# Patient Record
Sex: FEMALE | Race: OTHER | HISPANIC OR LATINO | Employment: FULL TIME | URBAN - METROPOLITAN AREA
[De-identification: names, ages, dates, MRNs, and addresses within clinical notes are randomized per-mention and may not be internally consistent; named-entity substitution may affect disease eponyms.]

---

## 2017-07-11 ENCOUNTER — OFFICE VISIT (OUTPATIENT)
Dept: URGENT CARE | Facility: MEDICAL CENTER | Age: 52
End: 2017-07-11
Payer: COMMERCIAL

## 2017-07-11 ENCOUNTER — APPOINTMENT (OUTPATIENT)
Dept: RADIOLOGY | Facility: MEDICAL CENTER | Age: 52
End: 2017-07-11
Payer: COMMERCIAL

## 2017-07-11 DIAGNOSIS — M25.519 PAIN IN SHOULDER: ICD-10-CM

## 2017-07-11 PROCEDURE — 99204 OFFICE O/P NEW MOD 45 MIN: CPT

## 2017-07-11 PROCEDURE — 73030 X-RAY EXAM OF SHOULDER: CPT

## 2018-01-15 NOTE — MISCELLANEOUS
Message  Message Free Text Note Form: I left a voice mail message for Karen regarding the referral she received from Dr Loco Jackson for the [facility] Program       Active Problems    1  Benign essential hypertension (401 1) (I10)   2  Change in bowel habits (787 99) (R19 4)   3  Diabetes mellitus type 2, uncontrolled (250 02) (E11 65)   4  Diabetes mellitus, type 2 (250 00) (E11 9)   5  Diabetic Peripheral Neuropathy (357 2)   6  Esophageal reflux (530 81) (K21 9)   7  Hyperlipidemia (272 4) (E78 5)   8  Obesity (278 00) (E66 9)   9  Upper respiratory infection (465 9) (J06 9)   10  Vitamin D deficiency (268 9) (E55 9)    Current Meds   1  Atorvastatin Calcium 20 MG Oral Tablet; TAKE 1 TABLET DAILY; Therapy: 51VCA8634 to (Evaluate:19Mar2014)  Requested for: 49SSX6243; Last   Rx:89Ihs5098 Ordered   2  BD Pen Needle Zhane U/F 32G X 4 MM Miscellaneous; Use 4/day; Therapy: 28Apr2016 to (Meng Perry)  Requested for: 28Apr2016; Last   Rx:28Apr2016 Ordered   3  Fluticasone Propionate 50 MCG/ACT Nasal Suspension; USE 2 SPRAYS IN EACH   NOSTRIL DAILY; Therapy: 02Apr2014 to (Evaluate:01Jun2014)  Requested for: 02Apr2014; Last   Rx:02Apr2014 Ordered   4  Lantus SoloStar 100 UNIT/ML Subcutaneous Solution Pen-injector; 30 units sq qhs; Therapy: 28Apr2016 to (Evaluate:58Zrd1945)  Requested for: 28Apr2016; Last   Rx:28Apr2016 Ordered   5  Lisinopril 40 MG Oral Tablet; TAKE 1 TABLET DAILY AS DIRECTED; Therapy: 04PTT8540 to (Porter Ventura)  Requested for: 40FUZ9450; Last   Rx:06Mar2013 Ordered   6  Metoprolol Tartrate 25 MG Oral Tablet; Take 1 tablet twice daily; Therapy: 97YFT2877 to (Evaluate:03Obs2761)  Requested for: 45RIG5351; Last   Rx:16Jun2014 Ordered   7  NovoLOG FlexPen 100 UNIT/ML Subcutaneous Solution Pen-injector; 8 units before   meals; Therapy: 28Apr2016 to (Evaluate:64Igz9096)  Requested for: 28Apr2016; Last   Rx:28Apr2016 Ordered   8   Omeprazole 20 MG Oral Capsule Delayed Release; TAKE 1 CAPSULE BY MOUTH ONE   TIME DAILY AS NEEDED FOR GERD; Therapy: 02Apr2014 to (Evaluate:07Jan2015)  Requested for: 09Sep2014; Last   Rx:32Pil9948 Ordered    Allergies    1  Penicillins    2   Shellfish    Signatures   Electronically signed by : Shamir Rasheed, ; May  3 2016  3:18PM EST                       (Author)

## 2018-02-22 ENCOUNTER — OFFICE VISIT (OUTPATIENT)
Dept: OBGYN CLINIC | Facility: CLINIC | Age: 53
End: 2018-02-22
Payer: COMMERCIAL

## 2018-02-22 VITALS — DIASTOLIC BLOOD PRESSURE: 70 MMHG | WEIGHT: 200 LBS | SYSTOLIC BLOOD PRESSURE: 117 MMHG | BODY MASS INDEX: 40.4 KG/M2

## 2018-02-22 DIAGNOSIS — N64.4 BREAST PAIN, LEFT: Primary | ICD-10-CM

## 2018-02-22 DIAGNOSIS — N63.20 MASS OF BREAST, LEFT: ICD-10-CM

## 2018-02-22 PROCEDURE — 99204 OFFICE O/P NEW MOD 45 MIN: CPT | Performed by: OBSTETRICS & GYNECOLOGY

## 2018-02-22 RX ORDER — ACETAMINOPHEN AND CODEINE PHOSPHATE 300; 30 MG/1; MG/1
2 TABLET ORAL EVERY 6 HOURS PRN
Qty: 30 TABLET | Refills: 0 | Status: SHIPPED | OUTPATIENT
Start: 2018-02-22 | End: 2019-09-11 | Stop reason: ALTCHOICE

## 2018-02-22 NOTE — PROGRESS NOTES
Assessment/Plan:   Left breast mass with pain and tenderness     Plan  1  Diagnostic ultrasound left breast  2  Screening mammography bilateral with CAD  3  Warm compresses 3-4 times daily   4  Tylenol with codeine, alternate with Motrin p r n   5   Trial of antibiotics cephalexin 500 mg twice daily  Follow-up in 2 weeks and p r n  Schedule annual exam      Diagnoses and all orders for this visit:    Breast pain, left  -     US breast left limited (diagnostic); Future  -     Mammo diagnostic bilateral w cad; Future  -     acetaminophen-codeine (TYLENOL #3) 300-30 mg per tablet; Take 2 tablets by mouth every 6 (six) hours as needed for moderate pain    Mass of breast, left       Past Medical history   x2  Insulin-dependent diabetes  Hypertension    Subjective:     Patient ID: Cora Verma is a 50 y o  female  HPI   82-year-old female  5 para  here with recent onset of left breast pain and swelling beneath her left Areola  Palpable lesion feel cystic 4 x 5 cm  No nipple discharge, no supraclavicular or axillary lymphadenopathy detected  Patient has not had a mammogram in over 2 years  Review of Systems   All other systems reviewed and are negative  Objective:     Physical Exam   Constitutional: She appears well-developed  HENT:   Head: Normocephalic  Eyes: Pupils are equal, round, and reactive to light  Pulmonary/Chest: Right breast exhibits no inverted nipple, no mass, no nipple discharge, no skin change and no tenderness  Left breast exhibits mass and tenderness  Left breast exhibits no inverted nipple, no nipple discharge and no skin change

## 2018-02-23 ENCOUNTER — TELEPHONE (OUTPATIENT)
Dept: OBGYN CLINIC | Facility: CLINIC | Age: 53
End: 2018-02-23

## 2018-02-23 DIAGNOSIS — N61.1 BREAST ABSCESS OF FEMALE: Primary | ICD-10-CM

## 2018-02-23 RX ORDER — CEPHALEXIN 500 MG/1
500 CAPSULE ORAL EVERY 12 HOURS SCHEDULED
Qty: 10 CAPSULE | Refills: 1 | Status: SHIPPED | OUTPATIENT
Start: 2018-02-23 | End: 2018-02-28

## 2018-03-13 ENCOUNTER — CONVERSION ENCOUNTER (OUTPATIENT)
Dept: MAMMOGRAPHY | Facility: CLINIC | Age: 53
End: 2018-03-13

## 2018-03-20 LAB
ABSOL LYMPHOCYTES (HISTORICAL): 3.1 K/UL (ref 0.5–4)
ALBUMIN SERPL BCP-MCNC: 4.1 G/DL (ref 3–5.2)
ALP SERPL-CCNC: 78 U/L (ref 43–122)
ALT SERPL W P-5'-P-CCNC: 40 U/L (ref 9–52)
AMORPHOUS MATERIAL (HISTORICAL): ABNORMAL
ANION GAP SERPL CALCULATED.3IONS-SCNC: 13 MMOL/L (ref 5–14)
AST SERPL W P-5'-P-CCNC: 28 U/L (ref 14–36)
BACTERIA UR QL AUTO: ABNORMAL
BASOPHILS # BLD AUTO: 0.1 K/UL (ref 0–0.1)
BASOPHILS # BLD AUTO: 1 % (ref 0–1)
BILIRUB SERPL-MCNC: 0.2 MG/DL
BILIRUB UR QL STRIP: NEGATIVE MG/DL
BUN SERPL-MCNC: 17 MG/DL (ref 5–25)
CALCIUM SERPL-MCNC: 9.9 MG/DL (ref 8.4–10.2)
CASTS/CASTS TYPE (HISTORICAL): ABNORMAL /LPF
CHLORIDE SERPL-SCNC: 105 MEQ/L (ref 97–108)
CHOLEST SERPL-MCNC: 177 MG/DL
CHOLEST/HDLC SERPL: 3.8 {RATIO}
CLARITY UR: ABNORMAL
CO2 SERPL-SCNC: 22 MMOL/L (ref 22–30)
COLOR UR: ABNORMAL
CREATINE, SERUM (HISTORICAL): 0.7 MG/DL (ref 0.6–1.2)
CRYSTAL TYPE (HISTORICAL): ABNORMAL /HPF
DEPRECATED RDW RBC AUTO: 15.1 %
EGFR (HISTORICAL): >60 ML/MIN/1.73 M2
EOSINOPHIL # BLD AUTO: 0.5 K/UL (ref 0–0.4)
EOSINOPHIL NFR BLD AUTO: 6 % (ref 0–6)
EST. AVERAGE GLUCOSE BLD GHB EST-MCNC: 151 MG/DL
GLUCOSE FASTING (HISTORICAL): 116 MG/DL (ref 70–99)
GLUCOSE UR STRIP-MCNC: 250 MG/DL
HBA1C MFR BLD HPLC: 6.9 %
HCT VFR BLD AUTO: 40.3 % (ref 36–46)
HDLC SERPL-MCNC: 46 MG/DL
HGB BLD-MCNC: 13.4 G/DL (ref 12–16)
HGB UR QL STRIP.AUTO: NEGATIVE
KETONES UR STRIP-MCNC: NEGATIVE MG/DL
LDL/HDL RATIO (HISTORICAL): 1.8
LDLC SERPL CALC-MCNC: 81 MG/DL
LEUKOCYTE ESTERASE UR QL STRIP: ABNORMAL
LYMPHOCYTES NFR BLD AUTO: 33 % (ref 25–45)
MCH RBC QN AUTO: 26.6 PG (ref 26–34)
MCHC RBC AUTO-ENTMCNC: 33.3 % (ref 31–36)
MCV RBC AUTO: 80 FL (ref 80–100)
MONOCYTES # BLD AUTO: 0.6 K/UL (ref 0.2–0.9)
MONOCYTES NFR BLD AUTO: 6 % (ref 1–10)
MUCOUS THREADS URNS QL MICRO: ABNORMAL
NEUTROPHILS ABS COUNT (HISTORICAL): 5.1 K/UL (ref 1.8–7.8)
NEUTS SEG NFR BLD AUTO: 54 % (ref 45–65)
NITRITE UR QL STRIP: NEGATIVE
NON-SQ EPI CELLS URNS QL MICRO: ABNORMAL
OTHER STN SPEC: ABNORMAL
PH UR STRIP.AUTO: 5 [PH] (ref 4.5–8)
PLATELET # BLD AUTO: 409 K/MCL (ref 150–450)
POTASSIUM SERPL-SCNC: 4.4 MEQ/L (ref 3.6–5)
PROT UR STRIP-MCNC: 30 MG/DL
RBC # BLD AUTO: 5.05 M/MCL (ref 4–5.2)
RBC #/AREA URNS AUTO: ABNORMAL /HPF
SODIUM SERPL-SCNC: 140 MEQ/L (ref 137–147)
SP GR UR STRIP.AUTO: 1.02 (ref 1–1.04)
TOTAL PROTEIN (HISTORICAL): 7.5 G/DL (ref 5.9–8.4)
TRIGL SERPL-MCNC: 251 MG/DL
TSH SERPL DL<=0.05 MIU/L-ACNC: 3.52 UIU/ML (ref 0.47–4.68)
URIC ACID (HISTORICAL): 7.7 MG/DL (ref 2.5–7.5)
UROBILINOGEN UR QL STRIP.AUTO: NEGATIVE MG/DL (ref 0–1)
VIT B12 SERPL-MCNC: 505 PG/ML (ref 239–931)
VITAMIN D25-HYDROXY (HISTORICAL): 21.7 NG/ML (ref 30–100)
VLDLC SERPL CALC-MCNC: 50 MG/DL (ref 0–40)
WBC # BLD AUTO: 9.4 K/MCL (ref 4.5–11)
WBC #/AREA URNS AUTO: 6 /HPF

## 2018-05-04 DIAGNOSIS — N61.0 MASTITIS: Primary | ICD-10-CM

## 2018-05-04 NOTE — PROGRESS NOTES
Please notify patient ultrasound of left breast is stable, no suspicious findings, however recommended repeat diagnostic ultrasound of left breast in 6 months  Prescription ordered

## 2018-05-30 LAB
ABSOL LYMPHOCYTES (HISTORICAL): 3.3 K/UL (ref 0.5–4)
ALBUMIN SERPL BCP-MCNC: 4 G/DL (ref 3–5.2)
ALP SERPL-CCNC: 75 U/L (ref 43–122)
ALT SERPL W P-5'-P-CCNC: 23 U/L (ref 9–52)
ANION GAP SERPL CALCULATED.3IONS-SCNC: 12 MMOL/L (ref 5–14)
AST SERPL W P-5'-P-CCNC: 18 U/L (ref 14–36)
BASOPHILS # BLD AUTO: 0.1 K/UL (ref 0–0.1)
BASOPHILS # BLD AUTO: 1 % (ref 0–1)
BILIRUB SERPL-MCNC: 0.3 MG/DL
BUN SERPL-MCNC: 18 MG/DL (ref 5–25)
CALCIUM SERPL-MCNC: 9.4 MG/DL (ref 8.4–10.2)
CHLORIDE SERPL-SCNC: 109 MEQ/L (ref 97–108)
CHOLEST SERPL-MCNC: 148 MG/DL
CHOLEST/HDLC SERPL: 3.4 {RATIO}
CO2 SERPL-SCNC: 20 MMOL/L (ref 22–30)
CREATINE, SERUM (HISTORICAL): 0.89 MG/DL (ref 0.6–1.2)
CREATININE, RANDOM URINE (HISTORICAL): 273.4 MG/DL (ref 50–200)
DEPRECATED RDW RBC AUTO: 15.2 %
EGFR (HISTORICAL): >60 ML/MIN/1.73 M2
EOSINOPHIL # BLD AUTO: 0.5 K/UL (ref 0–0.4)
EOSINOPHIL NFR BLD AUTO: 6 % (ref 0–6)
EST. AVERAGE GLUCOSE BLD GHB EST-MCNC: 146 MG/DL
GLUCOSE FASTING (HISTORICAL): 120 MG/DL (ref 70–99)
HBA1C MFR BLD HPLC: 6.7 %
HCT VFR BLD AUTO: 41.6 % (ref 36–46)
HDLC SERPL-MCNC: 43 MG/DL
HGB BLD-MCNC: 13.4 G/DL (ref 12–16)
LDL/HDL RATIO (HISTORICAL): 1.5
LDLC SERPL CALC-MCNC: 64 MG/DL
LYMPHOCYTES NFR BLD AUTO: 33 % (ref 25–45)
MCH RBC QN AUTO: 26.9 PG (ref 26–34)
MCHC RBC AUTO-ENTMCNC: 32.3 % (ref 31–36)
MCV RBC AUTO: 83 FL (ref 80–100)
MICROALBUM.,U,RANDOM (HISTORICAL): 10.5 MG/DL
MICROALBUMIN/CREATININE RATIO (HISTORICAL): 38.4
MONOCYTES # BLD AUTO: 0.7 K/UL (ref 0.2–0.9)
MONOCYTES NFR BLD AUTO: 7 % (ref 1–10)
NEUTROPHILS ABS COUNT (HISTORICAL): 5.2 K/UL (ref 1.8–7.8)
NEUTS SEG NFR BLD AUTO: 53 % (ref 45–65)
PLATELET # BLD AUTO: 413 K/MCL (ref 150–450)
POTASSIUM SERPL-SCNC: 4.6 MEQ/L (ref 3.6–5)
RBC # BLD AUTO: 5.01 M/MCL (ref 4–5.2)
SODIUM SERPL-SCNC: 142 MEQ/L (ref 137–147)
TOTAL PROTEIN (HISTORICAL): 7.3 G/DL (ref 5.9–8.4)
TRIGL SERPL-MCNC: 204 MG/DL
TSH SERPL DL<=0.05 MIU/L-ACNC: 4.02 UIU/ML (ref 0.47–4.68)
URIC ACID (HISTORICAL): 6.1 MG/DL (ref 2.5–7.5)
VIT B12 SERPL-MCNC: >1000 PG/ML (ref 239–931)
VITAMIN D25-HYDROXY (HISTORICAL): 30.4 NG/ML (ref 30–100)
VLDLC SERPL CALC-MCNC: 41 MG/DL (ref 0–40)
WBC # BLD AUTO: 9.8 K/MCL (ref 4.5–11)

## 2018-07-24 ENCOUNTER — APPOINTMENT (OUTPATIENT)
Dept: LAB | Facility: HOSPITAL | Age: 53
End: 2018-07-24
Attending: INTERNAL MEDICINE
Payer: COMMERCIAL

## 2018-07-24 ENCOUNTER — TRANSCRIBE ORDERS (OUTPATIENT)
Dept: ADMINISTRATIVE | Facility: HOSPITAL | Age: 53
End: 2018-07-24

## 2018-07-24 DIAGNOSIS — E78.5 DYSLIPIDEMIA: ICD-10-CM

## 2018-07-24 DIAGNOSIS — G62.9 NEUROPATHY: ICD-10-CM

## 2018-07-24 DIAGNOSIS — E11.29 TYPE 2 DIABETES MELLITUS WITH OTHER DIABETIC KIDNEY COMPLICATION (HCC): ICD-10-CM

## 2018-07-24 DIAGNOSIS — E11.29 TYPE 2 DIABETES MELLITUS WITH OTHER DIABETIC KIDNEY COMPLICATION (HCC): Primary | ICD-10-CM

## 2018-07-24 DIAGNOSIS — I15.2 HYPERTENSION DUE TO ENDOCRINE DISORDER: ICD-10-CM

## 2018-07-24 LAB
ALBUMIN SERPL BCP-MCNC: 4.2 G/DL (ref 3–5.2)
ALP SERPL-CCNC: 68 U/L (ref 43–122)
ALT SERPL W P-5'-P-CCNC: 23 U/L (ref 9–52)
ANION GAP SERPL CALCULATED.3IONS-SCNC: 11 MMOL/L (ref 5–14)
AST SERPL W P-5'-P-CCNC: 20 U/L (ref 14–36)
BILIRUB SERPL-MCNC: 0.3 MG/DL
BUN SERPL-MCNC: 22 MG/DL (ref 5–25)
CALCIUM SERPL-MCNC: 9.3 MG/DL (ref 8.4–10.2)
CHLORIDE SERPL-SCNC: 108 MMOL/L (ref 97–108)
CO2 SERPL-SCNC: 22 MMOL/L (ref 22–30)
CREAT SERPL-MCNC: 0.88 MG/DL (ref 0.6–1.2)
EST. AVERAGE GLUCOSE BLD GHB EST-MCNC: 134 MG/DL
GFR SERPL CREATININE-BSD FRML MDRD: 77 ML/MIN/1.73SQ M
GLUCOSE P FAST SERPL-MCNC: 96 MG/DL (ref 70–99)
HBA1C MFR BLD: 6.3 % (ref 4.2–6.3)
POTASSIUM SERPL-SCNC: 4.5 MMOL/L (ref 3.6–5)
PROT SERPL-MCNC: 8.1 G/DL (ref 5.9–8.4)
SODIUM SERPL-SCNC: 141 MMOL/L (ref 137–147)

## 2018-07-24 PROCEDURE — 36415 COLL VENOUS BLD VENIPUNCTURE: CPT

## 2018-07-24 PROCEDURE — 83036 HEMOGLOBIN GLYCOSYLATED A1C: CPT

## 2018-07-24 PROCEDURE — 80053 COMPREHEN METABOLIC PANEL: CPT

## 2018-09-19 ENCOUNTER — TELEPHONE (OUTPATIENT)
Dept: OBGYN CLINIC | Facility: CLINIC | Age: 53
End: 2018-09-19

## 2018-09-19 NOTE — TELEPHONE ENCOUNTER
----- Message from Shanna Mitchell DO sent at 9/19/2018  9:56 AM EDT -----  Regarding: Mastitis  This patient was seen  6 months ago for this initial problem if it has recurred again she does need to be seen in the office at her earliest convenience prior to being treated

## 2018-09-19 NOTE — TELEPHONE ENCOUNTER
Patient feels as if she has mastitis again and would like to know if you would like to see her or if you can just send in a script to [facility] in [facility]

## 2018-09-20 ENCOUNTER — OFFICE VISIT (OUTPATIENT)
Dept: OBGYN CLINIC | Facility: CLINIC | Age: 53
End: 2018-09-20
Payer: COMMERCIAL

## 2018-09-20 VITALS
BODY MASS INDEX: 37.29 KG/M2 | WEIGHT: 185 LBS | SYSTOLIC BLOOD PRESSURE: 132 MMHG | HEIGHT: 59 IN | DIASTOLIC BLOOD PRESSURE: 80 MMHG

## 2018-09-20 DIAGNOSIS — N61.1 ABSCESS OF RIGHT BREAST: Primary | ICD-10-CM

## 2018-09-20 PROCEDURE — 99214 OFFICE O/P EST MOD 30 MIN: CPT | Performed by: OBSTETRICS & GYNECOLOGY

## 2018-09-20 RX ORDER — AMITRIPTYLINE HYDROCHLORIDE 10 MG/1
TABLET, FILM COATED ORAL EVERY 12 HOURS
COMMUNITY
Start: 2016-02-26 | End: 2019-09-11 | Stop reason: SDDI

## 2018-09-20 RX ORDER — METOPROLOL TARTRATE 50 MG/1
TABLET, FILM COATED ORAL EVERY 12 HOURS
COMMUNITY
Start: 2016-04-20 | End: 2018-09-20

## 2018-09-20 RX ORDER — LISINOPRIL 20 MG/1
TABLET ORAL EVERY 24 HOURS
COMMUNITY
Start: 2015-08-05 | End: 2018-09-20

## 2018-09-20 RX ORDER — MOMETASONE FUROATE 50 UG/1
SPRAY, METERED NASAL
COMMUNITY
Start: 2015-11-09 | End: 2018-09-20

## 2018-09-20 RX ORDER — INSULIN GLARGINE 100 [IU]/ML
INJECTION, SOLUTION SUBCUTANEOUS
COMMUNITY
Start: 2015-08-05 | End: 2019-09-11

## 2018-09-20 RX ORDER — GABAPENTIN 100 MG/1
CAPSULE ORAL EVERY 12 HOURS
COMMUNITY
Start: 2016-05-24 | End: 2019-09-11 | Stop reason: SDDI

## 2018-09-20 RX ORDER — GABAPENTIN 300 MG/1
CAPSULE ORAL 3 TIMES DAILY
COMMUNITY
End: 2018-09-20

## 2018-09-20 RX ORDER — LORATADINE AND PSEUDOEPHEDRINE 10; 240 MG/1; MG/1
TABLET, EXTENDED RELEASE ORAL EVERY 24 HOURS
COMMUNITY
Start: 2016-01-14 | End: 2018-09-20

## 2018-09-20 RX ORDER — CEPHALEXIN 250 MG/1
250 CAPSULE ORAL 4 TIMES DAILY
Qty: 40 CAPSULE | Refills: 0 | Status: SHIPPED | OUTPATIENT
Start: 2018-09-20 | End: 2018-09-30

## 2018-09-20 RX ORDER — PREGABALIN 75 MG/1
CAPSULE ORAL
COMMUNITY
Start: 2016-02-26 | End: 2018-09-20

## 2018-09-20 RX ORDER — DULOXETIN HYDROCHLORIDE 30 MG/1
CAPSULE, DELAYED RELEASE ORAL
COMMUNITY
Start: 2016-03-31 | End: 2018-09-20

## 2018-09-20 NOTE — PROGRESS NOTES
Assessment/Plan:      Diagnoses and all orders for this visit:    Abscess of right breast  Comments: To start Keflex immediately along with warm compresses  Patient to follow up this coming Monday or Tuesday  To call the  imediately for worsening  Orders:  -     US breast right limited (diagnostic); Future  -     cephalexin (KEFLEX) 250 mg capsule; Take 1 capsule (250 mg total) by mouth 4 (four) times a day for 10 days    Other orders  -     Insulin Pen Needle 32G X 5 MM MISC; Inject at bedtime   -     Discontinue: mometasone (NASONEX) 50 mcg/act nasal spray; spray 2 spray by intranasal route  every day in each nostril  -     Discontinue: pregabalin (LYRICA) 75 mg capsule; take 1 capsule by oral route in am 2 capsules at bedtime  -     Discontinue: gabapentin (NEURONTIN) 300 mg capsule; 3 (three) times a day  -     gabapentin (NEURONTIN) 100 mg capsule; Every 12 hours  -     Ergocalciferol 2000 units CAPS; take 1 capsule by oral route  every week  -     Discontinue: DULoxetine (CYMBALTA) 30 mg delayed release capsule; take 2 capsule by oral route at bedtime or as directed  -     Discontinue: loratadine-pseudoephedrine (CLARITIN-D 24 HOUR)  mg per 24 hr tablet; every 24 hours  -     Exenatide ER (BYDUREON) 2 MG PEN; inject 0 65 milliliter by subcutaneous route  every 7 days in the abdomen, thighs, or outer area of upper arm rotating injectionsites  -     amitriptyline (ELAVIL) 10 mg tablet; Every 12 hours  -     insulin glargine (LANTUS) 100 units/mL subcutaneous injection; inject by subcutaneous route 25 units at bedtime  -     Discontinue: lisinopril (ZESTRIL) 20 mg tablet; every 24 hours  -     Discontinue: metoprolol tartrate (LOPRESSOR) 50 mg tablet; Every 12 hours          Subjective:     Patient ID: Paulina Caurso is a 48 y o  female  This patient is seen with a several day history of pain and swelling of the right breast in addition to a low-grade fever (99)     The patient last year did have a breast abscess of the left breast   She feels she has the same on her right side  She does have diabetes which is under fairly tight control  She denies any history of trauma or skin changes  Currently there is no breast discharge  Review of Systems   Constitutional: Positive for fever  Negative for chills  Respiratory: Negative  Cardiovascular: Negative  Gastrointestinal: Negative  Negative for abdominal pain, blood in stool, constipation, diarrhea and nausea  Genitourinary: Negative  Negative for difficulty urinating, dysuria, flank pain, hematuria and urgency  Skin: Negative  Negative for rash and wound  Neurological: Negative  Objective:     Physical Exam  the breasts are symmetric but the right definitely shows erythema from the 9 to 1 o'clock area  Palpation of the right breast discloses a large cyst-like area underlying the area of erythema encompassing an area roughly the size of a plum  The area is painful to palpation  There are no other overlying skin changes or masses  The right axillary area and right supraclavicular area are free of any lymphadenopathy  Palpation an exam of the left breast fails to show any abnormalities

## 2018-09-21 ENCOUNTER — TELEPHONE (OUTPATIENT)
Dept: OBGYN CLINIC | Facility: CLINIC | Age: 53
End: 2018-09-21

## 2018-09-21 NOTE — TELEPHONE ENCOUNTER
----- Message from Vivek Roa MD sent at 9/20/2018  4:50 PM EDT -----  Regarding: Patient's right breast abscess  Please have someone touch base with this person late Friday or Saturday to see how they are doing

## 2018-09-26 ENCOUNTER — TELEPHONE (OUTPATIENT)
Dept: OBGYN CLINIC | Facility: CLINIC | Age: 53
End: 2018-09-26

## 2018-09-26 NOTE — TELEPHONE ENCOUNTER
----- Message from Lilia Durand MD sent at 9/20/2018  4:50 PM EDT -----  Regarding: Patient's right breast abscess  Please have someone touch base with this person late Friday or Saturday to see how they are doing

## 2018-10-20 ENCOUNTER — TRANSCRIBE ORDERS (OUTPATIENT)
Dept: ADMINISTRATIVE | Facility: HOSPITAL | Age: 53
End: 2018-10-20

## 2018-10-23 ENCOUNTER — TRANSCRIBE ORDERS (OUTPATIENT)
Dept: ADMINISTRATIVE | Facility: HOSPITAL | Age: 53
End: 2018-10-23

## 2018-10-23 DIAGNOSIS — E53.8 BIOTIN-(PROPIONYL-COA-CARBOXYLASE) LIGASE DEFICIENCY: ICD-10-CM

## 2018-10-23 DIAGNOSIS — E11.22 TYPE 2 DIABETES MELLITUS WITH DIABETIC CHRONIC KIDNEY DISEASE, UNSPECIFIED CKD STAGE, UNSPECIFIED WHETHER LONG TERM INSULIN USE (HCC): ICD-10-CM

## 2018-10-23 DIAGNOSIS — E55.9 AVITAMINOSIS D: Primary | ICD-10-CM

## 2018-10-25 ENCOUNTER — APPOINTMENT (OUTPATIENT)
Dept: LAB | Facility: HOSPITAL | Age: 53
End: 2018-10-25
Attending: INTERNAL MEDICINE
Payer: COMMERCIAL

## 2018-10-25 DIAGNOSIS — E53.8 BIOTIN-(PROPIONYL-COA-CARBOXYLASE) LIGASE DEFICIENCY: ICD-10-CM

## 2018-10-25 DIAGNOSIS — E11.22 TYPE 2 DIABETES MELLITUS WITH DIABETIC CHRONIC KIDNEY DISEASE, UNSPECIFIED CKD STAGE, UNSPECIFIED WHETHER LONG TERM INSULIN USE (HCC): ICD-10-CM

## 2018-10-25 DIAGNOSIS — E55.9 AVITAMINOSIS D: ICD-10-CM

## 2018-10-25 LAB
25(OH)D3 SERPL-MCNC: 52.8 NG/ML (ref 30–100)
ALBUMIN SERPL BCP-MCNC: 4.3 G/DL (ref 3–5.2)
ALP SERPL-CCNC: 90 U/L (ref 43–122)
ALT SERPL W P-5'-P-CCNC: 20 U/L (ref 9–52)
ANION GAP SERPL CALCULATED.3IONS-SCNC: 12 MMOL/L (ref 5–14)
AST SERPL W P-5'-P-CCNC: 23 U/L (ref 14–36)
BACTERIA UR QL AUTO: ABNORMAL /HPF
BASOPHILS # BLD AUTO: 0 THOUSANDS/ΜL (ref 0–0.1)
BASOPHILS NFR BLD AUTO: 1 % (ref 0–1)
BILIRUB SERPL-MCNC: 0.4 MG/DL
BILIRUB UR QL STRIP: NEGATIVE
BUN SERPL-MCNC: 16 MG/DL (ref 5–25)
CALCIUM SERPL-MCNC: 10 MG/DL (ref 8.4–10.2)
CHLORIDE SERPL-SCNC: 107 MMOL/L (ref 97–108)
CHOLEST SERPL-MCNC: 136 MG/DL
CLARITY UR: ABNORMAL
CO2 SERPL-SCNC: 22 MMOL/L (ref 22–30)
COLOR UR: ABNORMAL
CREAT SERPL-MCNC: 1.01 MG/DL (ref 0.6–1.2)
EOSINOPHIL # BLD AUTO: 0.5 THOUSAND/ΜL (ref 0–0.4)
EOSINOPHIL NFR BLD AUTO: 7 % (ref 0–6)
ERYTHROCYTE [DISTWIDTH] IN BLOOD BY AUTOMATED COUNT: 14.4 %
EST. AVERAGE GLUCOSE BLD GHB EST-MCNC: 140 MG/DL
GFR SERPL CREATININE-BSD FRML MDRD: 65 ML/MIN/1.73SQ M
GLUCOSE P FAST SERPL-MCNC: 128 MG/DL (ref 70–99)
GLUCOSE UR STRIP-MCNC: ABNORMAL MG/DL
HBA1C MFR BLD: 6.5 % (ref 4.2–6.3)
HCT VFR BLD AUTO: 40 % (ref 36–46)
HDLC SERPL-MCNC: 38 MG/DL (ref 40–59)
HGB BLD-MCNC: 13.4 G/DL (ref 12–16)
HGB UR QL STRIP.AUTO: NEGATIVE
KETONES UR STRIP-MCNC: NEGATIVE MG/DL
LDLC SERPL CALC-MCNC: 63 MG/DL
LEUKOCYTE ESTERASE UR QL STRIP: 25
LYMPHOCYTES # BLD AUTO: 2.5 THOUSANDS/ΜL (ref 0.5–4)
LYMPHOCYTES NFR BLD AUTO: 35 % (ref 20–50)
MCH RBC QN AUTO: 27.8 PG (ref 26–34)
MCHC RBC AUTO-ENTMCNC: 33.4 G/DL (ref 31–36)
MCV RBC AUTO: 83 FL (ref 80–100)
MONOCYTES # BLD AUTO: 0.5 THOUSAND/ΜL (ref 0.2–0.9)
MONOCYTES NFR BLD AUTO: 6 % (ref 1–10)
NEUTROPHILS # BLD AUTO: 3.7 THOUSANDS/ΜL (ref 1.8–7.8)
NEUTS SEG NFR BLD AUTO: 51 % (ref 45–65)
NITRITE UR QL STRIP: NEGATIVE
NON-SQ EPI CELLS URNS QL MICRO: ABNORMAL /HPF
NONHDLC SERPL-MCNC: 98 MG/DL
PH UR STRIP.AUTO: 5 [PH] (ref 4.5–8)
PLATELET # BLD AUTO: 443 THOUSANDS/UL (ref 150–450)
PMV BLD AUTO: 6.7 FL (ref 8.9–12.7)
POTASSIUM SERPL-SCNC: 4.1 MMOL/L (ref 3.6–5)
PROLACTIN SERPL-MCNC: 62.8 NG/ML
PROT SERPL-MCNC: 7.9 G/DL (ref 5.9–8.4)
PROT UR STRIP-MCNC: ABNORMAL MG/DL
RBC # BLD AUTO: 4.81 MILLION/UL (ref 4–5.2)
RBC #/AREA URNS AUTO: ABNORMAL /HPF
SODIUM SERPL-SCNC: 141 MMOL/L (ref 137–147)
SP GR UR STRIP.AUTO: 1.02 (ref 1–1.04)
TRIGL SERPL-MCNC: 174 MG/DL
TSH SERPL DL<=0.05 MIU/L-ACNC: 2.31 UIU/ML (ref 0.47–4.68)
URATE SERPL-MCNC: 5.4 MG/DL (ref 2.7–7.5)
UROBILINOGEN UA: NEGATIVE MG/DL
VIT B12 SERPL-MCNC: 1019 PG/ML (ref 100–900)
WBC # BLD AUTO: 7.2 THOUSAND/UL (ref 4.5–11)
WBC #/AREA URNS AUTO: ABNORMAL /HPF

## 2018-10-25 PROCEDURE — 84550 ASSAY OF BLOOD/URIC ACID: CPT

## 2018-10-25 PROCEDURE — 84443 ASSAY THYROID STIM HORMONE: CPT

## 2018-10-25 PROCEDURE — 81003 URINALYSIS AUTO W/O SCOPE: CPT | Performed by: INTERNAL MEDICINE

## 2018-10-25 PROCEDURE — 83036 HEMOGLOBIN GLYCOSYLATED A1C: CPT

## 2018-10-25 PROCEDURE — 80053 COMPREHEN METABOLIC PANEL: CPT

## 2018-10-25 PROCEDURE — 84146 ASSAY OF PROLACTIN: CPT

## 2018-10-25 PROCEDURE — 36415 COLL VENOUS BLD VENIPUNCTURE: CPT

## 2018-10-25 PROCEDURE — 80061 LIPID PANEL: CPT

## 2018-10-25 PROCEDURE — 85025 COMPLETE CBC W/AUTO DIFF WBC: CPT

## 2018-10-25 PROCEDURE — 81001 URINALYSIS AUTO W/SCOPE: CPT | Performed by: INTERNAL MEDICINE

## 2018-10-25 PROCEDURE — 82306 VITAMIN D 25 HYDROXY: CPT

## 2018-10-25 PROCEDURE — 82607 VITAMIN B-12: CPT

## 2018-11-26 ENCOUNTER — HOSPITAL ENCOUNTER (OUTPATIENT)
Dept: BONE DENSITY | Facility: CLINIC | Age: 53
Discharge: HOME | End: 2018-11-26
Payer: COMMERCIAL

## 2018-11-26 DIAGNOSIS — E55.9 AVITAMINOSIS D: ICD-10-CM

## 2018-11-26 PROCEDURE — 77080 DXA BONE DENSITY AXIAL: CPT

## 2019-03-26 ENCOUNTER — APPOINTMENT (OUTPATIENT)
Dept: LAB | Facility: HOSPITAL | Age: 54
End: 2019-03-26
Attending: INTERNAL MEDICINE
Payer: COMMERCIAL

## 2019-03-26 ENCOUNTER — TRANSCRIBE ORDERS (OUTPATIENT)
Dept: ADMINISTRATIVE | Facility: HOSPITAL | Age: 54
End: 2019-03-26

## 2019-03-26 DIAGNOSIS — IMO0002 TYPE II DIABETES MELLITUS WITH RENAL MANIFESTATIONS, UNCONTROLLED: ICD-10-CM

## 2019-03-26 DIAGNOSIS — I10 ESSENTIAL HYPERTENSION, BENIGN: ICD-10-CM

## 2019-03-26 DIAGNOSIS — E53.8 VITAMIN B12 DEFICIENCY: ICD-10-CM

## 2019-03-26 DIAGNOSIS — IMO0002 TYPE II DIABETES MELLITUS WITH RENAL MANIFESTATIONS, UNCONTROLLED: Primary | ICD-10-CM

## 2019-03-26 DIAGNOSIS — E66.9 OBESITY, UNSPECIFIED CLASSIFICATION, UNSPECIFIED OBESITY TYPE, UNSPECIFIED WHETHER SERIOUS COMORBIDITY PRESENT: ICD-10-CM

## 2019-03-26 LAB
25(OH)D3 SERPL-MCNC: 41.4 NG/ML (ref 30–100)
ALBUMIN SERPL BCP-MCNC: 4.5 G/DL (ref 3–5.2)
ALP SERPL-CCNC: 110 U/L (ref 43–122)
ALT SERPL W P-5'-P-CCNC: 21 U/L (ref 9–52)
ANION GAP SERPL CALCULATED.3IONS-SCNC: 11 MMOL/L (ref 5–14)
AST SERPL W P-5'-P-CCNC: 24 U/L (ref 14–36)
BACTERIA UR QL AUTO: ABNORMAL /HPF
BASOPHILS # BLD AUTO: 0.1 THOUSANDS/ΜL (ref 0–0.1)
BASOPHILS NFR BLD AUTO: 1 % (ref 0–1)
BILIRUB SERPL-MCNC: 0.3 MG/DL
BILIRUB UR QL STRIP: ABNORMAL
BUN SERPL-MCNC: 17 MG/DL (ref 5–25)
CALCIUM ALBUM COR SERPL-MCNC: 9.9 MG/DL (ref 8.3–10.1)
CALCIUM SERPL-MCNC: 10.3 MG/DL (ref 8.4–10.2)
CHLORIDE SERPL-SCNC: 101 MMOL/L (ref 97–108)
CHOLEST SERPL-MCNC: 247 MG/DL
CLARITY UR: ABNORMAL
CO2 SERPL-SCNC: 26 MMOL/L (ref 22–30)
COLOR UR: ABNORMAL
CREAT SERPL-MCNC: 0.75 MG/DL (ref 0.6–1.2)
CREAT UR-MCNC: 413 MG/DL
EOSINOPHIL # BLD AUTO: 0.4 THOUSAND/ΜL (ref 0–0.4)
EOSINOPHIL NFR BLD AUTO: 4 % (ref 0–6)
ERYTHROCYTE [DISTWIDTH] IN BLOOD BY AUTOMATED COUNT: 14.3 %
EST. AVERAGE GLUCOSE BLD GHB EST-MCNC: 183 MG/DL
GFR SERPL CREATININE-BSD FRML MDRD: 93 ML/MIN/1.73SQ M
GLUCOSE P FAST SERPL-MCNC: 145 MG/DL (ref 70–99)
GLUCOSE UR STRIP-MCNC: NEGATIVE MG/DL
HBA1C MFR BLD: 8 % (ref 4.2–6.3)
HCT VFR BLD AUTO: 42.6 % (ref 36–46)
HDLC SERPL-MCNC: 44 MG/DL (ref 40–59)
HGB BLD-MCNC: 14.1 G/DL (ref 12–16)
HGB UR QL STRIP.AUTO: 250
KETONES UR STRIP-MCNC: ABNORMAL MG/DL
LDLC SERPL CALC-MCNC: 147 MG/DL
LEUKOCYTE ESTERASE UR QL STRIP: 25
LYMPHOCYTES # BLD AUTO: 3 THOUSANDS/ΜL (ref 0.5–4)
LYMPHOCYTES NFR BLD AUTO: 29 % (ref 25–45)
MCH RBC QN AUTO: 27 PG (ref 26–34)
MCHC RBC AUTO-ENTMCNC: 33 G/DL (ref 31–36)
MCV RBC AUTO: 82 FL (ref 80–100)
MICROALBUMIN UR-MCNC: 1490 MG/L (ref 0–20)
MICROALBUMIN/CREAT 24H UR: 361 MG/G CREATININE (ref 0–30)
MONOCYTES # BLD AUTO: 0.8 THOUSAND/ΜL (ref 0.2–0.9)
MONOCYTES NFR BLD AUTO: 8 % (ref 1–10)
MUCOUS THREADS UR QL AUTO: ABNORMAL
NEUTROPHILS # BLD AUTO: 6.1 THOUSANDS/ΜL (ref 1.8–7.8)
NEUTS SEG NFR BLD AUTO: 59 % (ref 45–65)
NITRITE UR QL STRIP: NEGATIVE
NON-SQ EPI CELLS URNS QL MICRO: ABNORMAL /HPF
NONHDLC SERPL-MCNC: 203 MG/DL
OTHER STN SPEC: ABNORMAL
PH UR STRIP.AUTO: 5 [PH]
PLATELET # BLD AUTO: 474 THOUSANDS/UL (ref 150–450)
PMV BLD AUTO: 6.4 FL (ref 8.9–12.7)
POTASSIUM SERPL-SCNC: 3.5 MMOL/L (ref 3.6–5)
PROT SERPL-MCNC: 8.6 G/DL (ref 5.9–8.4)
PROT UR STRIP-MCNC: ABNORMAL MG/DL
RBC # BLD AUTO: 5.2 MILLION/UL (ref 4–5.2)
RBC #/AREA URNS AUTO: ABNORMAL /HPF
SODIUM SERPL-SCNC: 138 MMOL/L (ref 137–147)
SP GR UR STRIP.AUTO: 1.03 (ref 1–1.04)
TRIGL SERPL-MCNC: 278 MG/DL
TSH SERPL DL<=0.05 MIU/L-ACNC: 5.34 UIU/ML (ref 0.47–4.68)
URATE SERPL-MCNC: 5.9 MG/DL (ref 2.7–7.5)
UROBILINOGEN UA: 1 MG/DL
VIT B12 SERPL-MCNC: 647 PG/ML (ref 100–900)
WBC # BLD AUTO: 10.5 THOUSAND/UL (ref 4.5–11)
WBC #/AREA URNS AUTO: ABNORMAL /HPF

## 2019-03-26 PROCEDURE — 87086 URINE CULTURE/COLONY COUNT: CPT | Performed by: INTERNAL MEDICINE

## 2019-03-26 PROCEDURE — 84550 ASSAY OF BLOOD/URIC ACID: CPT

## 2019-03-26 PROCEDURE — 85025 COMPLETE CBC W/AUTO DIFF WBC: CPT

## 2019-03-26 PROCEDURE — 81001 URINALYSIS AUTO W/SCOPE: CPT | Performed by: INTERNAL MEDICINE

## 2019-03-26 PROCEDURE — 80053 COMPREHEN METABOLIC PANEL: CPT

## 2019-03-26 PROCEDURE — 82306 VITAMIN D 25 HYDROXY: CPT

## 2019-03-26 PROCEDURE — 82043 UR ALBUMIN QUANTITATIVE: CPT | Performed by: INTERNAL MEDICINE

## 2019-03-26 PROCEDURE — 83036 HEMOGLOBIN GLYCOSYLATED A1C: CPT | Performed by: INTERNAL MEDICINE

## 2019-03-26 PROCEDURE — 81003 URINALYSIS AUTO W/O SCOPE: CPT | Performed by: INTERNAL MEDICINE

## 2019-03-26 PROCEDURE — 82607 VITAMIN B-12: CPT

## 2019-03-26 PROCEDURE — 84443 ASSAY THYROID STIM HORMONE: CPT

## 2019-03-26 PROCEDURE — 80061 LIPID PANEL: CPT

## 2019-03-26 PROCEDURE — 36415 COLL VENOUS BLD VENIPUNCTURE: CPT | Performed by: INTERNAL MEDICINE

## 2019-03-26 PROCEDURE — 82570 ASSAY OF URINE CREATININE: CPT | Performed by: INTERNAL MEDICINE

## 2019-03-28 LAB — BACTERIA UR CULT: NORMAL

## 2019-04-01 ENCOUNTER — PROCEDURE VISIT (OUTPATIENT)
Dept: FAMILY MEDICINE CLINIC | Facility: CLINIC | Age: 54
End: 2019-04-01

## 2019-04-01 VITALS
TEMPERATURE: 97.9 F | HEIGHT: 59 IN | SYSTOLIC BLOOD PRESSURE: 110 MMHG | RESPIRATION RATE: 18 BRPM | HEART RATE: 100 BPM | OXYGEN SATURATION: 98 % | BODY MASS INDEX: 37.09 KG/M2 | DIASTOLIC BLOOD PRESSURE: 60 MMHG | WEIGHT: 184 LBS

## 2019-04-01 DIAGNOSIS — M65.321 TRIGGER INDEX FINGER OF RIGHT HAND: Primary | ICD-10-CM

## 2019-04-01 PROCEDURE — 20550 NJX 1 TENDON SHEATH/LIGAMENT: CPT | Performed by: FAMILY MEDICINE

## 2019-04-01 PROCEDURE — 99213 OFFICE O/P EST LOW 20 MIN: CPT | Performed by: FAMILY MEDICINE

## 2019-04-01 RX ORDER — TRIAMCINOLONE ACETONIDE 40 MG/ML
20 INJECTION, SUSPENSION INTRA-ARTICULAR; INTRAMUSCULAR
Status: COMPLETED | OUTPATIENT
Start: 2019-04-01 | End: 2019-04-01

## 2019-04-01 RX ADMIN — TRIAMCINOLONE ACETONIDE 20 MG: 40 INJECTION, SUSPENSION INTRA-ARTICULAR; INTRAMUSCULAR at 14:05

## 2019-07-11 ENCOUNTER — APPOINTMENT (OUTPATIENT)
Dept: LAB | Facility: HOSPITAL | Age: 54
End: 2019-07-11
Attending: INTERNAL MEDICINE
Payer: COMMERCIAL

## 2019-07-11 ENCOUNTER — TRANSCRIBE ORDERS (OUTPATIENT)
Dept: ADMINISTRATIVE | Facility: HOSPITAL | Age: 54
End: 2019-07-11

## 2019-07-11 DIAGNOSIS — E78.5 DYSLIPIDEMIA: ICD-10-CM

## 2019-07-11 DIAGNOSIS — E11.29 TYPE 2 DIABETES MELLITUS WITH OTHER DIABETIC KIDNEY COMPLICATION (HCC): ICD-10-CM

## 2019-07-11 DIAGNOSIS — I10 HYPERTENSION, UNSPECIFIED TYPE: Primary | ICD-10-CM

## 2019-07-11 DIAGNOSIS — G62.9 NEUROPATHY: ICD-10-CM

## 2019-07-11 DIAGNOSIS — E55.9 VITAMIN D DEFICIENCY: ICD-10-CM

## 2019-07-11 DIAGNOSIS — I10 HYPERTENSION, UNSPECIFIED TYPE: ICD-10-CM

## 2019-07-11 DIAGNOSIS — E66.9 OBESITY, UNSPECIFIED CLASSIFICATION, UNSPECIFIED OBESITY TYPE, UNSPECIFIED WHETHER SERIOUS COMORBIDITY PRESENT: ICD-10-CM

## 2019-07-11 LAB
25(OH)D3 SERPL-MCNC: 21.2 NG/ML (ref 30–100)
ALBUMIN SERPL BCP-MCNC: 4.2 G/DL (ref 3–5.2)
ALP SERPL-CCNC: 90 U/L (ref 43–122)
ALT SERPL W P-5'-P-CCNC: 21 U/L (ref 9–52)
ANION GAP SERPL CALCULATED.3IONS-SCNC: 13 MMOL/L (ref 5–14)
AST SERPL W P-5'-P-CCNC: 23 U/L (ref 14–36)
BACTERIA UR QL AUTO: ABNORMAL /HPF
BASOPHILS # BLD AUTO: 0.2 THOUSANDS/ΜL (ref 0–0.1)
BASOPHILS NFR BLD AUTO: 2 % (ref 0–1)
BILIRUB SERPL-MCNC: 0.4 MG/DL
BILIRUB UR QL STRIP: NEGATIVE
BUN SERPL-MCNC: 12 MG/DL (ref 5–25)
CALCIUM SERPL-MCNC: 10.1 MG/DL (ref 8.4–10.2)
CHLORIDE SERPL-SCNC: 106 MMOL/L (ref 97–108)
CHOLEST SERPL-MCNC: 205 MG/DL
CLARITY UR: ABNORMAL
CO2 SERPL-SCNC: 22 MMOL/L (ref 22–30)
COLOR UR: ABNORMAL
CREAT SERPL-MCNC: 0.76 MG/DL (ref 0.6–1.2)
CREAT UR-MCNC: 175 MG/DL
EOSINOPHIL # BLD AUTO: 0.4 THOUSAND/ΜL (ref 0–0.4)
EOSINOPHIL NFR BLD AUTO: 4 % (ref 0–6)
ERYTHROCYTE [DISTWIDTH] IN BLOOD BY AUTOMATED COUNT: 15.4 %
EST. AVERAGE GLUCOSE BLD GHB EST-MCNC: 163 MG/DL
GFR SERPL CREATININE-BSD FRML MDRD: 91 ML/MIN/1.73SQ M
GLUCOSE P FAST SERPL-MCNC: 140 MG/DL (ref 70–99)
GLUCOSE UR STRIP-MCNC: ABNORMAL MG/DL
HBA1C MFR BLD: 7.3 % (ref 4.2–6.3)
HCT VFR BLD AUTO: 40.7 % (ref 36–46)
HDLC SERPL-MCNC: 46 MG/DL (ref 40–59)
HGB BLD-MCNC: 13.4 G/DL (ref 12–16)
HGB UR QL STRIP.AUTO: NEGATIVE
KETONES UR STRIP-MCNC: NEGATIVE MG/DL
LDLC SERPL CALC-MCNC: 122 MG/DL
LEUKOCYTE ESTERASE UR QL STRIP: NEGATIVE
LYMPHOCYTES # BLD AUTO: 2.9 THOUSANDS/ΜL (ref 0.5–4)
LYMPHOCYTES NFR BLD AUTO: 29 % (ref 25–45)
MCH RBC QN AUTO: 26.6 PG (ref 26–34)
MCHC RBC AUTO-ENTMCNC: 32.8 G/DL (ref 31–36)
MCV RBC AUTO: 81 FL (ref 80–100)
MICROALBUMIN UR-MCNC: 335 MG/L (ref 0–20)
MICROALBUMIN/CREAT 24H UR: 191 MG/G CREATININE (ref 0–30)
MONOCYTES # BLD AUTO: 0.7 THOUSAND/ΜL (ref 0.2–0.9)
MONOCYTES NFR BLD AUTO: 7 % (ref 1–10)
NEUTROPHILS # BLD AUTO: 5.8 THOUSANDS/ΜL (ref 1.8–7.8)
NEUTS SEG NFR BLD AUTO: 58 % (ref 45–65)
NITRITE UR QL STRIP: NEGATIVE
NON-SQ EPI CELLS URNS QL MICRO: ABNORMAL /HPF
NONHDLC SERPL-MCNC: 159 MG/DL
PH UR STRIP.AUTO: 5 [PH]
PLATELET # BLD AUTO: 443 THOUSANDS/UL (ref 150–450)
PMV BLD AUTO: 6.8 FL (ref 8.9–12.7)
POTASSIUM SERPL-SCNC: 3.7 MMOL/L (ref 3.6–5)
PROT SERPL-MCNC: 8.1 G/DL (ref 5.9–8.4)
PROT UR STRIP-MCNC: ABNORMAL MG/DL
RBC # BLD AUTO: 5.02 MILLION/UL (ref 4–5.2)
RBC #/AREA URNS AUTO: ABNORMAL /HPF
SODIUM SERPL-SCNC: 141 MMOL/L (ref 137–147)
SP GR UR STRIP.AUTO: 1.02 (ref 1–1.04)
TRIGL SERPL-MCNC: 183 MG/DL
TSH SERPL DL<=0.05 MIU/L-ACNC: 2.48 UIU/ML (ref 0.47–4.68)
URATE SERPL-MCNC: 5.9 MG/DL (ref 2.7–7.5)
UROBILINOGEN UA: NEGATIVE MG/DL
VIT B12 SERPL-MCNC: 519 PG/ML (ref 100–900)
WBC # BLD AUTO: 9.9 THOUSAND/UL (ref 4.5–11)
WBC #/AREA URNS AUTO: ABNORMAL /HPF

## 2019-07-11 PROCEDURE — 84443 ASSAY THYROID STIM HORMONE: CPT

## 2019-07-11 PROCEDURE — 80061 LIPID PANEL: CPT

## 2019-07-11 PROCEDURE — 82306 VITAMIN D 25 HYDROXY: CPT

## 2019-07-11 PROCEDURE — 83036 HEMOGLOBIN GLYCOSYLATED A1C: CPT

## 2019-07-11 PROCEDURE — 82607 VITAMIN B-12: CPT

## 2019-07-11 PROCEDURE — 85025 COMPLETE CBC W/AUTO DIFF WBC: CPT

## 2019-07-11 PROCEDURE — 82043 UR ALBUMIN QUANTITATIVE: CPT | Performed by: INTERNAL MEDICINE

## 2019-07-11 PROCEDURE — 81003 URINALYSIS AUTO W/O SCOPE: CPT | Performed by: INTERNAL MEDICINE

## 2019-07-11 PROCEDURE — 84550 ASSAY OF BLOOD/URIC ACID: CPT

## 2019-07-11 PROCEDURE — 80053 COMPREHEN METABOLIC PANEL: CPT

## 2019-07-11 PROCEDURE — 82570 ASSAY OF URINE CREATININE: CPT | Performed by: INTERNAL MEDICINE

## 2019-07-11 PROCEDURE — 81001 URINALYSIS AUTO W/SCOPE: CPT | Performed by: INTERNAL MEDICINE

## 2019-07-11 PROCEDURE — 36415 COLL VENOUS BLD VENIPUNCTURE: CPT

## 2019-09-11 ENCOUNTER — OFFICE VISIT (OUTPATIENT)
Dept: FAMILY MEDICINE CLINIC | Facility: CLINIC | Age: 54
End: 2019-09-11
Payer: COMMERCIAL

## 2019-09-11 VITALS
HEART RATE: 70 BPM | WEIGHT: 198.4 LBS | DIASTOLIC BLOOD PRESSURE: 74 MMHG | RESPIRATION RATE: 16 BRPM | BODY MASS INDEX: 40 KG/M2 | HEIGHT: 59 IN | SYSTOLIC BLOOD PRESSURE: 126 MMHG

## 2019-09-11 DIAGNOSIS — E11.65 UNCONTROLLED TYPE 2 DIABETES MELLITUS WITH HYPERGLYCEMIA (HCC): ICD-10-CM

## 2019-09-11 DIAGNOSIS — E66.01 CLASS 3 SEVERE OBESITY DUE TO EXCESS CALORIES WITH SERIOUS COMORBIDITY AND BODY MASS INDEX (BMI) OF 40.0 TO 44.9 IN ADULT (HCC): ICD-10-CM

## 2019-09-11 DIAGNOSIS — E78.2 MIXED HYPERLIPIDEMIA: ICD-10-CM

## 2019-09-11 DIAGNOSIS — M65.321 TRIGGER INDEX FINGER OF RIGHT HAND: ICD-10-CM

## 2019-09-11 DIAGNOSIS — R60.9 DEPENDENT EDEMA: ICD-10-CM

## 2019-09-11 DIAGNOSIS — H73.92 ABNORMAL TYMPANIC MEMBRANE OF LEFT EAR: ICD-10-CM

## 2019-09-11 DIAGNOSIS — D35.2 PITUITARY MICROADENOMA (HCC): ICD-10-CM

## 2019-09-11 DIAGNOSIS — Z00.00 ANNUAL PHYSICAL EXAM: Primary | ICD-10-CM

## 2019-09-11 DIAGNOSIS — L70.0 ACNE VULGARIS: ICD-10-CM

## 2019-09-11 DIAGNOSIS — Z12.39 BREAST CANCER SCREENING: ICD-10-CM

## 2019-09-11 DIAGNOSIS — I10 ESSENTIAL HYPERTENSION: ICD-10-CM

## 2019-09-11 DIAGNOSIS — R80.9 MICROALBUMINURIA DUE TO TYPE 2 DIABETES MELLITUS (HCC): ICD-10-CM

## 2019-09-11 DIAGNOSIS — L29.9 ITCHING: ICD-10-CM

## 2019-09-11 DIAGNOSIS — E11.29 MICROALBUMINURIA DUE TO TYPE 2 DIABETES MELLITUS (HCC): ICD-10-CM

## 2019-09-11 PROCEDURE — 99204 OFFICE O/P NEW MOD 45 MIN: CPT | Performed by: FAMILY MEDICINE

## 2019-09-11 PROCEDURE — 99386 PREV VISIT NEW AGE 40-64: CPT | Performed by: FAMILY MEDICINE

## 2019-09-11 RX ORDER — LEVOTHYROXINE SODIUM 0.03 MG/1
25 TABLET ORAL DAILY
COMMUNITY
End: 2019-09-11 | Stop reason: ALTCHOICE

## 2019-09-11 RX ORDER — GEMFIBROZIL 600 MG/1
600 TABLET, FILM COATED ORAL DAILY
COMMUNITY
End: 2019-09-11 | Stop reason: SDDI

## 2019-09-11 RX ORDER — CLINDAMYCIN AND BENZOYL PEROXIDE 10; 50 MG/G; MG/G
GEL TOPICAL 2 TIMES DAILY
Qty: 25 G | Refills: 0 | Status: SHIPPED | OUTPATIENT
Start: 2019-09-11 | End: 2021-09-17

## 2019-09-11 RX ORDER — INSULIN ASPART 100 [IU]/ML
15-30 INJECTION, SOLUTION INTRAVENOUS; SUBCUTANEOUS 2 TIMES DAILY WITH MEALS
COMMUNITY
Start: 2019-07-09 | End: 2019-12-05 | Stop reason: SDUPTHER

## 2019-09-11 RX ORDER — ALLOPURINOL 100 MG/1
100 TABLET ORAL DAILY
COMMUNITY
End: 2019-09-11 | Stop reason: SDDI

## 2019-09-11 RX ORDER — LIRAGLUTIDE 6 MG/ML
30 INJECTION, SOLUTION SUBCUTANEOUS
COMMUNITY
Start: 2019-06-13 | End: 2019-11-11 | Stop reason: SDUPTHER

## 2019-09-11 RX ORDER — ASPIRIN 81 MG/1
81 TABLET ORAL DAILY
COMMUNITY
End: 2022-04-13

## 2019-09-11 RX ORDER — PANTOPRAZOLE SODIUM 40 MG/1
40 TABLET, DELAYED RELEASE ORAL DAILY
COMMUNITY
End: 2019-09-11 | Stop reason: SDDI

## 2019-09-11 RX ORDER — MOMETASONE FUROATE 50 UG/1
SPRAY, METERED NASAL AS NEEDED
COMMUNITY
Start: 2015-11-09 | End: 2020-03-10 | Stop reason: SDUPTHER

## 2019-09-11 RX ORDER — PHENTERMINE HYDROCHLORIDE 37.5 MG/1
37.5 CAPSULE ORAL EVERY MORNING
COMMUNITY
End: 2019-09-11 | Stop reason: SDDI

## 2019-09-11 RX ORDER — ESCITALOPRAM OXALATE 5 MG/1
5 TABLET ORAL DAILY
COMMUNITY
End: 2019-09-11 | Stop reason: SDDI

## 2019-09-11 RX ORDER — LISINOPRIL 20 MG/1
20 TABLET ORAL DAILY
COMMUNITY
End: 2019-10-23 | Stop reason: SDUPTHER

## 2019-09-11 RX ORDER — PIOGLITAZONEHYDROCHLORIDE 15 MG/1
15 TABLET ORAL DAILY
COMMUNITY
End: 2019-09-11 | Stop reason: SDDI

## 2019-09-11 RX ORDER — METOPROLOL TARTRATE 50 MG/1
1 TABLET, FILM COATED ORAL EVERY 12 HOURS
COMMUNITY
Start: 2016-04-20 | End: 2019-09-11 | Stop reason: ALTCHOICE

## 2019-09-11 NOTE — PROGRESS NOTES
ADULT ANNUAL [address]    NAME: Karen Ngo  AGE: 48 y o  SEX: female  : 1965     DATE: 2019     Assessment and Plan:   Immunizations and preventive care screenings were discussed with patient today  Appropriate education was printed on patient's after visit summary  Counseling:  Dental Health: discussed importance of regular tooth brushing, flossing, and dental visits  Injury prevention: discussed safety/seat belts, safety helmets, smoke detectors, carbon dioxide detectors, and smoking near bedding or upholstery  Sexual health: discussed sexually transmitted diseases, partner selection, use of condoms, avoidance of unintended pregnancy, and contraceptive alternatives  · Exercise: the importance of regular exercise/physical activity was discussed  Recommend exercise 3-5 times per week for at least 30 minutes  BMI Counseling: Body mass index is 40 76 kg/m²  The BMI is above normal  Nutrition recommendations include decreasing portion sizes, encouraging healthy choices of fruits and vegetables, consuming healthier snacks and limiting drinks that contain sugar  Exercise recommendations include moderate physical activity 150 minutes/week, exercising 3-5 times per week and strength training exercises  No pharmacotherapy was ordered  Patient referred to weight management and bariatric surgery due to patient being overweight  Return in about 6 weeks (around 10/23/2019) for Recheck  Chief Complaint:     Chief Complaint   Patient presents with   [address]     current PCP is too far away  Diabetes management   Diabetes      History of Present Illness:     Adult Annual Physical   Patient here for a comprehensive physical exam  The patient reports problems - see other note  Diet and Physical Activity  · Diet/Nutrition: diabetic diet  · Exercise: no formal exercise        Depression Screening  PHQ-9 Depression Screening    PHQ-9:    Frequency of the following problems over the past two weeks:            General Health  · Sleep: sleeps well  · Hearing: normal - bilateral   · Vision: goes for regular eye exams, most recent eye exam <1 year ago and wears glasses  · Dental: regular dental visits  /GYN Health  · Patient is: perimenopausal  · Last menstrual period: No LMP recorded  · Contraceptive method: tubal ligation  Review of Systems:     Review of Systems   Constitutional: Negative for activity change, chills and fever  HENT: Negative for congestion, rhinorrhea and sore throat  Eyes: Negative for visual disturbance  Respiratory: Negative for cough, shortness of breath and wheezing  Cardiovascular: Negative for chest pain and palpitations  Gastrointestinal: Negative for abdominal pain, blood in stool, constipation, diarrhea, nausea and vomiting  Genitourinary: Positive for menstrual problem (irregular)  Negative for dysuria  Musculoskeletal: Negative for arthralgias and myalgias  Skin: Positive for rash  Neurological: Negative for dizziness, tremors, weakness, numbness and headaches  Psychiatric/Behavioral: Positive for dysphoric mood  Negative for suicidal ideas  All other systems reviewed and are negative  Past Medical History:     Past Medical History:   Diagnosis Date    Anemia     Last Assessed:  3/27/13a    Asthma     Diabetes mellitus ([facility] [address] )     Hyperlipidemia     Hypertension     Irritable bowel syndrome     Last Assessed:  10/2/12      Past Surgical History:     Past Surgical History:   Procedure Laterality Date    ABDOMINAL SURGERY      CARPAL TUNNEL RELEASE Right      SECTION      TUBAL LIGATION      WISDOM TOOTH EXTRACTION        Social History:     Social History     Socioeconomic History    Marital status:       Spouse name: Danny Gaetano    Number of children: 2    Years of education: None    Highest education level: Some college, no degree   Occupational History     Comment: financial counseler   Social Needs    Financial resource strain: Not hard at all   [email or web address] insecurity:     Worry: Never true     Inability: Never true   [employer] needs:     Medical: No     Non-medical: No   Tobacco Use    Smoking status: Never Smoker    Smokeless tobacco: Never Used    Tobacco comment: As per [employer]:  Unknown if ever smoked   Substance and Sexual Activity    Alcohol use: Yes     Frequency: 2-4 times a month     Drinks per session: 1 or 2     Binge frequency: Never     Comment: As per [employer]:  Social drinker    Drug use: No    Sexual activity: Yes     Partners: Male     Birth control/protection: Female Sterilization   Lifestyle    Physical activity:     Days per week: 0 days     Minutes per session: 0 min    Stress: To some extent   Relationships    Social connections:     Talks on phone: More than three times a week     Gets together: More than three times a week     Attends [employer]ical service: None     Active member of club or organization: Yes     Attends meetings of clubs or organizations: More than 4 times per year     Relationship status:      Intimate partner violence:     Fear of current or ex partner: No     Emotionally abused: No     Physically abused: No     Forced sexual activity: No   Other Topics Concern    None   Social History Narrative    None      Family History:     Family History   Problem Relation Age of Onset    Diabetes Mother     Diabetes Father     Heart disease Father     Stroke Father     Hypertension Father     Diverticulitis Father     Hyperlipidemia Father     Diabetes Maternal Grandmother     Pancreatic cancer Maternal Grandmother     Liver cancer Maternal Grandfather     Alcohol abuse Maternal Grandfather     Heart disease Paternal Grandmother     Crohn's disease Sister     Diabetes type I Daughter     Eczema Daughter     Heart attack Paternal Grandfather     Asthma Maternal Aunt     Alcohol abuse Maternal Uncle     Mental illness Paternal Aunt     Schizophrenia Paternal Aunt     Alcohol abuse Paternal Uncle     Diabetes Paternal Uncle     Eczema Son     Colon cancer Neg Hx     Breast cancer Neg Hx       Current Medications:     Current Outpatient Medications   Medication Sig Dispense Refill    lisinopril (ZESTRIL) 20 mg tablet Take 20 mg by mouth daily      NOVOLOG FLEXPEN 100 units/mL injection pen       SAXENDA injection       atorvastatin (LIPITOR) 40 mg tablet Take 40 mg by mouth daily   clindamycin-benzoyl peroxide (BENZACLIN) gel Apply topically 2 (two) times a day 25 g 0    Ergocalciferol 2000 units CAPS take 1 capsule by oral route  every week      Insulin Pen Needle (BD PEN NEEDLE EDUARDO U/F) 32G X 4 MM MISC by Does not apply route      Insulin Pen Needle 32G X 5 MM MISC Inject at bedtime   mometasone (NASONEX) 50 mcg/act nasal spray spray 2 spray by intranasal route  every day in each nostril       No current facility-administered medications for this visit  Allergies: Allergies   Allergen Reactions    Amoxicillin Hives    Metformin      Other reaction(s): diarhea    Iodinated Diagnostic Agents Facial Swelling    Iodine     Penicillins     Shellfish-Derived Products Throat Swelling      Physical Exam:     /74 (BP Location: Left arm, Patient Position: Sitting, Cuff Size: Large)   Pulse 70   Resp 16   Ht 4' 10 5" (1 486 m)   Wt 90 kg (198 lb 6 4 oz)   BMI 40 76 kg/m²     Physical Exam   Constitutional: She is oriented to person, place, and time  She appears well-developed and well-nourished  obese   HENT:   Head: Normocephalic and atraumatic  Eyes: Conjunctivae and EOM are normal    Neck: No thyromegaly present  Cardiovascular: Normal rate, regular rhythm, normal heart sounds and intact distal pulses  No murmur heard  Pulmonary/Chest: Effort normal and breath sounds normal  No respiratory distress   She has no wheezes  Abdominal: Soft  Bowel sounds are normal  She exhibits no distension  There is no tenderness  Musculoskeletal: Normal range of motion  She exhibits no edema  Neurological: She is alert and oriented to person, place, and time  Skin: Skin is warm and dry  Capillary refill takes less than 2 seconds  Psychiatric: She has a normal mood and affect  Nursing note and vitals reviewed        MD Katie Reardon

## 2019-09-11 NOTE — ASSESSMENT & PLAN NOTE
Chronic, worsening  Referred for bariatric surgery consultation  Patient has previously been on phentermine which did help but then she went off it gain the weight back and more per patient

## 2019-09-11 NOTE — ASSESSMENT & PLAN NOTE
Chronic  Lipids currently above goal based on labs from July   Encouraged patient to restart atorvastatin 40 mg daily  When she runs out of this will transition her to Crestor for maximal therapy

## 2019-09-11 NOTE — ASSESSMENT & PLAN NOTE
Lab Results   Component Value Date    HGBA1C 7 3 (H) 07/11/2019     Chronic, above goal  Continue current regimen: Toujeo q h s  26-28 units, Saxenda 30 units q h s , NovoLog t i d  A c   Sliding scale (usually takes 16-26 units)  Recommend diet and exercise improvement  Referred for weight management and consideration of bariatric surgery given BMI of 40 and comorbidities  Diabetic foot exam completed today  Obtain ophthalmology records after her upcoming visit  Repeat A1c in 5 weeks  Obtain records of hepatitis B vaccination  Patient will receive pneumococcal vaccination with her flu shot next month

## 2019-09-11 NOTE — ASSESSMENT & PLAN NOTE
She has not had imaging in several years  Recommend rechecking this now (note allergy to IV contrast with facial swelling, thus no contrast)  Recheck prolactin with next lab draw

## 2019-09-11 NOTE — PATIENT INSTRUCTIONS
Nutrition: How to Make Alana Castano6  A healthy diet has a lot of benefits  It can prevent certain health conditions like heart disease and cancer, and it can lower your cholesterol  It can give you more energy, help you focus, and improve your mood  It can also help you lose weight or stay at a healthy weight  Path to Improved Health  The choices you make about what you eat and drink matter  They should add up to a balanced, nutritious diet  We all have different calorie needs based on our age, sex, and activity level  Health conditions can have a role, too  Fruits and Vegetables  Fruits and vegetables are rich in fiber, vitamins, and minerals  They should be the basis of your diet  Try to get many different colors of fruits and vegetables each day to add flavor and variety  Fruits and vegetables should cover half of your plate at each meal  Try not to add saturated fats and sugar to vegetables and fruits  This means avoiding margarine, butter, mayonnaise, and sour cream  You can use yogurt, healthy oils (such as canola or olive oil), or herbs instead  Potatoes and corn are not considered vegetables  Your body processes them more like grains  FRUITS & VEGETABLES   INSTEAD OF THIS: TRY THIS:   Regular or fried vegetables served with cream, cheese, or butter Raw, steamed, boiled, sautéed, or baked vegetables tossed with olive oil, salt, and pepper, or with onions or spices added (like garlic and cumin)   Fruits served with cream cheese or sugary sauces Fresh fruit with peanut, almond, or cashew butter or plain yogurt   Fried potatoes, including french fries, hash browns, and potato chips Baked sweet potatoes or other vegetables     Grains  Choose products that list whole grains as the first ingredient  Whole grains are high in fiber, protein, and vitamins  They are digested slowly, which helps you feel full longer and keeps you from overeating  Avoid products that say enriched    Hot cereals like oatmeal are usually low in saturated fat  However, instant cereals with cream may contain processed oils and can be high in sugar  Granola cereals usually contain a lot of sugar  Cold cereals are generally made with refined grains and are high in sugars  Look for whole-grain, low-sugar options instead  Try not to eat rich sweets, such as doughnuts, rolls, and muffins  Consider fruit or a piece of dark chocolate instead to satisfy your sweet tooth  GRAINS   INSTEAD OF THIS: TRY THIS:   Croissants, rolls, biscuits, and white breads Whole-grain breads, including wheat, rye, and pumpernickel   Doughnuts, pastries, and scones Whole-grain English muffins and small whole-grain bagels   Fried tortillas Soft tortillas (corn or whole wheat) without trans fats   Sugary cereals and regular granola Whole-grain cereal, oatmeal, and reduced-sugar granola   Snack crackers Whole-grain crackers   Potato or corn chips and buttered popcorn Unbuttered popcorn   White pasta Whole-wheat pasta   White rice Brown or wild rice   Fried rice or pasta mixes Brown rice or whole-grain pasta with low-sodium vegetable sauce   All-purpose white flour Whole-wheat flour     Protein  Protein can come from animal and vegetable sources  People who get more of their protein from animal sources tend to have more health problems that can lead to illness and early death  It is healthier to eat meat less often and get most of your protein from plant sources  When you eat meat, choose leaner cuts  Vegetable Protein Sources  There are many ways to get protein in your diet even if you do not eat meat  Most vegetables have some protein  When you eat these vegetables with whole grains, seeds, nuts, and especially beans, you can get a good amount of protein  You can swap beans for meat in recipes like lasagna or chili  Soy foods such as tofu, tempeh, and edamame are also good sources of protein      Beef, Pork, Veal, and [employer] and veal cuts have the words loin or round in their names  Lean pork cuts have the words loin or leg in their names  Trim off the outside fat before cooking the meat  Trim any inside fat before eating it  Use herbs, spices, and low-sodium marinades to season meat  Baking, broiling, grilling, and roasting are the healthiest ways to cook meats  Lean cuts can be panbroiled or stir-fried  Use a nonstick pan, canola oil, or olive oil instead of butter or margarine  Don't serve meat with high-fat sauces and gravies  Poultry  Chicken breasts are a good choice because they are low in fat and high in protein  Only eat duck and goose once in a while, because they are higher in saturated fat  Remove skin and visible fat before cooking  Baking, broiling, grilling, and roasting are the healthiest ways to [employer]  Skinless poultry can be pan broiled or stir fried  Use a nonstick pan, canola oil, or olive oil instead of butter or margarine  Seafood  Most seafood is high in healthy polyunsaturated fats  Healthy omega-3 fatty acids also are found in some fish, such as salmon and cold-water trout  If good-quality fresh fish isn't available, buy frozen fish  To prepare fish, you should poach, steam, bake, broil, or grill it      PROTEIN   INSTEAD OF THIS: TRY THIS:   Prime and marbled cuts of meat Select-grade lean beef, such as round, sirloin, and loin cuts   Pork spare ribs and ybarra Lean pork, such as tenderloin and loin chop, turkey ybarra, tofu ybarra   Regular ground beef Lean or extra-lean ground beef, ground chicken or turkey, tempeh, or beans   Lunch meats, such as pepperoni, salami, bologna, and liverwurst Lean lunch meats, such as turkey, chicken, and ham   Regular hot dogs and sausage Fat-free hot dogs, turkey dogs, tofu hot dogs   Breaded fish sticks and cakes, fish canned in oil, or seafood prepared with butter or served with high-fat sauce Fish (fresh, frozen, or canned in water), grilled fish sticks and cakes, or shellfish     Dairy  Choose low-fat, skim, or nondairy milk, such as soy, rice, or almond milk  Try low-fat or part-skim cheeses and other dairy products, or choose smaller portions of foods high in saturated fat  Yogurt can replace sour cream in many recipes  It is important to pick yogurt without added sugar  Try mixing yogurt with fruit for dessert  Sorbet and frozen yogurt are lower in fat than ice cream     DAIRY   INSTEAD OF THIS: TRY THIS:   Whole milk Skim (nonfat), 1% or 2% (low fat), or nondairy milk, such as soy, rice, almond, or cashew milk   Cream or evaporated milk Evaporated skim milk   Regular buttermilk Low-fat buttermilk   Yogurt made with whole milk Low-fat or nonfat yogurt   Regular cheese, including American, blue, Brie, cheddar, Stas, and Parmesan Low-fat cheese with less than 3 g fat per serving, or nondairy soy cheese   Regular cottage cheese Low-fat cottage cheese (less than 2% fat)   Regular cream cheese Low-fat cream cheese with less than 3 g fat per 1 oz, or skim ricotta   Ice cream Sorbet, sherbet, or frozen yogurt with less than 3 g fat per ½-cup serving     Fats and Oils  Although high-fat foods are higher in calories, they can help you feel satisfied with eating less  Don't be afraid to have fats in your diet, but try to limit saturated and trans fats  You need saturated and unsaturated fats in your diet, but most Americans get too much saturated fat  Heart disease, diabetes, some cancers, and arthritis have been linked to diets high in saturated fat, particularly saturated fats from animal products  FATS & OILS   INSTEAD OF THIS: TRY THIS:   Cookies Fruit or whole-grain cookies   Shortening, butter, and margarine Olive, canola, and soybean oils   Regular mayonnaise Yogurt   Regular salad dressing Vinaigrette with olive oil and vinegar   Butter or fat to grease pans Nonstick cooking spray, olive oil, or canola oil           Beverages  It is important that you stay hydrated  However, drinks that contain sugar are not healthy  This includes fruit juices, soda, sports and energy drinks, sweetened or flavored milk, and sweet tea  Artificial sweeteners may also be bad for your health  Drink mostly water or other unsweetened drinks  Don't drink too much alcohol  Women should have no more than one drink per day  Men should have no more than two drinks per day  Exercises    Set an Exercise Goal & Make a Plan  If youre ready to start getting active, its time to set a goal and make a plan  Staying Motivated  Its easy to start an exercise routine once youve decided its time for a change, but keeping it up is a challenge for many people  Positive Self-Talk Makes a Difference  The conversations you have with yourself about physical activity and your fitness abilities can have an impact on your performance  Overcoming Barriers to Activity Think about what is keeping you from being active and then check out some of our solutions to the most common barriers to physical activity  · Most adults with with type 1 and type 2 diabetes should engage in 150 min or more of moderate-to-vigorous intensity physical activity per week, spread over at least 3 days/week, with no more than 2 consecutive days without activity  [place] durations (minimum 75 min/week) of vigorous-intensity or interval training may be sufficient for younger and more physically fit individuals  · Adults with type 1  and type 2  diabetes should engage in 23 sessions/week of resistance exercise on nonconsecutive days  · All adults, and particularly those with type 2 diabetes, should decrease the amount of time spent in daily sedentary behavior  Prolonged sitting should be interrupted every 30 min for blood glucose benefits, particularly in adults with type 2 diabetes  · Flexibility training and balance training are recommended 23 times/week for older adults with diabetes   Yoga and huy chi may be included based on individual preferences to increase flexibility, muscular strength, and balance  Patient Education     [employer] Healthy Eating Plate [facility]  Department of [employer], Choose My Plate [employer] com br  Http://care  diabetesjournals  org/content/40/Supplement_1/S33    [employer]  org: Exercise & Fitness   http://familyLevel Four Softwarector  org/familydoctor/en/prevention-wellness/exercise-fitness html     American Diabetes Association (ADA): Weight Loss   https://www [employer]/  org/food-and-fitness/fitness/weight-loss/? utm_source=WWW&utm_medium=DropDownFF&utm_content=WeightLoss&utm_camp aign=CON     My Fitness Pal   http://www  [employer]   Online nutrition and calorie tracker  National Diabetes Education Program (NDEP): Tasty Recipes for People with Diabetes and Their Tru Mayans (English and [place])   https://www scar net/     Sonya Betts Recipes is a bilingual booklet filled with recipes specifically designed for Latin Americans  Recipes are accompanied by their nutritional facts table  The booklet also includes diabetes health information and resources  [employer]  org: Body Mass Index Calculator   http://familyLevel Four Softwarector  org/familydoctor/en/health-tools/bmi-calculator html     [employer]  org: Food and Nutrition Topics   http://familyLevel Four Softwarector  org/familydoctor/en/prevention-wellness/food-nutrition  html     ADA: Preventing Diabetes with Good Nutrition   https://www [employer]/  org/advocate/our-priorities/prevention/preventing-diabetes-nutrition  html     Health Power for Minorities: 10 Tips about Diabetes Prevention and Control   Competitor/HealthChannelDetails  aspx?bn=406        Wellness Visit for Adults   AMBULATORY CARE:   A wellness visit  is when you see your healthcare provider to get screened for health problems  You can also get advice on how to stay healthy  Write down your questions so you remember to ask them  Ask your healthcare provider how often you should have a wellness visit  What happens at a wellness visit:  Your healthcare provider will ask about your health, and your family history of health problems  This includes high blood pressure, heart disease, and cancer  He or she will ask if you have symptoms that concern you, if you smoke, and about your mood  You may also be asked about your intake of medicines, supplements, food, and alcohol  Any of the following may be done:  · Your weight  will be checked  Your height may also be checked so your body mass index (BMI) can be calculated  Your BMI shows if you are at a healthy weight  · Your blood pressure  and heart rate will be checked  Your temperature may also be checked  · Blood and urine tests  may be done  Blood tests may be done to check your cholesterol levels  Abnormal cholesterol levels increase your risk for heart disease and stroke  You may also need a blood or urine test to check for diabetes if you are at increased risk  Urine tests may be done to look for signs of an infection or kidney disease  · A physical exam  includes checking your heartbeat and lungs with a stethoscope  Your healthcare provider may also check your skin to look for sun damage  · Screening tests  may be recommended  A screening test is done to check for diseases that may not cause symptoms  The screening tests you may need depend on your age, gender, family history, and lifestyle habits  For example, colorectal screening may be recommended if you are 48years old or older  Screening tests you need if you are a woman:   · A Pap smear  is used to screen for cervical cancer  Pap smears are usually done every 3 to 5 years depending on your age  You may need them more often if you have had abnormal Pap smear test results in the past  Ask your healthcare provider how often you should have a Pap smear       · A mammogram  is an x-ray of your breasts to screen for breast cancer  Experts recommend mammograms every 2 years starting at age 48 years  You may need a mammogram at age 52 years or younger if you have an increased risk for breast cancer  Talk to your healthcare provider about when you should start having mammograms and how often you need them  Vaccines you may need:   · Get an influenza vaccine  every year  The influenza vaccine protects you from the flu  Several types of viruses cause the flu  The viruses change over time, so new vaccines are made each year  · Get a tetanus-diphtheria (Td) booster vaccine  every 10 years  This vaccine protects you against tetanus and diphtheria  Tetanus is a severe infection that may cause painful muscle spasms and lockjaw  Diphtheria is a severe bacterial infection that causes a thick covering in the back of your mouth and throat  · Get a human papillomavirus (HPV) vaccine  if you are female and aged 23 to 32 or male 23 to 24 and never received it  This vaccine protects you from HPV infection  HPV is the most common infection spread by sexual contact  HPV may also cause vaginal, penile, and anal cancers  · Get a pneumococcal vaccine  if you are aged 72 years or older  The pneumococcal vaccine is an injection given to protect you from pneumococcal disease  Pneumococcal disease is an infection caused by pneumococcal bacteria  The infection may cause pneumonia, meningitis, or an ear infection  · Get a shingles vaccine  if you are aged 61 or older, even if you have had shingles before  The shingles vaccine is an injection to protect you from the varicella-zoster virus  This is the same virus that causes chickenpox  Shingles is a painful rash that develops in people who had chickenpox or have been exposed to the virus  How to eat healthy:  My Plate is a model for planning healthy meals  It shows the types and amounts of foods that should go on your plate   Fruits and vegetables make up about half of your plate, and grains and protein make up the other half  A serving of dairy is included on the side of your plate  The amount of calories and serving sizes you need depends on your age, gender, weight, and height  Examples of healthy foods are listed below:  · Eat a variety of vegetables  such as dark green, red, and orange vegetables  You can also include canned vegetables low in sodium (salt) and frozen vegetables without added butter or sauces  · Eat a variety of fresh fruits , canned fruit in 100% juice, frozen fruit, and dried fruit  · Include whole grains  At least half of the grains you eat should be whole grains  Examples include whole-wheat bread, wheat pasta, brown rice, and whole-grain cereals such as oatmeal     · Eat a variety of protein foods such as seafood (fish and shellfish), lean meat, and poultry without skin (turkey and chicken)  Examples of lean meats include pork leg, shoulder, or tenderloin, and beef round, sirloin, tenderloin, and extra lean ground beef  Other protein foods include eggs and egg substitutes, beans, peas, soy products, nuts, and seeds  · Choose low-fat dairy products such as skim or 1% milk or low-fat yogurt, cheese, and cottage cheese  · Limit unhealthy fats  such as butter, hard margarine, and shortening  Exercise:  Exercise at least 30 minutes per day on most days of the week  Some examples of exercise include walking, biking, dancing, and swimming  You can also fit in more physical activity by taking the stairs instead of the elevator or parking farther away from stores  Include muscle strengthening activities 2 days each week  Regular exercise provides many health benefits  It helps you manage your weight, and decreases your risk for type 2 diabetes, heart disease, stroke, and high blood pressure  Exercise can also help improve your mood  Ask your healthcare provider about the best exercise plan for you     General health and safety guidelines:   · Do not smoke  Nicotine and other chemicals in cigarettes and cigars can cause lung damage  Ask your healthcare provider for information if you currently smoke and need help to quit  E-cigarettes or smokeless tobacco still contain nicotine  Talk to your healthcare provider before you use these products  · Limit alcohol  A drink of alcohol is 12 ounces of beer, 5 ounces of wine, or 1½ ounces of liquor  · Lose weight, if needed  Being overweight increases your risk of certain health conditions  These include heart disease, high blood pressure, type 2 diabetes, and certain types of cancer  · Protect your skin  Do not sunbathe or use tanning beds  Use sunscreen with a SPF 15 or higher  Apply sunscreen at least 15 minutes before you go outside  Reapply sunscreen every 2 hours  Wear protective clothing, hats, and sunglasses when you are outside  · Drive safely  Always wear your seatbelt  Make sure everyone in your car wears a seatbelt  A seatbelt can save your life if you are in an accident  Do not use your cell phone when you are driving  This could distract you and cause an accident  Pull over if you need to make a call or send a text message  · Practice safe sex  Use latex condoms if are sexually active and have more than one partner  Your healthcare provider may recommend screening tests for sexually transmitted infections (STIs)  · Wear helmets, lifejackets, and protective gear  Always wear a helmet when you ride a bike or motorcycle, go skiing, or play sports that could cause a head injury  Wear protective equipment when you play sports  Wear a lifejacket when you are on a boat or doing water sports  © 2017 [facility] Information is for End User's use only and may not be sold, redistributed or otherwise used for commercial purposes   All illustrations and images included in CareNotes® are the copyrighted property of [employer]  or Kirk Hatch  The above information is an  only  It is not intended as medical advice for individual conditions or treatments  Talk to your doctor, nurse or pharmacist before following any medical regimen to see if it is safe and effective for you  Wellness Visit for Adults   AMBULATORY CARE:   A wellness visit  is when you see your healthcare provider to get screened for health problems  You can also get advice on how to stay healthy  Write down your questions so you remember to ask them  Ask your healthcare provider how often you should have a wellness visit  What happens at a wellness visit:  Your healthcare provider will ask about your health, and your family history of health problems  This includes high blood pressure, heart disease, and cancer  He or she will ask if you have symptoms that concern you, if you smoke, and about your mood  You may also be asked about your intake of medicines, supplements, food, and alcohol  Any of the following may be done:  · Your weight  will be checked  Your height may also be checked so your body mass index (BMI) can be calculated  Your BMI shows if you are at a healthy weight  · Your blood pressure  and heart rate will be checked  Your temperature may also be checked  · Blood and urine tests  may be done  Blood tests may be done to check your cholesterol levels  Abnormal cholesterol levels increase your risk for heart disease and stroke  You may also need a blood or urine test to check for diabetes if you are at increased risk  Urine tests may be done to look for signs of an infection or kidney disease  · A physical exam  includes checking your heartbeat and lungs with a stethoscope  Your healthcare provider may also check your skin to look for sun damage  · Screening tests  may be recommended  A screening test is done to check for diseases that may not cause symptoms   The screening tests you may need depend on your age, gender, family history, and lifestyle habits  For example, colorectal screening may be recommended if you are 48years old or older  Screening tests you need if you are a woman:   · A Pap smear  is used to screen for cervical cancer  Pap smears are usually done every 3 to 5 years depending on your age  You may need them more often if you have had abnormal Pap smear test results in the past  Ask your healthcare provider how often you should have a Pap smear  · A mammogram  is an x-ray of your breasts to screen for breast cancer  Experts recommend mammograms every 2 years starting at age 48 years  You may need a mammogram at age 52 years or younger if you have an increased risk for breast cancer  Talk to your healthcare provider about when you should start having mammograms and how often you need them  Vaccines you may need:   · Get an influenza vaccine  every year  The influenza vaccine protects you from the flu  Several types of viruses cause the flu  The viruses change over time, so new vaccines are made each year  · Get a tetanus-diphtheria (Td) booster vaccine  every 10 years  This vaccine protects you against tetanus and diphtheria  Tetanus is a severe infection that may cause painful muscle spasms and lockjaw  Diphtheria is a severe bacterial infection that causes a thick covering in the back of your mouth and throat  · Get a human papillomavirus (HPV) vaccine  if you are female and aged 23 to 32 or male 23 to 24 and never received it  This vaccine protects you from HPV infection  HPV is the most common infection spread by sexual contact  HPV may also cause vaginal, penile, and anal cancers  · Get a pneumococcal vaccine  if you are aged 72 years or older  The pneumococcal vaccine is an injection given to protect you from pneumococcal disease  Pneumococcal disease is an infection caused by pneumococcal bacteria  The infection may cause pneumonia, meningitis, or an ear infection      · Get a shingles vaccine  if you are aged 61 or older, even if you have had shingles before  The shingles vaccine is an injection to protect you from the varicella-zoster virus  This is the same virus that causes chickenpox  Shingles is a painful rash that develops in people who had chickenpox or have been exposed to the virus  How to eat healthy:  My Plate is a model for planning healthy meals  It shows the types and amounts of foods that should go on your plate  Fruits and vegetables make up about half of your plate, and grains and protein make up the other half  A serving of dairy is included on the side of your plate  The amount of calories and serving sizes you need depends on your age, gender, weight, and height  Examples of healthy foods are listed below:  · Eat a variety of vegetables  such as dark green, red, and orange vegetables  You can also include canned vegetables low in sodium (salt) and frozen vegetables without added butter or sauces  · Eat a variety of fresh fruits , canned fruit in 100% juice, frozen fruit, and dried fruit  · Include whole grains  At least half of the grains you eat should be whole grains  Examples include whole-wheat bread, wheat pasta, brown rice, and whole-grain cereals such as oatmeal     · Eat a variety of protein foods such as seafood (fish and shellfish), lean meat, and poultry without skin (turkey and chicken)  Examples of lean meats include pork leg, shoulder, or tenderloin, and beef round, sirloin, tenderloin, and extra lean ground beef  Other protein foods include eggs and egg substitutes, beans, peas, soy products, nuts, and seeds  · Choose low-fat dairy products such as skim or 1% milk or low-fat yogurt, cheese, and cottage cheese  · Limit unhealthy fats  such as butter, hard margarine, and shortening  Exercise:  Exercise at least 30 minutes per day on most days of the week  Some examples of exercise include walking, biking, dancing, and swimming   You can also fit in more physical activity by taking the stairs instead of the elevator or parking farther away from stores  Include muscle strengthening activities 2 days each week  Regular exercise provides many health benefits  It helps you manage your weight, and decreases your risk for type 2 diabetes, heart disease, stroke, and high blood pressure  Exercise can also help improve your mood  Ask your healthcare provider about the best exercise plan for you  General health and safety guidelines:   · Do not smoke  Nicotine and other chemicals in cigarettes and cigars can cause lung damage  Ask your healthcare provider for information if you currently smoke and need help to quit  E-cigarettes or smokeless tobacco still contain nicotine  Talk to your healthcare provider before you use these products  · Limit alcohol  A drink of alcohol is 12 ounces of beer, 5 ounces of wine, or 1½ ounces of liquor  · Lose weight, if needed  Being overweight increases your risk of certain health conditions  These include heart disease, high blood pressure, type 2 diabetes, and certain types of cancer  · Protect your skin  Do not sunbathe or use tanning beds  Use sunscreen with a SPF 15 or higher  Apply sunscreen at least 15 minutes before you go outside  Reapply sunscreen every 2 hours  Wear protective clothing, hats, and sunglasses when you are outside  · Drive safely  Always wear your seatbelt  Make sure everyone in your car wears a seatbelt  A seatbelt can save your life if you are in an accident  Do not use your cell phone when you are driving  This could distract you and cause an accident  Pull over if you need to make a call or send a text message  · Practice safe sex  Use latex condoms if are sexually active and have more than one partner  Your healthcare provider may recommend screening tests for sexually transmitted infections (STIs)  · Wear helmets, lifejackets, and protective gear    Always wear a helmet when you ride a bike or motorcycle, go skiing, or play sports that could cause a head injury  Wear protective equipment when you play sports  Wear a lifejacket when you are on a boat or doing water sports  © 2017 [address] Information is for End User's use only and may not be sold, redistributed or otherwise used for commercial purposes  All illustrations and images included in CareNotes® are the copyrighted property of A D A M , Inc  or Kirk Hatch  The above information is an  only  It is not intended as medical advice for individual conditions or treatments  Talk to your doctor, nurse or pharmacist before following any medical regimen to see if it is safe and effective for you

## 2019-09-11 NOTE — ASSESSMENT & PLAN NOTE
Chronic  Per patient, her previous PCP was double-boarded IM/Nephro  Will encourage to establish with nephrology

## 2019-09-11 NOTE — PROGRESS NOTES
Lorenzo Pierre 1965 female MRN: [id number]    Family Medicine Follow-up Visit    Assessment/Plan  Itching  Start daily Zyrtec and f/u in 1 month    Pituitary microadenoma ([facility] [address] )  She has not had imaging in several years  Recommend rechecking this now (note allergy to IV contrast with facial swelling, thus no contrast)  Recheck prolactin with next lab draw    Diabetes mellitus type 2, uncontrolled ([facility] [address] )  Lab Results   Component Value Date    HGBA1C 7 3 (H) 07/11/2019     Chronic, above goal  Continue current regimen: Toujeo q h s  26-28 units, Saxenda 30 units q h s , NovoLog t i d  A c   Sliding scale (usually takes 16-26 units)  Recommend diet and exercise improvement  Referred for weight management and consideration of bariatric surgery given BMI of 40 and comorbidities  Diabetic foot exam completed today  Obtain ophthalmology records after her upcoming visit  Repeat A1c in 5 weeks  Obtain records of hepatitis B vaccination  Patient will receive pneumococcal vaccination with her flu shot next month    Essential hypertension  Chronic, at goal  Continue lisinopril 20 mg daily monotherapy    Class 3 severe obesity due to excess calories with serious comorbidity and body mass index (BMI) of 40 0 to 44 9 in [facility])  Chronic, worsening  Referred for bariatric surgery consultation  Patient has previously been on phentermine which did help but then she went off it gain the weight back and more per patient    Dependent edema  Counseled patient regarding lower extremity elevation, salt intake, compression stockings    Hyperlipidemia  Chronic  Lipids currently above goal based on labs from July   Encouraged patient to restart atorvastatin 40 mg daily  When she runs out of this will transition her to Crestor for maximal therapy    Trigger index finger of right hand  Follow-up with Dr Lars salinas as already scheduled    Acne vulgaris  New  Start Benzaclin BID and maintain good skin hygiene and moisturizers     Abnormal tympanic membrane of left ear  Referred to ENT for further evaluation    Microalbuminuria due to type 2 diabetes mellitus ([address] )  Chronic  Per patient, her previous PCP was double-boarded IM/Nephro  Will encourage to establish with nephrology    Sayra Ward MD  [facility] [place]   9/11/2019        SUBJECTIVE    CC: Establish Care (current PCP is too far away  Diabetes management ) and Diabetes    She has multiple records of  activities we need to obtain :  last PAP 2 years ago normal Dr Sandra Orellana  Dec 2018 Lyric Foley normal  Upcoming eye doctor appoint  Tdap shot 6 years ago get records ([facility] [employer])  Hep B vaccines with [address] [facility]  Hand doctor - Dr Valentina Saleh    She states she stopped all of her oral medications about 6 months ago:  Vit D  Gabapentin 100 TID  Gemfibrozil 600 QD  Phetermine 34 5 QD  Allopurinol 100 QD  Vit B12 1000mg QD  Atorvastatin 40mg QD  Pantoprazole 40mg QD  Levothyroxine 25mcg QD  lexapro 5mg QD  pioglitaozone 15mg qd  Asa 81 QD    She still takes: lisinopril 20mg QD, Novolog, Saxenda, and Toujeo  Carpal tunnel on left hand  She's had surgery on the right  Trigger fingers on pointer fingers but is seeing Dr Valentina Saleh for ring fingers soon  She's been very itchy and new rash on face for 4 months  She was on allopurinol but she's not sure she actually has gout  Feet swelling that's new through the day, gone when she wakes up  No pain or numbness  Also cramping in calves at night - shes using an OTC cream that does help  She has recurrent rashes on her hands, gets itchy and flakey and sometimes blisters/peels then gets better  She uses OTC anti-itch and moisturizer that does seem to help  Children have eczema, no other history of psoriasis or dermatologic disease in family       History of Present Illness:   Karen KAY Asyajihancy Mosquera is a 48 y o  female with a history of type 2 diabetes with long term use of insulin  Reports complications of none  Current regimen: Toujeo QHS 26-28u   Saxenda QD 30u HS  Novolog TID AC PRN, usually taking 16-26u     compliant most of the timedenies any side effects from medications  Hypoglycemic episodes: No  Activity: Daily activity is predictable Yes The patient engages in little, if any physical activity  Adjustments for activity include: none                further diabetic ROS: no polyuria or polydipsia, no chest pain, dyspnea or TIAs, no numbness, tingling or pain in extremities        Opthamology: sees doctor in next weeks  Podiatry: not in >1 year  Will obtain specialist records if not already obtained  Has hypertension: on ACE inhibitor/ARB, compliant all of the time  Has hyperlipidemia: on statin - tolerating well, no myalgias  noncompliant much of the time  Thyroid disorders: ?questionable, she hasn't taken medication in 6 months and TSH currently normal  Continue to monitor  History of pancreatitis: no    Review of Systems   Constitutional: Positive for unexpected weight change (increasing)  Negative for activity change, chills and fever  HENT: Negative for congestion, rhinorrhea, sore throat, trouble swallowing and voice change  Eyes: Negative for visual disturbance  Respiratory: Negative for cough, shortness of breath and wheezing  Cardiovascular: Negative for chest pain and palpitations  Gastrointestinal: Negative for abdominal pain, blood in stool, constipation, diarrhea, nausea and vomiting  Genitourinary: Positive for menstrual problem (irregular menses)  Negative for decreased urine volume and dysuria  Musculoskeletal: Negative for arthralgias and myalgias  Skin: Positive for rash  Neurological: Negative for dizziness, weakness and headaches  Psychiatric/Behavioral: Negative for dysphoric mood  All other systems reviewed and are negative        Historical Information     The patient history was reviewed as follows:    Past Medical History:   Diagnosis Date    Anemia     Last Assessed:  3/27/13a    Asthma     Diabetes mellitus ([facility] [address] )     Hyperlipidemia     Hypertension     Irritable bowel syndrome     Last Assessed:  10/2/12     Past Surgical History:   Procedure Laterality Date    ABDOMINAL SURGERY      CARPAL TUNNEL RELEASE Right      SECTION      TUBAL LIGATION      WISDOM TOOTH EXTRACTION       Family History   Problem Relation Age of Onset    Diabetes Mother     Diabetes Father     Heart disease Father     Stroke Father     Hypertension Father     Diverticulitis Father     Hyperlipidemia Father     Diabetes Maternal Grandmother     Pancreatic cancer Maternal Grandmother     Liver cancer Maternal Grandfather     Alcohol abuse Maternal Grandfather     Heart disease Paternal Grandmother     Crohn's disease Sister     Diabetes type I Daughter     Eczema Daughter     Heart attack Paternal Grandfather     Asthma Maternal Aunt     Alcohol abuse Maternal Uncle     Mental illness Paternal Aunt     Schizophrenia Paternal Aunt     Alcohol abuse Paternal Uncle     Diabetes Paternal Uncle     Eczema Son     Colon cancer Neg Hx     Breast cancer Neg Hx       Social History   Social History     Substance and Sexual Activity   Alcohol Use Yes    Frequency: 2-4 times a month    Drinks per session: 1 or 2    Binge frequency: Never    Comment: As per [employer]:  Social drinker     Social History     Substance and Sexual Activity   Drug Use No     Social History     Tobacco Use   Smoking Status Never Smoker   Smokeless Tobacco Never Used   Tobacco Comment    As per [employer]:  Unknown if ever smoked       Medications:   Meds/Allergies     Current Outpatient Medications:     lisinopril (ZESTRIL) 20 mg tablet, Take 20 mg by mouth daily, Disp: , Rfl:     NOVOLOG FLEXPEN 100 units/mL injection pen, , Disp: , Rfl:     SAXENDA injection, , Disp: , Rfl:     atorvastatin (LIPITOR) 40 mg tablet, Take 40 mg by mouth daily  , Disp: , Rfl:     clindamycin-benzoyl peroxide (BENZACLIN) gel, Apply topically 2 (two) times a day, Disp: 25 g, Rfl: 0    Ergocalciferol 2000 units CAPS, take 1 capsule by oral route  every week, Disp: , Rfl:     Insulin Pen Needle (BD PEN NEEDLE EDUARDO U/F) 32G X 4 MM MISC, by Does not apply route, Disp: , Rfl:     Insulin Pen Needle 32G X 5 MM MISC, Inject at bedtime  , Disp: , Rfl:     mometasone (NASONEX) 50 mcg/act nasal spray, spray 2 spray by intranasal route  every day in each nostril, Disp: , Rfl:   Allergies   Allergen Reactions    Amoxicillin Hives    Metformin      Other reaction(s): diarhea    Iodinated Diagnostic Agents Facial Swelling    Iodine     Penicillins     Shellfish-Derived Products Throat Swelling     OBJECTIVE    Vitals:   Vitals:    09/11/19 0901   BP: 126/74   BP Location: Left arm   Patient Position: Sitting   Cuff Size: Large   Pulse: 70   Resp: 16   Weight: 90 kg (198 lb 6 4 oz)   Height: 4' 10 5" (1 486 m)     Wt Readings from Last 3 Encounters:   09/11/19 90 kg (198 lb 6 4 oz)   04/01/19 83 5 kg (184 lb)   09/20/18 83 9 kg (185 lb)     Body mass index is 40 76 kg/m²  No LMP recorded  Physical Exam:    Physical Exam   Constitutional: She is oriented to person, place, and time  Vital signs are normal  She appears well-developed and well-nourished  She does not appear ill  No distress  HENT:   Head: Normocephalic and atraumatic  Right Ear: Tympanic membrane, external ear and ear canal normal    Left Ear: External ear and ear canal normal  Tympanic membrane is scarred (appears as though normal skin is invading over TM, still visualized bony landmarks)  Mouth/Throat: Uvula is midline, oropharynx is clear and moist and mucous membranes are normal  No posterior oropharyngeal erythema  No tonsillar exudate  Eyes: Conjunctivae and EOM are normal    Cardiovascular: Normal rate, regular rhythm, normal heart sounds and intact distal pulses  Pulses are weak pulses  No murmur heard  Pulses:       Dorsalis pedis pulses are 1+ on the right side, and 1+ on the left side  Posterior tibial pulses are 1+ on the right side, and 1+ on the left side  Pulmonary/Chest: Effort normal and breath sounds normal  No respiratory distress  She has no wheezes  Abdominal: Soft  Bowel sounds are normal  She exhibits no distension  There is no tenderness  Musculoskeletal: She exhibits no edema  Feet:   Right Foot:   Skin Integrity: Positive for callus  Left Foot:   Skin Integrity: Positive for callus  Neurological: She is alert and oriented to person, place, and time  Reflex Scores:       Patellar reflexes are 1+ on the right side and 1+ on the left side  Skin: Skin is warm and dry  Capillary refill takes less than 2 seconds  Acne over bilateral cheeks   Nursing note and vitals reviewed  Patient's shoes and socks removed  Right Foot/Ankle   Right Foot Inspection  Skin Exam: callus and callus                          Toe Exam: ROM and strength within normal limits  Sensory   Vibration: intact    Monofilament testing: intact  Vascular  Capillary refills: < 3 seconds  The right DP pulse is 1+  The right PT pulse is 1+  Left Foot/Ankle  Left Foot Inspection  Skin Exam: callus                         Toe Exam: ROM and strength within normal limits                   Sensory   Vibration: intact    Monofilament: intact  Vascular  Capillary refills: < 3 seconds  The left DP pulse is 1+  The left PT pulse is 1+  Assign Risk Category:  No deformity present; No loss of protective sensation; Weak pulses       Risk: 1    Labs: I have personally reviewed all pertinent results       Component      Latest Ref Rng & Units 3/26/2019 7/11/2019   Vit D, 25-Hydroxy      30 0 - 100 0 ng/mL 41 4 21 2 (L)     Component      Latest Ref Rng & Units 10/25/2018 3/26/2019 7/11/2019   Vitamin B-12      100 - 900 pg/mL 1,019 (H) 836 686     Component      Latest Ref Rng & Units 10/25/2018 3/26/2019 7/11/2019   TSH 3RD GENERATON      0 465 - 4 680 uIU/mL 2 310 5 340 (H) 2 480     Component      Latest Ref Rng & Units 10/25/2018 3/26/2019 7/11/2019   Cholesterol      <200 mg/dL 136 247 (H) 205 (H)   Triglycerides      <150 mg/dL 174 (H) 278 (H) 183 (H)   HDL      40 - 59 mg/dL 38 (L) 44 46   LDL Direct      <130 mg/dL 63 147 (H) 122   Non-HDL Cholesterol      mg/dl 98 203 159       Component      Latest Ref Rng & Units 7/24/2018 10/25/2018 3/26/2019 7/11/2019   Hemoglobin A1C      4 2 - 6 3 % 6 3 6 5 (H) 8 0 (H) 7 3 (H)   EAG      mg/dl 134 140 183 163       Component      Latest Ref Rng & Units 7/11/2019   Sodium      137 - 147 mmol/L 141   Potassium      3 6 - 5 0 mmol/L 3 7   Chloride      97 - 108 mmol/L 106   CO2      22 - 30 mmol/L 22   Anion Gap      5 - 14 mmol/L 13   BUN      5 - 25 mg/dL 12   Creatinine      0 60 - 1 20 mg/dL 0 76   GLUCOSE FASTING      70 - 99 mg/dL 140 (H)   Calcium      8 4 - 10 2 mg/dL 10 1   AST      14 - 36 U/L 23   ALT      9 - 52 U/L 21   Alkaline Phosphatase      43 - 122 U/L 90   Total Protein      5 9 - 8 4 g/dL 8 1   Albumin      3 0 - 5 2 g/dL 4 2   TOTAL BILIRUBIN      <1 30 mg/dL 0 40   eGFR      >60 ml/min/1 73sq m 91       Component      Latest Ref Rng & Units 5/30/2018 3/26/2019 7/11/2019   EXT Creatinine Urine      mg/dL  413 0 175 0   MICROALBUM ,U,RANDOM      0 0 - 20 0 mg/L 10 5 (H) 1,490 0 (H) 335 0 (H)   MICROALBUMIN/CREATININE RATIO      0 - 30 mg/g creatinine 38 4 (H) 361 (H) 191 (H)     Imaging:  I have personally reviewed all pertinent results

## 2019-09-23 ENCOUNTER — OFFICE VISIT (OUTPATIENT)
Dept: URGENT CARE | Facility: CLINIC | Age: 54
End: 2019-09-23
Payer: COMMERCIAL

## 2019-09-23 DIAGNOSIS — D35.2 PITUITARY MICROADENOMA (HCC): Primary | ICD-10-CM

## 2019-09-23 DIAGNOSIS — M54.2 NECK PAIN: Primary | ICD-10-CM

## 2019-09-23 PROCEDURE — 99213 OFFICE O/P EST LOW 20 MIN: CPT | Performed by: PHYSICIAN ASSISTANT

## 2019-09-23 PROCEDURE — S9088 SERVICES PROVIDED IN URGENT: HCPCS | Performed by: PHYSICIAN ASSISTANT

## 2019-09-23 RX ORDER — CYCLOBENZAPRINE HCL 10 MG
10 TABLET ORAL 3 TIMES DAILY PRN
Qty: 10 TABLET | Refills: 0 | Status: SHIPPED | OUTPATIENT
Start: 2019-09-23 | End: 2019-10-23

## 2019-09-23 RX ORDER — IBUPROFEN 800 MG/1
800 TABLET ORAL EVERY 8 HOURS PRN
Qty: 30 TABLET | Refills: 0 | Status: SHIPPED | OUTPATIENT
Start: 2019-09-23 | End: 2019-10-23 | Stop reason: SDUPTHER

## 2019-09-23 NOTE — PROGRESS NOTES
Contact by radiology that patient's MRI should be with and without contrast, will order as requested

## 2019-09-24 VITALS
WEIGHT: 197 LBS | HEART RATE: 96 BPM | BODY MASS INDEX: 41.35 KG/M2 | TEMPERATURE: 98.3 F | HEIGHT: 58 IN | SYSTOLIC BLOOD PRESSURE: 158 MMHG | DIASTOLIC BLOOD PRESSURE: 90 MMHG | RESPIRATION RATE: 16 BRPM

## 2019-09-24 NOTE — PROGRESS NOTES
[facility]        NAME: Brie Sanz is a 48 y o  female  : 1965    MRN: [id number]  DATE: 2019  TIME: 9:22 AM    Assessment and Plan   Neck pain [M54 2]  1  Neck pain  ibuprofen (MOTRIN) 800 mg tablet    cyclobenzaprine (FLEXERIL) 10 mg tablet         Patient Instructions     Ibuprofen and muscle relaxer as directed  Ice area for 20 minutes every 3-4 hours  Follow up with PCP in 3-5 days  Proceed to  ER if symptoms worsen  Chief Complaint     Chief Complaint   Patient presents with    Neck Pain     mild pain last week, taking Tylenol  Last 24 hrs much worse  History of Present Illness       Pt is a 48 yr old female presenting for right sided neck pain when she woke up yesterday  Pain worsens when turning her head  She has a hx of neuropathy but denies it being worse with the neck pain  She has been taking Tylenol without any relief  She was unable to sleep comfortably last night  Review of Systems   Review of Systems   Constitutional: Negative for chills and fever  Musculoskeletal: Positive for neck pain  Negative for neck stiffness  Current Medications       Current Outpatient Medications:     aspirin (ECOTRIN LOW STRENGTH) 81 mg EC tablet, Take 81 mg by mouth daily, Disp: , Rfl:     atorvastatin (LIPITOR) 40 mg tablet, Take 40 mg by mouth daily  , Disp: , Rfl:     clindamycin-benzoyl peroxide (BENZACLIN) gel, Apply topically 2 (two) times a day, Disp: 25 g, Rfl: 0    Insulin Glargine (TOUJEO) 300 units/mL CONCETRATED U-300 injection pen, Inject 28 Units under the skin daily at bedtime, Disp: , Rfl:     Insulin Pen Needle (BD PEN NEEDLE EDUARDO U/F) 32G X 4 MM MISC, by Does not apply route, Disp: , Rfl:     Insulin Pen Needle 32G X 5 MM MISC, Inject at bedtime  , Disp: , Rfl:     lisinopril (ZESTRIL) 20 mg tablet, Take 20 mg by mouth daily, Disp: , Rfl:     mometasone (NASONEX) 50 mcg/act nasal spray, spray 2 spray by intranasal route  every day in each nostril, Disp: , Rfl:     NOVOLOG FLEXPEN 100 units/mL injection pen, Inject 15-30 Units under the skin 3 (three) times a day before meals, Disp: , Rfl:     SAXENDA injection, Inject 30 Units under the skin daily at bedtime, Disp: , Rfl:     cyclobenzaprine (FLEXERIL) 10 mg tablet, Take 1 tablet (10 mg total) by mouth 3 (three) times a day as needed for muscle spasms, Disp: 10 tablet, Rfl: 0    Ergocalciferol 2000 units CAPS, take 1 capsule by oral route  every week, Disp: , Rfl:     ibuprofen (MOTRIN) 800 mg tablet, Take 1 tablet (800 mg total) by mouth every 8 (eight) hours as needed for mild pain or moderate pain, Disp: 30 tablet, Rfl: 0    Current Allergies     Allergies as of 2019 - Reviewed 2019   Allergen Reaction Noted    Amoxicillin Hives 2016    Metformin  2016    Iodinated diagnostic agents Facial Swelling 2016    Iodine  2016    Penicillins  10/02/2012    Shellfish-derived products Throat Swelling 2016            The following portions of the patient's history were reviewed and updated as appropriate: allergies, current medications, past family history, past medical history, past social history, past surgical history and problem list      Past Medical History:   Diagnosis Date    Anemia     Last Assessed:  3/27/13a    Asthma     Diabetes mellitus ([facility] [address] )     Hyperlipidemia     Hypertension     Irritable bowel syndrome     Last Assessed:  10/2/12       Past Surgical History:   Procedure Laterality Date    ABDOMINAL SURGERY      CARPAL TUNNEL RELEASE Right      SECTION      TUBAL LIGATION      WISDOM TOOTH EXTRACTION         Family History   Problem Relation Age of Onset    Diabetes Mother     Diabetes Father     Heart disease Father     Stroke Father     Hypertension Father     Diverticulitis Father     Hyperlipidemia Father     Diabetes Maternal Grandmother     Pancreatic cancer Maternal Grandmother     Liver cancer Maternal Grandfather     Alcohol abuse Maternal Grandfather     Heart disease Paternal Grandmother     Crohn's disease Sister     Diabetes type I Daughter     Eczema Daughter     Heart attack Paternal Grandfather     Asthma Maternal Aunt     Alcohol abuse Maternal Uncle     Mental illness Paternal Aunt     Schizophrenia Paternal Aunt     Alcohol abuse Paternal Uncle     Diabetes Paternal Uncle     Eczema Son     Colon cancer Neg Hx     Breast cancer Neg Hx          Medications have been verified  Objective   /90 (Patient Position: Sitting)   Pulse 96   Temp 98 3 °F (36 8 °C) (Temporal)   Resp 16   Ht 4' 10" (1 473 m)   Wt 89 4 kg (197 lb)   BMI 41 17 kg/m²        Physical Exam     Physical Exam   Constitutional: She is oriented to person, place, and time  She appears well-developed and well-nourished  No distress  HENT:   Head: Normocephalic and atraumatic  Eyes: Conjunctivae are normal    Neck: Normal range of motion  No spinous process tenderness and no muscular tenderness present  No neck rigidity  Normal range of motion present  Pain located to right upper neck  No rash  No TTP  Pulmonary/Chest: Effort normal    Neurological: She is alert and oriented to person, place, and time  Skin: Skin is warm and dry  She is not diaphoretic  Psychiatric: She has a normal mood and affect   Her behavior is normal

## 2019-09-26 ENCOUNTER — HOSPITAL ENCOUNTER (OUTPATIENT)
Dept: MRI IMAGING | Facility: HOSPITAL | Age: 54
Discharge: HOME | End: 2019-09-26
Payer: COMMERCIAL

## 2019-09-26 DIAGNOSIS — D35.2 PITUITARY MICROADENOMA (HCC): ICD-10-CM

## 2019-09-26 PROCEDURE — A9585 GADOBUTROL INJECTION: HCPCS | Performed by: FAMILY MEDICINE

## 2019-09-26 PROCEDURE — 70553 MRI BRAIN STEM W/O & W/DYE: CPT

## 2019-09-26 RX ADMIN — GADOBUTROL 8 ML: 604.72 INJECTION INTRAVENOUS at 20:25

## 2019-10-04 ENCOUNTER — OFFICE VISIT (OUTPATIENT)
Dept: OBGYN CLINIC | Facility: HOSPITAL | Age: 54
End: 2019-10-04
Payer: COMMERCIAL

## 2019-10-04 VITALS
HEIGHT: 58 IN | HEART RATE: 111 BPM | DIASTOLIC BLOOD PRESSURE: 84 MMHG | SYSTOLIC BLOOD PRESSURE: 142 MMHG | BODY MASS INDEX: 41.98 KG/M2 | WEIGHT: 200 LBS

## 2019-10-04 DIAGNOSIS — G56.02 CARPAL TUNNEL SYNDROME ON LEFT: ICD-10-CM

## 2019-10-04 DIAGNOSIS — M65.342 TRIGGER RING FINGER OF LEFT HAND: Primary | ICD-10-CM

## 2019-10-04 DIAGNOSIS — M65.341 TRIGGER RING FINGER OF RIGHT HAND: ICD-10-CM

## 2019-10-04 PROCEDURE — 20550 NJX 1 TENDON SHEATH/LIGAMENT: CPT | Performed by: ORTHOPAEDIC SURGERY

## 2019-10-04 PROCEDURE — 99214 OFFICE O/P EST MOD 30 MIN: CPT | Performed by: ORTHOPAEDIC SURGERY

## 2019-10-04 RX ORDER — LIDOCAINE HYDROCHLORIDE 5 MG/ML
1 INJECTION, SOLUTION INFILTRATION; PERINEURAL
Status: COMPLETED | OUTPATIENT
Start: 2019-10-04 | End: 2019-10-04

## 2019-10-04 RX ORDER — LIDOCAINE HYDROCHLORIDE AND EPINEPHRINE 10; 10 MG/ML; UG/ML
20 INJECTION, SOLUTION INFILTRATION; PERINEURAL ONCE
Status: CANCELLED | OUTPATIENT
Start: 2019-10-04 | End: 2019-10-04

## 2019-10-04 RX ORDER — BETAMETHASONE SODIUM PHOSPHATE AND BETAMETHASONE ACETATE 3; 3 MG/ML; MG/ML
3 INJECTION, SUSPENSION INTRA-ARTICULAR; INTRALESIONAL; INTRAMUSCULAR; SOFT TISSUE
Status: COMPLETED | OUTPATIENT
Start: 2019-10-04 | End: 2019-10-04

## 2019-10-04 RX ADMIN — LIDOCAINE HYDROCHLORIDE 1 ML: 5 INJECTION, SOLUTION INFILTRATION; PERINEURAL at 09:24

## 2019-10-04 RX ADMIN — BETAMETHASONE SODIUM PHOSPHATE AND BETAMETHASONE ACETATE 3 MG: 3; 3 INJECTION, SUSPENSION INTRA-ARTICULAR; INTRALESIONAL; INTRAMUSCULAR; SOFT TISSUE at 09:24

## 2019-10-04 NOTE — H&P
ASSESSMENT/PLAN:    Assessment:   Left carpal tunnel syndrome  Bilateral ring trigger fingers    Plan:   Bilateral ring trigger finger injection  Left endoscopic carpal tunnel release under local medication    Follow Up: After Surgery    To Do Next Visit:    Sutures out      Operative Discussions:     Endoscopic Carpal Tunnel Release: The anatomy and physiology of carpal tunnel syndrome was discussed with the patient today  Increase pressure localized under the transverse carpal ligament can cause pain, numbness, tingling, or dysesthesias within the median nerve distribution as well as feelings of fatigue, clumsiness, or awkwardness  These symptoms typically occur at night and worse in the morning upon waking  Eventually, untreated carpal tunnel syndrome can result in weakness and permanent loss of muscle within the thenar compartment of the hand  Treatment options were discussed with the patient  Conservative treatment includes nocturnal resting splints to keep the nerve in a neutral position, ergonomic changes within the work or home environment, activity modification, and tendon gliding exercises  Steroid injections within the carpal canal can help a majority of patients, however this is often self-limited in a majority of patients  Surgical intervention to divide the transverse carpal ligament typically results in a long-lasting relief of the patient's complaints, with the recurrence rate of less than 1%  The patient has elected to undergo an endoscopic carpal tunnel release  The 2 incision technique was discussed with the patient, which results in approximately a two-week less recovery time, and less wound complications  In the postoperative period, light activities are allowed immediately, driving is allowed when narcotic medication has stopped, and the bandages may be removed and incision may get wet after 2 days    Heavy activities (lifting more than approximately 10 pounds) will be allowed after follow up appointment in 1-2 weeks  While the pain and discomfort in the hands generally improves rapidly, the numbness and tingling as well as the strength will slowly improve over weeks to months depending on the severity of the carpal tunnel syndrome  Pillar pain was discussed with the patient, which is typically a common but self-limiting condition  The risks of bleeding and infection from the surgery are less than 1%  Risk of recurrence is approximately 0 5%  The risks of nerve injury or nerve damage or damage to the blood vessels is approximately 1 in 1200  The patient has an understanding of the above mentioned discussion  The risks and benefits of the procedure were explained to the patient, which include, but are not limited to: Bleeding, infection, recurrence, pain, scar, damage to tendons, damage to nerves, and damage to blood vessels, failure to give desired results and complications related to anesthesia   These risks, along with alternative conservative treatment options, and postoperative protocols were voiced back and understood by the patient   All questions were answered to the patient's satisfaction   The patient agrees to comply with a standard postoperative protocol, and is willing to proceed   Education was provided via written and auditory forms   There were no barriers to learning  Written handouts regarding wound care, incision and scar care, and general preoperative information was provided to the patient   Prior to surgery, the patient may be requested to stop all anti-inflammatory medications   Prophylactic aspirin, Plavix, and Coumadin may be allowed to be continued   Medications including vitamin E , ginkgo, and fish oil are requested to be stopped approximately one week prior to surgery   Hypertensive medications and beta blockers, if taken, should be continued  Diabetes: The risks of diabetes were discussed with the patient   These risks include increased risk of infection, delayed wound healing, increased swelling, increased stiffness, and increased scar formation  While these risks are generally increased in all diabetics, keeping one's blood sugar under strict control can help decrease problems after surgery  If blood sugar levels are too high, or hemoglobin A1c levels are too high, surgery may be delayed or canceled  _____________________________________________________  CHIEF COMPLAINT:  Chief Complaint   Patient presents with    Left Hand - Numbness         SUBJECTIVE:  Karen Carrillo is a 48y o  year old female who presents with Numbness in the left thumb, index and long fingers and, Catching and Locking to the bilateral ring finger  This started  Several  year(s) ago  Patient states she had an EMG about 12 yrs ago which showed moderate to severe CTS at that time  Symptoms wake her from sleep at night  Night-time bracing is no longer helping  She notes that is dropping items with the left hand         PAST MEDICAL HISTORY:  Past Medical History:   Diagnosis Date    Anemia     Last Assessed:  3/27/13a    Asthma     Diabetes mellitus ([facility] [address]     Hyperlipidemia     Hypertension     Irritable bowel syndrome     Last Assessed:  10/2/12       PAST SURGICAL HISTORY:  Past Surgical History:   Procedure Laterality Date    ABDOMINAL SURGERY      CARPAL TUNNEL RELEASE Right      SECTION      TUBAL LIGATION      WISDOM TOOTH EXTRACTION         FAMILY HISTORY:  Family History   Problem Relation Age of Onset    Diabetes Mother     Diabetes Father     Heart disease Father    [place] Stroke Father     Hypertension Father     Diverticulitis Father     Hyperlipidemia Father     Diabetes Maternal Grandmother     Pancreatic cancer Maternal Grandmother     Liver cancer Maternal Grandfather     Alcohol abuse Maternal Grandfather     Heart disease Paternal Grandmother     Crohn's disease Sister     Diabetes type I Daughter     Eczema Daughter     Heart attack Paternal Grandfather     Asthma Maternal Aunt     Alcohol abuse Maternal Uncle     Mental illness Paternal Aunt     Schizophrenia Paternal Aunt     Alcohol abuse Paternal Uncle     Diabetes Paternal Uncle     Eczema Son     Colon cancer Neg Hx     Breast cancer Neg Hx        SOCIAL HISTORY:  Social History     Tobacco Use    Smoking status: Never Smoker    Smokeless tobacco: Never Used   Substance Use Topics    Alcohol use: Yes     Frequency: 2-4 times a month     Drinks per session: 1 or 2     Binge frequency: Never    Drug use: No       MEDICATIONS:    Current Outpatient Medications:     aspirin (ECOTRIN LOW STRENGTH) 81 mg EC tablet, Take 81 mg by mouth daily, Disp: , Rfl:     atorvastatin (LIPITOR) 40 mg tablet, Take 40 mg by mouth daily  , Disp: , Rfl:     clindamycin-benzoyl peroxide (BENZACLIN) gel, Apply topically 2 (two) times a day, Disp: 25 g, Rfl: 0    cyclobenzaprine (FLEXERIL) 10 mg tablet, Take 1 tablet (10 mg total) by mouth 3 (three) times a day as needed for muscle spasms, Disp: 10 tablet, Rfl: 0    Ergocalciferol 2000 units CAPS, take 1 capsule by oral route  every week, Disp: , Rfl:     ibuprofen (MOTRIN) 800 mg tablet, Take 1 tablet (800 mg total) by mouth every 8 (eight) hours as needed for mild pain or moderate pain, Disp: 30 tablet, Rfl: 0    Insulin Glargine (TOUJEO) 300 units/mL CONCETRATED U-300 injection pen, Inject 28 Units under the skin daily at bedtime, Disp: , Rfl:     Insulin Pen Needle (BD PEN NEEDLE EDUARDO U/F) 32G X 4 MM MISC, by Does not apply route, Disp: , Rfl:     Insulin Pen Needle 32G X 5 MM MISC, Inject at bedtime  , Disp: , Rfl:     lisinopril (ZESTRIL) 20 mg tablet, Take 20 mg by mouth daily, Disp: , Rfl:     mometasone (NASONEX) 50 mcg/act nasal spray, spray 2 spray by intranasal route  every day in each nostril, Disp: , Rfl:     NOVOLOG FLEXPEN 100 units/mL injection pen, Inject 15-30 Units under the skin 3 (three) times a day before meals, Disp: , Rfl:     SAXENDA injection, Inject 30 Units under the skin daily at bedtime, Disp: , Rfl:     ALLERGIES:  Allergies   Allergen Reactions    Amoxicillin Hives    Metformin      Other reaction(s): diarhea    Iodinated Diagnostic Agents Facial Swelling    Iodine     Penicillins     Shellfish-Derived Products Throat Swelling       REVIEW OF SYSTEMS:  Pertinent items are noted in HPI  A comprehensive review of systems was negative  LABS:  HgA1c:   Lab Results   Component Value Date    HGBA1C 7 3 (H) 07/11/2019     BMP:   Lab Results   Component Value Date    GLUCOSE 138 02/28/2014    CALCIUM 10 1 07/11/2019     05/30/2018    K 3 7 07/11/2019    CO2 22 07/11/2019     07/11/2019    BUN 12 07/11/2019    CREATININE 0 76 07/11/2019         _____________________________________________________  PHYSICAL EXAMINATION:  /84   Pulse (!) 111   Ht 4' 10" (1 473 m)   Wt 90 7 kg (200 lb)   BMI 41 80 kg/m²    General: well developed and well nourished, alert, oriented times 3 and appears comfortable  Psychiatric: Normal  HEENT: Trachea Midline, No torticollis  Cardiovascular: No discernable arrhythmia  Pulmonary: No wheezing or stridor  Skin: No masses, erythema, lacerations, fluctation, ulcerations  Neurovascular: Pulses Intact    MUSCULOSKELETAL EXAMINATION:    Left Carpal Tunnel Exam:  Negative thenar atrophy  Positive phalen's test  Positive carpal tunnel compression  Positive tinels over median nerve at the wrist   AIN 5/5 APB 4/5  Full wrist flexion and extension motion with 5/5 strength  Bilateral ring fingers:  Positive palpable nodule over the A1 pulley  Positive tenderness to palpation over A1 pulley  Positive catching  Positive clicking    Patient has 60% flexion before trigger finger engages on the right and full motion on the left    _____________________________________________________  STUDIES REVIEWED:  No Studies to review      PROCEDURES PERFORMED:  Hand/upper extremity injection: bilateral ring A1  Date/Time: 10/4/2019 9:24 AM  Consent given by: patient  Site marked: site marked  Timeout: Immediately prior to procedure a time out was called to verify the correct patient, procedure, equipment, support staff and site/side marked as required   Supporting Documentation  Indications: pain   Procedure Details  Condition:trigger finger Location: ring finger - bilateral ring A1   Needle size: 25 G  Ultrasound guidance: no  Approach: volar    Medications (Right): 1 mL lidocaine 0 5 %; 3 mg betamethasone acetate-betamethasone sodium phosphate 6 (3-3) mg/mLMedications (Left): 1 mL lidocaine 0 5 %; 3 mg betamethasone acetate-betamethasone sodium phosphate 6 (3-3) mg/mL   Patient tolerance: patient tolerated the procedure well with no immediate complications  Dressing:  Sterile dressing applied

## 2019-10-04 NOTE — PROGRESS NOTES
ASSESSMENT/PLAN:    Assessment:   Left carpal tunnel syndrome  Bilateral ring trigger fingers    Plan:   Bilateral ring trigger finger injection  Left endoscopic carpal tunnel release under local medication    Follow Up: After Surgery    To Do Next Visit:    Sutures out      Operative Discussions:     Endoscopic Carpal Tunnel Release: The anatomy and physiology of carpal tunnel syndrome was discussed with the patient today  Increase pressure localized under the transverse carpal ligament can cause pain, numbness, tingling, or dysesthesias within the median nerve distribution as well as feelings of fatigue, clumsiness, or awkwardness  These symptoms typically occur at night and worse in the morning upon waking  Eventually, untreated carpal tunnel syndrome can result in weakness and permanent loss of muscle within the thenar compartment of the hand  Treatment options were discussed with the patient  Conservative treatment includes nocturnal resting splints to keep the nerve in a neutral position, ergonomic changes within the work or home environment, activity modification, and tendon gliding exercises  Steroid injections within the carpal canal can help a majority of patients, however this is often self-limited in a majority of patients  Surgical intervention to divide the transverse carpal ligament typically results in a long-lasting relief of the patient's complaints, with the recurrence rate of less than 1%  The patient has elected to undergo an endoscopic carpal tunnel release  The 2 incision technique was discussed with the patient, which results in approximately a two-week less recovery time, and less wound complications  In the postoperative period, light activities are allowed immediately, driving is allowed when narcotic medication has stopped, and the bandages may be removed and incision may get wet after 2 days    Heavy activities (lifting more than approximately 10 pounds) will be allowed after follow up appointment in 1-2 weeks  While the pain and discomfort in the hands generally improves rapidly, the numbness and tingling as well as the strength will slowly improve over weeks to months depending on the severity of the carpal tunnel syndrome  Pillar pain was discussed with the patient, which is typically a common but self-limiting condition  The risks of bleeding and infection from the surgery are less than 1%  Risk of recurrence is approximately 0 5%  The risks of nerve injury or nerve damage or damage to the blood vessels is approximately 1 in 1200  The patient has an understanding of the above mentioned discussion  The risks and benefits of the procedure were explained to the patient, which include, but are not limited to: Bleeding, infection, recurrence, pain, scar, damage to tendons, damage to nerves, and damage to blood vessels, failure to give desired results and complications related to anesthesia   These risks, along with alternative conservative treatment options, and postoperative protocols were voiced back and understood by the patient   All questions were answered to the patient's satisfaction   The patient agrees to comply with a standard postoperative protocol, and is willing to proceed   Education was provided via written and auditory forms   There were no barriers to learning  Written handouts regarding wound care, incision and scar care, and general preoperative information was provided to the patient   Prior to surgery, the patient may be requested to stop all anti-inflammatory medications   Prophylactic aspirin, Plavix, and Coumadin may be allowed to be continued   Medications including vitamin E , ginkgo, and fish oil are requested to be stopped approximately one week prior to surgery   Hypertensive medications and beta blockers, if taken, should be continued  Diabetes: The risks of diabetes were discussed with the patient   These risks include increased risk of infection, delayed wound healing, increased swelling, increased stiffness, and increased scar formation  While these risks are generally increased in all diabetics, keeping one's blood sugar under strict control can help decrease problems after surgery  If blood sugar levels are too high, or hemoglobin A1c levels are too high, surgery may be delayed or canceled  _____________________________________________________  CHIEF COMPLAINT:  Chief Complaint   Patient presents with    Left Hand - Numbness         SUBJECTIVE:  Karen Bar is a 48y o  year old female who presents with Numbness in the left thumb, index and long fingers and, Catching and Locking to the bilateral ring finger  This started  Several  year(s) ago  Patient states she had an EMG about 12 yrs ago which showed moderate to severe CTS at that time  Symptoms wake her from sleep at night  Night-time bracing is no longer helping  She notes that is dropping items with the left hand         PAST MEDICAL HISTORY:  Past Medical History:   Diagnosis Date    Anemia     Last Assessed:  3/27/13a    Asthma     [facility] mellitus ([facility] [address]     Hyperlipidemia     Hypertension     Irritable bowel syndrome     Last Assessed:  10/2/12       PAST SURGICAL HISTORY:  Past Surgical History:   Procedure Laterality Date    ABDOMINAL SURGERY      CARPAL TUNNEL RELEASE Right      SECTION      TUBAL LIGATION      WISDOM TOOTH EXTRACTION         FAMILY HISTORY:  Family History   Problem Relation Age of Onset    Diabetes Mother     Diabetes Father     Heart disease Father    Willena [place] Stroke Father     Hypertension Father     Diverticulitis Father     Hyperlipidemia Father     Diabetes Maternal Grandmother     Pancreatic cancer Maternal Grandmother     Liver cancer Maternal Grandfather     Alcohol abuse Maternal Grandfather     Heart disease Paternal Grandmother     Crohn's disease Sister     Diabetes type I Daughter     Eczema Daughter     Heart attack Paternal Grandfather     Asthma Maternal Aunt     Alcohol abuse Maternal Uncle     Mental illness Paternal Aunt     Schizophrenia Paternal Aunt     Alcohol abuse Paternal Uncle     Diabetes Paternal Uncle     Eczema Son     Colon cancer Neg Hx     Breast cancer Neg Hx        SOCIAL HISTORY:  Social History     Tobacco Use    Smoking status: Never Smoker    Smokeless tobacco: Never Used   Substance Use Topics    Alcohol use: Yes     Frequency: 2-4 times a month     Drinks per session: 1 or 2     Binge frequency: Never    Drug use: No       MEDICATIONS:    Current Outpatient Medications:     aspirin (ECOTRIN LOW STRENGTH) 81 mg EC tablet, Take 81 mg by mouth daily, Disp: , Rfl:     atorvastatin (LIPITOR) 40 mg tablet, Take 40 mg by mouth daily  , Disp: , Rfl:     clindamycin-benzoyl peroxide (BENZACLIN) gel, Apply topically 2 (two) times a day, Disp: 25 g, Rfl: 0    cyclobenzaprine (FLEXERIL) 10 mg tablet, Take 1 tablet (10 mg total) by mouth 3 (three) times a day as needed for muscle spasms, Disp: 10 tablet, Rfl: 0    Ergocalciferol 2000 units CAPS, take 1 capsule by oral route  every week, Disp: , Rfl:     ibuprofen (MOTRIN) 800 mg tablet, Take 1 tablet (800 mg total) by mouth every 8 (eight) hours as needed for mild pain or moderate pain, Disp: 30 tablet, Rfl: 0    Insulin Glargine (TOUJEO) 300 units/mL CONCETRATED U-300 injection pen, Inject 28 Units under the skin daily at bedtime, Disp: , Rfl:     Insulin Pen Needle (BD PEN NEEDLE EDUARDO U/F) 32G X 4 MM MISC, by Does not apply route, Disp: , Rfl:     Insulin Pen Needle 32G X 5 MM MISC, Inject at bedtime  , Disp: , Rfl:     lisinopril (ZESTRIL) 20 mg tablet, Take 20 mg by mouth daily, Disp: , Rfl:     mometasone (NASONEX) 50 mcg/act nasal spray, spray 2 spray by intranasal route  every day in each nostril, Disp: , Rfl:     NOVOLOG FLEXPEN 100 units/mL injection pen, Inject 15-30 Units under the skin 3 (three) times a day before meals, Disp: , Rfl:     SAXENDA injection, Inject 30 Units under the skin daily at bedtime, Disp: , Rfl:     ALLERGIES:  Allergies   Allergen Reactions    Amoxicillin Hives    Metformin      Other reaction(s): diarhea    Iodinated Diagnostic Agents Facial Swelling    Iodine     Penicillins     Shellfish-Derived Products Throat Swelling       REVIEW OF SYSTEMS:  Pertinent items are noted in HPI  A comprehensive review of systems was negative  LABS:  HgA1c:   Lab Results   Component Value Date    HGBA1C 7 3 (H) 07/11/2019     BMP:   Lab Results   Component Value Date    GLUCOSE 138 02/28/2014    CALCIUM 10 1 07/11/2019     05/30/2018    K 3 7 07/11/2019    CO2 22 07/11/2019     07/11/2019    BUN 12 07/11/2019    CREATININE 0 76 07/11/2019         _____________________________________________________  PHYSICAL EXAMINATION:  /84   Pulse (!) 111   Ht 4' 10" (1 473 m)   Wt 90 7 kg (200 lb)   BMI 41 80 kg/m²   General: well developed and well nourished, alert, oriented times 3 and appears comfortable  Psychiatric: Normal  HEENT: Trachea Midline, No torticollis  Cardiovascular: No discernable arrhythmia  Pulmonary: No wheezing or stridor  Skin: No masses, erythema, lacerations, fluctation, ulcerations  Neurovascular: Pulses Intact    MUSCULOSKELETAL EXAMINATION:    Left Carpal Tunnel Exam:  Negative thenar atrophy  Positive phalen's test  Positive carpal tunnel compression  Positive tinels over median nerve at the wrist   AIN 5/5 APB 4/5  Full wrist flexion and extension motion with 5/5 strength  Bilateral ring fingers:  Positive palpable nodule over the A1 pulley  Positive tenderness to palpation over A1 pulley  Positive catching  Positive clicking    Patient has 60% flexion before trigger finger engages on the right and full motion on the left    _____________________________________________________  STUDIES REVIEWED:  No Studies to review      PROCEDURES PERFORMED:  Hand/upper extremity injection: bilateral ring A1  Date/Time: 10/4/2019 9:24 AM  Consent given by: patient  Site marked: site marked  Timeout: Immediately prior to procedure a time out was called to verify the correct patient, procedure, equipment, support staff and site/side marked as required   Supporting Documentation  Indications: pain   Procedure Details  Condition:trigger finger Location: ring finger - bilateral ring A1   Needle size: 25 G  Ultrasound guidance: no  Approach: volar    Medications (Right): 1 mL lidocaine 0 5 %; 3 mg betamethasone acetate-betamethasone sodium phosphate 6 (3-3) mg/mLMedications (Left): 1 mL lidocaine 0 5 %; 3 mg betamethasone acetate-betamethasone sodium phosphate 6 (3-3) mg/mL   Patient tolerance: patient tolerated the procedure well with no immediate complications  Dressing:  Sterile dressing applied              Scribe Attestation    I,:    am acting as a scribe while in the presence of the attending physician :        I,:    personally performed the services described in this documentation    as scribed in my presence :

## 2019-10-09 ENCOUNTER — CLINICAL SUPPORT (OUTPATIENT)
Dept: BARIATRICS | Facility: CLINIC | Age: 54
End: 2019-10-09

## 2019-10-09 VITALS
RESPIRATION RATE: 14 BRPM | HEART RATE: 104 BPM | WEIGHT: 199.4 LBS | SYSTOLIC BLOOD PRESSURE: 130 MMHG | BODY MASS INDEX: 41.86 KG/M2 | HEIGHT: 58 IN | TEMPERATURE: 98.7 F | DIASTOLIC BLOOD PRESSURE: 80 MMHG

## 2019-10-09 DIAGNOSIS — E66.01 MORBID (SEVERE) OBESITY DUE TO EXCESS CALORIES (HCC): Primary | ICD-10-CM

## 2019-10-09 DIAGNOSIS — E66.01 MORBID OBESITY (HCC): Primary | ICD-10-CM

## 2019-10-09 PROCEDURE — RECHECK
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
## 2019-10-09 NOTE — PROGRESS NOTES
Bariatric Nutrition Assessment Note  NO WEIGHT CHECKS  Type of surgery    Vertical sleeve gastrectomy  Surgery Date: TBD  Surgeon: Dr Ronaldo Davis  48 y o   female     Wt with BMI of 25: 119lbs  Pre-Op Excess Wt: 80lbs  Blood pressure 130/80, pulse 104, temperature 98 7 °F (37 1 °C), temperature source Tympanic, resp  rate 14, height 4' 10" (1 473 m), weight 90 4 kg (199 lb 6 4 oz), not currently breastfeeding  Body mass index is 41 67 kg/m²  Weight History   Onset of Obesity: Childhood was "chubby", pre-pregnancy weight  31 yrs ago was about 150#, 175# 23yrs ago, gained a lot in the past 2 years since daughter passed away  Family history of obesity: Yes  Wt Loss Attempts: Counseling with  MD with weight loss meds  Exercise  Self Created Diets (Portion Control, Healthy Food Choices, etc )  Maximum Wt Lost: -25lbs    Review of History and Medications   Past Medical History:   Diagnosis Date    Anemia     Last Assessed:  3/27/13a    Arthritis     Asthma     Blurred vision     Diabetes mellitus ([facility] [address] )     Hypercholesterolemia     Hyperlipidemia     Hypertension     Increased urinary frequency     Irritable bowel syndrome     Last Assessed:  10/2/12    Joint pain     Nausea     Night sweat     Numbness     Palpitations     Weakness      Past Surgical History:   Procedure Laterality Date    ABDOMINAL SURGERY      CARPAL TUNNEL RELEASE Right      SECTION      x 2    TUBAL LIGATION      WISDOM TOOTH EXTRACTION       Social History     Socioeconomic History    Marital status:       Spouse name: Jamie Sorto    Number of children: 2    Years of education: 15    Highest education level: Some college, no degree   Occupational History     Comment: financial counseler   Social Needs    Financial resource strain: Not hard at all   [place] insecurity:     Worry: Never true     Inability: Never true   [employer] needs:     Medical: No     Non-medical: No   Tobacco Use    Smoking status: Former Smoker    Smokeless tobacco: Never Used   Substance and Sexual Activity    Alcohol use: Yes     Frequency: 2-4 times a month     Drinks per session: 1 or 2     Binge frequency: Never    Drug use: No    Sexual activity: Yes     Partners: Male     Birth control/protection: Female Sterilization   Lifestyle    Physical activity:     Days per week: 0 days     Minutes per session: 0 min    Stress: To some extent   Relationships    Social connections:     Talks on phone: More than three times a week     Gets together: More than three times a week     Attends Church service: None     Active member of club or organization: Yes     Attends meetings of clubs or organizations: More than 4 times per year     Relationship status:     Intimate partner violence:     Fear of current or ex partner: No     Emotionally abused: No     Physically abused: No     Forced sexual activity: No   Other Topics Concern    None   Social History Narrative    None       Current Outpatient Medications:     aspirin (ECOTRIN LOW STRENGTH) 81 mg EC tablet, Take 81 mg by mouth daily, Disp: , Rfl:     atorvastatin (LIPITOR) 40 mg tablet, Take 40 mg by mouth daily  , Disp: , Rfl:     clindamycin-benzoyl peroxide (BENZACLIN) gel, Apply topically 2 (two) times a day (Patient taking differently: Apply topically as needed ), Disp: 25 g, Rfl: 0    ibuprofen (MOTRIN) 800 mg tablet, Take 1 tablet (800 mg total) by mouth every 8 (eight) hours as needed for mild pain or moderate pain, Disp: 30 tablet, Rfl: 0    Insulin Glargine (TOUJEO) 300 units/mL CONCETRATED U-300 injection pen, Inject 28 Units under the skin daily at bedtime, Disp: , Rfl:     Insulin Pen Needle (BD PEN NEEDLE EDUARDO U/F) 32G X 4 MM MISC, by Does not apply route, Disp: , Rfl:     Insulin Pen Needle 32G X 5 MM MISC, Inject at bedtime  , Disp: , Rfl:     lisinopril (ZESTRIL) 20 mg tablet, Take 20 mg by mouth daily, Disp: , Rfl:     mometasone (NASONEX) 50 mcg/act nasal spray, as needed , Disp: , Rfl:     NOVOLOG FLEXPEN 100 units/mL injection pen, Inject 15-30 Units under the skin 2 (two) times a day with meals , Disp: , Rfl:     SAXENDA injection, Inject 30 Units under the skin daily at bedtime, Disp: , Rfl:     cyclobenzaprine (FLEXERIL) 10 mg tablet, Take 1 tablet (10 mg total) by mouth 3 (three) times a day as needed for muscle spasms (Patient not taking: Reported on 10/9/2019), Disp: 10 tablet, Rfl: 0    Ergocalciferol 2000 units CAPS, take 1 capsule by oral route  every week, Disp: , Rfl:   Food Intake and Lifestyle Assessment   Food Intake Assessment completed via food log brought by patient:  Lives at home alone, cooks for self  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[email or web address], sedentary job  Wakes:  6am  Breakfast: first food 9am-buttered roll and coffee or cream cheese on toast or [place]/muffin or 2 fried or scrammbled eggs, 2 slices of toast with butter  Always 16oz coffee with <1/4 c cream with 3 tsp sugar  Snack: -   Lunch: between 12-1pm- frozen [place] [facility] dinner (? Calories) or skips or tandori chicken wrap or burger or leftovers (rice, meat)  Snack: sometimes:   1/2 cookie and handful of cheetos-whatever may be in the office  Dinner: can be as late as 8 or 9pm:  a lot of pork products, but also will have beef or chicken or 1/2-1 cups rice, 2 fried eggs and 2 hot dogs or the frozen  meal   Snack: "Danger zone":  Cookie, cake, pastries last night had 6 oatmeal cookies and blood sugars was 300 before bed     Beverage intake: water, sweetened beverages, juice, regular soda and coffee/tea  Protein supplement: none, but has done a whey protein powder in the past  Estimated protein intake per day: 60-70gm  Estimated fluid intake per day: 16oz cup-1/2 water 1/2 juice 5 per week, 2 sodas per week or only 16oz water  Meals eaten away from home: 1-2 meals on the weekends, during the week will  something from [place] from hot bar once a week, lunch is out 2x/week  Typical meal pattern: 2-3 meals per day and grazes on snacks in the evening  Eating Behaviors: Consumption of high calorie/ high fat foods, Consumption of high calorie beverages, Large portion sizes, Frequent snacking/ grazing, Mindless eating and Craves sweet foods  Food allergies or intolerances: Allergies   Allergen Reactions    Amoxicillin Hives    Iodinated Diagnostic Agents Facial Swelling    Iodine Facial Swelling    Metformin      Other reaction(s): diarhea    Shellfish-Derived Products Throat Swelling    Penicillins      Yeast Infection     Cultural or Zoroastrianism considerations: none  Allergy:  shellfish    Physical Assessment  Physical Activity  Types of exercise: None now, used to belong to the gym and enjoyed it  Current physical limitations: back and joint pain    Psychosocial Assessment   Support systems: parent(s) sibling(s) significant other  Socioeconomic factors: none    Nutrition Diagnosis  Diagnosis: Overweight / Obesity (NC-3 3)  Related to: Physical inactivity and Excessive energy intake  As Evidenced by: BMI >25     Nutrition Prescription: Recommend the following diet  Regular    Interventions and Teaching   Discussed pre-op and post-op nutrition guidelines  Patient educated and handouts provided    Surgical changes to stomach / GI  Capacity of post-surgery stomach  Diet progression  Adequate hydration  Sugar and fat restriction to decrease "dumping syndrome"  Fat restriction to decrease steatorrhea  Expected weight loss  Weight loss plateaus/ possibility of weight regain  Exercise  Suggestions for pre-op diet  Nutrition considerations after surgery  Protein supplements  Meal planning and preparation  Appropriate carbohydrate, protein, and fat intake, and food/fluid choices to maximize safe weight loss, nutrient intake, and tolerance   Dietary and lifestyle changes  Possible problems with poor eating habits  Intuitive eating  Techniques for self monitoring and keeping daily food journal  Potential for food intolerance after surgery, and ways to deal with them including: lactose intolerance, nausea, reflux, vomiting, diarrhea, food intolerance, appetite changes, gas  Vitamin / Mineral supplementation of Multivitamin with minerals and Vitamin D 2000I pre-op    Education provided to: patient    Barriers to learning: No barriers identified  Readiness to change: preparation    Prior research on procedure: books, internet, discussed with provider and friends or family    Comprehension: verbalizes understanding     Expected Compliance: good  Recommendations  Pt is an appropriate candidate for surgery   Yes  Evaluation / Monitoring  Dietitian to Monitor: Eating pattern as discussed Body weight Lab values Physical activity    Pre-op weight loss goals  5% by day of surgery:  -10lbs (189lbs)  1/2 in order to submit: -5lbs (194lbs)  Goals  Eliminate sugar sweetened beverages and increase water to 64oz  Food journal via baritastic (reviewed with pt)  Exercise 60 minutes 3 times per week  Complete lesson plans 1-6  Eat 3 meals per day - use a protein shake as a meal replacement for breakfast  Eliminate mindless snacking-provided with snack list to assist in better choice for evening snack  Time Spent:   1 Hour 15 mins

## 2019-10-23 ENCOUNTER — OFFICE VISIT (OUTPATIENT)
Dept: FAMILY MEDICINE CLINIC | Facility: CLINIC | Age: 54
End: 2019-10-23
Payer: COMMERCIAL

## 2019-10-23 VITALS
DIASTOLIC BLOOD PRESSURE: 84 MMHG | SYSTOLIC BLOOD PRESSURE: 124 MMHG | HEART RATE: 66 BPM | BODY MASS INDEX: 41.94 KG/M2 | HEIGHT: 58 IN | TEMPERATURE: 98.2 F | WEIGHT: 199.8 LBS

## 2019-10-23 DIAGNOSIS — R80.9 MICROALBUMINURIA DUE TO TYPE 2 DIABETES MELLITUS (HCC): ICD-10-CM

## 2019-10-23 DIAGNOSIS — I10 ESSENTIAL HYPERTENSION: ICD-10-CM

## 2019-10-23 DIAGNOSIS — M54.2 NECK PAIN: ICD-10-CM

## 2019-10-23 DIAGNOSIS — L70.0 ACNE VULGARIS: ICD-10-CM

## 2019-10-23 DIAGNOSIS — E11.65 UNCONTROLLED TYPE 2 DIABETES MELLITUS WITH HYPERGLYCEMIA (HCC): ICD-10-CM

## 2019-10-23 DIAGNOSIS — E78.2 MIXED HYPERLIPIDEMIA: ICD-10-CM

## 2019-10-23 DIAGNOSIS — E11.29 MICROALBUMINURIA DUE TO TYPE 2 DIABETES MELLITUS (HCC): ICD-10-CM

## 2019-10-23 DIAGNOSIS — E66.01 CLASS 3 SEVERE OBESITY DUE TO EXCESS CALORIES WITH SERIOUS COMORBIDITY AND BODY MASS INDEX (BMI) OF 40.0 TO 44.9 IN ADULT (HCC): ICD-10-CM

## 2019-10-23 DIAGNOSIS — Z23 IMMUNIZATION DUE: Primary | ICD-10-CM

## 2019-10-23 DIAGNOSIS — D35.2 PITUITARY MICROADENOMA (HCC): ICD-10-CM

## 2019-10-23 DIAGNOSIS — E11.319 DIABETIC RETINOPATHY OF BOTH EYES ASSOCIATED WITH TYPE 2 DIABETES MELLITUS, MACULAR EDEMA PRESENCE UNSPECIFIED, UNSPECIFIED RETINOPATHY SEVERITY (HCC): ICD-10-CM

## 2019-10-23 DIAGNOSIS — K21.9 GASTROESOPHAGEAL REFLUX DISEASE WITHOUT ESOPHAGITIS: ICD-10-CM

## 2019-10-23 PROBLEM — H60.542 ECZEMA OF LEFT EXTERNAL EAR: Status: ACTIVE | Noted: 2019-09-11

## 2019-10-23 LAB — SL AMB POCT HEMOGLOBIN AIC: 9.8 (ref ?–6.5)

## 2019-10-23 PROCEDURE — 90471 IMMUNIZATION ADMIN: CPT | Performed by: FAMILY MEDICINE

## 2019-10-23 PROCEDURE — 90715 TDAP VACCINE 7 YRS/> IM: CPT | Performed by: FAMILY MEDICINE

## 2019-10-23 PROCEDURE — 83036 HEMOGLOBIN GLYCOSYLATED A1C: CPT | Performed by: FAMILY MEDICINE

## 2019-10-23 PROCEDURE — 99214 OFFICE O/P EST MOD 30 MIN: CPT | Performed by: FAMILY MEDICINE

## 2019-10-23 PROCEDURE — 90682 RIV4 VACC RECOMBINANT DNA IM: CPT | Performed by: FAMILY MEDICINE

## 2019-10-23 PROCEDURE — 3046F HEMOGLOBIN A1C LEVEL >9.0%: CPT | Performed by: FAMILY MEDICINE

## 2019-10-23 PROCEDURE — 90472 IMMUNIZATION ADMIN EACH ADD: CPT | Performed by: FAMILY MEDICINE

## 2019-10-23 PROCEDURE — 90732 PPSV23 VACC 2 YRS+ SUBQ/IM: CPT | Performed by: FAMILY MEDICINE

## 2019-10-23 RX ORDER — ATORVASTATIN CALCIUM 40 MG/1
40 TABLET, FILM COATED ORAL DAILY
Qty: 90 TABLET | Refills: 3 | Status: SHIPPED | OUTPATIENT
Start: 2019-10-23 | End: 2019-11-12

## 2019-10-23 RX ORDER — LISINOPRIL 20 MG/1
20 TABLET ORAL DAILY
Qty: 90 TABLET | Refills: 3 | Status: SHIPPED | OUTPATIENT
Start: 2019-10-23 | End: 2020-03-25

## 2019-10-23 RX ORDER — PANTOPRAZOLE SODIUM 40 MG/1
40 TABLET, DELAYED RELEASE ORAL DAILY
Qty: 90 TABLET | Refills: 1 | Status: SHIPPED | OUTPATIENT
Start: 2019-10-23 | End: 2020-09-01 | Stop reason: HOSPADM

## 2019-10-23 RX ORDER — IBUPROFEN 800 MG/1
800 TABLET ORAL EVERY 8 HOURS PRN
Qty: 30 TABLET | Refills: 0 | Status: SHIPPED | OUTPATIENT
Start: 2019-10-23 | End: 2019-11-21 | Stop reason: HOSPADM

## 2019-10-23 NOTE — ASSESSMENT & PLAN NOTE
Lab Results   Component Value Date    HGBA1C 9 8 (A) 10/23/2019   A1c 7 3--> 9 8  Chronic, uncontrolled, worsening  Patient's previous regimen was to prison 28 units subcu q h s , Saxenda 30 units subcu q h s , NovoLog 15-30 units b i d   With meals, and dapagliflozin-metformin (she's unsure of dose) Coty Lindsay  The patient has stopped taking this medication, accounting for the increase in her A1c  Will restart medication and recheck in 3 months  She is to send me a glucose log of her fasting glucoses and glucose 2 hours after her biggest meal of the day in 2 weeks so we can adjust her insulin as necessary  I advised the patient strongly to not stop, or change the dosing of her medications without consulting with me  Also discussed the importance of diabetic control with her upcoming surgeries

## 2019-10-23 NOTE — ASSESSMENT & PLAN NOTE
Patient reported recent eye exam in [facility]  The ophthalmologist had reported to the patient that she had noted diabetic retinopathy as well as retinal hemorrhages and recommended she see a retinal specialist  Referral placed for [facility] ophthalmology

## 2019-10-23 NOTE — ASSESSMENT & PLAN NOTE
Repeat urine microalbumin creatinine ratio with next blood draw  Patient has upcoming appointment with Nephrology

## 2019-10-23 NOTE — ASSESSMENT & PLAN NOTE
Patient id the with the bariatric team  She would like to pursue options for bariatric surgery  She is in the process of the workup for that

## 2019-10-23 NOTE — PROGRESS NOTES
Mehdi Miguel Angel 1965 female MRN: [id number]    Family Medicine Follow-up Visit    Assessment/Plan   Pituitary microadenoma [facility])  Reviewed MRI with patient  Microadenoma has shrunk compared to previous imaging, currently 4 mm  Would not recommend continuing to follow radiographically on last new symptoms warrant investigation    Microalbuminuria due to type 2 diabetes mellitus ([facility] )  Repeat urine microalbumin creatinine ratio with next blood draw  Patient has upcoming appointment with Nephrology    Essential hypertension  Chronic, at goal  Continue lisinopril 20 mg daily monotherapy    Carpal tunnel syndrome on left  Patient is planning upcoming surgical intervention    Eczema of left external ear  Patient's eye ENT determined the abnormality I have visualized to be due to eczema    Acne vulgaris  Significant improvement on the BenzaClin  Continue medication as ordered      Hyperlipidemia  Patient did restart Lipitor  Will recheck lipid panel in 6 weeks    Class 3 severe obesity due to excess calories with serious comorbidity and body mass index (BMI) of 40 0 to 44 9 in adult [facility])  Patient id the with the bariatric team  She would like to pursue options for bariatric surgery  She is in the process of the workup for that    Diabetes mellitus type 2, uncontrolled ([place] [id number] )    Lab Results   Component Value Date    HGBA1C 9 8 (A) 10/23/2019   A1c 7 3--> 9 8  Chronic, uncontrolled, worsening  Patient's previous regimen was to snf 28 units subcu q h s , Saxenda 30 units subcu q h s , NovoLog 15-30 units b i d   With meals, and dapagliflozin-metformin (she's unsure of dose) Lc Zhao  The patient has stopped taking this medication, accounting for the increase in her A1c  Will restart medication and recheck in 3 months  She is to send me a glucose log of her fasting glucoses and glucose 2 hours after her biggest meal of the day in 2 weeks so we can adjust her insulin as necessary  I advised the patient strongly to not stop, or change the dosing of her medications without consulting with me  Also discussed the importance of diabetic control with her upcoming surgeries    Diabetic retinopathy of both eyes associated with type 2 diabetes mellitus ([address] )  Patient reported recent eye exam in vision works  The ophthalmologist had reported to the patient that she had noted diabetic retinopathy as well as retinal hemorrhages and recommended she see a retinal specialist  Referral placed for Torsten Manzanares, [facility] ophthalmology    Karen was seen today for follow-up  Diagnoses and all orders for this visit:    Immunization due  -     influenza vaccine, [id number], quadrivalent, recombinant, PF, 0 5 mL, for patients 18 yr+ ([id number])  -     PNEUMOCOCCAL POLYSACCHARIDE VACCINE 23-VALENT =>3YO SQ IM  -     TDAP VACCINE GREATER THAN OR EQUAL TO 6YO IM    Uncontrolled type 2 diabetes mellitus with hyperglycemia ([facility] )  -     POCT hemoglobin A1c  -     Microalbumin / creatinine urine ratio  -     Comprehensive metabolic panel; Future  -     CBC; Future  -     Dapagliflozin-metFORMIN HCl ER 2 5-1000 MG TB24; Take 1 tablet by mouth daily with breakfast  -     pantoprazole (PROTONIX) 40 mg tablet; Take 1 tablet (40 mg total) by mouth daily    Mixed hyperlipidemia  -     Lipid Panel with Direct LDL reflex; Future  -     atorvastatin (LIPITOR) 40 mg tablet; Take 1 tablet (40 mg total) by mouth daily    Diabetic retinopathy of both eyes associated with type 2 diabetes mellitus, macular edema presence unspecified, unspecified retinopathy severity ([facility] )  -     Cancel: Ambulatory Referral to Ophthalmology; Future  -     Ambulatory Referral to Ophthalmology; Future    Essential hypertension  -     lisinopril (ZESTRIL) 20 mg tablet;  Take 1 tablet (20 mg total) by mouth daily    Pituitary microadenoma (HCC)    Gastroesophageal reflux disease without esophagitis  -     Dapagliflozin-metFORMIN HCl ER 2 5-1000 MG TB24; Take 1 tablet by mouth daily with breakfast  -     pantoprazole (PROTONIX) 40 mg tablet; Take 1 tablet (40 mg total) by mouth daily    Neck pain  -     ibuprofen (MOTRIN) 800 mg tablet; Take 1 tablet (800 mg total) by mouth every 8 (eight) hours as needed for mild pain or moderate pain    Acne vulgaris    Class 3 severe obesity due to excess calories with serious comorbidity and body mass index (BMI) of 40 0 to 44 9 in adult [facility])    Microalbuminuria due to type 2 diabetes mellitus ([facility] )      Karina Dawn MD  [address]  10/23/2019      Please be aware that this note contains text that was dictated and there may be errors pertaining to "sound-alike" words during the dictation process  SUBJECTIVE    CC: Follow-up    HPI:  Mary Crowe is a 48 y o  female who presented for a follow-up of chronic problems:    Obesity - saw bariatric team; is going to schedule after holidays  She needs to lose 10 lbs, shes meeting with cardio    Diabetes -   -- Nephropathy, did recommend she see nephrologist  Patient responded that she had stopped taking her oral antidiabetic medication, and reported that it bothered her stomach  However given her upcoming surgeries she is willing to restart it and take Protonix with it    Cholesterol - I had recommended last visit she start taking Lipitor again  The patient did comply with my recommendations and is currently taking Lipitor daily    Acne - last visit I had Rx Benzaclin  She felt significant improvement in her acne with this topical gel    Pituitary adenoma - was reimaged  Did initially size is now 4 mm diameter  Review of Systems   Constitutional: Negative for activity change, chills and fever  HENT: Negative for congestion, rhinorrhea and sore throat  Eyes: Negative for visual disturbance  Respiratory: Negative for cough, shortness of breath and wheezing  Cardiovascular: Negative for chest pain and palpitations     Gastrointestinal: Negative for abdominal pain, blood in stool, constipation, diarrhea, nausea and vomiting  Genitourinary: Negative for dysuria  Musculoskeletal: Negative for arthralgias and myalgias  Skin: Negative for rash  Neurological: Negative for dizziness, weakness and headaches  All other systems reviewed and are negative        Historical Information     The patient history was reviewed as follows:    Past Medical History:   Diagnosis Date    Anemia     Last Assessed:  3/27/13a    Arthritis     Asthma     Blurred vision     Diabetes mellitus ([facility] )     Hypercholesterolemia     Hyperlipidemia     Hypertension     Increased urinary frequency     Irritable bowel syndrome     Last Assessed:  10/2/12    Joint pain     Nausea     Night sweat     Numbness     Weakness      Past Surgical History:   Procedure Laterality Date    ABDOMINAL SURGERY      CARPAL TUNNEL RELEASE Right      SECTION      x 2    TUBAL LIGATION      WISDOM TOOTH EXTRACTION       Family History   Problem Relation Age of Onset    Diabetes Mother     Hypertension Mother     Diabetes Father     Heart disease Father     Stroke Father     Hypertension Father     Diverticulitis Father     Hyperlipidemia Father     Heart attack Father     Diabetes Maternal Grandmother     Pancreatic cancer Maternal Grandmother     Liver cancer Maternal Grandfather     Alcohol abuse Maternal Grandfather     Heart disease Paternal Grandmother     Crohn's disease Sister     Diabetes type I Daughter     Eczema Daughter     Heart attack Paternal Grandfather     Asthma Maternal Aunt     Alcohol abuse Maternal Uncle     Mental illness Paternal Aunt     Schizophrenia Paternal Aunt     Alcohol abuse Paternal Uncle     Diabetes Paternal Uncle     Eczema Son     Colon cancer Neg Hx     Breast cancer Neg Hx       Social History   Social History     Substance and Sexual Activity   Alcohol Use Yes    Comment: rarely     Social History     Substance and Sexual Activity Drug Use No     Social History     Tobacco Use   Smoking Status Former Smoker    Last attempt to quit: Rivas Armani Years since quittin 8   Smokeless Tobacco Never Used       Medications:   Meds/Allergies     Current Outpatient Medications:     clindamycin-benzoyl peroxide (BENZACLIN) gel, Apply topically 2 (two) times a day, Disp: 25 g, Rfl: 0    aspirin (ECOTRIN LOW STRENGTH) 81 mg EC tablet, Take 81 mg by mouth daily, Disp: , Rfl:     atorvastatin (LIPITOR) 40 mg tablet, Take 1 tablet (40 mg total) by mouth daily, Disp: 90 tablet, Rfl: 3    azelastine (ASTELIN) 0 1 % nasal spray, 2 sprays into each nostril 2 (two) times a day Use in each nostril as directed, Disp: 1 Bottle, Rfl: 6    Dapagliflozin-metFORMIN HCl ER 2 5-1000 MG TB24, Take 1 tablet by mouth daily with breakfast, Disp: 30 tablet, Rfl: 3    Ergocalciferol 2000 units CAPS, take 1 capsule by oral route  every week, Disp: , Rfl:     ibuprofen (MOTRIN) 800 mg tablet, Take 1 tablet (800 mg total) by mouth every 8 (eight) hours as needed for mild pain or moderate pain, Disp: 30 tablet, Rfl: 0    Insulin Glargine (TOUJEO) 300 units/mL CONCETRATED U-300 injection pen, Inject 28 Units under the skin daily at bedtime, Disp: , Rfl:     Insulin Pen Needle (BD PEN NEEDLE EDUARDO U/F) 32G X 4 MM MISC, by Does not apply route, Disp: , Rfl:     Insulin Pen Needle 32G X 5 MM MISC, Inject at bedtime  , Disp: , Rfl:     lisinopril (ZESTRIL) 20 mg tablet, Take 1 tablet (20 mg total) by mouth daily, Disp: 90 tablet, Rfl: 3    mometasone (ELOCON) 0 1 % cream, Apply topically daily Apply topically BID x 2 weeks then qhs prn, Disp: 45 g, Rfl: 3    mometasone (NASONEX) 50 mcg/act nasal spray, as needed , Disp: , Rfl:     NOVOLOG FLEXPEN 100 units/mL injection pen, Inject 15-30 Units under the skin 2 (two) times a day with meals , Disp: , Rfl:     pantoprazole (PROTONIX) 40 mg tablet, Take 1 tablet (40 mg total) by mouth daily, Disp: 90 tablet, Rfl: 1   SAXENDA injection, Inject 30 Units under the skin daily at bedtime, Disp: , Rfl:   Allergies   Allergen Reactions    Amoxicillin Hives    Iodinated Diagnostic Agents Facial Swelling    Iodine Facial Swelling    Shellfish-Derived Products Throat Swelling    Metformin      Other reaction(s): diarhea    Penicillins      Yeast Infection       OBJECTIVE    Vitals:   Vitals:    10/23/19 1529   BP: 124/84   BP Location: Left arm   Patient Position: Sitting   Cuff Size: Standard   Pulse: 66   Temp: 98 2 °F (36 8 °C)   Weight: 90 6 kg (199 lb 12 8 oz)   Height: 4' 10" (1 473 m)     Wt Readings from Last 3 Encounters:   10/23/19 90 6 kg (199 lb 12 8 oz)   10/14/19 92 5 kg (204 lb)   10/09/19 90 4 kg (199 lb 6 4 oz)     Body mass index is 41 76 kg/m²  Temp Readings from Last 3 Encounters:   10/23/19 98 2 °F (36 8 °C)   10/09/19 98 7 °F (37 1 °C) (Tympanic)   09/23/19 98 3 °F (36 8 °C) (Temporal)     BP Readings from Last 3 Encounters:   10/23/19 124/84   10/14/19 120/78   10/09/19 130/80     Pulse Readings from Last 3 Encounters:   10/23/19 66   10/14/19 64   10/09/19 104     No LMP recorded  Physical Exam:    Physical Exam   Constitutional: Vital signs are normal  She appears well-developed and well-nourished  She does not appear ill  No distress  HENT:   Head: Normocephalic and atraumatic  Right Ear: External ear normal    Left Ear: External ear normal    Nose: Nose normal    Eyes: Conjunctivae, EOM and lids are normal    Neck: No JVD present  No tracheal deviation present  Cardiovascular: Intact distal pulses  Pulmonary/Chest: No accessory muscle usage  No respiratory distress  Abdominal: Normal appearance  Neurological: She is alert  Skin: No rash noted  She is not diaphoretic  Psychiatric: She has a normal mood and affect  Her speech is normal and behavior is normal  She expresses no suicidal ideation  Nursing note and vitals reviewed  Labs:   I have personally reviewed all pertinent results  Imaging:  I have personally reviewed all pertinent results

## 2019-10-23 NOTE — ASSESSMENT & PLAN NOTE
Reviewed MRI with patient  Microadenoma has shrunk compared to previous imaging, currently 4 mm  Would not recommend continuing to follow radiographically on last new symptoms warrant investigation

## 2019-10-29 ENCOUNTER — CONSULT (OUTPATIENT)
Dept: NEPHROLOGY | Facility: CLINIC | Age: 54
End: 2019-10-29
Payer: COMMERCIAL

## 2019-10-29 VITALS
HEIGHT: 58 IN | HEART RATE: 104 BPM | RESPIRATION RATE: 18 BRPM | SYSTOLIC BLOOD PRESSURE: 130 MMHG | WEIGHT: 199 LBS | DIASTOLIC BLOOD PRESSURE: 80 MMHG | BODY MASS INDEX: 41.77 KG/M2

## 2019-10-29 DIAGNOSIS — E55.9 VITAMIN D DEFICIENCY: ICD-10-CM

## 2019-10-29 DIAGNOSIS — I10 PRIMARY HYPERTENSION: ICD-10-CM

## 2019-10-29 DIAGNOSIS — E11.21 TYPE 2 DIABETES MELLITUS WITH NEPHROPATHY (HCC): ICD-10-CM

## 2019-10-29 DIAGNOSIS — R80.1 PERSISTENT PROTEINURIA: Primary | ICD-10-CM

## 2019-10-29 DIAGNOSIS — E78.5 HYPERLIPIDEMIA, UNSPECIFIED HYPERLIPIDEMIA TYPE: ICD-10-CM

## 2019-10-29 PROCEDURE — 99244 OFF/OP CNSLTJ NEW/EST MOD 40: CPT | Performed by: INTERNAL MEDICINE

## 2019-10-29 NOTE — PROGRESS NOTES
NEPHROLOGY OUTPATIENT CONSULTATION   Krista Dunbar 48 y o  female MRN: [id number]  Date: 10/29/2019  Reason for consultation:   Chief Complaint   Patient presents with    Consult    Proteinuria       ASSESSMENT AND PLAN:  Persistent Proteinuria  -Proteinuria likely due to diabetic nephropathy as well as hypertensive nephrosclerosis  As per patient she stopped taking all other medications few months back which could have contributed to worsening proteinuria in March 2019  Proteinuria now improving to microalbumin creatinine ratio of 191 mg   -stressed on need for good control of diabetes mellitus with goal A1c less than 7 0 to prevent chronic kidney disease and slow proteinuria  Last A1c from October was 9 8  Also recommended avoiding NSAIDs which currently she is taking once a day if needed for pain  Recommend that she take Tylenol instead   -continue lisinopril 20 mg daily which would help further with proteinuria   -last blood work as well as urine protein check was from July 2019  Will repeat upc ratio as well as check BMP this week  UA done in office today was positive for 1+ protein but no blood   -follow-up in 5 months with repeat labs  Primary hypertension  -BP stable and at goal   Recommend goal blood pressure less than 130/80 to decrease proteinuria   -continue lisinopril    -discussed about 2 g sodium diet and weight loss with daily exercise  Recommended home monitoring of blood pressure  As per patient she does not check blood pressure at home but goes to pharmacy near her where she can check her blood pressure  She was told to call me if systolic blood pressure more than 950 or diastolic more than 90  Vitamin-D deficiency  - vitamin 21 2 from July 2019   - On oral vitamin D 2000 units daily  Check vitamin-D level for next office visit  Patient complaining of cramps, will also check magnesium level this week along with BMP      Diabetes mellitus type 2 with diabetic nephropathy  -last A1c from October 23rd was 9 8  Recommend goal A1c less than 7 0 to decrease the chance of chronic kidney disease and decrease proteinuria  Patient verbalized understanding  She is currently on insulin and oral medication for diabetes  Hyperlipidemia   ,   Stressed on good diabetic control, life style modification and weight loss with daily exercise  Patient is planning for gastric sleeve procedure and is following with bariatric surgeon  Continue statins  Diagnoses and all orders for this visit:    Persistent proteinuria  -     Ambulatory referral to Nephrology  -     Basic metabolic panel; Future  -     Protein / creatinine ratio, urine; Future  -     Magnesium; Future  -     Basic metabolic panel; Future  -     Phosphorus; Future  -     Protein / creatinine ratio, urine; Future  -     Urinalysis with microscopic; Future  -     Magnesium; Future    Primary hypertension  -     Ambulatory referral to Nephrology  -     Basic metabolic panel; Future  -     Phosphorus; Future  -     Protein / creatinine ratio, urine; Future  -     Urinalysis with microscopic; Future  -     Vitamin D 25 hydroxy; Future  -     Magnesium; Future    Vitamin D deficiency  -     Vitamin D 25 hydroxy; Future    Type 2 diabetes mellitus with nephropathy (HCC)    Hyperlipidemia, unspecified hyperlipidemia type        Patient Instructions   Proteinuria improving  BP stable  Continue lisinopril      -Recommend 2 g sodium diet  -Recommend daily exercise and weight loss  -Discussed home monitoring of BP and maintaining a log of blood pressure   -Recommend goal BP less than 130/80  Please inform me if SBP below 110 or above 140's persistently  Follow up in 4-5 months with repeat labs  Repeat labs this week         HISTORY OF PRESENT ILLNESS:  Maria Esther Davila is a 48y o  year old female with medical issues of pituitary microadenoma, diabetes mellitus type 2 x 15 yrs, hypertension x 15 yrs , hyperlipidemia who presents for initial consultation for proteinuria  Has proteinuria since 2016  Was 38 mg in 2018 and worsened to  361 mg in march 2019 and now improving to 191 mg  Has been on lisinopril  She was previously following with Dr Jocelyne Duarte but recently moved [facility] and changed her PCP  Patient's A1c from 10/23 was 9 8  Renal function is stable at creatinine 0 7 to 0 8  EGFR 91  Electrolytes stable  Vitamin-D level is low at 21 2 in July  BP has been well controlled recently during PCP visit  Diabetes on insulin and oral meds  Fasting in am 135  Will also be going for gastric sleeve procedure as not able to loose weight- planning for it next year  No urinary symptoms  Taking NSAIDs as needed once daily  Complaining of occasional cramps in both legs  REVIEW OF SYSTEMS:    Review of Systems   Constitutional: Negative for activity change, appetite change, chills, diaphoresis, fatigue and fever  HENT: Negative for congestion, facial swelling and nosebleeds  Eyes: Negative for pain and visual disturbance  Respiratory: Negative for cough, chest tightness and shortness of breath  Cardiovascular: Negative for chest pain and palpitations  Gastrointestinal: Negative for abdominal distention, abdominal pain, diarrhea, nausea and vomiting  Genitourinary: Negative for difficulty urinating, dysuria, flank pain, frequency and hematuria  Musculoskeletal: Negative for arthralgias, back pain and joint swelling  Skin: Negative for rash  Neurological: Negative for dizziness, seizures, syncope, weakness and headaches  Psychiatric/Behavioral: Negative for agitation and confusion  The patient is not nervous/anxious  More than 10 point review of systems were obtained and discussed in length with the patient       PAST MEDICAL HISTORY:  Past Medical History:   Diagnosis Date    Anemia     Last Assessed:  3/27/13a    Arthritis     Asthma     Blurred vision     Chronic kidney disease     Diabetes mellitus ([facility] [address]     Di[facility] retinopathy ([facility]     Hypercholesterolemia     Hyperlipidemia     Hypertension     Increased urinary frequency     Irritable bowel syndrome     Last Assessed:  10/2/12    Joint pain     Nausea     Night sweat     Numbness     Weakness        PAST SURGICAL HISTORY:  Past Surgical History:   Procedure Laterality Date    ABDOMINAL SURGERY      CARPAL TUNNEL RELEASE Right      SECTION      x 2    COLONOSCOPY  2016    TUBAL LIGATION      WISDOM TOOTH EXTRACTION         ALLERGIES:  Allergies   Allergen Reactions    Amoxicillin Hives    Iodinated Diagnostic Agents Facial Swelling    Iodine Facial Swelling    Shellfish-Derived Products Throat Swelling    Metformin      Other reaction(s): diarhea    Penicillins      Yeast Infection       SOCIAL HISTORY:  Social History     Substance and Sexual Activity   Alcohol Use Yes    Comment: rarely     Social History     Substance and Sexual Activity   Drug Use No     Social History     Tobacco Use   Smoking Status Former Smoker    Last attempt to quit: [place] Years since quittin 8   Smokeless Tobacco Never Used       FAMILY HISTORY:  Family History   Problem Relation Age of Onset    Diabetes Mother     Hypertension Mother     Diabetes Father     Heart disease Father     Stroke Father     Hypertension Father     Diverticulitis Father     Hyperlipidemia Father     Heart attack Father     Diabetes Maternal Grandmother     Pancreatic cancer Maternal Grandmother     Liver cancer Maternal Grandfather     Alcohol abuse Maternal Grandfather     Heart disease Paternal Grandmother     Crohn's disease Sister     Diabetes type I Daughter     Eczema Daughter     Heart attack Paternal Grandfather     Asthma Maternal Aunt     Alcohol abuse Maternal Uncle     Mental illness Paternal Aunt     Schizophrenia Paternal Aunt     Diabetes Paternal Aunt     Kidney disease Paternal Aunt     Alcohol abuse Paternal Uncle     Diabetes Paternal Uncle     Eczema Son     Colon cancer Neg Hx     Breast cancer Neg Hx        MEDICATIONS:    Current Outpatient Medications:     atorvastatin (LIPITOR) 40 mg tablet, Take 1 tablet (40 mg total) by mouth daily, Disp: 90 tablet, Rfl: 3    azelastine (ASTELIN) 0 1 % nasal spray, 2 sprays into each nostril 2 (two) times a day Use in each nostril as directed, Disp: 1 Bottle, Rfl: 6    clindamycin-benzoyl peroxide (BENZACLIN) gel, Apply topically 2 (two) times a day, Disp: 25 g, Rfl: 0    Dapagliflozin-metFORMIN HCl ER 2 5-1000 MG TB24, Take 1 tablet by mouth daily with breakfast, Disp: 30 tablet, Rfl: 3    Ergocalciferol 2000 units CAPS, take 1 capsule by oral route  every week, Disp: , Rfl:     ibuprofen (MOTRIN) 800 mg tablet, Take 1 tablet (800 mg total) by mouth every 8 (eight) hours as needed for mild pain or moderate pain, Disp: 30 tablet, Rfl: 0    Insulin Glargine (TOUJEO) 300 units/mL CONCETRATED U-300 injection pen, Inject 28 Units under the skin daily at bedtime, Disp: , Rfl:     Insulin Pen Needle (BD PEN NEEDLE EDUARDO U/F) 32G X 4 MM MISC, by Does not apply route, Disp: , Rfl:     Insulin Pen Needle 32G X 5 MM MISC, Inject at bedtime  , Disp: , Rfl:     lisinopril (ZESTRIL) 20 mg tablet, Take 1 tablet (20 mg total) by mouth daily, Disp: 90 tablet, Rfl: 3    mometasone (ELOCON) 0 1 % cream, Apply topically daily Apply topically BID x 2 weeks then qhs prn, Disp: 45 g, Rfl: 3    mometasone (NASONEX) 50 mcg/act nasal spray, as needed , Disp: , Rfl:     NOVOLOG FLEXPEN 100 units/mL injection pen, Inject 15-30 Units under the skin 2 (two) times a day with meals , Disp: , Rfl:     pantoprazole (PROTONIX) 40 mg tablet, Take 1 tablet (40 mg total) by mouth daily, Disp: 90 tablet, Rfl: 1    SAXENDA injection, Inject 30 Units under the skin daily at bedtime, Disp: , Rfl:     aspirin (ECOTRIN LOW STRENGTH) 81 mg EC tablet, Take 81 mg by mouth daily, Disp: , Rfl: PHYSICAL EXAM:  Vitals:    10/29/19 1523 10/29/19 1602   BP: 139/75 130/80   BP Location: Left arm    Patient Position: Sitting    Cuff Size: Large    Pulse: 104    Resp: 18    Weight: 90 3 kg (199 lb)    Height: 4' 10" (1 473 m)      Body mass index is 41 59 kg/m²  Physical Exam   Constitutional: She is oriented to person, place, and time  Vital signs are normal  She appears well-developed  No distress  HENT:   Head: Normocephalic and atraumatic  Mouth/Throat: Oropharynx is clear and moist    Eyes: Pupils are equal, round, and reactive to light  Conjunctivae are normal  No scleral icterus  Neck: Neck supple  No thyromegaly present  Cardiovascular: Normal rate, regular rhythm and normal heart sounds  Exam reveals no friction rub  No murmur heard  Pulmonary/Chest: Effort normal and breath sounds normal  No respiratory distress  She has no wheezes  She has no rales  Abdominal: Soft  Bowel sounds are normal  She exhibits no distension  There is no tenderness  Musculoskeletal: She exhibits no edema or deformity  Lymphadenopathy:     She has no cervical adenopathy  Neurological: She is alert and oriented to person, place, and time  She is not disoriented  Skin: She is not diaphoretic  No cyanosis  No pallor  Nails show no clubbing  Psychiatric: She has a normal mood and affect  Her mood appears not anxious  Her affect is not inappropriate           Lab Results:   Results for orders placed or performed in visit on 10/23/19   POCT hemoglobin A1c   Result Value Ref Range    Hemoglobin A1C 9 8 (A) 6 5             Invalid input(s): ALBUMIN     Lab Results:         Invalid input(s): ALBUMIN    Laboratory Results:  Lab Results   Component Value Date    WBC 9 90 07/11/2019    HGB 13 4 07/11/2019    HCT 40 7 07/11/2019    MCV 81 07/11/2019     07/11/2019     Lab Results   Component Value Date    SODIUM 141 07/11/2019    K 3 7 07/11/2019     07/11/2019    CO2 22 07/11/2019    BUN 12 07/11/2019    CREATININE 0 76 07/11/2019    GLUC 370 (H) 08/30/2016    CALCIUM 10 1 07/11/2019     Lab Results   Component Value Date    CALCIUM 10 1 07/11/2019     No results found for: LABPROT  [unfilled]  Lab Results   Component Value Date    CALCIUM 10 1 07/11/2019     [unfilled]      Portions of the record may have been created with voice recognition software  Occasional wrong word or "sound a like" substitutions may have occurred due to the inherent limitations of voice recognition software  Read the chart carefully and recognize, using context, where substitutions have occurred

## 2019-10-29 NOTE — PATIENT INSTRUCTIONS
Proteinuria improving  BP stable  Continue lisinopril      -Recommend 2 g sodium diet  -Recommend daily exercise and weight loss  -Discussed home monitoring of BP and maintaining a log of blood pressure   -Recommend goal BP less than 130/80  Please inform me if SBP below 110 or above 140's persistently  Follow up in 4-5 months with repeat labs  Repeat labs this week

## 2019-11-01 ENCOUNTER — OFFICE VISIT (OUTPATIENT)
Dept: BARIATRICS | Facility: CLINIC | Age: 54
End: 2019-11-01
Payer: COMMERCIAL

## 2019-11-01 VITALS
HEIGHT: 58 IN | WEIGHT: 198 LBS | BODY MASS INDEX: 41.56 KG/M2 | TEMPERATURE: 98.9 F | SYSTOLIC BLOOD PRESSURE: 118 MMHG | HEART RATE: 102 BPM | RESPIRATION RATE: 14 BRPM | DIASTOLIC BLOOD PRESSURE: 72 MMHG

## 2019-11-01 DIAGNOSIS — E66.01 OBESITY, CLASS III, BMI 40-49.9 (MORBID OBESITY) (HCC): Primary | ICD-10-CM

## 2019-11-01 PROCEDURE — 99203 OFFICE O/P NEW LOW 30 MIN: CPT | Performed by: SURGERY

## 2019-11-01 NOTE — PROGRESS NOTES
BARIATRIC INITIAL CONSULT - BARIATRIC SURGERY    Mehdi Michelle 48 y o  female MRN: [id number]  Unit/Bed#:  Encounter: [id number]      HPI:  Mehdi Michelle is a 48 y o  female who presents with a longstanding history of morbid obesity and inability to sustain a meaningful weight loss  Here today to discuss bariatric options  She is a  at [employer] mass index is 41 38 kg/m²  ++Suffers from DM2 (15yrs on insulin + oral + saxenda), HTN, HLD, carpal tunnel L hand, АНДРЕЙ (suspected), GERD after medication use (last episode July)  S/p C-sxn x2, lap tubal ligation, CTR R    Visit type: initial visit    Symptoms: excess weight, weight increase, inability to loss weight and joint pain    Associated Symptoms: none    Associated Conditions: glucose intolerance, hyperlipidemia, sleep apnea, abdominal obesity and hypergycemia  Disease Complications: diabetes, hypertension, sleep apnea and osteoarthritis  Weight Loss Interest: high  Previous Diet Trials: low calorie     Exercise Frequency:infrequency  Types of Exercise: walking      Review of Systems   Constitutional: Negative  Respiratory: Negative  Cardiovascular: Negative  Gastrointestinal: Negative  Musculoskeletal: Negative  Neurological: Negative  All other systems reviewed and are negative        Historical Information   Past Medical History:   Diagnosis Date    Anemia     Last Assessed:  3/27/13a    Arthritis     Asthma     Blurred vision     Chronic kidney disease     Diabetes mellitus [facility] [address]     Diabetic retinopathy ([facility] [address]     Hypercholesterolemia     Hyperlipidemia     Hypertension     Increased urinary frequency     Irritable bowel syndrome     Last Assessed:  10/2/12    Joint pain     Nausea     Night sweat     Numbness     Weakness      Past Surgical History:   Procedure Laterality Date    ABDOMINAL SURGERY      CARPAL TUNNEL RELEASE Right      SECTION      x 2    COLONOSCOPY 2016    TUBAL LIGATION      WISDOM TOOTH EXTRACTION       Social History   Social History     Substance and Sexual Activity   Alcohol Use Yes    Comment: rarely     Social History     Substance and Sexual Activity   Drug Use No     Social History     Tobacco Use   Smoking Status Former Smoker    Last attempt to quit:  [address] Years since quittin 8   Smokeless Tobacco Never Used     Family History:   Family History   Problem Relation Age of Onset    Diabetes Mother     Hypertension Mother     Diabetes Father     Heart disease Father    Sandoval Stroke Father     Hypertension Father     Diverticulitis Father     Hyperlipidemia Father     Heart attack Father     Diabetes Maternal Grandmother     Pancreatic cancer Maternal Grandmother     Liver cancer Maternal Grandfather     Alcohol abuse Maternal Grandfather     Heart disease Paternal Grandmother     Crohn's disease Sister     Diabetes type I Daughter     Eczema Daughter     Heart attack Paternal Grandfather     Asthma Maternal Aunt     Alcohol abuse Maternal Uncle     Mental illness Paternal Aunt     Schizophrenia Paternal Aunt     Diabetes Paternal Aunt     Kidney disease Paternal Aunt     Alcohol abuse Paternal Uncle     Diabetes Paternal Uncle     Eczema Son     Colon cancer Neg Hx     Breast cancer Neg Hx        Meds/Allergies   all medications and allergies reviewed  Allergies   Allergen Reactions    Amoxicillin Hives    Iodinated Diagnostic Agents Facial Swelling    Iodine Facial Swelling    Shellfish-Derived Products Throat Swelling    Metformin      Other reaction(s): diarhea    Penicillins      Yeast Infection       Objective       Current Vitals:   /72 (BP Location: Right arm, Patient Position: Sitting, Cuff Size: Large)   Pulse 102   Temp 98 9 °F (37 2 °C) (Tympanic)   Resp 14   Ht 4' 10" (1 473 m)   Wt 89 8 kg (198 lb)   BMI 41 38 kg/m²       Physical Exam   Constitutional: She is oriented to person, place, and time  She appears well-developed  HENT:   Head: Normocephalic  Eyes: EOM are normal    Neck: Normal range of motion  Cardiovascular: Normal rate  Pulmonary/Chest: Effort normal    Abdominal: She exhibits no distension  Musculoskeletal: Normal range of motion  Neurological: She is alert and oriented to person, place, and time  Skin: Skin is warm and dry  Psychiatric: She has a normal mood and affect  Her behavior is normal  Judgment and thought content normal        Lab Results: I have personally reviewed pertinent lab results  Imaging: I have personally reviewed pertinent reports  EKG, Pathology, and Other Studies: I have personally reviewed pertinent reports  Assessment/PLAN:    48 y o  yo female with a long standing h/o of obesity and inability to sustain any meaningful weight loss on her own despite several attempts  She is interested in the Laparoscopic sleeve gastrectomy  I have discussed with the patient that 20-30% of patient's may experience worsened GERD and 18% risk of Hwang's esophagitis requiring surveillance EGDs after a sleeve gastrectomy  The patient understands this fully and accepts the risks to undergo a sleeve gastrectomy  ++Suffers from DM2 (15yrs on insulin + oral + saxenda), HTN, HLD, carpal tunnel L hand, АНДРЕЙ (suspected), GERD after medication use (last episode July)  S/p C-sxn x2, lap tubal ligation, CTR R    I have explained our Enhanced Recovery After Bariatric Surgery (ERABS) protocol and benefits including preoperative, intraoperative and postoperative elements  As a part of her pre op evaluation, she will be referred to a cardiologist and for a sleep evaluation and consult  She needs an EGD to evaluate the anatomy of her GI tract prior to the operation  I have spent over 45 minutes with her face to face in the office today discussing her options and details of the surgery   We have seen an animation of the surgery on the computer that illustrates how the operation is done and how the anatomy will be altered with the procedure  Over 50% of this was coordinating care  She was given the opportunity to ask questions and I have answered all of them  I have discussed and educated the patient with regards to the components of our multidisciplinary program and the importance of compliance and follow up in the post operative period  The patient was also instructed with regards to the importance of behavior modification, nutritional counseling, support meeting attendance and lifestyle changes that are important to ensure success  Although there is a great statistical chance of improvement or even resolution of most of her associated comorbidities, the results vary from patient to patient and they largely depend on her commitment and compliance  She needs to be at   189 lbs prior to the operation      PAWAN Monk   11/1/2019  2:31 PM

## 2019-11-01 NOTE — LETTER
November 1, 2019     Janice Crawford MD  [address] [address]  [address]    Patient: Swapna Fraire   YOB: 1965   Date of Visit: 11/1/2019       Dear Dr Vernon Contrerasy: Thank you for referring Eva Redman to me for evaluation for bariatric surgery  Below are my notes for this consultation  If you have questions, please do not hesitate to call me  I look forward to following your patient along with you  Sincerely,      PAWAN Bach   11/1/2019  2:48 PM          CC: No Recipients  Lashae Bess MD  11/1/2019  2:47 PM  Sign at close encounter      BARIATRIC INITIAL CONSULT - Peter Ngo 48 y o  female MRN: [id number]  Unit/Bed#:  Encounter: [id number]      HPI:  Swapna Fraire is a 48 y o  female who presents with a longstanding history of morbid obesity and inability to sustain a meaningful weight loss  Here today to discuss bariatric options  She is a  at [employer] mass index is 41 38 kg/m²  ++Suffers from DM2 (15yrs on insulin + oral + saxenda), HTN, HLD, carpal tunnel L hand, АНДРЕЙ (suspected), GERD after medication use (last episode July)  S/p C-sxn x2, lap tubal ligation, CTR R    Visit type: initial visit    Symptoms: excess weight, weight increase, inability to loss weight and joint pain    Associated Symptoms: none    Associated Conditions: glucose intolerance, hyperlipidemia, sleep apnea, abdominal obesity and hypergycemia  Disease Complications: diabetes, hypertension, sleep apnea and osteoarthritis  Weight Loss Interest: high  Previous Diet Trials: low calorie     Exercise Frequency:infrequency  Types of Exercise: walking      Review of Systems   Constitutional: Negative  Respiratory: Negative  Cardiovascular: Negative  Gastrointestinal: Negative  Musculoskeletal: Negative  Neurological: Negative  All other systems reviewed and are negative        Historical Information Past Medical History:   Diagnosis Date    Anemia     Last Assessed:  3/27/13a    Arthritis     Asthma     Blurred vision     Chronic kidney disease     Diabetes mellitus [facility] [address] [facility]     Diabetic retinopathy ([facility] [address] [facility]     Hypercholesterolemia     Hyperlipidemia     Hypertension     Increased urinary frequency     Irritable bowel syndrome     Last Assessed:  10/2/12    Joint pain     Nausea     Night sweat     Numbness     Weakness      Past Surgical History:   Procedure Laterality Date    ABDOMINAL SURGERY      CARPAL TUNNEL RELEASE Right      SECTION      x 2    COLONOSCOPY  2016    TUBAL LIGATION      WISDOM TOOTH EXTRACTION       Social History   Social History     Substance and Sexual Activity   Alcohol Use Yes    Comment: rarely     Social History     Substance and Sexual Activity   Drug Use No     Social History     Tobacco Use   Smoking Status Former Smoker    Last attempt to quit: Claudetta Dumas Years since quittin 8   Smokeless Tobacco Never Used     Family History:   Family History   Problem Relation Age of Onset    Diabetes Mother     Hypertension Mother     Diabetes Father     Heart disease Father     Stroke Father     Hypertension Father     Diverticulitis Father     Hyperlipidemia Father     Heart attack Father     Diabetes Maternal Grandmother     Pancreatic cancer Maternal Grandmother     Liver cancer Maternal Grandfather     Alcohol abuse Maternal Grandfather     Heart disease Paternal Grandmother     Crohn's disease Sister     Diabetes type I Daughter     Eczema Daughter     Heart attack Paternal Grandfather     Asthma Maternal Aunt     Alcohol abuse Maternal Uncle     Mental illness Paternal Aunt     Schizophrenia Paternal Aunt     Diabetes Paternal Aunt     Kidney disease Paternal Aunt     Alcohol abuse Paternal Uncle     Diabetes Paternal Uncle     Eczema Son     Colon cancer Neg Hx     Breast cancer Neg Hx        Meds/Allergies   all medications and allergies reviewed  Allergies   Allergen Reactions    Amoxicillin Hives    Iodinated Diagnostic Agents Facial Swelling    Iodine Facial Swelling    Shellfish-Derived Products Throat Swelling    Metformin      Other reaction(s): diarhea    Penicillins      Yeast Infection       Objective       Current Vitals:   /72 (BP Location: Right arm, Patient Position: Sitting, Cuff Size: Large)   Pulse 102   Temp 98 9 °F (37 2 °C) (Tympanic)   Resp 14   Ht 4' 10" (1 473 m)   Wt 89 8 kg (198 lb)   BMI 41 38 kg/m²        Physical Exam   Constitutional: She is oriented to person, place, and time  She appears well-developed  HENT:   Head: Normocephalic  Eyes: EOM are normal    Neck: Normal range of motion  Cardiovascular: Normal rate  Pulmonary/Chest: Effort normal    Abdominal: She exhibits no distension  Musculoskeletal: Normal range of motion  Neurological: She is alert and oriented to person, place, and time  Skin: Skin is warm and dry  Psychiatric: She has a normal mood and affect  Her behavior is normal  Judgment and thought content normal        Lab Results: I have personally reviewed pertinent lab results  Imaging: I have personally reviewed pertinent reports  EKG, Pathology, and Other Studies: I have personally reviewed pertinent reports  Assessment/PLAN:    48 y o  yo female with a long standing h/o of obesity and inability to sustain any meaningful weight loss on her own despite several attempts  She is interested in the Laparoscopic sleeve gastrectomy  I have discussed with the patient that 20-30% of patient's may experience worsened GERD and 18% risk of Hwang's esophagitis requiring surveillance EGDs after a sleeve gastrectomy  The patient understands this fully and accepts the risks to undergo a sleeve gastrectomy       ++Suffers from DM2 (15yrs on insulin + oral + saxenda), HTN, HLD, carpal tunnel L hand, АНДРЕЙ (suspected), GERD after medication use (last episode July)  S/p C-sxn x2, lap tubal ligation, CTR R    I have explained our Enhanced Recovery After Bariatric Surgery (ERABS) protocol and benefits including preoperative, intraoperative and postoperative elements  As a part of her pre op evaluation, she will be referred to a cardiologist and for a sleep evaluation and consult  She needs an EGD to evaluate the anatomy of her GI tract prior to the operation  I have spent over 45 minutes with her face to face in the office today discussing her options and details of the surgery  We have seen an animation of the surgery on the computer that illustrates how the operation is done and how the anatomy will be altered with the procedure  Over 50% of this was coordinating care  She was given the opportunity to ask questions and I have answered all of them  I have discussed and educated the patient with regards to the components of our multidisciplinary program and the importance of compliance and follow up in the post operative period  The patient was also instructed with regards to the importance of behavior modification, nutritional counseling, support meeting attendance and lifestyle changes that are important to ensure success  Although there is a great statistical chance of improvement or even resolution of most of her associated comorbidities, the results vary from patient to patient and they largely depend on her commitment and compliance  She needs to be at   189 lbs prior to the operation      PAWAN Worrell   11/1/2019  2:31 PM

## 2019-11-11 ENCOUNTER — TELEPHONE (OUTPATIENT)
Dept: FAMILY MEDICINE CLINIC | Facility: CLINIC | Age: 54
End: 2019-11-11

## 2019-11-11 DIAGNOSIS — E11.65 UNCONTROLLED TYPE 2 DIABETES MELLITUS WITH HYPERGLYCEMIA (HCC): Primary | ICD-10-CM

## 2019-11-11 LAB
LEFT EYE DIABETIC RETINOPATHY: NORMAL
RIGHT EYE DIABETIC RETINOPATHY: NORMAL

## 2019-11-11 PROCEDURE — 2022F DILAT RTA XM EVC RTNOPTHY: CPT | Performed by: FAMILY MEDICINE

## 2019-11-11 RX ORDER — LIRAGLUTIDE 6 MG/ML
3 INJECTION, SOLUTION SUBCUTANEOUS
Qty: 30 PEN | Refills: 3 | Status: SHIPPED | OUTPATIENT
Start: 2019-11-11 | End: 2020-04-20 | Stop reason: SDUPTHER

## 2019-11-11 NOTE — TELEPHONE ENCOUNTER
Left message on script line to [facility]ill [facility], I did not see dosing in chart

## 2019-11-12 ENCOUNTER — CONSULT (OUTPATIENT)
Dept: CARDIOLOGY CLINIC | Facility: CLINIC | Age: 54
End: 2019-11-12
Payer: COMMERCIAL

## 2019-11-12 VITALS
SYSTOLIC BLOOD PRESSURE: 128 MMHG | HEIGHT: 58 IN | BODY MASS INDEX: 41.35 KG/M2 | WEIGHT: 197 LBS | HEART RATE: 86 BPM | OXYGEN SATURATION: 99 % | DIASTOLIC BLOOD PRESSURE: 80 MMHG

## 2019-11-12 DIAGNOSIS — E78.2 MIXED HYPERLIPIDEMIA: ICD-10-CM

## 2019-11-12 DIAGNOSIS — E11.65 UNCONTROLLED TYPE 2 DIABETES MELLITUS WITH HYPERGLYCEMIA (HCC): ICD-10-CM

## 2019-11-12 DIAGNOSIS — I10 ESSENTIAL HYPERTENSION: ICD-10-CM

## 2019-11-12 DIAGNOSIS — R06.00 EXERTIONAL DYSPNEA: Primary | ICD-10-CM

## 2019-11-12 DIAGNOSIS — E66.01 OBESITY, CLASS III, BMI 40-49.9 (MORBID OBESITY) (HCC): ICD-10-CM

## 2019-11-12 DIAGNOSIS — E66.01 MORBID OBESITY (HCC): ICD-10-CM

## 2019-11-12 PROCEDURE — 99244 OFF/OP CNSLTJ NEW/EST MOD 40: CPT | Performed by: INTERNAL MEDICINE

## 2019-11-12 PROCEDURE — 93000 ELECTROCARDIOGRAM COMPLETE: CPT | Performed by: INTERNAL MEDICINE

## 2019-11-12 RX ORDER — ROSUVASTATIN CALCIUM 20 MG/1
20 TABLET, COATED ORAL
Qty: 90 TABLET | Refills: 3 | Status: SHIPPED | OUTPATIENT
Start: 2019-12-17 | End: 2021-05-26

## 2019-11-12 NOTE — PROGRESS NOTES
[facility]  [address]   [place]  Cardiology Consultation    Kaylan Reyna  [id number]  1965      Consult for: Dyspnea  Appreciate consult by: Gino Melendez MD    1  Exertional dyspnea     2  Morbid obesity ([facility] [address] )  Ambulatory referral to Cardiology    POCT ECG    rosuvastatin (CRESTOR) 20 MG tablet   3  Essential hypertension  POCT ECG   4  Mixed hyperlipidemia  rosuvastatin (CRESTOR) 20 MG tablet   5  Obesity, Class III, BMI 40-49 9 (morbid obesity) ([facility] [address] )     6  Uncontrolled type 2 diabetes mellitus with hyperglycemia (HCC)        Discussion/Summary:   Exertional shortness of breath- high [place] risk factors including Dm2, htn, family hx- plan for exercise stress echo to evaluate  Diabetes mellitus with last A1c of 9 8 a- she will tried target her hemoglobin A1c to 7 0    Dyslipidemia- will switch her from atorvastatin to rosuvastatin 20 mg  There is less interference with  Blood sugars  We will target improved triglyceride control  Preoperative- she needs her blood sugar controlled  if exercise stress test is negative she will be intermediate cardiac risk for bariatric surgery    Hypertension targeting blood pressure less than 130/80 continue lisinopril therapy      HPI:   82-year-old woman with history longstanding diabetes mellitus type 2 for 15 years, hypertension for 15 years, dyslipidemia who presents prior to surgical intervention  She states having some dyspnea when walking up stairs  No prior history of myocardial infarction  Her blood pressures have been well controlled on lisinopril therapy  In no history of atrial fibrillation  No history of trouble with anesthesia  Denies having bleeding or bruising  Compliant with her medications  Her blood sugars have been controlled        Past Medical History:   Diagnosis Date    Anemia     Last Assessed:  3/27/13a    Arthritis     Asthma     Blurred vision     Chronic kidney disease     Diabetes mellitus ([facility] [address] [facility]     Diabetic retinopathy ([facility]     Hypercholesterolemia     Hyperlipidemia     Hypertension     Increased urinary frequency     Irritable bowel syndrome     Last Assessed:  10/2/12    Joint pain     Nausea     Night sweat     Numbness     Weakness      Social History     Socioeconomic History    Marital status:      Spouse name: Christel Hopson    Number of children: 2    Years of education: 15    Highest education level: Some college, no degree   Occupational History     Comment: financial counseler   Social Needs    Financial resource strain: Not hard at all   Moshe-R[place] insecurity:     Worry: Never true     Inability: Never true   [place] needs:     Medical: No     Non-medical: No   Tobacco Use    Smoking status: Former Smoker     Last attempt to quit:      Years since quittin 8    Smokeless tobacco: Never Used   Substance and Sexual Activity    Alcohol use: Yes     Comment: rarely    Drug use: No    Sexual activity: Yes     Partners: Male     Birth control/protection: Female Sterilization   Lifestyle    Physical activity:     Days per week: 0 days     Minutes per session: 0 min    Stress: To some extent   Relationships    Social connections:     Talks on phone: More than three times a week     Gets together: More than three times a week     Attends [employer] service: Not on file     Active member of club or organization: Yes     Attends meetings of clubs or organizations: More than 4 times per year     Relationship status:      Intimate partner violence:     Fear of current or ex partner: No     Emotionally abused: No     Physically abused: No     Forced sexual activity: No   Other Topics Concern    Not on file   Social History Narrative    Consumes 1 cup of coffee per day      Family History   Problem Relation Age of Onset    Diabetes Mother     Hypertension Mother     Diabetes Father     Heart disease Father  Stroke Father     Hypertension Father     Diverticulitis Father     Hyperlipidemia Father     Heart attack Father     Diabetes Maternal Grandmother     Pancreatic cancer Maternal Grandmother     Liver cancer Maternal Grandfather     Alcohol abuse Maternal Grandfather     Heart disease Paternal Grandmother     Crohn's disease Sister     Diabetes type I Daughter     Eczema Daughter     Heart attack Paternal Grandfather     Asthma Maternal Aunt     Alcohol abuse Maternal Uncle     Mental illness Paternal Aunt     Schizophrenia Paternal Aunt     Diabetes Paternal Aunt     Kidney disease Paternal Aunt     Alcohol abuse Paternal Uncle     Diabetes Paternal Uncle     Eczema Son     Colon cancer Neg Hx     Breast cancer Neg Hx      Past Surgical History:   Procedure Laterality Date    ABDOMINAL SURGERY      CARPAL TUNNEL RELEASE Right      SECTION      x 2    COLONOSCOPY  2016    TUBAL LIGATION      WISDOM TOOTH EXTRACTION         Current Outpatient Medications:     aspirin (ECOTRIN LOW STRENGTH) 81 mg EC tablet, Take 81 mg by mouth daily, Disp: , Rfl:     atorvastatin (LIPITOR) 40 mg tablet, Take 1 tablet (40 mg total) by mouth daily, Disp: 90 tablet, Rfl: 3    azelastine (ASTELIN) 0 1 % nasal spray, 2 sprays into each nostril 2 (two) times a day Use in each nostril as directed, Disp: 1 Bottle, Rfl: 6    clindamycin-benzoyl peroxide (BENZACLIN) gel, Apply topically 2 (two) times a day, Disp: 25 g, Rfl: 0    Dapagliflozin-metFORMIN HCl ER 2 5-1000 MG TB24, Take 1 tablet by mouth daily with breakfast, Disp: 30 tablet, Rfl: 3    Ergocalciferol 2000 units CAPS, take 1 capsule by oral route  every week, Disp: , Rfl:     ibuprofen (MOTRIN) 800 mg tablet, Take 1 tablet (800 mg total) by mouth every 8 (eight) hours as needed for mild pain or moderate pain, Disp: 30 tablet, Rfl: 0    Insulin Glargine (TOUJEO) 300 units/mL CONCETRATED U-300 injection pen, Inject 28 Units under the skin daily at bedtime, Disp: , Rfl:     Insulin Pen Needle (BD PEN NEEDLE EDUARDO U/F) 32G X 4 MM MISC, by Does not apply route, Disp: , Rfl:     Insulin Pen Needle 32G X 5 MM MISC, Inject at bedtime  , Disp: , Rfl:     lisinopril (ZESTRIL) 20 mg tablet, Take 1 tablet (20 mg total) by mouth daily, Disp: 90 tablet, Rfl: 3    mometasone (ELOCON) 0 1 % cream, Apply topically daily Apply topically BID x 2 weeks then qhs prn, Disp: 45 g, Rfl: 3    mometasone (NASONEX) 50 mcg/act nasal spray, as needed , Disp: , Rfl:     NOVOLOG FLEXPEN 100 units/mL injection pen, Inject 15-30 Units under the skin 2 (two) times a day with meals , Disp: , Rfl:     pantoprazole (PROTONIX) 40 mg tablet, Take 1 tablet (40 mg total) by mouth daily (Patient taking differently: Take 40 mg by mouth as needed ), Disp: 90 tablet, Rfl: 1    SAXENDA injection, Inject 0 5 mL (3 mg total) under the skin daily at bedtime, Disp: 30 pen, Rfl: 3  Allergies   Allergen Reactions    Amoxicillin Hives    Iodinated Diagnostic Agents Facial Swelling    Iodine Facial Swelling    Shellfish-Derived Products Throat Swelling    Metformin      Other reaction(s): diarhea    Penicillins      Yeast Infection     Vitals:    11/12/19 1601   BP: 128/80   BP Location: Left arm   Patient Position: Sitting   Cuff Size: Large   Pulse: 86   SpO2: 99%   Weight: 89 4 kg (197 lb)   Height: 4' 10" (1 473 m)       Review of Systems:   Review of Systems   Respiratory: Positive for shortness of breath  Vitals:    11/12/19 1601   BP: 128/80   BP Location: Left arm   Patient Position: Sitting   Cuff Size: Large   Pulse: 86   SpO2: 99%   Weight: 89 4 kg (197 lb)   Height: 4' 10" (1 473 m)     Physical Examination:   Physical Exam   Constitutional: She is oriented to person, place, and time  She appears well-developed and well-nourished  No distress  HENT:   Head: Normocephalic and atraumatic     Right Ear: External ear normal    Left Ear: External ear normal    Eyes: Pupils are equal, round, and reactive to light  Conjunctivae are normal  Right eye exhibits no discharge  Left eye exhibits no discharge  No scleral icterus  Neck: Normal range of motion  Neck supple  No JVD present  No tracheal deviation present  No thyromegaly present  Cardiovascular: Normal rate and regular rhythm  Exam reveals gallop  Exam reveals no friction rub  No murmur heard  Pulmonary/Chest: Effort normal and breath sounds normal  No stridor  No respiratory distress  She has no wheezes  She has no rales  She exhibits no tenderness  Abdominal: Soft  Bowel sounds are normal  She exhibits distension  She exhibits no mass  There is no tenderness  There is no rebound and no guarding  Musculoskeletal: Normal range of motion  She exhibits no edema, tenderness or deformity  Neurological: She is alert and oriented to person, place, and time  She has normal reflexes  She displays normal reflexes  No cranial nerve deficit  She exhibits normal muscle tone  Coordination normal    Skin: Skin is warm and dry  No rash noted  She is not diaphoretic  No erythema  No pallor  Psychiatric: She has a normal mood and affect   Her behavior is normal  Judgment and thought content normal        Labs:     Lab Results   Component Value Date    WBC 9 90 07/11/2019    HGB 13 4 07/11/2019    HCT 40 7 07/11/2019    MCV 81 07/11/2019    RDW 15 4 (H) 07/11/2019     07/11/2019     BMP:  Lab Results   Component Value Date    SODIUM 141 07/11/2019    K 3 7 07/11/2019     07/11/2019    CO2 22 07/11/2019    ANIONGAP 12 05/30/2018    BUN 12 07/11/2019    CREATININE 0 76 07/11/2019    GLUC 370 (H) 08/30/2016    GLUF 140 (H) 07/11/2019    CALCIUM 10 1 07/11/2019    CORRECTEDCA 9 9 03/26/2019    EGFR 91 07/11/2019     LFT:  Lab Results   Component Value Date    AST 23 07/11/2019    ALT 21 07/11/2019    ALKPHOS 90 07/11/2019    TP 8 1 07/11/2019    ALB 4 2 07/11/2019      Lab Results   Component Value Date    WGI6OVPRSHCZ 2 480 07/11/2019     Lab Results   Component Value Date    HGBA1C 9 8 (A) 10/23/2019     Lipid Profile:   Lab Results   Component Value Date    CHOLESTEROL 205 (H) 07/11/2019    HDL 46 07/11/2019    LDLCALC 122 07/11/2019    TRIG 183 (H) 07/11/2019     Lab Results   Component Value Date    CHOLESTEROL 205 (H) 07/11/2019    CHOLESTEROL 247 (H) 03/26/2019     No results found for: CKTOTAL, CKMB, CKMBINDEX, TROPONINI  No results found for: NTBNP   Recent Results (from the past 672 hour(s))   POCT hemoglobin A1c    Collection Time: 10/23/19  3:57 PM   Result Value Ref Range    Hemoglobin A1C 9 8 (A) 6 5   Hm Diabetes Eye Exam    Collection Time: 11/11/19  3:22 PM   Result Value Ref Range    Right Eye Diabetic Retinopathy Mild     Left Eye Diabetic Retinopathy Mild        Imaging & Testing   I have personally reviewed pertinent reports  Cardiac Testing   EKG: Personally reviewed  Normal sinus rhythm no acute ST T wave changes     Ginger Agarwal MD [facility] [place]  [phone]  Please call with any questions or suggestions    Counseling :  A description of the counseling:   Goals and Barriers:  Patient's ability to self care:  Medication side effect reviewed with patient in detail and all their questions answered  "Portions of the record may have been created with voice recognition software  Occasional wrong word or "sound a like" substitutions may have occurred due to the inherent limitations of voice recognition software  Read the chart carefully and recognize, using context, where substitutions have occurred   Please call if you have any questions  "

## 2019-11-18 NOTE — PRE-PROCEDURE INSTRUCTIONS
Pre-Surgery Instructions:   Medication Instructions    aspirin (ECOTRIN LOW STRENGTH) 81 mg EC tablet Patient was instructed by Physician and understands   azelastine (ASTELIN) 0 1 % nasal spray Patient was instructed by Physician and understands   clindamycin-benzoyl peroxide (BENZACLIN) gel Patient was instructed by Physician and understands   Dapagliflozin-metFORMIN HCl ER 2 5-1000 MG TB24 Patient was instructed by Physician and understands   Ergocalciferol 2000 units CAPS Patient was instructed by Physician and understands   ibuprofen (MOTRIN) 800 mg tablet Patient was instructed by Physician and understands   Insulin Glargine (TOUJEO) 300 units/mL CONCETRATED U-300 injection pen Patient was instructed by Physician and understands   Insulin Pen Needle (BD PEN NEEDLE EDUARDO U/F) 32G X 4 MM MISC Patient was instructed by Physician and understands   Insulin Pen Needle 32G X 5 MM MISC Patient was instructed by Physician and understands   lisinopril (ZESTRIL) 20 mg tablet Patient was instructed by Physician and understands   mometasone (ELOCON) 0 1 % cream Patient was instructed by Physician and understands   mometasone (NASONEX) 50 mcg/act nasal spray Patient was instructed by Physician and understands   NOVOLOG FLEXPEN 100 units/mL injection pen Patient was instructed by Physician and understands   pantoprazole (PROTONIX) 40 mg tablet Patient was instructed by Physician and understands   [START ON 12/17/2019] rosuvastatin (CRESTOR) 20 MG tablet Patient was instructed by Physician and understands   SAXENDA injection Patient was instructed by Physician and understands  Pre shower with hibiclens as instructed by surgeon  You may drive yourself to the hospital   You may take your medications day of surgery  You may eat on the day of your surgery  You may have a dressing, please wear something that will fit over it

## 2019-11-21 ENCOUNTER — TELEPHONE (OUTPATIENT)
Dept: OBGYN CLINIC | Facility: HOSPITAL | Age: 54
End: 2019-11-21

## 2019-11-21 ENCOUNTER — HOSPITAL ENCOUNTER (OUTPATIENT)
Facility: HOSPITAL | Age: 54
Setting detail: OUTPATIENT SURGERY
Discharge: HOME | End: 2019-11-21
Attending: ORTHOPAEDIC SURGERY | Admitting: ORTHOPAEDIC SURGERY
Payer: COMMERCIAL

## 2019-11-21 VITALS
RESPIRATION RATE: 18 BRPM | SYSTOLIC BLOOD PRESSURE: 126 MMHG | TEMPERATURE: 98.8 F | HEART RATE: 112 BPM | DIASTOLIC BLOOD PRESSURE: 63 MMHG | OXYGEN SATURATION: 100 %

## 2019-11-21 DIAGNOSIS — G56.02 CARPAL TUNNEL SYNDROME ON LEFT: Primary | ICD-10-CM

## 2019-11-21 LAB — GLUCOSE SERPL-MCNC: 214 MG/DL (ref 65–140)

## 2019-11-21 PROCEDURE — 29848 WRIST ENDOSCOPY/SURGERY: CPT | Performed by: ORTHOPAEDIC SURGERY

## 2019-11-21 PROCEDURE — 82948 REAGENT STRIP/BLOOD GLUCOSE: CPT

## 2019-11-21 PROCEDURE — 29848 WRIST ENDOSCOPY/SURGERY: CPT | Performed by: PHYSICIAN ASSISTANT

## 2019-11-21 PROCEDURE — 99024 POSTOP FOLLOW-UP VISIT: CPT | Performed by: PHYSICIAN ASSISTANT

## 2019-11-21 RX ORDER — HYDROCODONE BITARTRATE AND ACETAMINOPHEN 5; 325 MG/1; MG/1
1 TABLET ORAL EVERY 6 HOURS PRN
Qty: 5 TABLET | Refills: 0 | Status: SHIPPED | OUTPATIENT
Start: 2019-11-21 | End: 2019-12-05

## 2019-11-21 RX ORDER — SENNOSIDES 8.6 MG
650 CAPSULE ORAL EVERY 8 HOURS
Qty: 15 TABLET | Refills: 0 | Status: SHIPPED | OUTPATIENT
Start: 2019-11-21 | End: 2019-11-26

## 2019-11-21 RX ORDER — CLINDAMYCIN HYDROCHLORIDE 300 MG/1
300 CAPSULE ORAL 3 TIMES DAILY
Qty: 15 CAPSULE | Refills: 0 | Status: SHIPPED | OUTPATIENT
Start: 2019-11-21 | End: 2019-11-26

## 2019-11-21 RX ORDER — NAPROXEN SODIUM 220 MG
220 TABLET ORAL 2 TIMES DAILY WITH MEALS
Qty: 10 TABLET | Refills: 0 | Status: SHIPPED | OUTPATIENT
Start: 2019-11-21 | End: 2020-09-01 | Stop reason: HOSPADM

## 2019-11-21 RX ORDER — LIDOCAINE HYDROCHLORIDE AND EPINEPHRINE 10; 10 MG/ML; UG/ML
20 INJECTION, SOLUTION INFILTRATION; PERINEURAL ONCE
Status: COMPLETED | OUTPATIENT
Start: 2019-11-21 | End: 2019-11-21

## 2019-11-21 RX ADMIN — LIDOCAINE HYDROCHLORIDE,EPINEPHRINE BITARTRATE 20 ML: 10; .01 INJECTION, SOLUTION INFILTRATION; PERINEURAL at 11:19

## 2019-11-21 NOTE — TELEPHONE ENCOUNTER
Caller: patient  Call back number: [phone]  Patient's doctor: Dr Vito Swan    Patient called stating she needs a note to be out of work  She is asking to be out of work until 11/26  Patient will

## 2019-11-21 NOTE — LETTER
November 21, 2019     Patient: Valery Mills   YOB: 1965   Date of Visit: 11/21/2019       To Whom it May Concern:    Chandler Young is under my professional care  She was seen in my operating room on 11/21/2019  She may return to work on November 26, 2019  If you have any questions or concerns, please don't hesitate to call           Sincerely,          Roula Holman MD          CC: No Recipients

## 2019-11-21 NOTE — OP NOTE
OPERATIVE REPORT  PATIENT NAME: Rena Cotton  :  1965  MRN: [id number]  Pt Location: [facility] OR    SURGERY DATE: 19    Surgeon(s) and Role:     * Alexx Bojorquez MD - Primary     * Pancho Gallego PA-C - Assisting    Pre-Op Diagnosis:  Carpal tunnel syndrome on left [G56 02]    Post-Op Diagnosis Codes:     * Carpal tunnel syndrome on left [G56 02]    Procedure(s):  RELEASE CARPAL TUNNEL ENDOSCOPIC-left (Left)    Specimen(s):  * No orders in the log *    Estimated Blood Loss:   Minimal      Anesthesia Type:   Local    Operative Indications: The patient has a history of Carpal Tunnel Syndrome  left that was recalcitrant to conservative management  The decision was made to bring the patient to the operating room for Endoscopic Carpal Tunnel Release  left  Risks of the procedure were explained which include, but are not limited to bleeding; infection; damage to nerves, arteries,veins, tendons; scar; pain; need for reoperation; failure to give desired result; and risks of anaesthesia  All questions were answered to satisfaction and they were willing to proceed  Operative Findings:  Left carpal tunnel syndrome    Complications:   None    Procedure and Technique:  After the patient, site, and procedure were identified, the patient was brought into the operating room in a supine position  Local anaesthesia was adminstered in the preoperative holding area  A tourniquet was not used  The  left upper extremity was then prepped and drapped in a normal, sterile, orthopedic fashion  After reconfirmation of the patient, site, and surgical procedure, which was agreed upon by the entire surgical team, attention was turned to the left wrist   The sites of the proximal and distal incisions were marked    The rayne of the proximal incision was placed horizontally at the midline of the wrist   The distal incision rayne was longitudinal extending distally from the point of intersection of the line between the long finger and ring finger and the line along the distal border of the fully abducted thumb  The proximal incision was performed  Subcutaneous tissues were dissected  Then the transverse volar antebrachial fascia was perforated with a scalpel  The edges of the skin incision where retracted and the forearm fascia was incised for approximately 1 5 cm proximally with care taken to identify and protect the median nerve  Retractors were used to inspect the transverse carpal ligament distally  A curved Leong dissector was used to glide under the transverse carpal ligament and superficial to the median nerve with confirmation via the washboard feeling  Then the curved Leong was pushed into the palm toward the distal incision site  When the location of the distal skin rayne was adequate, the distal incision was made  Then with retraction of the skin, further dissection and perforation of the palmar fascia was performed with the use of tenotomy scissors  The curved Leong was guided from proximal to distal out the distal incisions without any twisting to allow for dilation of the tract  The curved Leong was removed, and the cannula for the camera was inserted along the same tract, making sure to keep the alignment post on the cannula perpendicular to the plane of the hand without twisting  Then while keeping the wrist in extension, and holding the cannula of the camera in place, the wrist was placed on the hyperextension board  The scope was inserted distally, and a cotton-tip applicator was used proximally to clean the tract as well as the scope  A curved cutting knife was introduced from proximal to distal while keeping visualization with the use of the camera  Without twisting of the canula, the knife was used to cut the transverse carpal ligament completely, making sure there were no remnant fibers  Then after this was accomplished, the hand was removed from the extension block    Three maneuvers were used to confirm the full release of the transverse carpal ligament  First, the ease of twisting the trocar of the camera confirmed the release of the ligament  Second, the curved Leong was introduced to make sure there were no remnant fibers that could be felt palmarly  Third, the scope was introduced again to visualize that the whole ligament was released proximally to distally  Additional confirmation of full release included retraction and inspection in the distal and proximal incisions to make sure there were no remnant fibers distally or proximally respectively  At the completion of the procedure, hemostasis was obtained with cautery and direct pressure  The wounds were copiously irrigated with sterile solution  The wounds were closed with Prolene  Sterile dressings were applied, including Xeroform, gauze, tweeners, webril, ACE  Please note, all sponge, needle, and instrument counts were correct prior to closure  Loupe magnification was utilized  The patient tolerated the procedure well       I was present for the entire procedure, A qualified resident physician was not available and A physician assistant was required during the procedure for retraction tissue handling,dissection and suturing    Patient Disposition:  PACU  and hemodynamically stable    SIGNATURE: Uma Garcia MD  DATE: 11/21/19  TIME: 11:59 AM

## 2019-11-21 NOTE — TELEPHONE ENCOUNTER
I called patient and informed her that her work note is done  She said she will get it off of her MyChart if she has any issues she will call us back

## 2019-11-21 NOTE — DISCHARGE INSTRUCTIONS
Post Operative Instructions    You have had surgery on your arm today, please read and follow the information below:  · Elevate your hand above your elbow during the next 24-48 hours to help with swelling  · Place your hand and arm over your head with motion at your shoulder three times a day  · Do not apply any cream/ointment/oil to your incisions including antibiotics  · Do not soak your hands in standing water (dishwater, tubs, Jacuzzi's, pools, etc ) until given permission (typically 2-3 weeks after injury)    Call the office if you notice any:  · Increased numbness or tingling of your hand or fingers that is not relieved with elevation  · Increasing pain that is not controlled with medication  · Difficulty chewing, breathing, swallowing  · Chest pains or shortness of breath  · Fever over 101 4 degrees  Bandage: Remove bandage after 5 days  Motion: Move fingers into a fist 5 times a day, DO NOT move any splinted fingers  Weight bearing status: Avoid heavy lifting (>5 pounds) with the extremity that was operated on until follow up appointment  Normal activities of daily living are OK  Ice: Ice for 10 minutes every hour as needed for swelling x 24 hours  Sling: No sling necessary  Medications:   Naproxen 220 mg two times a day   Tylenol Extended Release 650 mg every 8 hours  Norco/Hydrocodone one tab every 6 hours AS NEEDED for pain   Clindamycin 300mg three times a day x 5 days     Follow-up Appointment: 7-10 days  Please call the office if you have any questions or concerns regarding your post-operative care

## 2019-11-21 NOTE — H&P
H&P Exam - Orthopedics   Karen Ngo 48 y o  female MRN: [id number]  Unit/Bed#: OR POOL    Assessment/Plan   Assessment:  L CTS   Plan:  L ECTR to be performed today    History of Present Illness   HPI:  Rupal Young is a 48 y o  female who presents with n/t in the L thumb, index and long fingers  Describes nocturnal symptoms  Nocturnal splinting is no longer helping  Describes weakness      Historical Information  Review Of Systems:   · Skin: Normal  · Neuro: See HPI  · Musculoskeletal: See HPI  · 14 point review of systems negative except as stated above     Past Medical History:   Past Medical History:   Diagnosis Date    Anemia     Last Assessed:  3/27/13a    Arthritis     Asthma     Blurred vision     Chronic kidney disease     Diabetes mellitus ([facility] [address]     Diabetic retinopathy ([facility] [address] [facility]     Hypercholesterolemia     Hyperlipidemia     Hypertension     Increased urinary frequency     Irritable bowel syndrome     Last Assessed:  10/2/12    Joint pain     Nausea     Night sweat     Numbness     Weakness        Past Surgical History:   Past Surgical History:   Procedure Laterality Date    ABDOMINAL SURGERY      CARPAL TUNNEL RELEASE Right      SECTION      x 2    COLONOSCOPY  2016    TUBAL LIGATION      WISDOM TOOTH EXTRACTION         Family History:  Family history reviewed and non-contributory  Family History   Problem Relation Age of Onset    Diabetes Mother     Hypertension Mother     Diabetes Father     Heart disease Father     Stroke Father     Hypertension Father     Diverticulitis Father     Hyperlipidemia Father     Heart attack Father     Diabetes Maternal Grandmother     Pancreatic cancer Maternal Grandmother     Liver cancer Maternal Grandfather     Alcohol abuse Maternal Grandfather     Heart disease Paternal Grandmother     Crohn's disease Sister     Diabetes type I Daughter     Eczema Daughter     Heart attack Paternal Grandfather     Asthma Maternal Aunt     Alcohol abuse Maternal Uncle     Mental illness Paternal Aunt     Schizophrenia Paternal Aunt     Diabetes Paternal Aunt     Kidney disease Paternal Aunt     Alcohol abuse Paternal Uncle     Diabetes Paternal Uncle     Eczema Son     Colon cancer Neg Hx     Breast cancer Neg Hx        Social History:  Social History     Socioeconomic History    Marital status:      Spouse name: Skylar Lerma    Number of children: 2    Years of education: 15    Highest education level: Some college, no degree   Occupational History     Comment: financial counseler   Social Needs    Financial resource strain: Not hard at all   Moshe-Ras insecurity:     Worry: Never true     Inability: Never true   [employer] needs:     Medical: No     Non-medical: No   Tobacco Use    Smoking status: Former Smoker     Last attempt to quit:      Years since quittin 9    Smokeless tobacco: Never Used   Substance and Sexual Activity    Alcohol use: Yes     Comment: rarely    Drug use: No    Sexual activity: Yes     Partners: Male     Birth control/protection: Female Sterilization   Lifestyle    Physical activity:     Days per week: 0 days     Minutes per session: 0 min    Stress: To some extent   Relationships    Social connections:     Talks on phone: More than three times a week     Gets together: More than three times a week     Attends Christianity service: None     Active member of club or organization: Yes     Attends meetings of clubs or organizations: More than 4 times per year     Relationship status:     Intimate partner violence:     Fear of current or ex partner: No     Emotionally abused: No     Physically abused: No     Forced sexual activity: No   Other Topics Concern    None   Social History Narrative    Consumes 1 cup of coffee per day       Allergies:    Allergies   Allergen Reactions    Iodinated Diagnostic Agents Facial Swelling    Iodine Facial Swelling    Shellfish-Derived Products Throat Swelling    Amoxicillin Hives    Metformin Diarrhea    Penicillins      Yeast Infection           Labs:  0   Lab Value Date/Time    HCT 40 7 07/11/2019 1146    HCT 42 6 03/26/2019 0901    HCT 40 0 10/25/2018 0756    HCT 41 6 05/30/2018 0809    HCT 40 3 03/20/2018 0932    HCT 40 3 02/28/2014 1340    HGB 13 4 07/11/2019 1146    HGB 14 1 03/26/2019 0901    HGB 13 4 10/25/2018 0756    HGB 13 4 05/30/2018 0809    HGB 13 4 03/20/2018 0932    HGB 13 3 01/14/2015 1806    HGB 13 7 02/28/2014 1340    WBC 9 90 07/11/2019 1146    WBC 10 50 03/26/2019 0901    WBC 7 20 10/25/2018 0756    WBC 9 8 05/30/2018 0809    WBC 9 4 03/20/2018 0932    WBC 12 04 (H) 02/28/2014 1340       Meds:  No current facility-administered medications for this encounter  Blood Culture:   No results found for: BLOODCX    Wound Culture:   No results found for: WOUNDCULT    Ins and Outs:  No intake/output data recorded  Physical Exam  /83   Pulse (!) 107   Temp 98 3 °F (36 8 °C) (Oral)   Resp 18   SpO2 99%   /83   Pulse (!) 107   Temp 98 3 °F (36 8 °C) (Oral)   Resp 18   SpO2 99%   Gen: Alert and oriented to person, place, time  HEENT: EOMI, eyes clear, moist mucus membranes, hearing intact  Respiratory: Bilateral chest rise   No audible wheezing found  Cardiovascular: Regular Rate and Rhythm  Abdomen: soft nontender/nondistended  Ortho Exam: Pt with well-maintained wrist ROM  Neuro Exam: Pt with + tinel's at L carpal tunnel with + compression testing and + APB weakness    Lab Results: Reviewed  Imaging: Reviewed

## 2019-11-25 ENCOUNTER — OFFICE VISIT (OUTPATIENT)
Dept: SLEEP CENTER | Facility: CLINIC | Age: 54
End: 2019-11-25
Payer: COMMERCIAL

## 2019-11-25 VITALS
BODY MASS INDEX: 41.35 KG/M2 | WEIGHT: 197 LBS | DIASTOLIC BLOOD PRESSURE: 60 MMHG | HEIGHT: 58 IN | SYSTOLIC BLOOD PRESSURE: 110 MMHG

## 2019-11-25 DIAGNOSIS — R06.83 SNORING: Primary | ICD-10-CM

## 2019-11-25 DIAGNOSIS — G47.52 DREAM ENACTMENT BEHAVIOR: ICD-10-CM

## 2019-11-25 DIAGNOSIS — G47.19 EXCESSIVE DAYTIME SLEEPINESS: ICD-10-CM

## 2019-11-25 DIAGNOSIS — G47.00 FREQUENT NOCTURNAL AWAKENING: ICD-10-CM

## 2019-11-25 DIAGNOSIS — E66.01 MORBID OBESITY (HCC): ICD-10-CM

## 2019-11-25 PROCEDURE — 99244 OFF/OP CNSLTJ NEW/EST MOD 40: CPT | Performed by: PSYCHIATRY & NEUROLOGY

## 2019-11-25 NOTE — LETTER
November 25, 2019     Brandoncarlotta OmerEzra becerra  [address] [place] [address]    Patient: Magi Wilson   YOB: 1965   Date of Visit: 11/25/2019       Dear Dr Ely Hoffman: Thank you for referring India Herman to me for evaluation  Below are my notes for this consultation  If you have questions, please do not hesitate to call me  I look forward to following your patient along with you  Sincerely,        Caitlin Drew MD        CC: MD Caitlin Beckford MD  11/25/2019  9:57 AM  Signed  Sleep Medicine Consultation Note    HPI:  Ms Magi Wilson is a 48 y o  female seen at the request of Jada Alberts MD for advice regarding suspected sleep disordered breathing  The patient is looking to undergo bariatric surgery and needs this for clearance  She had an HST done 2 years ago at Chan Coe MD office when that was her PCP and she was told that she needs a CPAP, but the patient was not interested at that time  The patient stated that her boyfriend, mom, and son tell her that she snores  She will also catch herself snoring if she falls asleep on the couch  She has been snoring for many years and probably getting worse now because she is hearing herself snoring  She stated that his wakes her boyfriend at times  She does not snore every day  Sleeping supine makes snoring worse than sleeping on her side  She has gained 10-15lbs this year  She denied witnessed apneas or gasping  She denied difficult breathing through her nose unless it is allergy season  She was recently told that she has a chronic post nasal drip by her ENT a few years ago  She uses a nasal spray for this  She has "bouts of insomnia at times " She sometimes does not get sleepy at all and will not be able to fall asleep  Sometimes she will wake up frequently throughout the night  Sleep onset insomnia is variable, does not happened frequently, a few times a month       Please see below for continuation of the HPI:    Sleep Pattern:  -Location: bedroom  -Bed/Recliner/Wedge: bed  -Bed Partner: yes  -HOB: flat  -# of pillows under head: 1  -Position: side  -Bedtime: varies from 8p-1a  -Lights out: same time  -Environmental: TV for 20 mins while getting ready for sleep then it stays on while she is asleep    -Latency: variable, usually mins  -Awakenings: 2-5   -Reason: pain or unsure why she wakes up   -Duration: mins  -Wake time: 6am   -Alarm: yes, but wakes up before alarm  -Rise time: same time  -Days off: bedtime 1-2am and wake time 7-10am  -Shift Work: M-F days  -Patient's estimate of total sleep time: 4 at least  Sometimes 7 hours    Daytime Symptoms:  -Upon Awakening: not tired  -Daytime fatigue/sleepiness: tiredness increases as the day goes on  -Naps: on weekends  -Involuntary Dozing: watching TV, at work she will catch herself  -Cognitive Symptoms: denied  -Driving: Difficulty with sleepiness and driving:  denied   -- Close calls related to sleepiness: denied   -- Accidents related to sleepiness: denied      Questionnaires: Sitting and reading: Slight chance of dozing  Watching TV: High chance of dozing  Sitting, inactive in a public place (e g  a theatre or a meeting): Moderate chance of dozing  As a passenger in a car for an hour without a break: High chance of dozing  Lying down to rest in the afternoon when circumstances permit: High chance of dozing  Sitting and talking to someone: Would never doze  Sitting quietly after a lunch without alcohol: Slight chance of dozing  In a car, while stopped for a few minutes in traffic: Would never doze  Total score: 13      Sleep Review of Symptoms:  -Parasomnias:  --Sleep Walking: denied  --Dream Enactment: laughs, crying or screaming, sometimes runs   A lot of this is due to dreaming of her daughter who passed away 2 years ago  --Bruxism: no  -Motor:  --RLS: rarely  --PLMS: no  -Narcolepsy:  --Hallucinations: no  --Paralysis: yes  --Cataplexy: no    Childhood Sleep History: denied    Prior Sleep Studies/Evaluations:  HST 2 years ago    Family History:  Family history of sleep disorders: parents both snore    Patient Active Problem List   Diagnosis    Pituitary microadenoma ([facility] [address] )    Hyperlipidemia    Essential hypertension    Diabetes mellitus type 2, uncontrolled ([facility] [address] )    Class 3 severe obesity due to excess calories with serious comorbidity and body mass index (BMI) of 40 0 to 44 9 in adult [facility])    Trigger index finger of right hand    Dependent edema    Itching    Microalbuminuria due to type 2 diabetes mellitus (HCC)    Acne vulgaris    Eczema of left external ear    Carpal tunnel syndrome on left    Diabetic retinopathy of both eyes associated with type 2 diabetes mellitus ([facility] [address] )    Vitamin D deficiency    Type 2 diabetes mellitus with nephropathy ([facility] [address] )    Obesity, Class III, BMI 40-49 9 (morbid obesity) ([facility] [address] )     Past Medical History:   Diagnosis Date    Anemia     Last Assessed:  3/27/13a    Arthritis     Asthma     Blurred vision     Chronic kidney disease     Diabetes mellitus ([facility] [address] )     Diabetic retinopathy ([facility] [address] )     Hypercholesterolemia     Hyperlipidemia     Hypertension     Increased urinary frequency     Irritable bowel syndrome     Last Assessed:  10/2/12    Joint pain     Nausea     Night sweat     Numbness     Weakness        --> Denies any other cardiopulmonary disease  --> Seizure hx: denies  --> Head injury with LOC: denies  --> Supplemental Oxygen Use: denies    Labs   Results for Franck Person (MRN [id number]) as of 11/25/2019 09:27   Ref   Range 7/11/2019 11:46   Sodium Latest Ref Range: 137 - 147 mmol/L 141   Potassium Latest Ref Range: 3 6 - 5 0 mmol/L 3 7   Chloride Latest Ref Range: 97 - 108 mmol/L 106   CO2 Latest Ref Range: 22 - 30 mmol/L 22   Anion Gap Latest Ref Range: 5 - 14 mmol/L 13   BUN Latest Ref Range: 5 - 25 mg/dL 12   Creatinine Latest Ref Range: 0 60 - 1 20 mg/dL 0 76   GLUCOSE FASTING Latest Ref Range: 70 - 99 mg/dL 140 (H)   Calcium Latest Ref Range: 8 4 - 10 2 mg/dL 10 1   AST Latest Ref Range: 14 - 36 U/L 23   ALT Latest Ref Range: 9 - 52 U/L 21   Alkaline Phosphatase Latest Ref Range: 43 - 122 U/L 90   Total Protein Latest Ref Range: 5 9 - 8 4 g/dL 8 1   Albumin Latest Ref Range: 3 0 - 5 2 g/dL 4 2   TOTAL BILIRUBIN Latest Ref Range: <1 30 mg/dL 0 40   eGFR Latest Ref Range: >60 ml/min/1 73sq m 91   Cholesterol Latest Ref Range: <200 mg/dL 205 (H)   Triglycerides Latest Ref Range: <150 mg/dL 183 (H)   HDL Latest Ref Range: 40 - 59 mg/dL 46   Non-HDL Cholesterol Latest Units: mg/dl 159   LDL Direct Latest Ref Range: <130 mg/dL 122   Vit D, 25-Hydroxy Latest Ref Range: 30 0 - 100 0 ng/mL 21 2 (L)   Vitamin B-12 Latest Ref Range: 100 - 900 pg/mL 519   WBC Latest Ref Range: 4 50 - 11 00 Thousand/uL 9 90   Red Blood Cell Count Latest Ref Range: 4 00 - 5 20 Million/uL 5 02   Hemoglobin Latest Ref Range: 12 0 - 16 0 g/dL 13 4   HCT Latest Ref Range: 36 0 - 46 0 % 40 7   MCV Latest Ref Range: 80 - 100 fL 81   MCH Latest Ref Range: 26 0 - 34 0 pg 26 6   MCHC Latest Ref Range: 31 0 - 36 0 g/dL 32 8   RDW Latest Ref Range: <15 3 % 15 4 (H)   Platelet Count Latest Ref Range: 150 - 450 Thousands/uL 443   MPV Latest Ref Range: 8 9 - 12 7 fL 6 8 (L)   Neutrophils % Latest Ref Range: 45 - 65 % 58   Lymphocytes Relative Latest Ref Range: 25 - 45 % 29   Monocytes Relative Latest Ref Range: 1 - 10 % 7   Eosinophils Latest Ref Range: 0 - 6 % 4   Basophils Relative Latest Ref Range: 0 - 1 % 2 (H)   Absolute Neutrophils Latest Ref Range: 1 80 - 7 80 Thousands/µL 5 80   Lymphocytes Absolute Latest Ref Range: 0 50 - 4 00 Thousands/µL 2 90   Absolute Monocytes Latest Ref Range: 0 20 - 0 90 Thousand/µL 0 70   Absolute Eosinophils Latest Ref Range: 0 00 - 0 40 Thousand/µL 0 40   Basophils Absolute Latest Ref Range: 0 00 - 0 10 Thousands/µL 0 20 (H)   Hemoglobin A1C Latest Ref Range: 4 2 - 6 3 % 7 3 (H)   EAG Latest Units: mg/dl 163   TSH 3RD GENERATON Latest Ref Range: 0 465 - 4 680 uIU/mL 2 480   Epithelial Cells Latest Ref Range: None Seen, Occasional /hpf Innumerable (A)   Color, UA Latest Ref Range: Straw, Yellow, Pale Yellow  Elin (A)   Clarity, UA Latest Ref Range: Clear, Other  Slightly Cloudy (A)   SL AMB SPECIFIC GRAVITY_URINE Latest Ref Range: 1 003 - 1 040  1 020   Glucose, UA Latest Ref Range: Negative mg/dl >=1000 (1%) (A)   Ketones, UA Latest Ref Range: Negative mg/dl Negative   Blood, UA Latest Ref Range: Negative  Negative   Nitrite, UA Latest Ref Range: Negative  Negative   Leukocytes, UA Latest Ref Range: Negative  Negative   pH, UA Latest Ref Range: 4 5, 5 0, 5 5, 6 0, 6 5, 7 0, 7 5, 8 0  5 0   POCT URINE PROTEIN Latest Ref Range: Negative mg/dl 100 (2+) (A)   Bilirubin, UA Latest Ref Range: Negative  Negative   SL AMB POCT UROBILINOGEN Latest Ref Range: 1 0, Negative mg/dL Negative   RBC, UA Latest Ref Range: None Seen, 0-5 /hpf 0-1 (A)   WBC, UA Latest Ref Range: None Seen, 0-5, 5-55, 5-65 /hpf 0-1 (A)   Bacteria, UA Latest Ref Range: None Seen, Occasional /hpf Occasional   EXT Creatinine Urine Latest Units: mg/dL 175 0   MICROALBUMIN/CREATININE RATIO Latest Ref Range: 0 - 30 mg/g creatinine 191 (H)   MICROALBUM ,U,RANDOM Latest Ref Range: 0 0 - 20 0 mg/L 335 0 (H)   URIC ACID Latest Ref Range: 2 7 - 7 5 mg/dL 5 9         Past Surgical History:   Procedure Laterality Date    ABDOMINAL SURGERY      CARPAL TUNNEL RELEASE Right      SECTION      x 2    COLONOSCOPY  2016    AZ WRIST ARTHROSCOP,RELEASE Opal Casas LIG Left 2019    Procedure: RELEASE CARPAL TUNNEL ENDOSCOPIC-left;  Surgeon: Leonid Chavis MD;  Location:  MAIN OR;  Service: Orthopedics    TUBAL LIGATION      WISDOM TOOTH EXTRACTION         --> ENT procedures: denies    Current Outpatient Medications   Medication Sig Dispense Refill    acetaminophen (TYLENOL 8 HOUR) 650 mg CR tablet Take 1 tablet (650 mg total) by mouth every 8 (eight) hours for 5 days 15 tablet 0    aspirin (ECOTRIN LOW STRENGTH) 81 mg EC tablet Take 81 mg by mouth daily      azelastine (ASTELIN) 0 1 % nasal spray 2 sprays into each nostril 2 (two) times a day Use in each nostril as directed 1 Bottle 6    clindamycin (CLEOCIN) 300 MG capsule Take 1 capsule (300 mg total) by mouth 3 (three) times a day for 5 days 15 capsule 0    clindamycin-benzoyl peroxide (BENZACLIN) gel Apply topically 2 (two) times a day 25 g 0    Dapagliflozin-metFORMIN HCl ER 2 5-1000 MG TB24 Take 1 tablet by mouth daily with breakfast 30 tablet 3    Ergocalciferol 2000 units CAPS take 1 capsule by oral route  every week      HYDROcodone-acetaminophen (NORCO) 5-325 mg per tablet Take 1 tablet by mouth every 6 (six) hours as needed for pain for up to 5 dosesMax Daily Amount: 4 tablets 5 tablet 0    Insulin Glargine (TOUJEO) 300 units/mL CONCETRATED U-300 injection pen Inject 28 Units under the skin daily at bedtime      Insulin Pen Needle (BD PEN NEEDLE EDUARDO U/F) 32G X 4 MM MISC by Does not apply route      Insulin Pen Needle 32G X 5 MM MISC Inject at bedtime        lisinopril (ZESTRIL) 20 mg tablet Take 1 tablet (20 mg total) by mouth daily 90 tablet 3    mometasone (ELOCON) 0 1 % cream Apply topically daily Apply topically BID x 2 weeks then qhs prn 45 g 3    mometasone (NASONEX) 50 mcg/act nasal spray as needed       naproxen sodium (ALEVE) 220 MG tablet Take 1 tablet (220 mg total) by mouth 2 (two) times a day with meals for 5 days 10 tablet 0    NOVOLOG FLEXPEN 100 units/mL injection pen Inject 15-30 Units under the skin 2 (two) times a day with meals       pantoprazole (PROTONIX) 40 mg tablet Take 1 tablet (40 mg total) by mouth daily (Patient taking differently: Take 40 mg by mouth as needed ) 90 tablet 1    [START ON 12/17/2019] rosuvastatin (CRESTOR) 20 MG tablet Take 1 tablet (20 mg total) by mouth daily at bedtime 90 tablet 3    SAXENDA injection Inject 0 5 mL (3 mg total) under the skin daily at bedtime 30 pen 3     No current facility-administered medications for this visit  Social History:  -Employment: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[employer]  -Smoking: quit 25 years ago  -Caffeine: 12-16 of coffee int he morning  -Alcohol: occasionally  -THC: quit 25 years ago  -OTC/Supplements/herbals: no  -Illicits:  denies  -Family: lives with boyfriend    ROS:  Genitourinary hot flashes at night and sleep problems that vary with menstrual cycle    Cardiology none   Gastrointestinal none   Neurology none   Constitutional fatigue   Integumentary none   Psychiatry depression, aggressiveness or irritability and mood change   Musculoskeletal joint pain, muscle aches, sciatica and leg cramps   Pulmonary snoring and difficulty breathing when lying flat    ENT ringing in ears   Endocrine none   Hematological none       MSE:  -Alert and appropriate: alert, calm, cooperative  -Oriented to person, place and time:  name, age, location, day/date/mon/yr  -Behavior: good, sustained eye contact  -Speech: Unremarkable rate/rhythm/volume  -Mood: "good"  -Affect: full range  -Thought Processes: linear, logical, goal directed  PE:  Body mass index is 41 17 kg/m²    Vitals:    11/25/19 0900   BP: 110/60   Weight: 89 4 kg (197 lb)   Height: 4' 10" (1 473 m)       -General:  In NAD    -Eyes: Conjunctival injection: none     -EOM:  PERRLA, EOMI   -Eyelid hooding: no    -ENT: MP: 4/4   -Facial deformity: no retrognathia   -Hard palate: moderate arch   -Soft palate:  Crowding with enlarged uvula   -Gums and teeth: normal dentition   -Tongue:  Scalloping   -Nares:  Patent    -Neck/Lymphatics: Lymphadenopathy:  none appreciated   -Masses:  none appreciated   -Circumference: Neck Circumference: 16 5 "    -Cardiac: Auscultation:  RRR   - LE edema over shins: none appreciated    -Pulm: -Respirations: unlaboured         -Auscultation:  CTA bilaterally, posterior fields    -Neuro: No resting tremor     -Musculoskeletal: Gait and stance: normal turning and ambulation; unremarkable  Assessment:  Ms Jimena Kent is a 48 y o  female who is seen to evaluate for possible obstructive sleep apnea  Given the patient's symptoms of obesity, snoring, excessive daytime sleepiness, non-restorative sleep, frequent nocturnal awakenings, and dream enactment, in the context of a narrow airway, chronic post-nasal drip and allergies, a diagnosis of obstructive sleep apnea is very likely  She is undergoing evaluation for bariatric surgery and this is part of the work-up  The pathophysiology of, the reasons to treat and treatment options for obstructive sleep apnea were all reviewed with the patient today  Discussed the testing options and reviewed the benefits and downsides of both, patient opted for the in lab testing  She is amenable to treatment with PAP therapy  Discussed keeping nasal passages clear, abstaining from alcohol, and other sedating drugs at night- which will worsen symptoms of [id number]  --History provided by: patient   --Records reviewed: in chart      Recommendations:  1) In lab diagnostic Polysomnography with arm leads for dream enactment  2) Driving safety was reviewed with patient  If the patient feels too sleepy to drive she knows not to drive  If she becomes sleepy while driving she will pull over and nap  3) Follow-up after initiating treatment if needed  4) Call with any questions or concerns  All questions answered for the patient, who indicated understanding and agreed with the plan       Fe Aguilar MD  Psychiatry/ Sleep medicine

## 2019-11-25 NOTE — PROGRESS NOTES
Sleep Medicine Consultation Note    HPI:  Ms Mary Crowe is a 48 y o  female seen at the request of Ashley Lara MD for advice regarding suspected sleep disordered breathing  The patient is looking to undergo bariatric surgery and needs this for clearance  She had an HST done 2 years ago at Lea Willams MD office when that was her PCP and she was told that she needs a CPAP, but the patient was not interested at that time  The patient stated that her boyfriend, mom, and son tell her that she snores  She will also catch herself snoring if she falls asleep on the couch  She has been snoring for many years and probably getting worse now because she is hearing herself snoring  She stated that his wakes her boyfriend at times  She does not snore every day  Sleeping supine makes snoring worse than sleeping on her side  She has gained 10-15lbs this year  She denied witnessed apneas or gasping  She denied difficult breathing through her nose unless it is allergy season  She was recently told that she has a chronic post nasal drip by her ENT a few years ago  She uses a nasal spray for this  She has "bouts of insomnia at times " She sometimes does not get sleepy at all and will not be able to fall asleep  Sometimes she will wake up frequently throughout the night  Sleep onset insomnia is variable, does not happened frequently, a few times a month       Please see below for continuation of the HPI:    Sleep Pattern:  -Location: bedroom  -Bed/Recliner/Wedge: bed  -Bed Partner: yes  -HOB: flat  -# of pillows under head: 1  -Position: side  -Bedtime: varies from 8p-1a  -Lights out: same time  -Environmental: TV for 20 mins while getting ready for sleep then it stays on while she is asleep    -Latency: variable, usually mins  -Awakenings: 2-5   -Reason: pain or unsure why she wakes up   -Duration: mins  -Wake time: 6am   -Alarm: yes, but wakes up before alarm  -Rise time: same time  -Days off: bedtime 1-2am and wake time 7-10am  -Shift Work: M-F days  -Patient's estimate of total sleep time: 4 at least  Sometimes 7 hours    Daytime Symptoms:  -Upon Awakening: not tired  -Daytime fatigue/sleepiness: tiredness increases as the day goes on  -Naps: on weekends  -Involuntary Dozing: watching TV, at work she will catch herself  -Cognitive Symptoms: denied  -Driving: Difficulty with sleepiness and driving:  denied   -- Close calls related to sleepiness: denied   -- Accidents related to sleepiness: denied      Questionnaires: Sitting and reading: Slight chance of dozing  Watching TV: High chance of dozing  Sitting, inactive in a public place (e g  a theatre or a meeting): Moderate chance of dozing  As a passenger in a car for an hour without a break: High chance of dozing  Lying down to rest in the afternoon when circumstances permit: High chance of dozing  Sitting and talking to someone: Would never doze  Sitting quietly after a lunch without alcohol: Slight chance of dozing  In a car, while stopped for a few minutes in traffic: Would never doze  Total score: 13      Sleep Review of Symptoms:  -Parasomnias:  --Sleep Walking: denied  --Dream Enactment: laughs, crying or screaming, sometimes runs   A lot of this is due to dreaming of her daughter who passed away 2 years ago  --Bruxism: no  -Motor:  --RLS: rarely  --PLMS: no  -Narcolepsy:  --Hallucinations: no  --Paralysis: yes  --Cataplexy: no    Childhood Sleep History: denied    Prior Sleep Studies/Evaluations:  HST 2 years ago    Family History:  Family history of sleep disorders: parents both snore    Patient Active Problem List   Diagnosis    [facility]uitary [facility]adenoma [facility] [address] )    Hyperlipidemia    Essential hypertension    Diabetes mellitus type 2, uncontrolled ([facility] [address] )    Class 3 severe obesity due to excess calories with serious comorbidity and body mass index (BMI) of 40 0 to 44 9 in [facility])    Trigger index finger of right hand    Dependent edema    Itching    Microalbuminuria due to type 2 diabetes mellitus ([facility])    Acne vulgaris    Eczema of left external ear    Carpal tunnel syndrome on left    Diabetic retinopathy of both eyes associated with type 2 diabetes mellitus ([place] [address] )    Vitamin D deficiency    Type 2 diabetes mellitus with nephropathy ([facility])    Obesity, Class III, BMI 40-49 9 (morbid obesity) ([place] [address] )     Past Medical History:   Diagnosis Date    Anemia     Last Assessed:  3/27/13a    Arthritis     Asthma     Blurred vision     Chronic kidney disease     Diabetes mellitus ([place] [address] )     Diabetic retinopathy ([place] [address] )     Hypercholesterolemia     Hyperlipidemia     Hypertension     Increased urinary frequency     Irritable bowel syndrome     Last Assessed:  10/2/12    Joint pain     Nausea     Night sweat     Numbness     Weakness        --> Denies any other cardiopulmonary disease  --> Seizure hx: denies  --> Head injury with LOC: denies  --> Supplemental Oxygen Use: denies    Labs   Results for Nomi Vasquez (MRN [id number]) as of 11/25/2019 09:27   Ref   Range 7/11/2019 11:46   Sodium Latest Ref Range: 137 - 147 mmol/L 141   Potassium Latest Ref Range: 3 6 - 5 0 mmol/L 3 7   Chloride Latest Ref Range: 97 - 108 mmol/L 106   CO2 Latest Ref Range: 22 - 30 mmol/L 22   Anion Gap Latest Ref Range: 5 - 14 mmol/L 13   BUN Latest Ref Range: 5 - 25 mg/dL 12   Creatinine Latest Ref Range: 0 60 - 1 20 mg/dL 0 76   GLUCOSE FASTING Latest Ref Range: 70 - 99 mg/dL 140 (H)   Calcium Latest Ref Range: 8 4 - 10 2 mg/dL 10 1   AST Latest Ref Range: 14 - 36 U/L 23   ALT Latest Ref Range: 9 - 52 U/L 21   Alkaline Phosphatase Latest Ref Range: 43 - 122 U/L 90   Total Protein Latest Ref Range: 5 9 - 8 4 g/dL 8 1   Albumin Latest Ref Range: 3 0 - 5 2 g/dL 4 2   TOTAL BILIRUBIN Latest Ref Range: <1 30 mg/dL 0 40   eGFR Latest Ref Range: >60 ml/min/1 73sq m 91   Cholesterol Latest Ref Range: <200 mg/dL 205 (H)   Triglycerides Latest Ref Range: <150 mg/dL 183 (H)   HDL Latest Ref Range: 40 - 59 mg/dL 46   Non-HDL Cholesterol Latest Units: mg/dl 159   LDL Direct Latest Ref Range: <130 mg/dL 122   Vit D, 25-Hydroxy Latest Ref Range: 30 0 - 100 0 ng/mL 21 2 (L)   Vitamin B-12 Latest Ref Range: 100 - 900 pg/mL 519   WBC Latest Ref Range: 4 50 - 11 00 Thousand/uL 9 90   Red Blood Cell Count Latest Ref Range: 4 00 - 5 20 Million/uL 5 02   Hemoglobin Latest Ref Range: 12 0 - 16 0 g/dL 13 4   HCT Latest Ref Range: 36 0 - 46 0 % 40 7   MCV Latest Ref Range: 80 - 100 fL 81   MCH Latest Ref Range: 26 0 - 34 0 pg 26 6   MCHC Latest Ref Range: 31 0 - 36 0 g/dL 32 8   RDW Latest Ref Range: <15 3 % 15 4 (H)   Platelet Count Latest Ref Range: 150 - 450 Thousands/uL 443   MPV Latest Ref Range: 8 9 - 12 7 fL 6 8 (L)   Neutrophils % Latest Ref Range: 45 - 65 % 58   Lymphocytes Relative Latest Ref Range: 25 - 45 % 29   Monocytes Relative Latest Ref Range: 1 - 10 % 7   Eosinophils Latest Ref Range: 0 - 6 % 4   Basophils Relative Latest Ref Range: 0 - 1 % 2 (H)   Absolute Neutrophils Latest Ref Range: 1 80 - 7 80 Thousands/µL 5 80   Lymphocytes Absolute Latest Ref Range: 0 50 - 4 00 Thousands/µL 2 90   Absolute Monocytes Latest Ref Range: 0 20 - 0 90 Thousand/µL 0 70   Absolute Eosinophils Latest Ref Range: 0 00 - 0 40 Thousand/µL 0 40   Basophils Absolute Latest Ref Range: 0 00 - 0 10 Thousands/µL 0 20 (H)   Hemoglobin A1C Latest Ref Range: 4 2 - 6 3 % 7 3 (H)   EAG Latest Units: mg/dl 163   TSH 3RD GENERATON Latest Ref Range: 0 465 - 4 680 uIU/mL 2 480   Epithelial Cells Latest Ref Range: None Seen, Occasional /hpf Innumerable (A)   Color, UA Latest Ref Range: Straw, Yellow, Pale Yellow  Elin (A)   Clarity, UA Latest Ref Range: Clear, Other  Slightly Cloudy (A)   SL AMB SPECIFIC GRAVITY_URINE Latest Ref Range: 1 003 - 1 040  1 020   Glucose, UA Latest Ref Range: Negative mg/dl >=1000 (1%) (A)   Ketones, UA Latest Ref Range: Negative mg/dl Negative   Blood, UA Latest Ref Range: Negative  Negative Nitrite, UA Latest Ref Range: Negative  Negative   Leukocytes, UA Latest Ref Range: Negative  Negative   pH, UA Latest Ref Range: 4 5, 5 0, 5 5, 6 0, 6 5, 7 0, 7 5, 8 0  5 0   POCT URINE PROTEIN Latest Ref Range: Negative mg/dl 100 (2+) (A)   Bilirubin, UA Latest Ref Range: Negative  Negative   SL AMB POCT UROBILINOGEN Latest Ref Range: 1 0, Negative mg/dL Negative   RBC, UA Latest Ref Range: None Seen, 0-5 /hpf 0-1 (A)   WBC, UA Latest Ref Range: None Seen, 0-5, 5-55, 5-65 /hpf 0-1 (A)   Bacteria, UA Latest Ref Range: None Seen, Occasional /hpf Occasional   EXT Creatinine Urine Latest Units: mg/dL 175 0   MICROALBUMIN/CREATININE RATIO Latest Ref Range: 0 - 30 mg/g creatinine 191 (H)   MICROALBUM ,U,RANDOM Latest Ref Range: 0 0 - 20 0 mg/L 335 0 (H)   URIC ACID Latest Ref Range: 2 7 - 7 5 mg/dL 5 9         Past Surgical History:   Procedure Laterality Date    ABDOMINAL SURGERY      CARPAL TUNNEL RELEASE Right      SECTION      x 2    COLONOSCOPY  2016    MN WRIST ARTHROSCOP,RELEASE XVERS LIG Left 2019    Procedure: RELEASE CARPAL TUNNEL ENDOSCOPIC-left;  Surgeon: Alexx Bojorquez MD;  Location: [facility];  Service: Orthopedics    TUBAL LIGATION      WISDOM TOOTH EXTRACTION         --> ENT procedures: denies    Current Outpatient Medications   Medication Sig Dispense Refill    acetaminophen (TYLENOL 8 HOUR) 650 mg CR tablet Take 1 tablet (650 mg total) by mouth every 8 (eight) hours for 5 days 15 tablet 0    aspirin (ECOTRIN LOW STRENGTH) 81 mg EC tablet Take 81 mg by mouth daily      azelastine (ASTELIN) 0 1 % nasal spray 2 sprays into each nostril 2 (two) times a day Use in each nostril as directed 1 Bottle 6    clindamycin (CLEOCIN) 300 MG capsule Take 1 capsule (300 mg total) by mouth 3 (three) times a day for 5 days 15 capsule 0    clindamycin-benzoyl peroxide (BENZACLIN) gel Apply topically 2 (two) times a day 25 g 0    Dapagliflozin-metFORMIN HCl ER 2 5-1000 MG TB24 Take 1 tablet by mouth daily with breakfast 30 tablet 3    Ergocalciferol 2000 units CAPS take 1 capsule by oral route  every week      HYDROcodone-acetaminophen (NORCO) 5-325 mg per tablet Take 1 tablet by mouth every 6 (six) hours as needed for pain for up to 5 dosesMax Daily Amount: 4 tablets 5 tablet 0    Insulin Glargine (TOUJEO) 300 units/mL CONCETRATED U-300 injection pen Inject 28 Units under the skin daily at bedtime      Insulin Pen Needle (BD PEN NEEDLE EDUARDO U/F) 32G X 4 MM MISC by Does not apply route      Insulin Pen Needle 32G X 5 MM MISC Inject at bedtime   lisinopril (ZESTRIL) 20 mg tablet Take 1 tablet (20 mg total) by mouth daily 90 tablet 3    mometasone (ELOCON) 0 1 % cream Apply topically daily Apply topically BID x 2 weeks then qhs prn 45 g 3    mometasone (NASONEX) 50 mcg/act nasal spray as needed       naproxen sodium (ALEVE) 220 MG tablet Take 1 tablet (220 mg total) by mouth 2 (two) times a day with meals for 5 days 10 tablet 0    NOVOLOG FLEXPEN 100 units/mL injection pen Inject 15-30 Units under the skin 2 (two) times a day with meals       pantoprazole (PROTONIX) 40 mg tablet Take 1 tablet (40 mg total) by mouth daily (Patient taking differently: Take 40 mg by mouth as needed ) 90 tablet 1    [START ON 12/17/2019] rosuvastatin (CRESTOR) 20 MG tablet Take 1 tablet (20 mg total) by mouth daily at bedtime 90 tablet 3    SAXENDA injection Inject 0 5 mL (3 mg total) under the skin daily at bedtime 30 pen 3     No current facility-administered medications for this visit            Social History:  -[place]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[employer] [place]  -Smoking: quit 25 years ago  -Caffeine: 12-16 of coffee int he morning  -Alcohol: occasionally  -THC: quit 25 years ago  -OTC/Supplements/herbals: no  -Illicits:  denies  -Family: lives with boyfriend    ROS:  Genitourinary hot flashes at night and sleep problems that vary with menstrual cycle    Cardiology none   Gastrointestinal none   Neurology none   Constitutional fatigue   Integumentary none   Psychiatry depression, aggressiveness or irritability and mood change   Musculoskeletal joint pain, muscle aches, sciatica and leg cramps   Pulmonary snoring and difficulty breathing when lying flat    ENT ringing in ears   Endocrine none   Hematological none       MSE:  -Alert and appropriate: alert, calm, cooperative  -Oriented to person, place and time:  name, age, location, day/date/mon/yr  -Behavior: good, sustained eye contact  -Speech: Unremarkable rate/rhythm/volume  -Mood: "good"  -Affect: full range  -Thought Processes: linear, logical, goal directed  PE:  Body mass index is 41 17 kg/m²  Vitals:    11/25/19 0900   BP: 110/60   Weight: 89 4 kg (197 lb)   Height: 4' 10" (1 473 m)       -General:  In NAD    -Eyes: Conjunctival injection: none     -EOM:  PERRLA, EOMI   -Eyelid hooding: no    -ENT: MP: 4/4   -Facial deformity: no retrognathia   -Hard palate: moderate arch   -Soft palate:  Crowding with enlarged uvula   -Gums and teeth: normal dentition   -Tongue:  Scalloping   -Nares:  Patent    -Neck/Lymphatics: Lymphadenopathy:  none appreciated   -Masses:  none appreciated   -Circumference: Neck Circumference: 16 5 "    -Cardiac: Auscultation:  RRR   - LE edema over shins: none appreciated    -Pulm: -Respirations: unlaboured         -Auscultation:  CTA bilaterally, posterior fields    -Neuro: No resting tremor     -Musculoskeletal: Gait and stance: normal turning and ambulation; unremarkable  Assessment:  Ms Zackary Becker is a 48 y o  female who is seen to evaluate for possible obstructive sleep apnea  Given the patient's symptoms of obesity, snoring, excessive daytime sleepiness, non-restorative sleep, frequent nocturnal awakenings, and dream enactment, in the context of a narrow airway, chronic post-nasal drip and allergies, a diagnosis of obstructive sleep apnea is very likely   She is undergoing evaluation for bariatric surgery and this is part of the work-up  The pathophysiology of, the reasons to treat and treatment options for obstructive sleep apnea were all reviewed with the patient today  Discussed the testing options and reviewed the benefits and downsides of both, patient opted for the in lab testing  She is amenable to treatment with PAP therapy  Discussed keeping nasal passages clear, abstaining from alcohol, and other sedating drugs at night- which will worsen symptoms of АНДРЕЙ  --History provided by: patient   --Records reviewed: in chart      Recommendations:  1) In lab diagnostic Polysomnography with arm leads for dream enactment  2) Driving safety was reviewed with patient  If the patient feels too sleepy to drive she knows not to drive  If she becomes sleepy while driving she will pull over and nap  3) Follow-up after initiating treatment if needed  4) Call with any questions or concerns  All questions answered for the patient, who indicated understanding and agreed with the plan       Glory Calles MD  Psychiatry/ Sleep medicine

## 2019-11-25 NOTE — PATIENT INSTRUCTIONS
Recommendations:  1) In lab diagnostic Polysomnography with arm leads for dream enactment  2) Driving safety was reviewed with patient  If the patient feels too sleepy to drive she knows not to drive  If she becomes sleepy while driving she will pull over and nap  3) Follow-up after initiating treatment if needed  4) Call with any questions or concerns

## 2019-12-02 NOTE — PRE-PROCEDURE INSTRUCTIONS
My Surgical Experience    The following information was developed to assist you to prepare for your operation  What do I need to do before coming to the hospital?   Arrange for a responsible person to drive you to and from the hospital    Arrange care for your children at home  Children are not allowed in the recovery areas of the hospital   Plan to wear clothing that is easy to put on and take off  If you are having shoulder surgery, wear a shirt that buttons or zippers in the front  Bathing  o Shower the evening before and the morning of your surgery with an antibacterial soap  Please refer to the Pre Op Showering Instructions for Surgery Patients Sheet   o Remove nail polish and all body piercing jewelry  o Do not shave any body part for at least 24 hours before surgery-this includes face, arms, legs and upper body  Food  o Nothing to eat or drink after midnight the night before your surgery  This includes candy and chewing gum  o Exception: If your surgery is after 12:00pm (noon), you may have clear liquids such as 7-Up®, ginger ale, apple or cranberry juice, Jell-O®, water, or clear broth until 8:00 am  o Do not drink milk or juice with pulp on the morning before surgery  o Do not drink alcohol 24 hours before surgery  Medicine  o Follow instructions you received from your surgeon about which medicines you may take on the day of surgery  o If instructed to take medicine on the morning of surgery, take pills with just a small sip of water  Call your prescribing doctor for specific infroamtion on what to do if you take insulin    What should I bring to the hospital?    Bring:  Park Dues or a walker, if you have them, for foot or knee surgery   A list of the daily medicines, vitamins, minerals, herbals and nutritional supplements you take   Include the dosages of medicines and the time you take them each day   Glasses, dentures or hearing aids   Minimal clothing; you will be wearing hospital sleepwear   Photo ID; required to verify your identity   If you have a Living Will or Power of , bring a copy of the documents   If you have an ostomy, bring an extra pouch and any supplies you use    Do not bring   Medicines or inhalers   Money, valuables or jewelry    What other information should I know about the day of surgery?  Notify your surgeons if you develop a cold, sore throat, cough, fever, rash or any other illness   Report to the Ambulatory Surgical/Same Day Surgery Unit   You will be instructed to stop at Registration only if you have not been pre-registered   Inform your  fi they do not stay that they will be asked by the staff to leave a phone number where they can be reached   Be available to be reached before surgery  In the event the operating room schedule changes, you may be asked to come in earlier or later than expected    *It is important to tell your doctor and others involved in your health care if you are taking or have been taking any non-prescription drugs, vitamins, minerals, herbals or other nutritional supplements  Any of these may interact with some food or medicines and cause a reaction      Pre-Surgery Instructions:   Medication Instructions    aspirin (ECOTRIN LOW STRENGTH) 81 mg EC tablet Instructed patient per Anesthesia Guidelines   azelastine (ASTELIN) 0 1 % nasal spray Instructed patient per Anesthesia Guidelines   clindamycin-benzoyl peroxide (BENZACLIN) gel Instructed patient per Anesthesia Guidelines   Dapagliflozin-metFORMIN HCl ER 2 5-1000 MG TB24 Instructed patient per Anesthesia Guidelines   Ergocalciferol 2000 units CAPS Instructed patient per Anesthesia Guidelines   HYDROcodone-acetaminophen (NORCO) 5-325 mg per tablet Instructed patient per Anesthesia Guidelines   Insulin Glargine (TOUJEO) 300 units/mL CONCETRATED U-300 injection pen Instructed patient per Anesthesia Guidelines      Insulin Pen Needle (BD PEN NEEDLE EDUARDO U/F) 32G X 4 MM MISC Instructed patient per Anesthesia Guidelines   Insulin Pen Needle 32G X 5 MM MISC Instructed patient per Anesthesia Guidelines   lisinopril (ZESTRIL) 20 mg tablet Instructed patient per Anesthesia Guidelines   mometasone (ELOCON) 0 1 % cream Instructed patient per Anesthesia Guidelines   mometasone (NASONEX) 50 mcg/act nasal spray Instructed patient per Anesthesia Guidelines   naproxen sodium (ALEVE) 220 MG tablet Instructed patient per Anesthesia Guidelines   NOVOLOG FLEXPEN 100 units/mL injection pen Instructed patient per Anesthesia Guidelines   pantoprazole (PROTONIX) 40 mg tablet Instructed patient per Anesthesia Guidelines   [START ON 12/17/2019] rosuvastatin (CRESTOR) 20 MG tablet Instructed patient per Anesthesia Guidelines   SAXENDA injection Instructed patient per Anesthesia Guidelines

## 2019-12-04 ENCOUNTER — OFFICE VISIT (OUTPATIENT)
Dept: OBGYN CLINIC | Facility: HOSPITAL | Age: 54
End: 2019-12-04

## 2019-12-04 ENCOUNTER — TELEPHONE (OUTPATIENT)
Dept: OTHER | Facility: HOSPITAL | Age: 54
End: 2019-12-04

## 2019-12-04 ENCOUNTER — APPOINTMENT (OUTPATIENT)
Dept: LAB | Facility: HOSPITAL | Age: 54
End: 2019-12-04
Payer: COMMERCIAL

## 2019-12-04 VITALS
HEART RATE: 99 BPM | SYSTOLIC BLOOD PRESSURE: 135 MMHG | WEIGHT: 199 LBS | DIASTOLIC BLOOD PRESSURE: 84 MMHG | BODY MASS INDEX: 41.77 KG/M2 | HEIGHT: 58 IN

## 2019-12-04 DIAGNOSIS — E83.52 HYPERCALCEMIA: Primary | ICD-10-CM

## 2019-12-04 DIAGNOSIS — E66.01 OBESITY, CLASS III, BMI 40-49.9 (MORBID OBESITY) (HCC): ICD-10-CM

## 2019-12-04 DIAGNOSIS — Z98.890 S/P CARPAL TUNNEL RELEASE: Primary | ICD-10-CM

## 2019-12-04 DIAGNOSIS — R80.1 PERSISTENT PROTEINURIA: ICD-10-CM

## 2019-12-04 LAB
ALBUMIN SERPL BCP-MCNC: 4 G/DL (ref 3.5–5)
ALP SERPL-CCNC: 80 U/L (ref 46–116)
ALT SERPL W P-5'-P-CCNC: 31 U/L (ref 12–78)
ANION GAP SERPL CALCULATED.3IONS-SCNC: 8 MMOL/L (ref 4–13)
AST SERPL W P-5'-P-CCNC: 24 U/L (ref 5–45)
BILIRUB SERPL-MCNC: 0.42 MG/DL (ref 0.2–1)
BUN SERPL-MCNC: 15 MG/DL (ref 5–25)
CALCIUM ALBUM COR SERPL-MCNC: 10.2 MG/DL (ref 8.3–10.1)
CALCIUM SERPL-MCNC: 10.2 MG/DL (ref 8.3–10.1)
CHLORIDE SERPL-SCNC: 106 MMOL/L (ref 100–108)
CHOLEST SERPL-MCNC: 146 MG/DL (ref 50–200)
CO2 SERPL-SCNC: 25 MMOL/L (ref 21–32)
CREAT SERPL-MCNC: 0.9 MG/DL (ref 0.6–1.3)
CREAT UR-MCNC: 233 MG/DL
CREAT UR-MCNC: 241 MG/DL
ERYTHROCYTE [DISTWIDTH] IN BLOOD BY AUTOMATED COUNT: 14.6 % (ref 11.6–15.1)
EST. AVERAGE GLUCOSE BLD GHB EST-MCNC: 209 MG/DL
GFR SERPL CREATININE-BSD FRML MDRD: 74 ML/MIN/1.73SQ M
GLUCOSE P FAST SERPL-MCNC: 112 MG/DL (ref 65–99)
HBA1C MFR BLD: 8.9 % (ref 4.2–6.3)
HCT VFR BLD AUTO: 41.5 % (ref 34.8–46.1)
HDLC SERPL-MCNC: 43 MG/DL
HGB BLD-MCNC: 13.3 G/DL (ref 11.5–15.4)
LDLC SERPL CALC-MCNC: 61 MG/DL (ref 0–100)
MAGNESIUM SERPL-MCNC: 1.7 MG/DL (ref 1.6–2.6)
MCH RBC QN AUTO: 27.3 PG (ref 26.8–34.3)
MCHC RBC AUTO-ENTMCNC: 32 G/DL (ref 31.4–37.4)
MCV RBC AUTO: 85 FL (ref 82–98)
MICROALBUMIN UR-MCNC: 238 MG/L (ref 0–20)
MICROALBUMIN/CREAT 24H UR: 99 MG/G CREATININE (ref 0–30)
NONHDLC SERPL-MCNC: 103 MG/DL
PLATELET # BLD AUTO: 380 THOUSANDS/UL (ref 149–390)
PMV BLD AUTO: 8.6 FL (ref 8.9–12.7)
POTASSIUM SERPL-SCNC: 3.9 MMOL/L (ref 3.5–5.3)
PROT SERPL-MCNC: 8.3 G/DL (ref 6.4–8.2)
PROT UR-MCNC: 48 MG/DL
PROT/CREAT UR: 0.21 MG/G{CREAT} (ref 0–0.1)
RBC # BLD AUTO: 4.87 MILLION/UL (ref 3.81–5.12)
SODIUM SERPL-SCNC: 139 MMOL/L (ref 136–145)
TRIGL SERPL-MCNC: 209 MG/DL
TSH SERPL DL<=0.05 MIU/L-ACNC: 4.59 UIU/ML (ref 0.36–3.74)
WBC # BLD AUTO: 11.6 THOUSAND/UL (ref 4.31–10.16)

## 2019-12-04 PROCEDURE — 80053 COMPREHEN METABOLIC PANEL: CPT

## 2019-12-04 PROCEDURE — 82043 UR ALBUMIN QUANTITATIVE: CPT | Performed by: FAMILY MEDICINE

## 2019-12-04 PROCEDURE — 99024 POSTOP FOLLOW-UP VISIT: CPT | Performed by: PHYSICIAN ASSISTANT

## 2019-12-04 PROCEDURE — 36415 COLL VENOUS BLD VENIPUNCTURE: CPT

## 2019-12-04 PROCEDURE — 83036 HEMOGLOBIN GLYCOSYLATED A1C: CPT

## 2019-12-04 PROCEDURE — 82570 ASSAY OF URINE CREATININE: CPT

## 2019-12-04 PROCEDURE — 84156 ASSAY OF PROTEIN URINE: CPT

## 2019-12-04 PROCEDURE — 85027 COMPLETE CBC AUTOMATED: CPT

## 2019-12-04 PROCEDURE — 83735 ASSAY OF MAGNESIUM: CPT

## 2019-12-04 PROCEDURE — 82570 ASSAY OF URINE CREATININE: CPT | Performed by: FAMILY MEDICINE

## 2019-12-04 PROCEDURE — 84443 ASSAY THYROID STIM HORMONE: CPT

## 2019-12-04 PROCEDURE — 80061 LIPID PANEL: CPT

## 2019-12-04 NOTE — TELEPHONE ENCOUNTER
Reviewed labs from 12/04/19  Renal function stable at creatinine 0 9  Calcium elevated to 10 2  Upc ratio 0 21  Last hemoglobin A1c was 8 9  Proteinuria likely from diabetic nephropathy but in the setting of hypercalcemia would do further workup to rule out paraproteinemia  Will also check ionized calcium, check PTH, SPEP UPEP light chain ratio    Also check vitamin D 125

## 2019-12-04 NOTE — PROGRESS NOTES
Assessment:   S/P L ECTR 11/21/19    Plan:   Resume activities as tolerated and scar tissue massage  No soaking x 2 days    Follow Up:  PRN        CHIEF COMPLAINT:  Chief Complaint   Patient presents with    Left Wrist - Post-op         SUBJECTIVE:  Selene Obregon is a 48 y o  female who presents for follow up S/P L ECTR 11/21/19  Patient states she is doing well  Great improvement in her n/t and no major complaints of pain         PHYSICAL EXAMINATION:  Vital signs: /84   Pulse 99   Ht 4' 10" (1 473 m)   Wt 90 3 kg (199 lb)   LMP 11/04/2019   BMI 41 59 kg/m²   General: well developed and well nourished, alert, oriented times 3 and appears comfortable  Psychiatric: Normal    MUSCULOSKELETAL EXAMINATION:  Incision: Clean, dry, intact  Range of Motion: As expected  Neurovascular status: Neuro intact, good cap refill  Activity Restrictions: No restrictions  Done today: Sutures out and Steri strips applied      STUDIES REVIEWED:  No Studies to review      PROCEDURES PERFORMED:  Procedures  No Procedures performed today

## 2019-12-05 ENCOUNTER — TELEPHONE (OUTPATIENT)
Dept: BARIATRICS | Facility: CLINIC | Age: 54
End: 2019-12-05

## 2019-12-05 ENCOUNTER — ANESTHESIA EVENT (OUTPATIENT)
Dept: GASTROENTEROLOGY | Facility: AMBULARY SURGERY CENTER | Age: 54
End: 2019-12-05

## 2019-12-05 ENCOUNTER — TELEPHONE (OUTPATIENT)
Dept: FAMILY MEDICINE CLINIC | Facility: CLINIC | Age: 54
End: 2019-12-05

## 2019-12-05 DIAGNOSIS — D72.829 LEUKOCYTOSIS, UNSPECIFIED TYPE: Primary | ICD-10-CM

## 2019-12-05 DIAGNOSIS — E11.65 UNCONTROLLED TYPE 2 DIABETES MELLITUS WITH HYPERGLYCEMIA (HCC): ICD-10-CM

## 2019-12-05 DIAGNOSIS — E11.65 UNCONTROLLED TYPE 2 DIABETES MELLITUS WITH HYPERGLYCEMIA (HCC): Primary | ICD-10-CM

## 2019-12-05 RX ORDER — INSULIN ASPART 100 [IU]/ML
15-30 INJECTION, SOLUTION INTRAVENOUS; SUBCUTANEOUS 2 TIMES DAILY WITH MEALS
Qty: 5 PEN | Refills: 3 | Status: SHIPPED | OUTPATIENT
Start: 2019-12-05 | End: 2020-05-27 | Stop reason: SDUPTHER

## 2019-12-05 NOTE — ANESTHESIA PREPROCEDURE EVALUATION
Review of Systems/Medical History  Patient summary reviewed  Chart reviewed      Cardiovascular  Hyperlipidemia, Hypertension controlled,    Pulmonary  Negative pulmonary ROS Asthma , well controlled/ stable ,        GI/Hepatic  Negative GI/hepatic ROS          Negative  ROS        Endo/Other  Diabetes well controlled type 2 Oral agent,   Obesity    GYN  Negative gynecology ROS          Hematology  Anemia ,     Musculoskeletal    Arthritis     Neurology  Negative neurology ROS      Psychology   Negative psychology ROS              Physical Exam    Airway    Mallampati score: II  TM Distance: >3 FB  Neck ROM: full     Dental   No notable dental hx     Cardiovascular  Rhythm: regular, Rate: normal, Cardiovascular exam normal    Pulmonary  Pulmonary exam normal Breath sounds clear to auscultation,     Other Findings        Anesthesia Plan  ASA Score- 2     Anesthesia Type- IV sedation with anesthesia with ASA Monitors  Additional Monitors:   Airway Plan:         Plan Factors-    Induction- intravenous  Postoperative Plan- Plan for postoperative opioid use  Informed Consent- Anesthetic plan and risks discussed with patient  I personally reviewed this patient with the CRNA  Discussed and agreed on the Anesthesia Plan with the CRNA  Omar White

## 2019-12-05 NOTE — TELEPHONE ENCOUNTER
Discussed results with patient  Reassured  She will do blood work next week and recheck thyroid in 6 weeks; may have been inflammatory reactive given recent surgery

## 2019-12-06 ENCOUNTER — ANESTHESIA (OUTPATIENT)
Dept: GASTROENTEROLOGY | Facility: AMBULARY SURGERY CENTER | Age: 54
End: 2019-12-06

## 2019-12-06 ENCOUNTER — HOSPITAL ENCOUNTER (OUTPATIENT)
Dept: GASTROENTEROLOGY | Facility: AMBULARY SURGERY CENTER | Age: 54
Setting detail: OUTPATIENT SURGERY
Discharge: HOME | End: 2019-12-06
Attending: SURGERY
Payer: COMMERCIAL

## 2019-12-06 ENCOUNTER — TELEPHONE (OUTPATIENT)
Dept: OTHER | Facility: HOSPITAL | Age: 54
End: 2019-12-06

## 2019-12-06 VITALS
SYSTOLIC BLOOD PRESSURE: 125 MMHG | BODY MASS INDEX: 41.22 KG/M2 | HEART RATE: 96 BPM | TEMPERATURE: 96.8 F | DIASTOLIC BLOOD PRESSURE: 66 MMHG | HEIGHT: 58 IN | OXYGEN SATURATION: 98 % | RESPIRATION RATE: 16 BRPM | WEIGHT: 196.38 LBS

## 2019-12-06 DIAGNOSIS — E66.01 MORBID OBESITY (HCC): ICD-10-CM

## 2019-12-06 LAB — GLUCOSE SERPL-MCNC: 141 MG/DL (ref 65–140)

## 2019-12-06 PROCEDURE — 88342 IMHCHEM/IMCYTCHM 1ST ANTB: CPT | Performed by: PATHOLOGY

## 2019-12-06 PROCEDURE — 88341 IMHCHEM/IMCYTCHM EA ADD ANTB: CPT | Performed by: PATHOLOGY

## 2019-12-06 PROCEDURE — 82948 REAGENT STRIP/BLOOD GLUCOSE: CPT

## 2019-12-06 PROCEDURE — 88305 TISSUE EXAM BY PATHOLOGIST: CPT | Performed by: PATHOLOGY

## 2019-12-06 PROCEDURE — 43239 EGD BIOPSY SINGLE/MULTIPLE: CPT | Performed by: SURGERY

## 2019-12-06 RX ORDER — PROPOFOL 10 MG/ML
INJECTION, EMULSION INTRAVENOUS AS NEEDED
Status: DISCONTINUED | OUTPATIENT
Start: 2019-12-06 | End: 2019-12-06 | Stop reason: SURG

## 2019-12-06 RX ORDER — SODIUM CHLORIDE, SODIUM LACTATE, POTASSIUM CHLORIDE, CALCIUM CHLORIDE 600; 310; 30; 20 MG/100ML; MG/100ML; MG/100ML; MG/100ML
125 INJECTION, SOLUTION INTRAVENOUS CONTINUOUS
Status: DISCONTINUED | OUTPATIENT
Start: 2019-12-06 | End: 2019-12-10 | Stop reason: HOSPADM

## 2019-12-06 RX ORDER — METOCLOPRAMIDE HYDROCHLORIDE 5 MG/ML
INJECTION INTRAMUSCULAR; INTRAVENOUS AS NEEDED
Status: DISCONTINUED | OUTPATIENT
Start: 2019-12-06 | End: 2019-12-06 | Stop reason: SURG

## 2019-12-06 RX ADMIN — PROPOFOL 50 MG: 10 INJECTION, EMULSION INTRAVENOUS at 07:53

## 2019-12-06 RX ADMIN — SODIUM CHLORIDE, SODIUM LACTATE, POTASSIUM CHLORIDE, AND CALCIUM CHLORIDE 125 ML/HR: .6; .31; .03; .02 INJECTION, SOLUTION INTRAVENOUS at 07:44

## 2019-12-06 RX ADMIN — PROPOFOL 25 MG: 10 INJECTION, EMULSION INTRAVENOUS at 07:56

## 2019-12-06 RX ADMIN — TOPICAL ANESTHETIC 1 SPRAY: 200 SPRAY DENTAL; PERIODONTAL at 07:50

## 2019-12-06 RX ADMIN — METOCLOPRAMIDE HYDROCHLORIDE 10 MG: 5 INJECTION INTRAMUSCULAR; INTRAVENOUS at 07:58

## 2019-12-06 RX ADMIN — PROPOFOL 50 MG: 10 INJECTION, EMULSION INTRAVENOUS at 07:54

## 2019-12-06 RX ADMIN — PROPOFOL 50 MG: 10 INJECTION, EMULSION INTRAVENOUS at 07:52

## 2019-12-06 RX ADMIN — PROPOFOL 25 MG: 10 INJECTION, EMULSION INTRAVENOUS at 07:55

## 2019-12-06 NOTE — ANESTHESIA POSTPROCEDURE EVALUATION
Post-Op Assessment Note    CV Status:  Stable       Mental Status:  Alert   Hydration Status:  Stable   PONV Controlled:  Controlled   Airway Patency:  Patent   Post Op Vitals Reviewed: Yes      Staff: CRNA           BP      Temp     Pulse     Resp      SpO2

## 2019-12-06 NOTE — TELEPHONE ENCOUNTER
Discussed with patient over the phone- she denies any intake of TUMS  Reviewed labs from 12/04/19  Renal function stable at creatinine 0 9  Calcium elevated to 10 2  Upc ratio 0 21  Last hemoglobin A1c was 8 9      Proteinuria likely from diabetic nephropathy but in the setting of hypercalcemia would do further workup to rule out paraproteinemia  Will also check ionized calcium, check PTH, SPEP UPEP light chain ratio    Also check vitamin D 125

## 2019-12-06 NOTE — H&P
This is a 48 y o  female with a history of morbid obesity and Body mass index is 41 04 kg/m²  Here for an EGD to evaluate the anatomy of the GI tract and to rule out the presence of H  pylori  Physical Exam    /73   Pulse 91   Temp (!) 96 8 °F (36 °C) (Tympanic)   Resp 18   Ht 4' 10" (1 473 m)   Wt 89 1 kg (196 lb 6 oz)   LMP 11/04/2019   SpO2 99%   BMI 41 04 kg/m²    AAOx3  RR  CTA B (anesthesia)  Abdomen obese  Benign  A/P:    This is a 48 y o  female with a history of morbid obesity and Body mass index is 41 04 kg/m²       Will proceed with the EGD and biopsies        PAWAN Fraire   12/6/2019  7:43 AM

## 2019-12-14 ENCOUNTER — HOSPITAL ENCOUNTER (OUTPATIENT)
Dept: SLEEP CENTER | Facility: CLINIC | Age: 54
Discharge: HOME | End: 2019-12-14
Payer: COMMERCIAL

## 2019-12-14 DIAGNOSIS — G47.00 FREQUENT NOCTURNAL AWAKENING: ICD-10-CM

## 2019-12-14 DIAGNOSIS — G47.52 DREAM ENACTMENT BEHAVIOR: ICD-10-CM

## 2019-12-14 DIAGNOSIS — G47.19 EXCESSIVE DAYTIME SLEEPINESS: ICD-10-CM

## 2019-12-14 DIAGNOSIS — R06.83 SNORING: ICD-10-CM

## 2019-12-14 DIAGNOSIS — E66.01 MORBID OBESITY (HCC): ICD-10-CM

## 2019-12-14 PROCEDURE — 95810 POLYSOM 6/> YRS 4/> PARAM: CPT

## 2019-12-14 PROCEDURE — 95810 POLYSOM 6/> YRS 4/> PARAM: CPT | Performed by: INTERNAL MEDICINE

## 2019-12-15 NOTE — PROGRESS NOTES
%    Sleep Study Documentation    Pre-Sleep Study       Sleep testing procedure explained to patient:YES    Patient napped prior to study:YES- less than 30 minutes  Napped after 2PM: yes    Caffeine:Dayshift worker after 12PM   Caffeine use:YES- coffee  6 to 18 ounces    Alcohol:Dayshift workers after 5PM: Alcohol use:NO    Typical day for patient:YES       Study Documentation    Sleep Study Indications: snoring, BMI>30, EDS, neck circumference >17    Sleep Study: Diagnostic   Snore:Mild  Supplemental O2: no    O2 flow rate (L/min) range n/a  O2 flow rate (L%/min) final n/a  Minimum SaO2 84%  Baseline SaO2 97%        Mode of Therapy:n/a    EKG abnormalities: no     EEG abnormalities: no    Sleep Study Recorded < 2 hours: N/A    Sleep Study Recorded > 2 hours but incomplete study: N/A    Sleep Study Recorded 6 hours but no sleep obtained: NO    Patient classification: employed       Post-Sleep Study    Medication used at bedtime or during sleep study:YES other prescription medications    Patient reports time it took to fall asleep:less than 20 minutes    Patient reports waking up during study:3 or more times  Patient reports returning to sleep without difficulty  Patient reports sleeping 2 to 4 hours with dreaming  Patient reports sleep during study:typical    Patient rated sleepiness: Somewhat sleepy or tired    PAP treatment:no

## 2019-12-19 ENCOUNTER — TELEPHONE (OUTPATIENT)
Dept: SLEEP CENTER | Facility: CLINIC | Age: 54
End: 2019-12-19

## 2019-12-19 DIAGNOSIS — G47.33 OSA (OBSTRUCTIVE SLEEP APNEA): Primary | ICD-10-CM

## 2019-12-19 NOTE — TELEPHONE ENCOUNTER
Moderate АНДРЕЙ  Discussed study results- scheduled titation study  Read Financial Policy to Patient, who verbalizes understanding & is agreeable to same

## 2019-12-22 ENCOUNTER — HOSPITAL ENCOUNTER (OUTPATIENT)
Dept: SLEEP CENTER | Facility: HOSPITAL | Age: 54
Discharge: HOME | End: 2019-12-22
Attending: PSYCHIATRY & NEUROLOGY
Payer: COMMERCIAL

## 2019-12-22 DIAGNOSIS — G47.33 OSA (OBSTRUCTIVE SLEEP APNEA): ICD-10-CM

## 2019-12-22 PROCEDURE — 95811 POLYSOM 6/>YRS CPAP 4/> PARM: CPT

## 2019-12-22 PROCEDURE — 95811 POLYSOM 6/>YRS CPAP 4/> PARM: CPT | Performed by: PSYCHIATRY & NEUROLOGY

## 2019-12-23 NOTE — PROGRESS NOTES
Sleep Study Documentation    Pre-Sleep Study       Sleep testing procedure explained to patient:YES    Patient napped prior to study:NO    Caffeine:Dayshift worker after 12PM   Caffeine use:YES- coffee  6 ounces and soda  12 ounces    Alcohol:Dayshift workers after 5PM: Alcohol use:NO    Typical day for patient:YES       Study Documentation    Sleep Study Indications: G47 33    Sleep Study: Treatment   Optimal PAP pressure: 5  Leak:Small  Snore:Eliminated  REM Obtained:yes  Supplemental O2: no    Minimum SaO2 90  Baseline SaO2 98  PAP mask tried (list all) Resmed Airfit F10, N20 and P10  PAP mask choice (final) Resmed Airfit P10  PAP mask type:pillows  PAP pressure at which snoring was eliminated 4  Minimum SaO2 at final PAP pressure   Mode of Therapy:CPAP  ETCO2:No  CPAP changed to BiPAP:No    Mode of Therapy:    EKG abnormalities: sinus tach  EEG abnormalities: no    Sleep Study Recorded < 2 hours: N/A    Sleep Study Recorded > 2 hours but incomplete study: N/A    Sleep Study Recorded 6 hours but no sleep obtained: NO    Patient classification: employed       Post-Sleep Study    Medication used at bedtime or during sleep study:YES other prescription medications    Patient reports time it took to fall asleep:less than 20 minutes    Patient reports waking up during study:1 to 2 times  Patient reports returning to sleep in 10 to 30 minutes  Patient reports sleeping 2 to 4 hours with dreaming  Patient reports sleep during study:typical    Patient rated sleepiness: Somewhat sleepy or tired    PAP treatment:yes: Post PAP treatment patient reports feeling unsure if a change is noted and  would wear PAP mask at home

## 2019-12-26 ENCOUNTER — TELEPHONE (OUTPATIENT)
Dept: SLEEP CENTER | Facility: CLINIC | Age: 54
End: 2019-12-26

## 2019-12-26 DIAGNOSIS — G47.33 OSA (OBSTRUCTIVE SLEEP APNEA): Primary | ICD-10-CM

## 2019-12-26 NOTE — TELEPHONE ENCOUNTER
----- Message from Mary Stone MD sent at 12/26/2019 10:25 AM EST -----  CPAP titration read  APAP ordered  Follow up in 6-8 weeks

## 2019-12-26 NOTE — TELEPHONE ENCOUNTER
Discussed sleep study results  Scheduled DME appointment and compliance appointment      Appointment information emailed to [email or web address]

## 2020-01-08 ENCOUNTER — TRANSCRIBE ORDERS (OUTPATIENT)
Dept: LAB | Facility: CLINIC | Age: 55
End: 2020-01-08

## 2020-01-08 ENCOUNTER — APPOINTMENT (OUTPATIENT)
Dept: LAB | Facility: CLINIC | Age: 55
End: 2020-01-08
Payer: COMMERCIAL

## 2020-01-08 DIAGNOSIS — D72.829 LEUKOCYTOSIS, UNSPECIFIED TYPE: ICD-10-CM

## 2020-01-08 DIAGNOSIS — E83.52 HYPERCALCEMIA: ICD-10-CM

## 2020-01-08 LAB
BASOPHILS # BLD AUTO: 0.05 THOUSANDS/ΜL (ref 0–0.1)
BASOPHILS NFR BLD AUTO: 1 % (ref 0–1)
EOSINOPHIL # BLD AUTO: 0.49 THOUSAND/ΜL (ref 0–0.61)
EOSINOPHIL NFR BLD AUTO: 5 % (ref 0–6)
ERYTHROCYTE [DISTWIDTH] IN BLOOD BY AUTOMATED COUNT: 13.7 % (ref 11.6–15.1)
HCT VFR BLD AUTO: 41.3 % (ref 34.8–46.1)
HGB BLD-MCNC: 13.5 G/DL (ref 11.5–15.4)
IMM GRANULOCYTES # BLD AUTO: 0.02 THOUSAND/UL (ref 0–0.2)
IMM GRANULOCYTES NFR BLD AUTO: 0 % (ref 0–2)
LYMPHOCYTES # BLD AUTO: 2.5 THOUSANDS/ΜL (ref 0.6–4.47)
LYMPHOCYTES NFR BLD AUTO: 27 % (ref 14–44)
MCH RBC QN AUTO: 27.9 PG (ref 26.8–34.3)
MCHC RBC AUTO-ENTMCNC: 32.7 G/DL (ref 31.4–37.4)
MCV RBC AUTO: 85 FL (ref 82–98)
MONOCYTES # BLD AUTO: 0.56 THOUSAND/ΜL (ref 0.17–1.22)
MONOCYTES NFR BLD AUTO: 6 % (ref 4–12)
NEUTROPHILS # BLD AUTO: 5.62 THOUSANDS/ΜL (ref 1.85–7.62)
NEUTS SEG NFR BLD AUTO: 61 % (ref 43–75)
NRBC BLD AUTO-RTO: 0 /100 WBCS
PLATELET # BLD AUTO: 394 THOUSANDS/UL (ref 149–390)
PMV BLD AUTO: 9.2 FL (ref 8.9–12.7)
PTH-INTACT SERPL-MCNC: 33.3 PG/ML (ref 18.4–80.1)
RBC # BLD AUTO: 4.84 MILLION/UL (ref 3.81–5.12)
TSH SERPL DL<=0.05 MIU/L-ACNC: 3.05 UIU/ML (ref 0.36–3.74)
WBC # BLD AUTO: 9.24 THOUSAND/UL (ref 4.31–10.16)

## 2020-01-08 PROCEDURE — 84166 PROTEIN E-PHORESIS/URINE/CSF: CPT

## 2020-01-08 PROCEDURE — 85025 COMPLETE CBC W/AUTO DIFF WBC: CPT

## 2020-01-08 PROCEDURE — 84165 PROTEIN E-PHORESIS SERUM: CPT | Performed by: PATHOLOGY

## 2020-01-08 PROCEDURE — 36415 COLL VENOUS BLD VENIPUNCTURE: CPT

## 2020-01-08 PROCEDURE — 84166 PROTEIN E-PHORESIS/URINE/CSF: CPT | Performed by: PATHOLOGY

## 2020-01-08 PROCEDURE — 83970 ASSAY OF PARATHORMONE: CPT

## 2020-01-08 PROCEDURE — 84443 ASSAY THYROID STIM HORMONE: CPT

## 2020-01-08 PROCEDURE — 82652 VIT D 1 25-DIHYDROXY: CPT

## 2020-01-08 PROCEDURE — 84165 PROTEIN E-PHORESIS SERUM: CPT

## 2020-01-08 PROCEDURE — 83883 ASSAY NEPHELOMETRY NOT SPEC: CPT

## 2020-01-09 LAB
ALBUMIN SERPL ELPH-MCNC: 3.83 G/DL (ref 3.5–5)
ALBUMIN SERPL ELPH-MCNC: 51 % (ref 52–65)
ALPHA1 GLOB SERPL ELPH-MCNC: 0.34 G/DL (ref 0.1–0.4)
ALPHA1 GLOB SERPL ELPH-MCNC: 4.5 % (ref 2.5–5)
ALPHA2 GLOB SERPL ELPH-MCNC: 0.95 G/DL (ref 0.4–1.2)
ALPHA2 GLOB SERPL ELPH-MCNC: 12.6 % (ref 7–13)
BETA GLOB ABNORMAL SERPL ELPH-MCNC: 0.61 G/DL (ref 0.4–0.8)
BETA1 GLOB SERPL ELPH-MCNC: 8.1 % (ref 5–13)
BETA2 GLOB SERPL ELPH-MCNC: 7.5 % (ref 2–8)
BETA2+GAMMA GLOB SERPL ELPH-MCNC: 0.56 G/DL (ref 0.2–0.5)
GAMMA GLOB ABNORMAL SERPL ELPH-MCNC: 1.22 G/DL (ref 0.5–1.6)
GAMMA GLOB SERPL ELPH-MCNC: 16.3 % (ref 12–22)
IGG/ALB SER: 1.04 {RATIO} (ref 1.1–1.8)
KAPPA LC FREE SER-MCNC: 30.6 MG/L (ref 3.3–19.4)
KAPPA LC FREE/LAMBDA FREE SER: 0.96 {RATIO} (ref 0.26–1.65)
LAMBDA LC FREE SERPL-MCNC: 31.9 MG/L (ref 5.7–26.3)
PROT PATTERN SERPL ELPH-IMP: ABNORMAL
PROT SERPL-MCNC: 7.5 G/DL (ref 6.4–8.2)

## 2020-01-10 ENCOUNTER — HOSPITAL ENCOUNTER (OUTPATIENT)
Dept: NON INVASIVE DIAGNOSTICS | Facility: HOSPITAL | Age: 55
Discharge: HOME | End: 2020-01-10
Attending: INTERNAL MEDICINE
Payer: COMMERCIAL

## 2020-01-10 DIAGNOSIS — E66.01 MORBID OBESITY (HCC): ICD-10-CM

## 2020-01-10 DIAGNOSIS — I10 ESSENTIAL HYPERTENSION: ICD-10-CM

## 2020-01-10 DIAGNOSIS — E78.2 MIXED HYPERLIPIDEMIA: ICD-10-CM

## 2020-01-10 DIAGNOSIS — E11.65 UNCONTROLLED TYPE 2 DIABETES MELLITUS WITH HYPERGLYCEMIA (HCC): ICD-10-CM

## 2020-01-10 DIAGNOSIS — E66.01 OBESITY, CLASS III, BMI 40-49.9 (MORBID OBESITY) (HCC): ICD-10-CM

## 2020-01-10 DIAGNOSIS — R06.00 EXERTIONAL DYSPNEA: ICD-10-CM

## 2020-01-10 LAB
1,25(OH)2D3 SERPL-MCNC: 57.3 PG/ML (ref 19.9–79.3)
CHEST PAIN STATEMENT: NORMAL
MAX DIASTOLIC BP: 70 MMHG
MAX HEART RATE: 148 BPM
MAX PREDICTED HEART RATE: 168 BPM
MAX. SYSTOLIC BP: 144 MMHG
PROTOCOL NAME: NORMAL
TARGET HR FORMULA: NORMAL
TIME IN EXERCISE PHASE: NORMAL

## 2020-01-10 PROCEDURE — 93351 STRESS TTE COMPLETE: CPT | Performed by: INTERNAL MEDICINE

## 2020-01-10 PROCEDURE — 93350 STRESS TTE ONLY: CPT

## 2020-01-11 LAB
ALBUMIN UR ELPH-MCNC: 100 %
ALPHA1 GLOB MFR UR ELPH: 0 %
ALPHA2 GLOB MFR UR ELPH: 0 %
B-GLOBULIN MFR UR ELPH: 0 %
GAMMA GLOB MFR UR ELPH: 0 %
PROT PATTERN UR ELPH-IMP: NORMAL
PROT UR-MCNC: 7 MG/DL

## 2020-01-13 ENCOUNTER — TELEPHONE (OUTPATIENT)
Dept: NEPHROLOGY | Facility: CLINIC | Age: 55
End: 2020-01-13

## 2020-01-13 DIAGNOSIS — E11.29 MICROALBUMINURIA DUE TO TYPE 2 DIABETES MELLITUS (HCC): ICD-10-CM

## 2020-01-13 DIAGNOSIS — I10 ESSENTIAL HYPERTENSION: Primary | ICD-10-CM

## 2020-01-13 DIAGNOSIS — R80.9 MICROALBUMINURIA DUE TO TYPE 2 DIABETES MELLITUS (HCC): ICD-10-CM

## 2020-01-13 NOTE — TELEPHONE ENCOUNTER
I spoke to the patient and she is aware her SPEP and UPEP were normal  She will repeat a BMP and ionized calcium in 1 month  No further questions or concerns at this time

## 2020-01-13 NOTE — TELEPHONE ENCOUNTER
----- Message from Red Ramos MD sent at 1/10/2020  2:29 PM EST -----  Please inform patient that serum and urine protein electrophoresis was negative  Please order for repeat BMP to be done in 1 month to monitor electrolytes and calcium level (indication hypercalcemia)   Also order ionized calcium in one month

## 2020-01-14 ENCOUNTER — TELEPHONE (OUTPATIENT)
Dept: CARDIOLOGY CLINIC | Facility: CLINIC | Age: 55
End: 2020-01-14

## 2020-01-14 NOTE — LETTER
Cardiology Pre Operative Clearance      PRE OPERATIVE CARDIAC RISK ASSESSMENT    01/14/20    Karen Ngo  1965  [id number]    Date of Surgery: TBD    Type of Surgery: Bariatric    Surgeon:     {MARTIN AMB CARDIAC CONTRAINDICATIONS:16414}    Anticoagulation: {RESPONSE; YES/NO/NA:19980}    Physician Comment: ***    Electronically Signed: ***

## 2020-01-14 NOTE — TELEPHONE ENCOUNTER
----- Message from Cedrick Jacobo MD sent at 1/13/2020  4:15 PM EST -----  The stress echo was negative  Her blood pressure was controlled with stress    Can please put in for cardiac clearance letter,

## 2020-01-17 ENCOUNTER — OFFICE VISIT (OUTPATIENT)
Dept: BARIATRICS | Facility: CLINIC | Age: 55
End: 2020-01-17

## 2020-01-17 VITALS — HEIGHT: 58 IN | WEIGHT: 195.8 LBS | BODY MASS INDEX: 41.1 KG/M2

## 2020-01-17 DIAGNOSIS — Z98.84 BARIATRIC SURGERY STATUS: ICD-10-CM

## 2020-01-17 DIAGNOSIS — E66.01 MORBID (SEVERE) OBESITY DUE TO EXCESS CALORIES (HCC): Primary | ICD-10-CM

## 2020-01-17 PROCEDURE — RECHECK

## 2020-01-17 NOTE — PROGRESS NOTES
Bariatric Nutrition Assessment Note  NO WEIGHT CHECKS  Type of surgery    Vertical sleeve gastrectomy  Surgery Date: TBD  Surgeon: Dr Michelle Hines  48 y o   female     Wt with BMI of 25: 119lbs  Pre-Op Excess Wt: 80lbs  Height 4' 10" (1 473 m), weight 88 8 kg (195 lb 12 8 oz), not currently breastfeeding  Body mass index is 40 92 kg/m²  Review of History and Medications   Past Medical History:   Diagnosis Date    Anemia     Last Assessed:  3/27/13a    Arthritis     Asthma     Blurred vision     Chronic kidney disease     [facility] [address] [facility]     Diabetic retinopathy [facility] [address] [facility]     Hypercholesterolemia     Hyperlipidemia     Hypertension     Increased urinary frequency     Irritable bowel syndrome     Last Assessed:  10/2/12    Joint pain     Nausea     Night sweat     Numbness     Weakness      Past Surgical History:   Procedure Laterality Date    ABDOMINAL SURGERY      CARPAL TUNNEL RELEASE Right      SECTION      x 2    COLONOSCOPY  2016    COLONOSCOPY      RI WRIST Diann Meeter LIG Left 2019    Procedure: RELEASE CARPAL TUNNEL ENDOSCOPIC-left;  Surgeon: Kiet Tidwell MD;  Location: [facility] OR;  Service: Orthopedics    TUBAL LIGATION      WISDOM TOOTH EXTRACTION       Social History     Socioeconomic History    Marital status:       Spouse name: Margie Payan    Number of children: 2    Years of education: 15    Highest education level: Some college, no degree   Occupational History     Comment: financial counseler   Social Needs    Financial resource strain: Not hard at all   Moshe-Ras insecurity:     Worry: Never true     Inability: Never true   [employer] needs:     Medical: No     Non-medical: No   Tobacco Use    Smoking status: Former Smoker     Types: Cigarettes     Last attempt to quit:      Years since quittin 0    Smokeless tobacco: Former User   Substance and Sexual Activity    Alcohol use: Yes     Comment: rarely    Drug use: No    Sexual activity: Yes     Partners: Male     Birth control/protection: Female Sterilization   Lifestyle    Physical activity:     Days per week: 0 days     Minutes per session: 0 min    Stress: To some extent   Relationships    Social connections:     Talks on phone: More than three times a week     Gets together: More than three times a week     Attends Zoroastrianism service: Not on file     Active member of club or organization: Yes     Attends meetings of clubs or organizations: More than 4 times per year     Relationship status:     Intimate partner violence:     Fear of current or ex partner: No     Emotionally abused: No     Physically abused: No     Forced sexual activity: No   Other Topics Concern    Not on file   Social History Narrative    Consumes 1 cup of coffee per day       Current Outpatient Medications:     aspirin (ECOTRIN LOW STRENGTH) 81 mg EC tablet, Take 81 mg by mouth daily, Disp: , Rfl:     azelastine (ASTELIN) 0 1 % nasal spray, 2 sprays into each nostril 2 (two) times a day Use in each nostril as directed, Disp: 1 Bottle, Rfl: 6    clindamycin-benzoyl peroxide (BENZACLIN) gel, Apply topically 2 (two) times a day, Disp: 25 g, Rfl: 0    Dapagliflozin-metFORMIN HCl ER 2 5-1000 MG TB24, Take 1 tablet by mouth daily with breakfast, Disp: 30 tablet, Rfl: 3    Ergocalciferol 2000 units CAPS, take 1 capsule by oral route  every week, Disp: , Rfl:     Insulin Glargine (TOUJEO) 300 units/mL CONCETRATED U-300 injection pen, Inject 28 Units under the skin daily at bedtime, Disp: 3 pen, Rfl: 3    Insulin Pen Needle (BD PEN NEEDLE EDUARDO U/F) 32G X 4 MM MISC, by Does not apply route, Disp: , Rfl:     Insulin Pen Needle 32G X 5 MM MISC, Inject at bedtime  , Disp: , Rfl:     lisinopril (ZESTRIL) 20 mg tablet, Take 1 tablet (20 mg total) by mouth daily, Disp: 90 tablet, Rfl: 3    mometasone (ELOCON) 0 1 % cream, Apply topically daily Apply topically BID x 2 weeks then qhs prn, Disp: 45 g, Rfl: 3    mometasone (NASONEX) 50 mcg/act nasal spray, as needed , Disp: , Rfl:     naproxen sodium (ALEVE) 220 MG tablet, Take 1 tablet (220 mg total) by mouth 2 (two) times a day with meals for 5 days, Disp: 10 tablet, Rfl: 0    NOVOLOG FLEXPEN 100 units/mL injection pen, Inject 15-30 Units under the skin 2 (two) times a day with meals, Disp: 5 pen, Rfl: 3    pantoprazole (PROTONIX) 40 mg tablet, Take 1 tablet (40 mg total) by mouth daily, Disp: 90 tablet, Rfl: 1    rosuvastatin (CRESTOR) 20 MG tablet, Take 1 tablet (20 mg total) by mouth daily at bedtime, Disp: 90 tablet, Rfl: 3    SAXENDA injection, Inject 0 5 mL (3 mg total) under the skin daily at bedtime, Disp: 30 pen, Rfl: 3  Food Intake and Lifestyle Assessment   Food Intake Assessment completed via food log brought by patient:  Pt started to decrease portions and monitor carbs and snacks better  Lives at home alone, cooks for self and boyfriend  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[employer] office, sedentary job  Wakes:  6am  Breakfast:cottage cheese with fruit and bagel thin OR stop at [facility] and a coffee  Snack: -   Lunch: between 12-3pm, but not skipping anymore  Yesterday since heavier breakfast had cereal, but often a lot of soups with veggies and turkey/chicken, sometimes with a grilled cheese toasted in toaster oven without butter or a small burger from fast food  Snack: sometimes:   1/2 cookie and handful of cheetos-whatever may be in the office  Dinner: can be as late as 8 or 9pm: if does a later lunch will have just oatmeal and 1/2 bagel thin   Snack: "[place]anger [place]", but has been decreasing    If has something will have some cheese and crackers or fruit or SF jello  Beverage intake: water, juice (diluted with water), regular soda (during the holidays) and coffee/tea  Protein supplement: likes JBN or premier  Estimated protein intake per day: 60-70gm  Estimated fluid intake per day: 32-64oz water,   Meals eaten away from home: 1-2 meals on the weekends, during the week will  something from supermarket from hot bar once a week, lunch is out 2x/week  Typical meal pattern: 2-3 meals per day and grazes on snacks in the evening  Eating Behaviors: Consumption of high calorie/ high fat foods, Consumption of high calorie beverages, Large portion sizes, Frequent snacking/ grazing, Mindless eating and Craves sweet foods  Blood sugars:  During the holidays they were running 170-180's in the mornings  Recently sometimes at night 200, but morning 145 highest   Food allergies or intolerances: Allergies   Allergen Reactions    Iodinated Diagnostic Agents Facial Swelling    Iodine Facial Swelling    Shellfish-Derived Products Throat Swelling    Amoxicillin Hives    Metformin Diarrhea    Penicillins      Yeast Infection     Cultural or Mu-ism considerations: none  Allergy:  shellfish    Physical Assessment  Physical Activity  Types of exercise: None now  Current physical limitations: back and joint pain    Psychosocial Assessment   Support systems: parent(s) sibling(s) significant other  Socioeconomic factors: none    Nutrition Diagnosis  Diagnosis: Overweight / Obesity (NC-3 3)  Related to: Physical inactivity and Excessive energy intake  As Evidenced by: BMI >25     Nutrition Prescription: Recommend the following diet  Regular  Interventions and Teaching   Discussed pre-op and post-op nutrition guidelines  Patient educated and handouts provided    Surgical changes to stomach / GI  Capacity of post-surgery stomach  Diet progression  Adequate hydration  Exercise  Suggestions for pre-op diet  Nutrition considerations after surgery  Protein supplements  Meal planning and preparation  Dietary and lifestyle changes  Possible problems with poor eating habits  Intuitive eating  Techniques for self monitoring and keeping daily food journal    Education provided to: patient    Barriers to learning: No barriers identified  Readiness to change: preparation    Prior research on procedure: books, internet, discussed with provider and friends or family    Comprehension: verbalizes understanding     Expected Compliance: good  Recommendations  Pt is an appropriate candidate for surgery  Yes  Evaluation / Monitoring  Dietitian to Monitor: Eating pattern as discussed Body weight Lab values Physical activity  NO weight checks ([facility]'s Employee)   PCP letter:  in EPIC-completed   Labs:  WBC, Calcium and TSH elevated - need recheck (orders entered by PCP)-going soon for recheck-done 1/8:  WNL   Sleep:  done 11/22, titration on 12/26, pt states mild АНДРЕЙ therefore question if CPAP really necessary-asked surgeon and states yes needs CPAP if that is what sleep is recommending  Pt needs to make appt to get CPAP    Will do asap   Cardio:  done 11/12, eval needs to state whether cleared-going for stress test on 1/10/20, per phone encounter on 1/14 : cleared    Meet w/surgeon:  done 11/1 - jason   EGD: completed 12/6   Support Group:  going to [place] on 1/8/20   Weight loss:  pre-op weight loss goals:5% by day of surgery: -10lbs (189lbs)1/2 in order to submit: -5lbs (194lbs)     Goals  Increase water to 64oz on a regular basis  Exercise 30 mins every other day  Complete lesson plans 1-6  Eat 3 meals per day - use a protein shake as a meal replacement for breakfast  Measure portions  Goal:  194lbs  F/U 2 weeks  Time Spent:   30 mins

## 2020-01-24 ENCOUNTER — OFFICE VISIT (OUTPATIENT)
Dept: FAMILY MEDICINE CLINIC | Facility: CLINIC | Age: 55
End: 2020-01-24

## 2020-01-24 VITALS
BODY MASS INDEX: 41.35 KG/M2 | DIASTOLIC BLOOD PRESSURE: 72 MMHG | TEMPERATURE: 98.8 F | SYSTOLIC BLOOD PRESSURE: 124 MMHG | HEIGHT: 58 IN | HEART RATE: 86 BPM | WEIGHT: 197 LBS | RESPIRATION RATE: 16 BRPM

## 2020-01-24 DIAGNOSIS — E11.65 UNCONTROLLED TYPE 2 DIABETES MELLITUS WITH HYPERGLYCEMIA (HCC): ICD-10-CM

## 2020-01-24 DIAGNOSIS — J01.00 ACUTE NON-RECURRENT MAXILLARY SINUSITIS: Primary | ICD-10-CM

## 2020-01-24 PROCEDURE — 99213 OFFICE O/P EST LOW 20 MIN: CPT | Performed by: FAMILY MEDICINE

## 2020-01-24 RX ORDER — DOXYCYCLINE HYCLATE 100 MG/1
100 CAPSULE ORAL EVERY 12 HOURS SCHEDULED
Qty: 14 CAPSULE | Refills: 0 | Status: SHIPPED | OUTPATIENT
Start: 2020-01-24 | End: 2020-01-31

## 2020-01-24 NOTE — PROGRESS NOTES
Reviewed diabetes treatment plan pre- and post-op  She is planning to undergo Laparoscopic sleeve gastrectomy  I would recommend the followin  Stop the Xigduo the day of the surgery and do not resume unless instructed to do so  2  Continue Saxenda through pre- and post-op  3  The night prior to the surgery, she is to take 1/2 her dose of Toujeo U-300, which would be 14u HS  4  Following surgery she is to hold all short acting insulin (Novolog), and then she will record pre-meal glucose levels for 3 days post-op and await physician instruction  I have activated the [place] glucose flow sheet for the patient to enter her blood sugars  She should start doing this the week prior to surgery and continue until instructed to stop  Please call the patient and let her know

## 2020-01-24 NOTE — ASSESSMENT & PLAN NOTE
Lab Results   Component Value Date    HGBA1C 8 9 (H) 12/04/2019   Uncontrolled  Current regimen: Xigduo 2 5-1000 QD, Toujeo U-300 28u HS, Novolog SSI BID, Saxenda 3mg qHS  Xigduo is too large to swallow post-op, I will look into comparable pills that are smaller; alternatively we can just stop the medication as she will be losing weight anyway  Additionally, we will need to closely monitor and adjust her current insulin regimen as she continues to lose weight before and after the procedure

## 2020-01-24 NOTE — PROGRESS NOTES
Laurita Ochoa 1965 female MRN: [id number]    Acute Visit    Assessment/Plan    Diabetes mellitus type 2, uncontrolled ([facility] [address] )    Lab Results   Component Value Date    HGBA1C 8 9 (H) 12/04/2019   Uncontrolled  Current regimen: Xigduo 2 5-1000 QD, Toujeo U-300 28u HS, Novolog SSI BID, Saxenda 3mg qHS  Xigduo is too large to swallow post-op, I will look into comparable pills that are smaller; alternatively we can just stop the medication as she will be losing weight anyway  Additionally, we will need to closely monitor and adjust her current insulin regimen as she continues to lose weight before and after the procedure    Karen was seen today for cough  Diagnoses and all orders for this visit:    Acute non-recurrent maxillary sinusitis  -     doxycycline hyclate (VIBRAMYCIN) 100 mg capsule; Take 1 capsule (100 mg total) by mouth every 12 (twelve) hours for 7 days    Uncontrolled type 2 diabetes mellitus with hyperglycemia (HCC)    Counseled the patient regarding supportive care  They are to call or return to the office if not improving  Heber Preciado MD  [address]  1/24/2020      Please be aware that this note contains text that was dictated and there may be errors pertaining to "sound-alike "words during the dictation process  SUBJECTIVE    CC: Cough (congestion)    HPI:  Karen Ricki Wilson is a 48 y o  female who presented for an acute visit complaining of congestion and cough for 1 week  Associated sinus pressure and sinus headache  ROS as below  Tried Nyquil with some relief  Patient also reviewed the extensive pre-op workup she has had with cardio, GI, bariatric, and sleep medicine teams  She is going to get a CPAP and has 4 lbs to go before she can schedule her bariatric surgery  She has a concern because her Damian [place] is too large for her to be able to swallow post op  Review of Systems   HENT: Positive for congestion, rhinorrhea and sinus pressure   Negative for sore throat  Respiratory: Positive for cough  Negative for shortness of breath and wheezing  Cardiovascular: Negative for chest pain  Gastrointestinal: Negative for diarrhea and nausea  Musculoskeletal: Negative for myalgias  All other systems reviewed and are negative  Medications:   Meds/Allergies   Current Outpatient Medications   Medication Sig Dispense Refill    aspirin (ECOTRIN LOW STRENGTH) 81 mg EC tablet Take 81 mg by mouth daily      azelastine (ASTELIN) 0 1 % nasal spray 2 sprays into each nostril 2 (two) times a day Use in each nostril as directed 1 Bottle 6    clindamycin-benzoyl peroxide (BENZACLIN) gel Apply topically 2 (two) times a day 25 g 0    Dapagliflozin-metFORMIN HCl ER 2 5-1000 MG TB24 Take 1 tablet by mouth daily with breakfast 30 tablet 3    Insulin Glargine (TOUJEO) 300 units/mL CONCETRATED U-300 injection pen Inject 28 Units under the skin daily at bedtime 3 pen 3    Insulin Pen Needle (BD PEN NEEDLE EDUARDO U/F) 32G X 4 MM MISC by Does not apply route      Insulin Pen Needle 32G X 5 MM MISC Inject at bedtime        lisinopril (ZESTRIL) 20 mg tablet Take 1 tablet (20 mg total) by mouth daily 90 tablet 3    mometasone (ELOCON) 0 1 % cream Apply topically daily Apply topically BID x 2 weeks then qhs prn 45 g 3    mometasone (NASONEX) 50 mcg/act nasal spray as needed       NOVOLOG FLEXPEN 100 units/mL injection pen Inject 15-30 Units under the skin 2 (two) times a day with meals 5 pen 3    pantoprazole (PROTONIX) 40 mg tablet Take 1 tablet (40 mg total) by mouth daily 90 tablet 1    rosuvastatin (CRESTOR) 20 MG tablet Take 1 tablet (20 mg total) by mouth daily at bedtime 90 tablet 3    SAXENDA injection Inject 0 5 mL (3 mg total) under the skin daily at bedtime 30 pen 3    doxycycline hyclate (VIBRAMYCIN) 100 mg capsule Take 1 capsule (100 mg total) by mouth every 12 (twelve) hours for 7 days 14 capsule 0    naproxen sodium (ALEVE) 220 MG tablet Take 1 tablet (220 mg total) by mouth 2 (two) times a day with meals for 5 days 10 tablet 0     No current facility-administered medications for this visit  Allergies   Allergen Reactions    Iodinated Diagnostic Agents Facial Swelling    Iodine Facial Swelling    Shellfish-Derived Products Throat Swelling    Amoxicillin Hives    Metformin Diarrhea    Penicillins      Yeast Infection       OBJECTIVE    Vitals:   Vitals:    01/24/20 0846   BP: 124/72   Pulse: 86   Resp: 16   Temp: 98 8 °F (37 1 °C)   Weight: 89 4 kg (197 lb)   Height: 4' 10" (1 473 m)     Physical Exam   Constitutional: Vital signs are normal  She appears well-developed and well-nourished  She appears ill  No distress  HENT:   Head: Normocephalic and atraumatic  Right Ear: Tympanic membrane and external ear normal    Left Ear: Tympanic membrane and external ear normal    Nose: Rhinorrhea present  Right sinus exhibits maxillary sinus tenderness  Left sinus exhibits maxillary sinus tenderness  Mouth/Throat: Uvula is midline and mucous membranes are normal  Posterior oropharyngeal erythema present  No oropharyngeal exudate (PND)  No tonsillar exudate  Eyes: Conjunctivae, EOM and lids are normal    Neck: No JVD present  No tracheal deviation present  Cardiovascular: Normal rate, regular rhythm and intact distal pulses  Pulmonary/Chest: Effort normal and breath sounds normal  No accessory muscle usage  No respiratory distress  She has no wheezes  She has no rhonchi  Abdominal: Normal appearance  Lymphadenopathy:     She has no cervical adenopathy  Neurological: She is alert  Skin: No rash noted  She is not diaphoretic  Psychiatric: She has a normal mood and affect  Nursing note and vitals reviewed

## 2020-01-30 ENCOUNTER — TELEPHONE (OUTPATIENT)
Dept: FAMILY MEDICINE CLINIC | Facility: CLINIC | Age: 55
End: 2020-01-30

## 2020-01-30 DIAGNOSIS — J01.00 ACUTE NON-RECURRENT MAXILLARY SINUSITIS: Primary | ICD-10-CM

## 2020-01-30 RX ORDER — CEFUROXIME AXETIL 500 MG/1
250 TABLET ORAL EVERY 12 HOURS SCHEDULED
Qty: 5 TABLET | Refills: 0 | Status: SHIPPED | OUTPATIENT
Start: 2020-01-30 | End: 2020-02-04

## 2020-02-04 ENCOUNTER — OFFICE VISIT (OUTPATIENT)
Dept: BARIATRICS | Facility: CLINIC | Age: 55
End: 2020-02-04

## 2020-02-04 VITALS — HEIGHT: 58 IN | WEIGHT: 195.6 LBS | BODY MASS INDEX: 41.06 KG/M2

## 2020-02-04 DIAGNOSIS — Z98.84 BARIATRIC SURGERY STATUS: ICD-10-CM

## 2020-02-04 DIAGNOSIS — E66.01 MORBID (SEVERE) OBESITY DUE TO EXCESS CALORIES (HCC): Primary | ICD-10-CM

## 2020-02-04 PROCEDURE — RECHECK

## 2020-02-04 NOTE — PROGRESS NOTES
Bariatric Nutrition Assessment Note  NO WEIGHT CHECKS  Type of surgery    Vertical sleeve gastrectomy  Surgery Date: TBD  Surgeon: Dr Macy Bence  48 y o   female     Wt with BMI of 25: 119lbs  Pre-Op Excess Wt: 80lbs  Height 4' 10" (1 473 m), weight 88 7 kg (195 lb 9 6 oz), not currently breastfeeding  Body mass index is 40 88 kg/m²  Review of History and Medications   Past Medical History:   Diagnosis Date    Anemia     Last Assessed:  3/27/13a    Arthritis     Asthma     Blurred vision     Chronic kidney disease     [facility] [address]     [facility] retinopathy [facility] [address] [facility]     Hypercholesterolemia     Hyperlipidemia     Hypertension     Increased urinary frequency     Irritable bowel syndrome     Last Assessed:  10/2/12    Joint pain     Nausea     Night sweat     Numbness     Weakness      Past Surgical History:   Procedure Laterality Date    ABDOMINAL SURGERY      CARPAL TUNNEL RELEASE Right      SECTION      x 2    COLONOSCOPY  2016    COLONOSCOPY      [place] WRIST Aniya Jaxson LIG Left 2019    Procedure: RELEASE CARPAL TUNNEL ENDOSCOPIC-left;  Surgeon: Gonzalez Ramirez MD;  Location: [facility];  Service: Orthopedics    TUBAL LIGATION      WISDOM TOOTH EXTRACTION       Social History     Socioeconomic History    Marital status:       Spouse name: Cindy Mccormack    Number of children: 2    Years of education: 15    Highest education level: Some college, no degree   Occupational History     Comment: financial counseler   Social Needs    Financial resource strain: Not hard at all   [place]-Ras insecurity:     Worry: Never true     Inability: Never true   [place] needs:     Medical: No     Non-medical: No   Tobacco Use    Smoking status: Former Smoker     Types: Cigarettes     Last attempt to quit:      Years since quittin 1    Smokeless tobacco: Former User   Substance and Sexual Activity    Alcohol use: Yes     Comment: rarely    Drug use: No    Sexual activity: Yes     Partners: Male     Birth control/protection: Female Sterilization   Lifestyle    Physical activity:     Days per week: 0 days     Minutes per session: 0 min    Stress: To some extent   Relationships    Social connections:     Talks on phone: More than three times a week     Gets together: More than three times a week     Attends Zoroastrian service: Not on file     Active member of club or organization: Yes     Attends meetings of clubs or organizations: More than 4 times per year     Relationship status:     Intimate partner violence:     Fear of current or ex partner: No     Emotionally abused: No     Physically abused: No     Forced sexual activity: No   Other Topics Concern    Not on file   Social History Narrative    Consumes 1 cup of coffee per day       Current Outpatient Medications:     aspirin (ECOTRIN LOW STRENGTH) 81 mg EC tablet, Take 81 mg by mouth daily, Disp: , Rfl:     azelastine (ASTELIN) 0 1 % nasal spray, 2 sprays into each nostril 2 (two) times a day Use in each nostril as directed, Disp: 1 Bottle, Rfl: 6    cefuroxime (CEFTIN) 500 mg tablet, Take 0 5 tablets (250 mg total) by mouth every 12 (twelve) hours for 5 days, Disp: 5 tablet, Rfl: 0    clindamycin-benzoyl peroxide (BENZACLIN) gel, Apply topically 2 (two) times a day, Disp: 25 g, Rfl: 0    Dapagliflozin-metFORMIN HCl ER 2 5-1000 MG TB24, Take 1 tablet by mouth daily with breakfast, Disp: 30 tablet, Rfl: 3    Insulin Glargine (TOUJEO) 300 units/mL CONCETRATED U-300 injection pen, Inject 28 Units under the skin daily at bedtime, Disp: 3 pen, Rfl: 3    Insulin Pen Needle (BD PEN NEEDLE EDUARDO U/F) 32G X 4 MM MISC, by Does not apply route, Disp: , Rfl:     Insulin Pen Needle 32G X 5 MM MISC, Inject at bedtime  , Disp: , Rfl:     lisinopril (ZESTRIL) 20 mg tablet, Take 1 tablet (20 mg total) by mouth daily, Disp: 90 tablet, Rfl: 3   mometasone (ELOCON) 0 1 % cream, Apply topically daily Apply topically BID x 2 weeks then qhs prn, Disp: 45 g, Rfl: 3    mometasone (NASONEX) 50 mcg/act nasal spray, as needed , Disp: , Rfl:     naproxen sodium (ALEVE) 220 MG tablet, Take 1 tablet (220 mg total) by mouth 2 (two) times a day with meals for 5 days, Disp: 10 tablet, Rfl: 0    NOVOLOG FLEXPEN 100 units/mL injection pen, Inject 15-30 Units under the skin 2 (two) times a day with meals, Disp: 5 pen, Rfl: 3    pantoprazole (PROTONIX) 40 mg tablet, Take 1 tablet (40 mg total) by mouth daily, Disp: 90 tablet, Rfl: 1    rosuvastatin (CRESTOR) 20 MG tablet, Take 1 tablet (20 mg total) by mouth daily at bedtime, Disp: 90 tablet, Rfl: 3    SAXENDA injection, Inject 0 5 mL (3 mg total) under the skin daily at bedtime, Disp: 30 pen, Rfl: 3  Food Intake and Lifestyle Assessment   Food Intake Assessment completed via food log brought by patient:  Pt started to decrease portions, monitor carbs and snacks better and focusing a little more on protein  Changed to coconut sugar in coffee  BS:  < 200 HS and < 150 AM  Lives at home alone, cooks for self and boyfriend  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[employer], sedentary job  Wakes:  6am  Breakfast: this morning had avocado toast (1 5 slices toast, avocado mixed with chilis, tomatoes, onion)-not feeling too hungry today  Snack: -   Lunch: between not too hungry yesterday so didn't have anything for lunch, but most often 12-3pm,  Not too hungry today-might just have a protein water  Snack: sometimes:   1/2 cookie and handful of cheetos-whatever may be in the office  Dinner: Yesterday:  Ramy Aland chicken thigh and 1/4 c corn and 6oz juice (V8 splash)  Milo Samra can be as late as 8 or 9pm: if does a later lunch will have just oatmeal and 1/2 bagel thin   Snack: "Danger zone", but has been decreasing    If has something will have some cheese and crackers or fruit or SF jello  Beverage intake: water, juice (diluted with water), regular soda (during the holidays) and coffee/tea  Protein supplement: likes JBN or premier  Estimated protein intake per day: 60-70gm  Estimated fluid intake per day: 32-64oz water,   Meals eaten away from home: 1-2 meals on the weekends, during the week will  something from [employer] from [employer] bar once a week, lunch is out 2x/week  Typical meal pattern: 2-3 meals per day and grazes on snacks in the evening  Eating Behaviors: Consumption of high calorie/ high fat foods, Consumption of high calorie beverages, Large portion sizes, Frequent snacking/ grazing, Mindless eating and Craves sweet foods  Food allergies or intolerances: Allergies   Allergen Reactions    Iodinated Diagnostic Agents Facial Swelling    Iodine Facial Swelling    Shellfish-Derived Products Throat Swelling    Amoxicillin Hives    Metformin Diarrhea    Penicillins      Yeast Infection     Cultural or Yazidism considerations: none  Allergy:  shellfish  Physical Assessment  Physical Activity  Types of exercise: None now  Current physical limitations: back and joint pain    Psychosocial Assessment   Support systems: parent(s) sibling(s) significant other  Socioeconomic factors: none    Nutrition Diagnosis  Diagnosis: Overweight / Obesity (NC-3 3)  Related to: Physical inactivity and Excessive energy intake  As Evidenced by: BMI >25     Nutrition Prescription: Recommend the following diet  Regular  Interventions and Teaching   Discussed pre-op and post-op nutrition guidelines  Patient educated and handouts provided    Adequate hydration  Exercise  Suggestions for pre-op diet  Nutrition considerations after surgery  Protein supplements  Meal planning and preparation  Dietary and lifestyle changes  Possible problems with poor eating habits  Intuitive eating  Techniques for self monitoring and keeping daily food journal    Education provided to: patient    Barriers to learning: No barriers identified  Readiness to change: preparation    Prior research on procedure: books, internet, discussed with provider and friends or family    Comprehension: verbalizes understanding     Expected Compliance: good  Evaluation / Monitoring  Dietitian to Monitor: Eating pattern as discussed Body weight Lab values Physical activity  NO weight checks ([facility]'s Employee)   PCP letter:  in EPIC-completed   Labs:  WBC, Calcium and TSH elevated - need recheck (orders entered by PCP)-going soon for recheck-done 1/8:  WNL   Sleep:  done 11/22, titration on 12/26, pt states mild АНДРЕЙ therefore question if CPAP really necessary-asked surgeon and states yes needs CPAP if that is what sleep is recommending  Pt needs to make appt to get CPAP- has appt 2/7   Cardio:  done 11/12, eval needs to state whether cleared-going for stress test on 1/10/20, per phone encounter on 1/14 : cleared    Meet w/surgeon:  done 11/1 - jason   EGD: completed 12/6   Support Group:  going to [place] on 1/8/20   Weight loss:  pre-op weight loss goals:5% by day of surgery: -10lbs (189lbs)1/2 in order to submit: -5lbs (194lbs)     Pt is 1 6lbs from her goal weight to submit to the insurance company  Pt will be going to sleep med on Friday to get fitted for her CPAP  Reviewed diet recall and pt stated that she hasn't been too hungry over the past 24hrs so had skipped a couple meals  Reports doing more protein at her meals, but hasn't replaced one meal with a meal replacement yet as we discussed last visit  Pt will start doing so this week and will ensure she is consuming 64oz fluids  Pt will f/u in one week    Goals  Increase water to 64oz on a regular basis  Exercise 30 mins every other day  Complete lesson plans 1-6  Eat 3 meals per day - use a protein shake as a meal replacement for breakfast as discussed at last visit  Protein at each meal  Goal:  194lbs  F/U 1 week  Time Spent:   30 mins

## 2020-02-06 ENCOUNTER — TELEPHONE (OUTPATIENT)
Dept: BARIATRICS | Facility: CLINIC | Age: 55
End: 2020-02-06

## 2020-02-06 ENCOUNTER — TELEPHONE (OUTPATIENT)
Dept: SLEEP CENTER | Facility: CLINIC | Age: 55
End: 2020-02-06

## 2020-02-06 NOTE — TELEPHONE ENCOUNTER
Patient's insurance plan does not cover machine  She would have to self pay  Went over that with her  She wanted to cancel the appointment  Cannot afford  Canceled appointments

## 2020-02-06 NOTE — TELEPHONE ENCOUNTER
Patient called office stating her insurance is not covering CPAP out of pocket cost is $600 which she can't afford right now  Patient wants to know if this will be an issue with surgery  Patient awaiting a call back to discuss concerns  Patient can be reached at [phone]  Will forward to provider for further review

## 2020-02-06 NOTE — TELEPHONE ENCOUNTER
Called patient to get more information about CPAP coverage  Pt is a [employer]'s employee so don't understand why the CPAP wouldn't be covered  Reviewed with pt  Pt states since she is a [employer] employee of [facility] she is under the impression she was kept on [employer], but her chart states her coverage is [employer] plan  Pt did not have her insurance cards on her at the time  Pt states she will call the benefits office at this time to confirm and f/u with the sleep center to see which insurance the request for coverage for the CPAP was submitted  Advised pt to call back when she has more information

## 2020-02-10 ENCOUNTER — TELEPHONE (OUTPATIENT)
Dept: BARIATRICS | Facility: CLINIC | Age: 55
End: 2020-02-10

## 2020-02-10 NOTE — TELEPHONE ENCOUNTER
Pt called and advised RD that the insurance situation was straightened out and her CPAP is covered, she only needs to pay 5%  Pt rescheduled RD appt from Feb 11 to Feb 25th  Next sleep appt for CPAP titration is on Feb 28th

## 2020-02-26 ENCOUNTER — TELEPHONE (OUTPATIENT)
Dept: BARIATRICS | Facility: CLINIC | Age: 55
End: 2020-02-26

## 2020-02-28 ENCOUNTER — TELEPHONE (OUTPATIENT)
Dept: SLEEP CENTER | Facility: CLINIC | Age: 55
End: 2020-02-28

## 2020-03-03 ENCOUNTER — OFFICE VISIT (OUTPATIENT)
Dept: BARIATRICS | Facility: CLINIC | Age: 55
End: 2020-03-03

## 2020-03-03 VITALS — HEIGHT: 58 IN | WEIGHT: 193.2 LBS | BODY MASS INDEX: 40.55 KG/M2

## 2020-03-03 DIAGNOSIS — Z98.84 BARIATRIC SURGERY STATUS: ICD-10-CM

## 2020-03-03 DIAGNOSIS — E66.01 MORBID (SEVERE) OBESITY DUE TO EXCESS CALORIES (HCC): Primary | ICD-10-CM

## 2020-03-03 PROCEDURE — RECHECK

## 2020-03-03 NOTE — PROGRESS NOTES
Bariatric Nutrition Assessment Note  NO WEIGHT CHECKS  Type of surgery    Vertical sleeve gastrectomy vs RYGB  Surgery Date: TBD  Surgeon: Dr Joanne Garcia  48 y o   female     Wt with BMI of 25: 119lbs  Pre-Op Excess Wt: 74 2lbs  Height 4' 10" (1 473 m), weight 87 6 kg (193 lb 3 2 oz), not currently breastfeeding  Body mass index is 40 38 kg/m²  Review of History and Medications   Past Medical History:   Diagnosis Date    Anemia     Last Assessed:  3/27/13a    Arthritis     Asthma     Blurred vision     Chronic kidney disease     [facility] [address]     [facility] retinopathy [facility] [address]     Hypercholesterolemia     Hyperlipidemia     Hypertension     Increased urinary frequency     Irritable bowel syndrome     Last Assessed:  10/2/12    Joint pain     Nausea     Night sweat     Numbness     Weakness      Past Surgical History:   Procedure Laterality Date    ABDOMINAL SURGERY      CARPAL TUNNEL RELEASE Right      SECTION      x 2    COLONOSCOPY  2016    COLONOSCOPY      [place] WRIST Duyen Nilay LIG Left 2019    Procedure: RELEASE CARPAL TUNNEL ENDOSCOPIC-left;  Surgeon: Khanh Okeefe MD;  Location: [facility] OR;  Service: Orthopedics    TUBAL LIGATION      WISDOM TOOTH EXTRACTION       Social History     Socioeconomic History    Marital status:       Spouse name: Keneth Opitz    Number of children: 2    Years of education: 15    Highest education level: Some college, no degree   Occupational History     Comment: financial counseler   Social Needs    Financial resource strain: Not hard at all   [place]-Ras insecurity:     Worry: Never true     Inability: Never true   [place] needs:     Medical: No     Non-medical: No   Tobacco Use    Smoking status: Former Smoker     Types: Cigarettes     Last attempt to quit:      Years since quittin 1    Smokeless tobacco: Former User   Substance and Sexual Activity    Alcohol use: Yes     Comment: rarely    Drug use: No    Sexual activity: Yes     Partners: Male     Birth control/protection: Female Sterilization   Lifestyle    Physical activity:     Days per week: 0 days     Minutes per session: 0 min    Stress: To some extent   Relationships    Social connections:     Talks on phone: More than three times a week     Gets together: More than three times a week     Attends Sikhism service: Not on file     Active member of club or organization: Yes     Attends meetings of clubs or organizations: More than 4 times per year     Relationship status:     Intimate partner violence:     Fear of current or ex partner: No     Emotionally abused: No     Physically abused: No     Forced sexual activity: No   Other Topics Concern    Not on file   Social History Narrative    Consumes 1 cup of coffee per day       Current Outpatient Medications:     aspirin (ECOTRIN LOW STRENGTH) 81 mg EC tablet, Take 81 mg by mouth daily, Disp: , Rfl:     azelastine (ASTELIN) 0 1 % nasal spray, 2 sprays into each nostril 2 (two) times a day Use in each nostril as directed, Disp: 1 Bottle, Rfl: 6    clindamycin-benzoyl peroxide (BENZACLIN) gel, Apply topically 2 (two) times a day, Disp: 25 g, Rfl: 0    Dapagliflozin-metFORMIN HCl ER 2 5-1000 MG TB24, Take 1 tablet by mouth daily with breakfast, Disp: 30 tablet, Rfl: 3    Insulin Glargine (TOUJEO) 300 units/mL CONCETRATED U-300 injection pen, Inject 28 Units under the skin daily at bedtime, Disp: 3 pen, Rfl: 3    Insulin Pen Needle (BD PEN NEEDLE EDUARDO U/F) 32G X 4 MM MISC, by Does not apply route, Disp: , Rfl:     Insulin Pen Needle 32G X 5 MM MISC, Inject at bedtime  , Disp: , Rfl:     lisinopril (ZESTRIL) 20 mg tablet, Take 1 tablet (20 mg total) by mouth daily, Disp: 90 tablet, Rfl: 3    mometasone (ELOCON) 0 1 % cream, Apply topically daily Apply topically BID x 2 weeks then qhs prn, Disp: 45 g, Rfl: 3   mometasone (NASONEX) 50 mcg/act nasal spray, as needed , Disp: , Rfl:     naproxen sodium (ALEVE) 220 MG tablet, Take 1 tablet (220 mg total) by mouth 2 (two) times a day with meals for 5 days, Disp: 10 tablet, Rfl: 0    NOVOLOG FLEXPEN 100 units/mL injection pen, Inject 15-30 Units under the skin 2 (two) times a day with meals, Disp: 5 pen, Rfl: 3    pantoprazole (PROTONIX) 40 mg tablet, Take 1 tablet (40 mg total) by mouth daily, Disp: 90 tablet, Rfl: 1    rosuvastatin (CRESTOR) 20 MG tablet, Take 1 tablet (20 mg total) by mouth daily at bedtime, Disp: 90 tablet, Rfl: 3    SAXENDA injection, Inject 0 5 mL (3 mg total) under the skin daily at bedtime, Disp: 30 pen, Rfl: 3  Food Intake and Lifestyle Assessment   Food Intake Assessment completed via food log brought by patient:  Pt started to decrease portions, monitor carbs and snacks better and focusing a little more on protein  BS:  < 200 HS and < 150 AM  Lives at home alone, cooks for self and boyfriend  More fish and chicken, down to beef once a week  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[employer], 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, sedentary job  Wakes:  6am  Breakfast: today-sargento snack back with hard boiled egg and using milk in coffee (sometimes with coconut sugar, always cinnamon)  Snack: today had protein water before came to appt  Lunch: today will be one hot dog  And SF applesauce  Dinner: Yesterday:  Had a lighter dinner of a serving of cereal with milk and 2-3 crackers with PB&J and tea with honey (light drizzle) OR other night made short ribs, potatoes and veggies  Snack: "Danger zone", but has been decreasing    If has something will have some cheese and crackers or fruit or SF jello  Beverage intake: water, juice (diluted with water), regular soda (during the holidays) and coffee/tea  Protein supplement: likes JBN or premier  Estimated protein intake per day: 60-70gm  Estimated fluid intake per day: 32-64oz water,   Meals eaten away from home: 1-2 meals on the weekends, during the week will  something from supermarket from hot bar once a week, lunch is out 2x/week  Typical meal pattern: 2-3 meals per day and grazes on snacks in the evening  Eating Behaviors: Consumption of high calorie/ high fat foods, Consumption of high calorie beverages, Large portion sizes, Frequent snacking/ grazing, Mindless eating and Craves sweet foods  Food allergies or intolerances: Allergies   Allergen Reactions    Iodinated Diagnostic Agents Facial Swelling    Iodine Facial Swelling    Shellfish-Derived Products Throat Swelling    Amoxicillin Hives    Metformin Diarrhea    Penicillins      Yeast Infection     Cultural or Yarsanism considerations: none  Allergy:  shellfish  Physical Assessment  Physical Activity  Types of exercise: None now  Current physical limitations: back and joint pain    Psychosocial Assessment   Support systems: parent(s) sibling(s) significant other  Socioeconomic factors: none    Nutrition Diagnosis  Diagnosis: Overweight / Obesity (NC-3 3)  Related to: Physical inactivity and Excessive energy intake  As Evidenced by: BMI >25     Nutrition Prescription: Recommend the following diet  Regular  Interventions and Teaching   Discussed pre-op and post-op nutrition guidelines  Patient educated and handouts provided    Adequate hydration  Exercise  Suggestions for pre-op diet  Nutrition considerations after surgery  Protein supplements  Meal planning and preparation  Dietary and lifestyle changes  Possible problems with poor eating habits  Intuitive eating  Techniques for self monitoring and keeping daily food journal    Education provided to: patient    Barriers to learning: No barriers identified  Readiness to change: preparation    Prior research on procedure: books, internet, discussed with provider and friends or family    Comprehension: verbalizes understanding     Expected Compliance: good  Evaluation / Monitoring  Dietitian to Monitor: Eating pattern as discussed Body weight Lab values Physical activity  NO weight checks ([facility]'s Employee)   PCP letter:  in EPIC-completed   Labs:  WBC, Calcium and TSH elevated - need recheck (orders entered by PCP)-going soon for recheck-done 1/8:  WNL   Sleep:  done 11/22, titration on 12/26, pt states mild АНДРЕЙ therefore question if CPAP really necessary-asked surgeon and states yes needs CPAP if that is what sleep is recommending  Pt needs to make appt to get CPAP- has appt 2/28-started wearing 3/1/20   Cardio:  done 11/12, eval needs to state whether cleared-going for stress test on 1/10/20, per phone encounter on 1/14 : cleared    Meet w/surgeon:  done 11/1 - jason   EGD: completed 12/6   Support Group:  going to [place] on 1/8/20   Weight loss:  pre-op weight loss goals:5% by day of surgery: -10lbs (189lbs)1/2 in order to submit: -5lbs (194lbs)     Pt has completed her pre-op requirements to include pre-op weight loss, therefore she can be submitted to insurance for approval   Pt has been more mindful of her food choices by choosing more chicken and fish, has been conscience of her portions and is using the protein shakes as needed as a meal replacement  Reviewed post-op vitamins with pt, provided with handout for reference and samples to try      Goals  Increase water to 64oz on a regular basis  Exercise 30 mins every other day  Complete lesson plans 1-6  Eat 3 meals per day - use a protein shake as a meal replacement as needed  Protein at each meal  Time Spent:   30 mins

## 2020-03-10 DIAGNOSIS — J30.1 SEASONAL ALLERGIC RHINITIS DUE TO POLLEN: ICD-10-CM

## 2020-03-10 DIAGNOSIS — E11.65 UNCONTROLLED TYPE 2 DIABETES MELLITUS WITH HYPERGLYCEMIA (HCC): Primary | ICD-10-CM

## 2020-03-10 RX ORDER — MOMETASONE FUROATE 50 UG/1
2 SPRAY, METERED NASAL AS NEEDED
Qty: 1 ACT | Refills: 3 | Status: SHIPPED | OUTPATIENT
Start: 2020-03-10 | End: 2021-02-15 | Stop reason: SDUPTHER

## 2020-03-23 ENCOUNTER — TELEPHONE (OUTPATIENT)
Dept: NEPHROLOGY | Facility: CLINIC | Age: 55
End: 2020-03-23

## 2020-03-25 ENCOUNTER — TELEMEDICINE (OUTPATIENT)
Dept: NEPHROLOGY | Facility: CLINIC | Age: 55
End: 2020-03-25
Payer: COMMERCIAL

## 2020-03-25 DIAGNOSIS — E11.21 TYPE 2 DIABETES MELLITUS WITH NEPHROPATHY (HCC): ICD-10-CM

## 2020-03-25 DIAGNOSIS — I10 PRIMARY HYPERTENSION: ICD-10-CM

## 2020-03-25 DIAGNOSIS — R82.90 FOUL SMELLING URINE: ICD-10-CM

## 2020-03-25 DIAGNOSIS — R80.1 PERSISTENT PROTEINURIA: Primary | ICD-10-CM

## 2020-03-25 DIAGNOSIS — E83.52 HYPERCALCEMIA: ICD-10-CM

## 2020-03-25 DIAGNOSIS — E55.9 VITAMIN D DEFICIENCY: ICD-10-CM

## 2020-03-25 PROCEDURE — 99442 PR PHYS/QHP TELEPHONE EVALUATION 11-20 MIN: CPT | Performed by: INTERNAL MEDICINE

## 2020-03-25 RX ORDER — LISINOPRIL 20 MG/1
20 TABLET ORAL DAILY
Qty: 90 TABLET | Refills: 3 | Status: ON HOLD | OUTPATIENT
Start: 2020-03-25 | End: 2020-09-01 | Stop reason: SDUPTHER

## 2020-03-25 NOTE — PROGRESS NOTES
Virtual Brief Visit    Problem List Items Addressed This Visit        Endocrine    Type 2 diabetes mellitus with nephropathy ([facility] [address] )    Relevant Orders    Hemoglobin A1C       Other    Vitamin D deficiency    Relevant Orders    Vitamin D 25 hydroxy    Vitamin D 25 hydroxy      Other Visit Diagnoses     Persistent proteinuria    -  Primary    Relevant Medications    lisinopril (ZESTRIL) 20 mg tablet    Other Relevant Orders    Protein / creatinine ratio, urine    Urinalysis with microscopic    Protein / creatinine ratio, urine    Urinalysis with microscopic    Primary hypertension        Relevant Medications    lisinopril (ZESTRIL) 20 mg tablet    Other Relevant Orders    Protein / creatinine ratio, urine    Basic metabolic panel    Basic metabolic panel    Magnesium    Basic metabolic panel    Hypercalcemia        Relevant Orders    Phosphorus    Magnesium    Basic metabolic panel    Calcium, ionized    Basic metabolic panel    Magnesium    Foul smelling urine        Relevant Orders    UA/M w/rflx Culture, Routine               Reason for visit is Patient with concern for COVID exposure, hence did not want to come in for actual appointment  Patient was informed that this was a billable visit and they are aware of it and wished to proceed  A/P   Persistent Proteinuria  -Proteinuria likely due to diabetic nephropathy as well as hypertensive nephrosclerosis  -Proteinuria worsened in march 2019 as he was not taking any medication at that time  Proteinuria now improving to microalbumin creatinine ratio of 99 mg in dec 2019 , UPC ratio 0 21    -recheck proteinuria  -stressed on need for good control of diabetes mellitus with goal A1c less than 7 0 to prevent chronic kidney disease and slow proteinuria  Last A1c from October was 8 9 in dec 2019  Will check A1c     -avoid NSADIS  - as per patient she is on lisinopril 10 mg daily but chart shows lisinopril 20 mg    Will increase it to 20 mg daily to help with BP and proteinuria  -SPEP/UPEP negative for monoclonal protein  - follow up in 6 months with repeat labs      Primary hypertension  -BP elevated  Recommend goal blood pressure less than 130/80 to decrease proteinuria   -continue lisinopril -increased to 20 mg daily   -discussed about 2 g sodium diet and weight loss with daily exercise  -Recommended home monitoring of blood pressure  Recent BP at home 140-150     Vitamin-D deficiency  - vitamin 21 2 from July 2019   - off vitamin D for last 2 months , check level     Hypercalcemia:  -borderline elevated calcium at 10 2  -vitamin D level low at 21 2, now off vitamin D  -TSH wnl at 3 0, vitamin 1,25 D: wnl   -SPEP and UPEP negative  -PTH wnl at 33 3   - check ionized calcium and check urine calcium/cr ratio       Diabetes mellitus type 2 with diabetic nephropathy  -last A1c 8 9 in dec  Recommend goal A1c less than 7 0 to decrease the chance of chronic kidney disease and decrease proteinuria  Patient verbalized understanding  She is currently on insulin  Management per PCP  Patient requested to order A1c- will do as it has been 3 months since last one      Hyperlipidemia   LDL improved to 61,  in dec  Stressed on good diabetic control, life style modification and weight loss with daily exercise  Patient is planning for gastric sleeve procedure and is following with bariatric surgeon  Continue statins      6 months follow up, labs now and in [place] [address]    Encounter provider Zulema Saldana MD    Provider located at [address] [place] [id number]      Recent Visits  No visits were found meeting these conditions     Showing recent visits within past 7 days and meeting all other requirements     Today's Visits  Date Type Provider Dept   03/25/20 Telemedicine Zulema Saldana, [address] [facility] today's visits and meeting all other requirements     Future Appointments  No visits were found meeting these conditions  Showing future appointments within next 150 days and meeting all other requirements        After connecting through [employer], the patient was identified by name and date of birth  Karen Ngo was informed that this is a telemedicine visit and that the visit is being conducted through telephone which may not be secure and therefore, might not be HIPAA-compliant  My office door was closed  No one else was in the room  She acknowledged consent and understanding of privacy and security of the video platform  The patient has agreed to participate and understands they can discontinue the visit at any time  Subjective  Karen Bryanesau Ventura is a 48 y o  female with history of htn, DM and pituitary microadenoma for follow up of proteinuria  Has proteinuria since 2016  Was 38 mg in 2018 and worsened to  361 mg in 2019 and now improving to 191 mg  Has been on lisinopril  She was previously following with Dr Helen Daily but recently moved [facility] and changed her PCP  Labs from 2019- cr stable at 0 9, UPC ratio 0 21   2020- UPEP negative, SPEP negative for monoclonal protein  K/L 0 96   Urine cloudy and foul odour- few months  Patient did not do previsit labs     No fever  BP in 140's says she is taking lisinopril only 10 mg daily       Past Medical History:   Diagnosis Date    Anemia     Last Assessed:  3/27/13a    Arthritis     Asthma     Blurred vision     Chronic kidney disease     Diabetes mellitus ([address] )     Diabetic retinopathy ([facility] [address] )     Hypercholesterolemia     Hyperlipidemia     Hypertension     Increased urinary frequency     Irritable bowel syndrome     Last Assessed:  10/2/12    Joint pain     Nausea     Night sweat     Numbness     Weakness        Past Surgical History:   Procedure Laterality Date    ABDOMINAL SURGERY      CARPAL TUNNEL RELEASE Right      SECTION      x 2    COLONOSCOPY  2016    COLONOSCOPY      [place] WRIST Gloris Eisenmenger LIG Left 11/21/2019    Procedure: RELEASE CARPAL TUNNEL ENDOSCOPIC-left;  Surgeon: Aldo Melendez MD;  Location: [facility];  Service: Orthopedics    TUBAL LIGATION  1999    WISDOM TOOTH EXTRACTION         Current Outpatient Medications   Medication Sig Dispense Refill    aspirin (ECOTRIN LOW STRENGTH) 81 mg EC tablet Take 81 mg by mouth daily      azelastine (ASTELIN) 0 1 % nasal spray 2 sprays into each nostril 2 (two) times a day Use in each nostril as directed 1 Bottle 6    clindamycin-benzoyl peroxide (BENZACLIN) gel Apply topically 2 (two) times a day 25 g 0    Insulin Glargine (TOUJEO) 300 units/mL CONCETRATED U-300 injection pen Inject 28 Units under the skin daily at bedtime 3 pen 3    Insulin Pen Needle (BD Pen Needle Zhane U/F) 32G X 4 MM MISC by Does not apply route 2 (two) times a day 60 each 3    mometasone (ELOCON) 0 1 % cream Apply topically daily Apply topically BID x 2 weeks then qhs prn 45 g 3    mometasone (Nasonex) 50 mcg/act nasal spray 2 sprays into each nostril as needed (allergies) 1 Act 3    NOVOLOG FLEXPEN 100 units/mL injection pen Inject 15-30 Units under the skin 2 (two) times a day with meals 5 pen 3    pantoprazole (PROTONIX) 40 mg tablet Take 1 tablet (40 mg total) by mouth daily 90 tablet 1    rosuvastatin (CRESTOR) 20 MG tablet Take 1 tablet (20 mg total) by mouth daily at bedtime 90 tablet 3    SAXENDA injection Inject 0 5 mL (3 mg total) under the skin daily at bedtime 30 pen 3    Dapagliflozin-metFORMIN HCl ER 2 5-1000 MG TB24 Take 1 tablet by mouth daily with breakfast (Patient not taking: Reported on 3/25/2020) 30 tablet 3    Insulin Pen Needle 32G X 5 MM MISC Inject at bedtime        lisinopril (ZESTRIL) 20 mg tablet Take 1 tablet (20 mg total) by mouth daily 90 tablet 3    naproxen sodium (ALEVE) 220 MG tablet Take 1 tablet (220 mg total) by mouth 2 (two) times a day with meals for 5 days 10 tablet 0     No current facility-administered medications for this visit  Allergies   Allergen Reactions    Iodinated Diagnostic Agents Facial Swelling    Iodine Facial Swelling    Shellfish-Derived Products Throat Swelling    Amoxicillin Hives    Metformin Diarrhea    Penicillins      Yeast Infection       Review of Systems   Constitutional: Negative for activity change, appetite change, chills, diaphoresis, fatigue and fever  HENT: Negative for congestion, facial swelling and nosebleeds  Eyes: Negative for pain and visual disturbance  Respiratory: Negative for cough, chest tightness and shortness of breath  Cardiovascular: Negative for chest pain and palpitations  Gastrointestinal: Negative for abdominal distention, abdominal pain, diarrhea, nausea and vomiting  Genitourinary: Negative for difficulty urinating, dysuria, flank pain, frequency and hematuria  Musculoskeletal: Negative for arthralgias, back pain and joint swelling  Skin: Negative for rash  Foul odour and cloudy- says ua done recent did not show UTI  Neurological: Negative for dizziness, seizures, syncope, weakness and headaches  Psychiatric/Behavioral: Negative for agitation and confusion  The patient is not nervous/anxious  I spent 15  minutes with the patient during this visit

## 2020-03-25 NOTE — LETTER
March 25, 2020     Binu Arauz MD  [address] [address]  [address]    Patient: Maria Esther Davila   YOB: 1965   Date of Visit: 3/25/2020       Dear Dr Umair Martin: Thank you for referring Kathy Keita to me for evaluation  Below are my notes for this consultation  If you have questions, please do not hesitate to call me  I look forward to following your patient along with you  Sincerely,        Bladimir Fine MD        CC: No Recipients  Bladimir Fine MD  3/25/2020  3:13 PM  Sign at close encounter    Virtual Brief Visit    Problem List Items Addressed This Visit        Endocrine    Type 2 diabetes mellitus with nephropathy ([facility] [address] )    Relevant Orders    Hemoglobin A1C       Other    Vitamin D deficiency    Relevant Orders    Vitamin D 25 hydroxy    Vitamin D 25 hydroxy      Other Visit Diagnoses     Persistent proteinuria    -  Primary    Relevant Medications    lisinopril (ZESTRIL) 20 mg tablet    Other Relevant Orders    Protein / creatinine ratio, urine    Urinalysis with microscopic    Protein / creatinine ratio, urine    Urinalysis with microscopic    Primary hypertension        Relevant Medications    lisinopril (ZESTRIL) 20 mg tablet    Other Relevant Orders    Protein / creatinine ratio, urine    Basic metabolic panel    Basic metabolic panel    Magnesium    Basic metabolic panel    Hypercalcemia        Relevant Orders    Phosphorus    Magnesium    Basic metabolic panel    Calcium, ionized    Basic metabolic panel    Magnesium    Foul smelling urine        Relevant Orders    UA/M w/rflx Culture, Routine               Reason for visit is Patient with concern for COVID exposure, hence did not want to come in for actual appointment  Patient was informed that this was a billable visit and they are aware of it and wished to proceed  A/P   Persistent Proteinuria  -Proteinuria likely due to diabetic nephropathy as well as hypertensive nephrosclerosis      -Proteinuria worsened in march 2019 as he was not taking any medication at that time  Proteinuria now improving to microalbumin creatinine ratio of 99 mg in dec 2019 , UPC ratio 0 21    -recheck proteinuria  -stressed on need for good control of diabetes mellitus with goal A1c less than 7 0 to prevent chronic kidney disease and slow proteinuria  Last A1c from October was 8 9 in dec 2019  Will check A1c     -avoid NSADIS  - as per patient she is on lisinopril 10 mg daily but chart shows lisinopril 20 mg   Will increase it to 20 mg daily to help with BP and proteinuria  -SPEP/UPEP negative for monoclonal protein  - follow up in 6 months with repeat labs      Primary hypertension  -BP elevated  Recommend goal blood pressure less than 130/80 to decrease proteinuria   -continue lisinopril -increased to 20 mg daily   -discussed about 2 g sodium diet and weight loss with daily exercise  -Recommended home monitoring of blood pressure  Recent BP at home 140-150     Vitamin-D deficiency  - vitamin 21 2 from July 2019   - off vitamin D for last 2 months , check level     Hypercalcemia:  -borderline elevated calcium at 10 2  -vitamin D level low at 21 2, now off vitamin D  -TSH wnl at 3 0, vitamin 1,25 D: wnl   -SPEP and UPEP negative  -PTH wnl at 33 3   - check ionized calcium and check urine calcium/cr ratio       Diabetes mellitus type 2 with diabetic nephropathy  -last A1c 8 9 in dec  Recommend goal A1c less than 7 0 to decrease the chance of chronic kidney disease and decrease proteinuria  Patient verbalized understanding  She is currently on insulin  Management per PCP  Patient requested to order A1c- will do as it has been 3 months since last one      Hyperlipidemia   LDL improved to 61,  in dec  Stressed on good diabetic control, life style modification and weight loss with daily exercise  Patient is planning for gastric sleeve procedure and is following with bariatric surgeon    Continue statins      6 months follow up, labs now and in [place] [address]    Encounter provider Al Montano MD    Provider located at [address] [place] [phone]      Recent Visits  No visits were found meeting these conditions  Showing recent visits within past 7 days and meeting all other requirements     Today's Visits  Date Type Provider Dept   03/25/20 Telemedicine Al Montano, [address] today's visits and meeting all other requirements     Future Appointments  No visits were found meeting these conditions  Showing future appointments within next 150 days and meeting all other requirements        After connecting through [employer], the patient was identified by name and date of birth  Karen Ngo was informed that this is a telemedicine visit and that the visit is being conducted through telephone which may not be secure and therefore, might not be HIPAA-compliant  My office door was closed  No one else was in the room  She acknowledged consent and understanding of privacy and security of the video platform  The patient has agreed to participate and understands they can discontinue the visit at any time  Subjective  Karen Cacerescy Turcios is a 48 y o  female with history of htn, DM and pituitary microadenoma for follow up of proteinuria  Has proteinuria since 2016  Was 38 mg in 2018 and worsened to  361 mg in march 2019 and now improving to 191 mg  Has been on lisinopril  She was previously following with Dr Finch Brothers but recently moved [facility] and changed her PCP  Labs from 12/2019- cr stable at 0 9, UPC ratio 0 21 Jan 2020- UPEP negative, SPEP negative for monoclonal protein  K/L 0 96   Urine cloudy and foul odour- few months  Patient did not do previsit labs     No fever  BP in 140's says she is taking lisinopril only 10 mg daily       Past Medical History:   Diagnosis Date    Anemia     Last Assessed:  3/27/13a    Arthritis  Asthma     Blurred vision     Chronic kidney disease     Diabetes mellitus ([facility] [address] )     Diabetic retinopathy ([facility] [address] )     Hypercholesterolemia     Hyperlipidemia     Hypertension     Increased urinary frequency     Irritable bowel syndrome     Last Assessed:  10/2/12    Joint pain     Nausea     Night sweat     Numbness     Weakness        Past Surgical History:   Procedure Laterality Date    ABDOMINAL SURGERY      CARPAL TUNNEL RELEASE Right      SECTION      x 2    COLONOSCOPY  2016    COLONOSCOPY      [place] WRIST Corina Catherine LIG Left 2019    Procedure: RELEASE CARPAL TUNNEL ENDOSCOPIC-left;  Surgeon: Roula Holman MD;  Location:  MAIN OR;  Service: Orthopedics    TUBAL LIGATION      WISDOM TOOTH EXTRACTION         Current Outpatient Medications   Medication Sig Dispense Refill    aspirin (ECOTRIN LOW STRENGTH) 81 mg EC tablet Take 81 mg by mouth daily      azelastine (ASTELIN) 0 1 % nasal spray 2 sprays into each nostril 2 (two) times a day Use in each nostril as directed 1 Bottle 6    clindamycin-benzoyl peroxide (BENZACLIN) gel Apply topically 2 (two) times a day 25 g 0    Insulin Glargine (TOUJEO) 300 units/mL CONCETRATED U-300 injection pen Inject 28 Units under the skin daily at bedtime 3 pen 3    Insulin Pen Needle (BD Pen Needle Zhane U/F) 32G X 4 MM MISC by Does not apply route 2 (two) times a day 60 each 3    mometasone (ELOCON) 0 1 % cream Apply topically daily Apply topically BID x 2 weeks then qhs prn 45 g 3    mometasone (Nasonex) 50 mcg/act nasal spray 2 sprays into each nostril as needed (allergies) 1 Act 3    NOVOLOG FLEXPEN 100 units/mL injection pen Inject 15-30 Units under the skin 2 (two) times a day with meals 5 pen 3    pantoprazole (PROTONIX) 40 mg tablet Take 1 tablet (40 mg total) by mouth daily 90 tablet 1    rosuvastatin (CRESTOR) 20 MG tablet Take 1 tablet (20 mg total) by mouth daily at bedtime 90 tablet 3    SAXENDA injection Inject 0 5 mL (3 mg total) under the skin daily at bedtime 30 pen 3    Dapagliflozin-metFORMIN HCl ER 2 5-1000 MG TB24 Take 1 tablet by mouth daily with breakfast (Patient not taking: Reported on 3/25/2020) 30 tablet 3    Insulin Pen Needle 32G X 5 MM MISC Inject at bedtime   lisinopril (ZESTRIL) 20 mg tablet Take 1 tablet (20 mg total) by mouth daily 90 tablet 3    naproxen sodium (ALEVE) 220 MG tablet Take 1 tablet (220 mg total) by mouth 2 (two) times a day with meals for 5 days 10 tablet 0     No current facility-administered medications for this visit  Allergies   Allergen Reactions    Iodinated Diagnostic Agents Facial Swelling    Iodine Facial Swelling    Shellfish-Derived Products Throat Swelling    Amoxicillin Hives    Metformin Diarrhea    Penicillins      Yeast Infection       Review of Systems   Constitutional: Negative for activity change, appetite change, chills, diaphoresis, fatigue and fever  HENT: Negative for congestion, facial swelling and nosebleeds  Eyes: Negative for pain and visual disturbance  Respiratory: Negative for cough, chest tightness and shortness of breath  Cardiovascular: Negative for chest pain and palpitations  Gastrointestinal: Negative for abdominal distention, abdominal pain, diarrhea, nausea and vomiting  Genitourinary: Negative for difficulty urinating, dysuria, flank pain, frequency and hematuria  Musculoskeletal: Negative for arthralgias, back pain and joint swelling  Skin: Negative for rash  Foul odour and cloudy- says ua done recent did not show UTI  Neurological: Negative for dizziness, seizures, syncope, weakness and headaches  Psychiatric/Behavioral: Negative for agitation and confusion  The patient is not nervous/anxious  I spent 15  minutes with the patient during this visit

## 2020-03-25 NOTE — PATIENT INSTRUCTIONS
Renal function stable  Avoid calcium supplement    Increase lisinopril to 20 mg daily   -Recommend 2 g sodium diet  -Recommend daily exercise and weight loss  -Discussed home monitoring of BP and maintaining a log of blood pressure   -Recommend goal BP less than 130/80  Please inform me if SBP below 110 or above 140's persistently      Labs now , in 2 months (as lisinopril dose doubled) and in 6 months

## 2020-04-01 DIAGNOSIS — IMO0002 DIABETES MELLITUS TYPE 2, UNCONTROLLED: Primary | ICD-10-CM

## 2020-04-01 RX ORDER — BLOOD-GLUCOSE METER
KIT MISCELLANEOUS
Qty: 1 EACH | Refills: 0 | Status: SHIPPED | OUTPATIENT
Start: 2020-04-01 | End: 2021-09-17 | Stop reason: SDUPTHER

## 2020-04-01 RX ORDER — LANCETS 21 GAUGE
EACH MISCELLANEOUS
Qty: 180 EACH | Refills: 1 | Status: SHIPPED | OUTPATIENT
Start: 2020-04-01 | End: 2021-09-17 | Stop reason: SDUPTHER

## 2020-04-02 ENCOUNTER — OFFICE VISIT (OUTPATIENT)
Dept: BARIATRICS | Facility: CLINIC | Age: 55
End: 2020-04-02

## 2020-04-02 DIAGNOSIS — Z98.84 BARIATRIC SURGERY STATUS: ICD-10-CM

## 2020-04-02 DIAGNOSIS — E66.01 MORBID (SEVERE) OBESITY DUE TO EXCESS CALORIES (HCC): Primary | ICD-10-CM

## 2020-04-02 PROCEDURE — RECHECK

## 2020-04-07 ENCOUNTER — TELEMEDICINE (OUTPATIENT)
Dept: FAMILY MEDICINE CLINIC | Facility: CLINIC | Age: 55
End: 2020-04-07

## 2020-04-07 DIAGNOSIS — N39.0 URINARY TRACT INFECTION WITHOUT HEMATURIA, SITE UNSPECIFIED: Primary | ICD-10-CM

## 2020-04-07 PROCEDURE — 99213 OFFICE O/P EST LOW 20 MIN: CPT | Performed by: NURSE PRACTITIONER

## 2020-04-07 RX ORDER — NITROFURANTOIN 25; 75 MG/1; MG/1
100 CAPSULE ORAL 2 TIMES DAILY
Qty: 10 CAPSULE | Refills: 0 | Status: SHIPPED | OUTPATIENT
Start: 2020-04-07 | End: 2020-04-12

## 2020-04-10 ENCOUNTER — TRANSCRIBE ORDERS (OUTPATIENT)
Dept: LAB | Facility: CLINIC | Age: 55
End: 2020-04-10

## 2020-04-10 ENCOUNTER — APPOINTMENT (OUTPATIENT)
Dept: LAB | Facility: CLINIC | Age: 55
End: 2020-04-10
Payer: COMMERCIAL

## 2020-04-10 ENCOUNTER — TELEPHONE (OUTPATIENT)
Dept: OTHER | Facility: HOSPITAL | Age: 55
End: 2020-04-10

## 2020-04-10 DIAGNOSIS — E11.21 TYPE 2 DIABETES MELLITUS WITH NEPHROPATHY (HCC): ICD-10-CM

## 2020-04-10 DIAGNOSIS — E83.52 HYPERCALCEMIA: ICD-10-CM

## 2020-04-10 DIAGNOSIS — R80.1 PERSISTENT PROTEINURIA: ICD-10-CM

## 2020-04-10 DIAGNOSIS — E55.9 VITAMIN D DEFICIENCY: ICD-10-CM

## 2020-04-10 DIAGNOSIS — E83.42 HYPOMAGNESEMIA: Primary | ICD-10-CM

## 2020-04-10 DIAGNOSIS — R82.90 FOUL SMELLING URINE: ICD-10-CM

## 2020-04-10 DIAGNOSIS — I10 PRIMARY HYPERTENSION: ICD-10-CM

## 2020-04-10 DIAGNOSIS — R79.89 ELEVATED SERUM CREATININE: ICD-10-CM

## 2020-04-10 LAB
25(OH)D3 SERPL-MCNC: 29.5 NG/ML (ref 30–100)
ANION GAP SERPL CALCULATED.3IONS-SCNC: 9 MMOL/L (ref 4–13)
BACTERIA UR QL AUTO: ABNORMAL /HPF
BILIRUB UR QL STRIP: NEGATIVE
BUN SERPL-MCNC: 10 MG/DL (ref 5–25)
CA-I BLD-SCNC: 1.19 MMOL/L (ref 1.12–1.32)
CALCIUM SERPL-MCNC: 9.3 MG/DL (ref 8.3–10.1)
CHLORIDE SERPL-SCNC: 103 MMOL/L (ref 100–108)
CLARITY UR: CLEAR
CO2 SERPL-SCNC: 27 MMOL/L (ref 21–32)
COLOR UR: YELLOW
CREAT SERPL-MCNC: 1.12 MG/DL (ref 0.6–1.3)
CREAT UR-MCNC: 235 MG/DL
EST. AVERAGE GLUCOSE BLD GHB EST-MCNC: 240 MG/DL
GFR SERPL CREATININE-BSD FRML MDRD: 57 ML/MIN/1.73SQ M
GLUCOSE SERPL-MCNC: 199 MG/DL (ref 65–140)
GLUCOSE UR STRIP-MCNC: NEGATIVE MG/DL
HBA1C MFR BLD: 10 %
HGB UR QL STRIP.AUTO: NEGATIVE
KETONES UR STRIP-MCNC: NEGATIVE MG/DL
LEUKOCYTE ESTERASE UR QL STRIP: ABNORMAL
MAGNESIUM SERPL-MCNC: 1.5 MG/DL (ref 1.6–2.6)
NITRITE UR QL STRIP: NEGATIVE
NON-SQ EPI CELLS URNS QL MICRO: ABNORMAL /HPF
PH UR STRIP.AUTO: 5.5 [PH]
PHOSPHATE SERPL-MCNC: 3.3 MG/DL (ref 2.7–4.5)
POTASSIUM SERPL-SCNC: 4 MMOL/L (ref 3.5–5.3)
PROT UR STRIP-MCNC: ABNORMAL MG/DL
PROT UR-MCNC: 60 MG/DL
PROT/CREAT UR: 0.26 MG/G{CREAT} (ref 0–0.1)
RBC #/AREA URNS AUTO: ABNORMAL /HPF
SODIUM SERPL-SCNC: 139 MMOL/L (ref 136–145)
SP GR UR STRIP.AUTO: 1.02 (ref 1–1.03)
UROBILINOGEN UR QL STRIP.AUTO: 0.2 E.U./DL
WBC #/AREA URNS AUTO: ABNORMAL /HPF

## 2020-04-10 PROCEDURE — 87186 SC STD MICRODIL/AGAR DIL: CPT

## 2020-04-10 PROCEDURE — 82570 ASSAY OF URINE CREATININE: CPT

## 2020-04-10 PROCEDURE — 87077 CULTURE AEROBIC IDENTIFY: CPT

## 2020-04-10 PROCEDURE — 84100 ASSAY OF PHOSPHORUS: CPT

## 2020-04-10 PROCEDURE — 84156 ASSAY OF PROTEIN URINE: CPT

## 2020-04-10 PROCEDURE — 82306 VITAMIN D 25 HYDROXY: CPT

## 2020-04-10 PROCEDURE — 87086 URINE CULTURE/COLONY COUNT: CPT

## 2020-04-10 PROCEDURE — 81001 URINALYSIS AUTO W/SCOPE: CPT

## 2020-04-10 PROCEDURE — 36415 COLL VENOUS BLD VENIPUNCTURE: CPT

## 2020-04-10 PROCEDURE — 80048 BASIC METABOLIC PNL TOTAL CA: CPT

## 2020-04-10 PROCEDURE — 83036 HEMOGLOBIN GLYCOSYLATED A1C: CPT

## 2020-04-10 PROCEDURE — 83735 ASSAY OF MAGNESIUM: CPT

## 2020-04-10 PROCEDURE — 82330 ASSAY OF CALCIUM: CPT

## 2020-04-12 LAB — BACTERIA UR CULT: ABNORMAL

## 2020-04-13 ENCOUNTER — TELEMEDICINE (OUTPATIENT)
Dept: FAMILY MEDICINE CLINIC | Facility: CLINIC | Age: 55
End: 2020-04-13
Payer: COMMERCIAL

## 2020-04-13 DIAGNOSIS — E11.65 UNCONTROLLED TYPE 2 DIABETES MELLITUS WITH HYPERGLYCEMIA (HCC): ICD-10-CM

## 2020-04-13 DIAGNOSIS — F51.5 NIGHTMARE DISORDER: Primary | ICD-10-CM

## 2020-04-13 PROCEDURE — 99214 OFFICE O/P EST MOD 30 MIN: CPT | Performed by: FAMILY MEDICINE

## 2020-04-13 RX ORDER — PRAZOSIN HYDROCHLORIDE 1 MG/1
CAPSULE ORAL
Qty: 90 CAPSULE | Refills: 0 | Status: SHIPPED | OUTPATIENT
Start: 2020-04-13 | End: 2020-04-20 | Stop reason: SINTOL

## 2020-04-14 ENCOUNTER — TELEPHONE (OUTPATIENT)
Dept: NEPHROLOGY | Facility: CLINIC | Age: 55
End: 2020-04-14

## 2020-04-14 DIAGNOSIS — N30.00 ACUTE CYSTITIS WITHOUT HEMATURIA: Primary | ICD-10-CM

## 2020-04-14 RX ORDER — SULFAMETHOXAZOLE AND TRIMETHOPRIM 800; 160 MG/1; MG/1
1 TABLET ORAL EVERY 12 HOURS SCHEDULED
Qty: 10 TABLET | Refills: 0 | Status: SHIPPED | OUTPATIENT
Start: 2020-04-14 | End: 2020-04-19

## 2020-04-15 ENCOUNTER — OFFICE VISIT (OUTPATIENT)
Dept: BARIATRICS | Facility: CLINIC | Age: 55
End: 2020-04-15

## 2020-04-15 VITALS — HEIGHT: 58 IN | WEIGHT: 196 LBS | BODY MASS INDEX: 41.14 KG/M2

## 2020-04-15 DIAGNOSIS — E66.01 MORBID (SEVERE) OBESITY DUE TO EXCESS CALORIES (HCC): Primary | ICD-10-CM

## 2020-04-15 DIAGNOSIS — Z98.84 BARIATRIC SURGERY STATUS: ICD-10-CM

## 2020-04-15 PROCEDURE — RECHECK

## 2020-04-16 ENCOUNTER — TELEMEDICINE (OUTPATIENT)
Dept: FAMILY MEDICINE CLINIC | Facility: CLINIC | Age: 55
End: 2020-04-16
Payer: COMMERCIAL

## 2020-04-16 DIAGNOSIS — R42 VERTIGO: ICD-10-CM

## 2020-04-16 DIAGNOSIS — R11.0 NAUSEA: Primary | ICD-10-CM

## 2020-04-16 PROCEDURE — 99442 PR PHYS/QHP TELEPHONE EVALUATION 11-20 MIN: CPT | Performed by: NURSE PRACTITIONER

## 2020-04-20 ENCOUNTER — TELEMEDICINE (OUTPATIENT)
Dept: FAMILY MEDICINE CLINIC | Facility: CLINIC | Age: 55
End: 2020-04-20
Payer: COMMERCIAL

## 2020-04-20 DIAGNOSIS — F51.5 NIGHTMARE DISORDER: Primary | ICD-10-CM

## 2020-04-20 DIAGNOSIS — E11.65 UNCONTROLLED TYPE 2 DIABETES MELLITUS WITH HYPERGLYCEMIA (HCC): ICD-10-CM

## 2020-04-20 PROCEDURE — 99214 OFFICE O/P EST MOD 30 MIN: CPT | Performed by: FAMILY MEDICINE

## 2020-04-20 RX ORDER — LIRAGLUTIDE 6 MG/ML
3 INJECTION, SOLUTION SUBCUTANEOUS
Qty: 30 PEN | Refills: 3 | Status: SHIPPED | OUTPATIENT
Start: 2020-04-20 | End: 2020-10-23 | Stop reason: ALTCHOICE

## 2020-05-27 ENCOUNTER — OFFICE VISIT (OUTPATIENT)
Dept: FAMILY MEDICINE CLINIC | Facility: CLINIC | Age: 55
End: 2020-05-27
Payer: COMMERCIAL

## 2020-05-27 ENCOUNTER — TELEPHONE (OUTPATIENT)
Dept: ADMINISTRATIVE | Facility: OTHER | Age: 55
End: 2020-05-27

## 2020-05-27 VITALS
SYSTOLIC BLOOD PRESSURE: 118 MMHG | HEIGHT: 58 IN | TEMPERATURE: 98 F | BODY MASS INDEX: 40.51 KG/M2 | DIASTOLIC BLOOD PRESSURE: 68 MMHG | WEIGHT: 193 LBS | HEART RATE: 70 BPM

## 2020-05-27 DIAGNOSIS — E66.01 OBESITY, CLASS III, BMI 40-49.9 (MORBID OBESITY) (HCC): ICD-10-CM

## 2020-05-27 DIAGNOSIS — F32.1 CURRENT MODERATE EPISODE OF MAJOR DEPRESSIVE DISORDER WITHOUT PRIOR EPISODE (HCC): ICD-10-CM

## 2020-05-27 DIAGNOSIS — R06.83 SNORING: ICD-10-CM

## 2020-05-27 DIAGNOSIS — E11.65 UNCONTROLLED TYPE 2 DIABETES MELLITUS WITH HYPERGLYCEMIA (HCC): Primary | ICD-10-CM

## 2020-05-27 DIAGNOSIS — E11.21 DIABETIC NEPHROPATHY ASSOCIATED WITH TYPE 2 DIABETES MELLITUS (HCC): ICD-10-CM

## 2020-05-27 PROCEDURE — 3074F SYST BP LT 130 MM HG: CPT | Performed by: FAMILY MEDICINE

## 2020-05-27 PROCEDURE — 99214 OFFICE O/P EST MOD 30 MIN: CPT | Performed by: FAMILY MEDICINE

## 2020-05-27 PROCEDURE — 3066F NEPHROPATHY DOC TX: CPT | Performed by: FAMILY MEDICINE

## 2020-05-27 PROCEDURE — 3046F HEMOGLOBIN A1C LEVEL >9.0%: CPT | Performed by: FAMILY MEDICINE

## 2020-05-27 PROCEDURE — 3078F DIAST BP <80 MM HG: CPT | Performed by: FAMILY MEDICINE

## 2020-05-27 PROCEDURE — 1036F TOBACCO NON-USER: CPT | Performed by: FAMILY MEDICINE

## 2020-05-27 PROCEDURE — 3008F BODY MASS INDEX DOCD: CPT | Performed by: FAMILY MEDICINE

## 2020-05-27 RX ORDER — TRAZODONE HYDROCHLORIDE 50 MG/1
50 TABLET ORAL
Qty: 30 TABLET | Refills: 0 | Status: SHIPPED | OUTPATIENT
Start: 2020-05-27 | End: 2020-06-26 | Stop reason: SDUPTHER

## 2020-05-27 RX ORDER — SERTRALINE HYDROCHLORIDE 25 MG/1
25 TABLET, FILM COATED ORAL DAILY
Qty: 30 TABLET | Refills: 3 | Status: SHIPPED | OUTPATIENT
Start: 2020-05-27 | End: 2020-06-26 | Stop reason: SDUPTHER

## 2020-05-27 RX ORDER — INSULIN ASPART 100 [IU]/ML
15-30 INJECTION, SOLUTION INTRAVENOUS; SUBCUTANEOUS 2 TIMES DAILY WITH MEALS
Qty: 5 PEN | Refills: 3 | Status: SHIPPED | OUTPATIENT
Start: 2020-05-27 | End: 2020-09-01 | Stop reason: HOSPADM

## 2020-05-28 ENCOUNTER — TELEPHONE (OUTPATIENT)
Dept: BARIATRICS | Facility: CLINIC | Age: 55
End: 2020-05-28

## 2020-05-29 ENCOUNTER — PREP FOR PROCEDURE (OUTPATIENT)
Dept: BARIATRICS | Facility: CLINIC | Age: 55
End: 2020-05-29

## 2020-05-29 DIAGNOSIS — I10 HYPERTENSION, ESSENTIAL: ICD-10-CM

## 2020-05-29 DIAGNOSIS — IMO0002 DIABETES MELLITUS TYPE 2, UNCONTROLLED: ICD-10-CM

## 2020-05-29 DIAGNOSIS — E66.01 MORBID OBESITY (HCC): Primary | ICD-10-CM

## 2020-05-29 DIAGNOSIS — E78.5 HYPERLIPIDEMIA: ICD-10-CM

## 2020-05-29 DIAGNOSIS — G47.33 OSA (OBSTRUCTIVE SLEEP APNEA): ICD-10-CM

## 2020-06-19 ENCOUNTER — HOSPITAL ENCOUNTER (OUTPATIENT)
Dept: RADIOLOGY | Facility: HOSPITAL | Age: 55
Discharge: HOME | End: 2020-06-19

## 2020-06-19 ENCOUNTER — OFFICE VISIT (OUTPATIENT)
Dept: OBGYN CLINIC | Facility: HOSPITAL | Age: 55
End: 2020-06-19
Payer: COMMERCIAL

## 2020-06-19 DIAGNOSIS — R52 PAIN: ICD-10-CM

## 2020-06-19 DIAGNOSIS — R52 PAIN: Primary | ICD-10-CM

## 2020-06-19 PROCEDURE — 1036F TOBACCO NON-USER: CPT | Performed by: PHYSICIAN ASSISTANT

## 2020-06-19 PROCEDURE — 3074F SYST BP LT 130 MM HG: CPT | Performed by: PHYSICIAN ASSISTANT

## 2020-06-19 PROCEDURE — 3078F DIAST BP <80 MM HG: CPT | Performed by: PHYSICIAN ASSISTANT

## 2020-06-19 PROCEDURE — 99213 OFFICE O/P EST LOW 20 MIN: CPT | Performed by: PHYSICIAN ASSISTANT

## 2020-06-19 PROCEDURE — 3046F HEMOGLOBIN A1C LEVEL >9.0%: CPT | Performed by: PHYSICIAN ASSISTANT

## 2020-06-19 PROCEDURE — 3066F NEPHROPATHY DOC TX: CPT | Performed by: PHYSICIAN ASSISTANT

## 2020-06-19 RX ORDER — OMEPRAZOLE 40 MG/1
40 CAPSULE, DELAYED RELEASE ORAL
COMMUNITY
End: 2020-09-01 | Stop reason: HOSPADM

## 2020-06-26 ENCOUNTER — OFFICE VISIT (OUTPATIENT)
Dept: FAMILY MEDICINE CLINIC | Facility: CLINIC | Age: 55
End: 2020-06-26
Payer: COMMERCIAL

## 2020-06-26 VITALS
TEMPERATURE: 98.1 F | HEART RATE: 74 BPM | DIASTOLIC BLOOD PRESSURE: 72 MMHG | HEIGHT: 58 IN | BODY MASS INDEX: 41.14 KG/M2 | WEIGHT: 196 LBS | SYSTOLIC BLOOD PRESSURE: 122 MMHG

## 2020-06-26 DIAGNOSIS — Z12.31 ENCOUNTER FOR SCREENING MAMMOGRAM FOR BREAST CANCER: ICD-10-CM

## 2020-06-26 DIAGNOSIS — E11.65 UNCONTROLLED TYPE 2 DIABETES MELLITUS WITH HYPERGLYCEMIA (HCC): ICD-10-CM

## 2020-06-26 DIAGNOSIS — E66.01 CLASS 3 SEVERE OBESITY DUE TO EXCESS CALORIES WITH SERIOUS COMORBIDITY AND BODY MASS INDEX (BMI) OF 40.0 TO 44.9 IN ADULT (HCC): ICD-10-CM

## 2020-06-26 DIAGNOSIS — M54.2 CERVICALGIA: Primary | ICD-10-CM

## 2020-06-26 DIAGNOSIS — F32.1 CURRENT MODERATE EPISODE OF MAJOR DEPRESSIVE DISORDER WITHOUT PRIOR EPISODE (HCC): ICD-10-CM

## 2020-06-26 DIAGNOSIS — I10 ESSENTIAL HYPERTENSION: ICD-10-CM

## 2020-06-26 DIAGNOSIS — K21.9 GASTROESOPHAGEAL REFLUX DISEASE WITHOUT ESOPHAGITIS: ICD-10-CM

## 2020-06-26 DIAGNOSIS — Z12.4 CERVICAL CANCER SCREENING: ICD-10-CM

## 2020-06-26 PROBLEM — F51.5 NIGHTMARE DISORDER: Status: RESOLVED | Noted: 2020-04-13 | Resolved: 2020-06-26

## 2020-06-26 PROBLEM — L29.9 ITCHING: Status: RESOLVED | Noted: 2019-09-11 | Resolved: 2020-06-26

## 2020-06-26 PROBLEM — R60.9 DEPENDENT EDEMA: Status: RESOLVED | Noted: 2019-09-11 | Resolved: 2020-06-26

## 2020-06-26 PROCEDURE — 3078F DIAST BP <80 MM HG: CPT | Performed by: FAMILY MEDICINE

## 2020-06-26 PROCEDURE — 99214 OFFICE O/P EST MOD 30 MIN: CPT | Performed by: FAMILY MEDICINE

## 2020-06-26 PROCEDURE — 3008F BODY MASS INDEX DOCD: CPT | Performed by: FAMILY MEDICINE

## 2020-06-26 PROCEDURE — 3066F NEPHROPATHY DOC TX: CPT | Performed by: FAMILY MEDICINE

## 2020-06-26 PROCEDURE — 1036F TOBACCO NON-USER: CPT | Performed by: FAMILY MEDICINE

## 2020-06-26 PROCEDURE — 3046F HEMOGLOBIN A1C LEVEL >9.0%: CPT | Performed by: FAMILY MEDICINE

## 2020-06-26 PROCEDURE — 3074F SYST BP LT 130 MM HG: CPT | Performed by: FAMILY MEDICINE

## 2020-06-26 RX ORDER — TRAZODONE HYDROCHLORIDE 50 MG/1
50 TABLET ORAL
Qty: 90 TABLET | Refills: 1 | Status: SHIPPED | OUTPATIENT
Start: 2020-06-26 | End: 2021-01-22 | Stop reason: SDUPTHER

## 2020-06-26 RX ORDER — SERTRALINE HYDROCHLORIDE 25 MG/1
25 TABLET, FILM COATED ORAL DAILY
Qty: 90 TABLET | Refills: 1 | Status: SHIPPED | OUTPATIENT
Start: 2020-06-26 | End: 2021-01-22

## 2020-07-10 DIAGNOSIS — E11.65 UNCONTROLLED TYPE 2 DIABETES MELLITUS WITH HYPERGLYCEMIA (HCC): Primary | ICD-10-CM

## 2020-07-14 DIAGNOSIS — IMO0002 DIABETES MELLITUS TYPE 2, UNCONTROLLED: Primary | ICD-10-CM

## 2020-07-20 ENCOUNTER — TELEPHONE (OUTPATIENT)
Dept: BARIATRICS | Facility: CLINIC | Age: 55
End: 2020-07-20

## 2020-07-20 NOTE — TELEPHONE ENCOUNTER
Returned pt's phone call  Pt had questions about pre-op diet  Pt is scheduled for bariatric surgery on 8/3 therefore is starting pre-op liver shrinking diet today  She states she purchased the [address] drink that has 30gm protein and 2gm of sugar  Advised to drink at least 3 per day  Pt aware can do 2 cups of non-starchy veggies, bone/chicken broth, 80oz calorie free beverages and SF jell-o or pop in between as needed  Pt states she will talk to her MD that prescribes her DM meds since she will be consuming <50gm carbs  Also, asked pt about last A1c of 10 0 in April  Pt states she took herself off her oral DM meds, but once she got that high A1c she started back up and is due for f/u blood work this week  Advised pt we do like under at least 10 0 for surgery for best healing post-op  Pt verbalizes understanding and without further questions at this time

## 2020-07-24 ENCOUNTER — TRANSCRIBE ORDERS (OUTPATIENT)
Dept: LAB | Facility: CLINIC | Age: 55
End: 2020-07-24

## 2020-07-24 ENCOUNTER — APPOINTMENT (OUTPATIENT)
Dept: LAB | Facility: CLINIC | Age: 55
End: 2020-07-24
Payer: COMMERCIAL

## 2020-07-24 DIAGNOSIS — N30.00 ACUTE CYSTITIS WITHOUT HEMATURIA: Primary | ICD-10-CM

## 2020-07-24 DIAGNOSIS — R79.89 ELEVATED SERUM CREATININE: ICD-10-CM

## 2020-07-24 DIAGNOSIS — I10 ESSENTIAL HYPERTENSION: ICD-10-CM

## 2020-07-24 DIAGNOSIS — R80.1 PERSISTENT PROTEINURIA: ICD-10-CM

## 2020-07-24 DIAGNOSIS — R82.71 BACTERIURIA: Primary | ICD-10-CM

## 2020-07-24 DIAGNOSIS — I10 PRIMARY HYPERTENSION: ICD-10-CM

## 2020-07-24 DIAGNOSIS — E11.29 MICROALBUMINURIA DUE TO TYPE 2 DIABETES MELLITUS (HCC): ICD-10-CM

## 2020-07-24 DIAGNOSIS — E83.42 HYPOMAGNESEMIA: ICD-10-CM

## 2020-07-24 DIAGNOSIS — R80.9 MICROALBUMINURIA DUE TO TYPE 2 DIABETES MELLITUS (HCC): ICD-10-CM

## 2020-07-24 DIAGNOSIS — R82.71 BACTERIURIA: ICD-10-CM

## 2020-07-24 DIAGNOSIS — E11.65 UNCONTROLLED TYPE 2 DIABETES MELLITUS WITH HYPERGLYCEMIA (HCC): ICD-10-CM

## 2020-07-24 DIAGNOSIS — E55.9 VITAMIN D DEFICIENCY: ICD-10-CM

## 2020-07-24 DIAGNOSIS — E78.2 MIXED HYPERLIPIDEMIA: ICD-10-CM

## 2020-07-24 LAB
25(OH)D3 SERPL-MCNC: 27.7 NG/ML (ref 30–100)
ALBUMIN SERPL BCP-MCNC: 3.7 G/DL (ref 3.5–5)
ANION GAP SERPL CALCULATED.3IONS-SCNC: 7 MMOL/L (ref 4–13)
BACTERIA UR QL AUTO: ABNORMAL /HPF
BILIRUB UR QL STRIP: NEGATIVE
BUN SERPL-MCNC: 24 MG/DL (ref 5–25)
CA-I BLD-SCNC: 1.28 MMOL/L (ref 1.12–1.32)
CALCIUM ALBUM COR SERPL-MCNC: 10.7 MG/DL (ref 8.3–10.1)
CALCIUM SERPL-MCNC: 10.5 MG/DL (ref 8.3–10.1)
CALCIUM SERPL-MCNC: 10.5 MG/DL (ref 8.3–10.1)
CHLORIDE SERPL-SCNC: 105 MMOL/L (ref 100–108)
CHOLEST SERPL-MCNC: 110 MG/DL (ref 50–200)
CLARITY UR: ABNORMAL
CO2 SERPL-SCNC: 24 MMOL/L (ref 21–32)
COLOR UR: YELLOW
CREAT SERPL-MCNC: 1.04 MG/DL (ref 0.6–1.3)
CREAT UR-MCNC: 143 MG/DL
EST. AVERAGE GLUCOSE BLD GHB EST-MCNC: 212 MG/DL
GFR SERPL CREATININE-BSD FRML MDRD: 62 ML/MIN/1.73SQ M
GLUCOSE P FAST SERPL-MCNC: 132 MG/DL (ref 65–99)
GLUCOSE UR STRIP-MCNC: ABNORMAL MG/DL
HBA1C MFR BLD: 9 %
HDLC SERPL-MCNC: 35 MG/DL
HGB UR QL STRIP.AUTO: NEGATIVE
KETONES UR STRIP-MCNC: ABNORMAL MG/DL
LDLC SERPL CALC-MCNC: 35 MG/DL (ref 0–100)
LEUKOCYTE ESTERASE UR QL STRIP: ABNORMAL
MAGNESIUM SERPL-MCNC: 2 MG/DL (ref 1.6–2.6)
NITRITE UR QL STRIP: POSITIVE
NON-SQ EPI CELLS URNS QL MICRO: ABNORMAL /HPF
PH UR STRIP.AUTO: 5.5 [PH]
PHOSPHATE SERPL-MCNC: 4.9 MG/DL (ref 2.7–4.5)
POTASSIUM SERPL-SCNC: 4.7 MMOL/L (ref 3.5–5.3)
PROT UR STRIP-MCNC: NEGATIVE MG/DL
PROT UR-MCNC: 17 MG/DL
PROT/CREAT UR: 0.12 MG/G{CREAT} (ref 0–0.1)
RBC #/AREA URNS AUTO: ABNORMAL /HPF
SODIUM SERPL-SCNC: 136 MMOL/L (ref 136–145)
SP GR UR STRIP.AUTO: 1.02 (ref 1–1.03)
TRIGL SERPL-MCNC: 201 MG/DL
UROBILINOGEN UR QL STRIP.AUTO: 0.2 E.U./DL
WBC #/AREA URNS AUTO: ABNORMAL /HPF

## 2020-07-24 PROCEDURE — 84156 ASSAY OF PROTEIN URINE: CPT

## 2020-07-24 PROCEDURE — 80048 BASIC METABOLIC PNL TOTAL CA: CPT

## 2020-07-24 PROCEDURE — 82040 ASSAY OF SERUM ALBUMIN: CPT

## 2020-07-24 PROCEDURE — 36415 COLL VENOUS BLD VENIPUNCTURE: CPT

## 2020-07-24 PROCEDURE — 82306 VITAMIN D 25 HYDROXY: CPT

## 2020-07-24 PROCEDURE — 80061 LIPID PANEL: CPT

## 2020-07-24 PROCEDURE — 84100 ASSAY OF PHOSPHORUS: CPT

## 2020-07-24 PROCEDURE — 87186 SC STD MICRODIL/AGAR DIL: CPT

## 2020-07-24 PROCEDURE — 82330 ASSAY OF CALCIUM: CPT

## 2020-07-24 PROCEDURE — 83036 HEMOGLOBIN GLYCOSYLATED A1C: CPT

## 2020-07-24 PROCEDURE — 81001 URINALYSIS AUTO W/SCOPE: CPT

## 2020-07-24 PROCEDURE — 87077 CULTURE AEROBIC IDENTIFY: CPT

## 2020-07-24 PROCEDURE — 82570 ASSAY OF URINE CREATININE: CPT

## 2020-07-24 PROCEDURE — 83735 ASSAY OF MAGNESIUM: CPT

## 2020-07-24 PROCEDURE — 87086 URINE CULTURE/COLONY COUNT: CPT

## 2020-07-26 LAB — BACTERIA UR CULT: ABNORMAL

## 2020-07-27 ENCOUNTER — TELEPHONE (OUTPATIENT)
Dept: FAMILY MEDICINE CLINIC | Facility: CLINIC | Age: 55
End: 2020-07-27

## 2020-07-27 RX ORDER — SULFAMETHOXAZOLE AND TRIMETHOPRIM 800; 160 MG/1; MG/1
1 TABLET ORAL 2 TIMES DAILY
Qty: 6 TABLET | Refills: 0 | Status: SHIPPED | OUTPATIENT
Start: 2020-07-27 | End: 2020-07-30

## 2020-07-27 NOTE — TELEPHONE ENCOUNTER
Please call the patient with these instructions for her diabetes medication prior to her bariatric surgery:    I would recommend the followin  Stop the Xigduo the day of the surgery and do not resume unless instructed to do so  We will not replace this with anything else  2  Continue Saxenda through pre- and post-op   3  The night prior to the surgery, take 1/2 her dose of Toujeo U-300, which would be 14u HS   4  Following surgery, don't take short acting insulin (Novolog), and then submit records of your pre-meal glucose levels for 3 days post-op and await physician instruction  Please tell her she can log her glucose in [employer], or she can call and leave me a voicemail her her glucose levels

## 2020-07-28 DIAGNOSIS — Z20.822 COVID-19 RULED OUT BY LABORATORY TESTING: ICD-10-CM

## 2020-07-28 PROCEDURE — U0003 INFECTIOUS AGENT DETECTION BY NUCLEIC ACID (DNA OR RNA); SEVERE ACUTE RESPIRATORY SYNDROME CORONAVIRUS 2 (SARS-COV-2) (CORONAVIRUS DISEASE [COVID-19]), AMPLIFIED PROBE TECHNIQUE, MAKING USE OF HIGH THROUGHPUT TECHNOLOGIES AS DESCRIBED BY CMS-2020-01-R: HCPCS

## 2020-07-28 NOTE — PRE-PROCEDURE INSTRUCTIONS
My Surgical Experience    The following information was developed to assist you to prepare for your operation  What do I need to do before coming to the hospital?   Arrange for a responsible person to drive you to and from the hospital    Arrange care for your children at home  Children are not allowed in the recovery areas of the hospital   Plan to wear clothing that is easy to put on and take off  If you are having shoulder surgery, wear a shirt that buttons or zippers in the front  Bathing  o Shower the evening before and the morning of your surgery with an antibacterial soap  Please refer to the Pre Op Showering Instructions for Surgery Patients Sheet   o Remove nail polish and all body piercing jewelry  o Do not shave any body part for at least 24 hours before surgery-this includes face, arms, legs and upper body  Food  o Nothing to eat or drink after midnight the night before your surgery  This includes candy and chewing gum  o Exception: If your surgery is after 12:00pm (noon), you may have clear liquids such as 7-Up®, ginger ale, apple or cranberry juice, Jell-O®, water, or clear broth until 8:00 am  o Do not drink milk or juice with pulp on the morning before surgery  o Do not drink alcohol 24 hours before surgery  Medicine  o Follow instructions you received from your surgeon about which medicines you may take on the day of surgery  o If instructed to take medicine on the morning of surgery, take pills with just a small sip of water  Call your prescribing doctor for specific infroamtion on what to do if you take insulin    What should I bring to the hospital?    Bring:  Rogers Downing or a walker, if you have them, for foot or knee surgery   A list of the daily medicines, vitamins, minerals, herbals and nutritional supplements you take   Include the dosages of medicines and the time you take them each day   Glasses, dentures or hearing aids   Minimal clothing; you will be wearing hospital katy   Photo ID; required to verify your identity   If you have a Living Will or Power of , bring a copy of the documents   If you have an ostomy, bring an extra pouch and any supplies you use    Do not bring   Medicines or inhalers   Money, valuables or jewelry    What other information should I know about the day of surgery?  Notify your surgeons if you develop a cold, sore throat, cough, fever, rash or any other illness   Report to the Ambulatory Surgical/Same Day Surgery Unit   You will be instructed to stop at Registration only if you have not been pre-registered   Inform your  fi they do not stay that they will be asked by the staff to leave a phone number where they can be reached   Be available to be reached before surgery  In the event the operating room schedule changes, you may be asked to come in earlier or later than expected    *It is important to tell your doctor and others involved in your health care if you are taking or have been taking any non-prescription drugs, vitamins, minerals, herbals or other nutritional supplements  Any of these may interact with some food or medicines and cause a reaction      Pre-Surgery Instructions:   Medication Instructions    aspirin (ECOTRIN LOW STRENGTH) 81 mg EC tablet Instructed patient per Anesthesia Guidelines   azelastine (ASTELIN) 0 1 % nasal spray Instructed patient per Anesthesia Guidelines   Blood Glucose Monitoring Suppl (FREESTYLE LITE) GREG Instructed patient per Anesthesia Guidelines   clindamycin-benzoyl peroxide (BENZACLIN) gel Instructed patient per Anesthesia Guidelines   Dapagliflozin-metFORMIN HCl ER 2 5-1000 MG TB24 Instructed patient per Anesthesia Guidelines   FreeStyle Unistick II Lancets MISC Instructed patient per Anesthesia Guidelines   glucose blood (FREESTYLE LITE) test strip Instructed patient per Anesthesia Guidelines      insulin glargine (Toujeo SoloStar) 300 units/mL CONCETRATED U-300 injection pen (1-unit dial) Instructed patient per Anesthesia Guidelines   Insulin Pen Needle (BD Pen Needle Zhane U/F) 32G X 4 MM MISC Instructed patient per Anesthesia Guidelines   Insulin Pen Needle 32G X 5 MM MISC Instructed patient per Anesthesia Guidelines   lisinopril (ZESTRIL) 20 mg tablet Instructed patient per Anesthesia Guidelines   magnesium oxide (MAG-OX) 400 mg Instructed patient per Anesthesia Guidelines   mometasone (Nasonex) 50 mcg/act nasal spray Instructed patient per Anesthesia Guidelines   naproxen sodium (ALEVE) 220 MG tablet Instructed patient per Anesthesia Guidelines   NOVOLOG FLEXPEN 100 units/mL SOPN Instructed patient per Anesthesia Guidelines   omeprazole (PriLOSEC) 40 MG capsule Instructed patient per Anesthesia Guidelines   rosuvastatin (CRESTOR) 20 MG tablet Instructed patient per Anesthesia Guidelines   SAXENDA injection Instructed patient per Anesthesia Guidelines   sertraline (ZOLOFT) 25 mg tablet Instructed patient per Anesthesia Guidelines   sulfamethoxazole-trimethoprim (BACTRIM DS) 800-160 mg per tablet Instructed patient per Anesthesia Guidelines   traZODone (DESYREL) 50 mg tablet Patient was instructed by Physician and understands

## 2020-07-29 LAB — SARS-COV-2 RNA SPEC QL NAA+PROBE: NOT DETECTED

## 2020-07-30 ENCOUNTER — TELEPHONE (OUTPATIENT)
Dept: NEPHROLOGY | Facility: CLINIC | Age: 55
End: 2020-07-30

## 2020-07-30 ENCOUNTER — TELEPHONE (OUTPATIENT)
Dept: BARIATRICS | Facility: CLINIC | Age: 55
End: 2020-07-30

## 2020-07-30 DIAGNOSIS — E83.52 HYPERCALCEMIA: Primary | ICD-10-CM

## 2020-07-30 NOTE — TELEPHONE ENCOUNTER
Calcium 10 7 corrected  Phosphorus slightly elevated  Renal function stable at creatinine 1 0    Discussed labs with the patient in detail  Previously checked SPEP UPEP, 125 vitamin-D, PTH within normal limit  Vitamin-D level on repeat labs was 27 7  Plan  Check PTH RP and a m   Cortisol  Recommended avoiding calcium supplement in the setting of elevated calcium level  Patient will be going for gastric sleeve procedure/surgery next week  Will plan for checking labs and BMP after that on August 10th

## 2020-07-31 ENCOUNTER — OFFICE VISIT (OUTPATIENT)
Dept: BARIATRICS | Facility: CLINIC | Age: 55
End: 2020-07-31
Payer: COMMERCIAL

## 2020-07-31 VITALS
DIASTOLIC BLOOD PRESSURE: 64 MMHG | RESPIRATION RATE: 14 BRPM | TEMPERATURE: 97.6 F | BODY MASS INDEX: 39.63 KG/M2 | HEIGHT: 58 IN | HEART RATE: 98 BPM | WEIGHT: 188.8 LBS | SYSTOLIC BLOOD PRESSURE: 98 MMHG

## 2020-07-31 DIAGNOSIS — E11.65 UNCONTROLLED TYPE 2 DIABETES MELLITUS WITH HYPERGLYCEMIA (HCC): ICD-10-CM

## 2020-07-31 DIAGNOSIS — E78.5 HYPERLIPIDEMIA, UNSPECIFIED HYPERLIPIDEMIA TYPE: ICD-10-CM

## 2020-07-31 DIAGNOSIS — E11.21 DIABETIC NEPHROPATHY ASSOCIATED WITH TYPE 2 DIABETES MELLITUS (HCC): ICD-10-CM

## 2020-07-31 DIAGNOSIS — E66.01 OBESITY, CLASS III, BMI 40-49.9 (MORBID OBESITY) (HCC): Primary | ICD-10-CM

## 2020-07-31 DIAGNOSIS — E11.319 DIABETIC RETINOPATHY OF BOTH EYES ASSOCIATED WITH TYPE 2 DIABETES MELLITUS, MACULAR EDEMA PRESENCE UNSPECIFIED, UNSPECIFIED RETINOPATHY SEVERITY (HCC): ICD-10-CM

## 2020-07-31 DIAGNOSIS — G47.33 OSA (OBSTRUCTIVE SLEEP APNEA): ICD-10-CM

## 2020-07-31 DIAGNOSIS — I10 ESSENTIAL HYPERTENSION: ICD-10-CM

## 2020-07-31 PROCEDURE — 1036F TOBACCO NON-USER: CPT | Performed by: SURGERY

## 2020-07-31 PROCEDURE — 3078F DIAST BP <80 MM HG: CPT | Performed by: SURGERY

## 2020-07-31 PROCEDURE — 99213 OFFICE O/P EST LOW 20 MIN: CPT | Performed by: SURGERY

## 2020-07-31 PROCEDURE — 3046F HEMOGLOBIN A1C LEVEL >9.0%: CPT | Performed by: SURGERY

## 2020-07-31 PROCEDURE — 3066F NEPHROPATHY DOC TX: CPT | Performed by: SURGERY

## 2020-07-31 PROCEDURE — 3008F BODY MASS INDEX DOCD: CPT | Performed by: SURGERY

## 2020-07-31 PROCEDURE — 3074F SYST BP LT 130 MM HG: CPT | Performed by: SURGERY

## 2020-07-31 RX ORDER — LEVOFLOXACIN 5 MG/ML
750 INJECTION, SOLUTION INTRAVENOUS ONCE
Status: CANCELLED | OUTPATIENT
Start: 2020-08-03 | End: 2020-07-31

## 2020-07-31 RX ORDER — SCOLOPAMINE TRANSDERMAL SYSTEM 1 MG/1
1 PATCH, EXTENDED RELEASE TRANSDERMAL ONCE
Status: CANCELLED | OUTPATIENT
Start: 2020-08-03 | End: 2020-07-31

## 2020-07-31 RX ORDER — CELECOXIB 100 MG/1
200 CAPSULE ORAL ONCE
Status: CANCELLED | OUTPATIENT
Start: 2020-08-03 | End: 2020-07-31

## 2020-07-31 RX ORDER — GABAPENTIN 300 MG/1
600 CAPSULE ORAL ONCE
Status: CANCELLED | OUTPATIENT
Start: 2020-08-03 | End: 2020-07-31

## 2020-07-31 RX ORDER — OXYCODONE HYDROCHLORIDE 5 MG/1
5 TABLET ORAL EVERY 4 HOURS PRN
Qty: 10 TABLET | Refills: 0 | Status: SHIPPED | OUTPATIENT
Start: 2020-07-31 | End: 2020-10-23 | Stop reason: ALTCHOICE

## 2020-07-31 RX ORDER — ACETAMINOPHEN 325 MG/1
975 TABLET ORAL ONCE
Status: CANCELLED | OUTPATIENT
Start: 2020-08-03 | End: 2020-07-31

## 2020-07-31 NOTE — PROGRESS NOTES
H&P Exam - Bariatric Surgery   Rosa Bella 48 y o  female MRN: [id number]   Encounter: [id number]      HPI:    48 y o  yo morbidly obese female found to be a good candidate to undergo a weight loss operation upon being enrolled here at the [facility]   She has been pre certified to undergo a Laparoscopic Sleeve gastrectomy  I have discussed with the patient that 20-30% of patient's may experience worsened GERD and 18% risk of Hwang's esophagitis requiring surveillance EGDs after a sleeve gastrectomy  The patient understands this fully and accepts the risks to undergo a sleeve gastrectomy  ++Suffers from DM2 (15yrs on insulin + oral + saxenda), HTN, HLD, carpal tunnel L hand, АНДРЕЙ, GERD after medication use (last episode July 2019)  S/p C-sxn x2, lap tubal ligation, CTR R    Here today to review her pre op test results  Has been medically cleared for the procedure  I have explained our Enhanced Recovery After Bariatric Surgery (ERABS) protocol and benefits including preoperative, intraoperative and postoperative elements  I have discussed with her at length the risks and benefits of the operation and reiterated the components of our multidisciplinary program and the importance of compliance and follow up in the post operative period  Although there is a great statistical chance of improvement or even resolution of most of her associated comorbidities, the results vary from patient to patient and they largely depend on his commitment  The patient was also instructed with regards to the importance of behavior modification, nutritional counseling, support meeting attendance and lifestyle changes that are important to ensure success  She was given the opportunity to ask questions and I have answered all of them      I have addressed with the patient the level of CODE STATUS for this hospital stay and after explaining the different options currently she wishes to be a Level I    She understands and wishes to proceed  She has lost all the weight required prior to surgery  Review of Systems   Constitutional: Negative  Respiratory: Negative  Cardiovascular: Negative  Gastrointestinal: Negative  Musculoskeletal: Negative  Neurological: Negative  All other systems reviewed and are negative        Historical Information   Past Medical History:   Diagnosis Date    Anemia     Last Assessed:  3/27/13a    Arthritis     Asthma     Blurred vision     Chronic kidney disease     Diabetes mellitus ([facility] [address] [facility]     was in touch with PCP re [facility]- she adjusted insulin doses and is seeing surgeon 20    Diabetic retinopathy ([facility] [address] [facility]     Dream enactment behavior     Hypercholesterolemia     Hyperlipidemia     Hypertension     Increased urinary frequency     Irritable bowel syndrome     Last Assessed:  10/2/12    Joint pain     Morbid obesity with BMI of 40 0-44 9, adult (formerly [facility])     Nausea     Night sweat     Nightmare disorder 2020    Numbness     UTI (urinary tract infection)     Weakness      Past Surgical History:   Procedure Laterality Date    ABDOMINAL SURGERY      CARPAL TUNNEL RELEASE Right      SECTION      x 2    COLONOSCOPY  2016    COLONOSCOPY      [place] WRIST Deborra Levo LIG Left 2019    Procedure: RELEASE CARPAL TUNNEL ENDOSCOPIC-left;  Surgeon: Akbar Torres MD;  Location: [facility] OR;  Service: Orthopedics    TUBAL LIGATION      WISDOM TOOTH EXTRACTION       Social History   Social History     Substance and Sexual Activity   Alcohol Use Yes    Comment: rarely     Social History     Substance and Sexual Activity   Drug Use No     Social History     Tobacco Use   Smoking Status Former Smoker    Types: Cigarettes    Last attempt to quit:  [address] Years since quittin 6   Smokeless Tobacco Former User     Family History: non-contributory    Meds/Allergies   all medications and allergies reviewed  Allergies Allergen Reactions    Iodinated Diagnostic Agents Facial Swelling    Iodine Facial Swelling    Shellfish-Derived Products Throat Swelling    Amoxicillin Hives    Metformin Diarrhea    Penicillins      Yeast Infection       Objective     Current Vitals:   Blood Pressure: 98/64 (07/31/20 1242)  Pulse: 98 (07/31/20 1242)  Temperature: 97 6 °F (36 4 °C) (07/31/20 1242)  Temp Source: Tympanic (07/31/20 1242)  Respirations: 14 (07/31/20 1242)  Height: 4' 10" (147 3 cm) (07/31/20 1242)  Weight - Scale: 85 6 kg (188 lb 12 8 oz) (07/31/20 1242)        Physical Exam   Constitutional: She is oriented to person, place, and time  She appears well-developed  HENT:   Head: Normocephalic  Eyes: EOM are normal    Neck: Normal range of motion  Cardiovascular: Normal rate and normal heart sounds  Pulmonary/Chest: Effort normal and breath sounds normal    Abdominal: Soft  She exhibits no distension  There is no tenderness  Musculoskeletal: Normal range of motion  Neurological: She is alert and oriented to person, place, and time  Skin: Skin is warm and dry  Psychiatric: She has a normal mood and affect  Her behavior is normal  Judgment and thought content normal        Lab Results: I have personally reviewed pertinent lab results  Imaging: I have personally reviewed pertinent reports  EKG, Pathology, and Other Studies: I have personally reviewed pertinent reports  Code Status: Level 1    Assessment:  48 y o  yo morbidly obese female found to be a good candidate to undergo a weight loss operation upon being enrolled here at the [address] [place] [address]  She has completed all of the preoperative process for bariatric surgery  Plan:  - Plan for laparoscopic possible open SG with intraoperative EGD  - I discussed the risk of rescheduling/canceling the case if goal weight not met    - She has lost all the required weight (189#)  - consent signed  - preop orders placed

## 2020-07-31 NOTE — H&P
H&P Exam - Bariatric Surgery   Vianney Cervantes 48 y o  female MRN: [id number]   Encounter: [id number]      HPI:    48 y o  yo morbidly obese female found to be a good candidate to undergo a weight loss operation upon being enrolled here at the [facility]   She has been pre certified to undergo a Laparoscopic Sleeve gastrectomy  I have discussed with the patient that 20-30% of patient's may experience worsened GERD and 18% risk of Hwang's esophagitis requiring surveillance EGDs after a sleeve gastrectomy  The patient understands this fully and accepts the risks to undergo a sleeve gastrectomy  ++Suffers from DM2 (15yrs on insulin + oral + saxenda), HTN, HLD, carpal tunnel L hand, АНДРЕЙ, GERD after medication use (last episode July 2019)  S/p C-sxn x2, lap tubal ligation, CTR R    Here today to review her pre op test results  Has been medically cleared for the procedure  I have explained our Enhanced Recovery After Bariatric Surgery (ERABS) protocol and benefits including preoperative, intraoperative and postoperative elements  I have discussed with her at length the risks and benefits of the operation and reiterated the components of our multidisciplinary program and the importance of compliance and follow up in the post operative period  Although there is a great statistical chance of improvement or even resolution of most of her associated comorbidities, the results vary from patient to patient and they largely depend on his commitment  The patient was also instructed with regards to the importance of behavior modification, nutritional counseling, support meeting attendance and lifestyle changes that are important to ensure success  She was given the opportunity to ask questions and I have answered all of them      I have addressed with the patient the level of CODE STATUS for this hospital stay and after explaining the different options currently she wishes to be a Level I    She understands and wishes to proceed  She has lost all the weight required prior to surgery  Review of Systems   Constitutional: Negative  Respiratory: Negative  Cardiovascular: Negative  Gastrointestinal: Negative  Musculoskeletal: Negative  Neurological: Negative  All other systems reviewed and are negative        Historical Information   Past Medical History:   Diagnosis Date    Anemia     Last Assessed:  3/27/13a    Arthritis     Asthma     Blurred vision     Chronic kidney disease     Diabetes mellitus ([facility] [address] [facility]     was in touch with PCP re [facility]- she adjusted insulin doses and is seeing surgeon 20    Diabetic retinopathy ([facility] [address] )     Dream enactment behavior     Hypercholesterolemia     Hyperlipidemia     Hypertension     Increased urinary frequency     Irritable bowel syndrome     Last Assessed:  10/2/12    Joint pain     Morbid obesity with BMI of 40 0-44 9, adult ([facility])     Nausea     Night sweat     Nightmare disorder 2020    Numbness     UTI (urinary tract infection)     Weakness      Past Surgical History:   Procedure Laterality Date    ABDOMINAL SURGERY      CARPAL TUNNEL RELEASE Right      SECTION      x 2    COLONOSCOPY  2016    COLONOSCOPY      [place] WRIST Kylie Brownie LIG Left 2019    Procedure: RELEASE CARPAL TUNNEL ENDOSCOPIC-left;  Surgeon: Chad Farnsworth MD;  Location:  MAIN OR;  Service: Orthopedics    TUBAL LIGATION      WISDOM TOOTH EXTRACTION       Social History   Social History     Substance and Sexual Activity   Alcohol Use Yes    Comment: rarely     Social History     Substance and Sexual Activity   Drug Use No     Social History     Tobacco Use   Smoking Status Former Smoker    Types: Cigarettes    Last attempt to quit: Ric[place] Apodaca Years since quittin 6   Smokeless Tobacco Former User     Family History: non-contributory    Meds/Allergies   all medications and allergies reviewed  Allergies Allergen Reactions    Iodinated Diagnostic Agents Facial Swelling    Iodine Facial Swelling    Shellfish-Derived Products Throat Swelling    Amoxicillin Hives    Metformin Diarrhea    Penicillins      Yeast Infection       Objective     Current Vitals:   Blood Pressure: 98/64 (07/31/20 1242)  Pulse: 98 (07/31/20 1242)  Temperature: 97 6 °F (36 4 °C) (07/31/20 1242)  Temp Source: Tympanic (07/31/20 1242)  Respirations: 14 (07/31/20 1242)  Height: 4' 10" (147 3 cm) (07/31/20 1242)  Weight - Scale: 85 6 kg (188 lb 12 8 oz) (07/31/20 1242)        Physical Exam   Constitutional: She is oriented to person, place, and time  She appears well-developed  HENT:   Head: Normocephalic  Eyes: EOM are normal    Neck: Normal range of motion  Cardiovascular: Normal rate and normal heart sounds  Pulmonary/Chest: Effort normal and breath sounds normal    Abdominal: Soft  She exhibits no distension  There is no tenderness  Musculoskeletal: Normal range of motion  Neurological: She is alert and oriented to person, place, and time  Skin: Skin is warm and dry  Psychiatric: She has a normal mood and affect  Her behavior is normal  Judgment and thought content normal        Lab Results: I have personally reviewed pertinent lab results  Imaging: I have personally reviewed pertinent reports  EKG, Pathology, and Other Studies: I have personally reviewed pertinent reports  Code Status: Level 1    Assessment:  48 y o  yo morbidly obese female found to be a good candidate to undergo a weight loss operation upon being enrolled here at the [address] [place] [address]  She has completed all of the preoperative process for bariatric surgery  Plan:  - Plan for laparoscopic possible open SG with intraoperative EGD  - I discussed the risk of rescheduling/canceling the case if goal weight not met    - She has lost all the required weight (189#)  - consent signed  - preop orders placed

## 2020-07-31 NOTE — H&P (VIEW-ONLY)
H&P Exam - Bariatric Surgery   Lilli Sandoval 48 y o  female MRN: [id number]   Encounter: [id number]      HPI:    48 y o  yo morbidly obese female found to be a good candidate to undergo a weight loss operation upon being enrolled here at the [facility]   She has been pre certified to undergo a Laparoscopic Sleeve gastrectomy  I have discussed with the patient that 20-30% of patient's may experience worsened GERD and 18% risk of Hwang's esophagitis requiring surveillance EGDs after a sleeve gastrectomy  The patient understands this fully and accepts the risks to undergo a sleeve gastrectomy  ++Suffers from DM2 (15yrs on insulin + oral + saxenda), HTN, HLD, carpal tunnel L hand, АНДРЕЙ, GERD after medication use (last episode July 2019)  S/p C-sxn x2, lap tubal ligation, CTR R    Here today to review her pre op test results  Has been medically cleared for the procedure  I have explained our Enhanced Recovery After Bariatric Surgery (ERABS) protocol and benefits including preoperative, intraoperative and postoperative elements  I have discussed with her at length the risks and benefits of the operation and reiterated the components of our multidisciplinary program and the importance of compliance and follow up in the post operative period  Although there is a great statistical chance of improvement or even resolution of most of her associated comorbidities, the results vary from patient to patient and they largely depend on his commitment  The patient was also instructed with regards to the importance of behavior modification, nutritional counseling, support meeting attendance and lifestyle changes that are important to ensure success  She was given the opportunity to ask questions and I have answered all of them      I have addressed with the patient the level of CODE STATUS for this hospital stay and after explaining the different options currently she wishes to be a Level I    She understands and wishes to proceed  She has lost all the weight required prior to surgery  Review of Systems   Constitutional: Negative  Respiratory: Negative  Cardiovascular: Negative  Gastrointestinal: Negative  Musculoskeletal: Negative  Neurological: Negative  All other systems reviewed and are negative        Historical Information   Past Medical History:   Diagnosis Date    Anemia     Last Assessed:  3/27/13a    Arthritis     Asthma     Blurred vision     Chronic kidney disease     Diabetes mellitus ([facility] [address] [facility]     was in touch with PCP re [facility]- she adjusted insulin doses and is seeing surgeon 20    Diabetic retinopathy ([facility] [address] [facility]     Dream enactment behavior     Hypercholesterolemia     Hyperlipidemia     Hypertension     Increased urinary frequency     Irritable bowel syndrome     Last Assessed:  10/2/12    Joint pain     Morbid obesity with BMI of 40 0-44 9, adult ([facility])     Nausea     Night sweat     Nightmare disorder 2020    Numbness     UTI (urinary tract infection)     Weakness      Past Surgical History:   Procedure Laterality Date    ABDOMINAL SURGERY      CARPAL TUNNEL RELEASE Right      SECTION      x 2    COLONOSCOPY  2016    COLONOSCOPY      [place] WRIST Hurtis Yari LIG Left 2019    Procedure: RELEASE CARPAL TUNNEL ENDOSCOPIC-left;  Surgeon: Leeanne Catalan MD;  Location: [facility];  Service: Orthopedics    TUBAL LIGATION      WISDOM TOOTH EXTRACTION       Social History   Social History     Substance and Sexual Activity   Alcohol Use Yes    Comment: rarely     Social History     Substance and Sexual Activity   Drug Use No     Social History     Tobacco Use   Smoking Status Former Smoker    Types: Cigarettes    Last attempt to quit:  [address] Years since quittin 6   Smokeless Tobacco Former User     Family History: non-contributory    Meds/Allergies   all medications and allergies reviewed  Allergies Allergen Reactions    Iodinated Diagnostic Agents Facial Swelling    Iodine Facial Swelling    Shellfish-Derived Products Throat Swelling    Amoxicillin Hives    Metformin Diarrhea    Penicillins      Yeast Infection       Objective     Current Vitals:   Blood Pressure: 98/64 (07/31/20 1242)  Pulse: 98 (07/31/20 1242)  Temperature: 97 6 °F (36 4 °C) (07/31/20 1242)  Temp Source: Tympanic (07/31/20 1242)  Respirations: 14 (07/31/20 1242)  Height: 4' 10" (147 3 cm) (07/31/20 1242)  Weight - Scale: 85 6 kg (188 lb 12 8 oz) (07/31/20 1242)        Physical Exam   Constitutional: She is oriented to person, place, and time  She appears well-developed  HENT:   Head: Normocephalic  Eyes: EOM are normal    Neck: Normal range of motion  Cardiovascular: Normal rate and normal heart sounds  Pulmonary/Chest: Effort normal and breath sounds normal    Abdominal: Soft  She exhibits no distension  There is no tenderness  Musculoskeletal: Normal range of motion  Neurological: She is alert and oriented to person, place, and time  Skin: Skin is warm and dry  Psychiatric: She has a normal mood and affect  Her behavior is normal  Judgment and thought content normal        Lab Results: I have personally reviewed pertinent lab results  Imaging: I have personally reviewed pertinent reports  EKG, Pathology, and Other Studies: I have personally reviewed pertinent reports  Code Status: Level 1    Assessment:  48 y o  yo morbidly obese female found to be a good candidate to undergo a weight loss operation upon being enrolled here at the [address] [place] [address]  She has completed all of the preoperative process for bariatric surgery  Plan:  - Plan for laparoscopic possible open SG with intraoperative EGD  - I discussed the risk of rescheduling/canceling the case if goal weight not met    - She has lost all the required weight (189#)  - consent signed  - preop orders placed

## 2020-08-02 ENCOUNTER — ANESTHESIA EVENT (OUTPATIENT)
Dept: PERIOP | Facility: HOSPITAL | Age: 55
End: 2020-08-02
Payer: COMMERCIAL

## 2020-08-02 NOTE — ANESTHESIA PREPROCEDURE EVALUATION
Procedure:  LAPAROSCOPIC SLEEVE GASTRECTOMY (N/A Abdomen)    Relevant Problems   No relevant active problems        Physical Exam    Airway    Mallampati score: III  TM Distance: >3 FB  Neck ROM: full     Dental   No notable dental hx     Cardiovascular  Cardiovascular exam normal    Pulmonary  Pulmonary exam normal     Other Findings        Anesthesia Plan  ASA Score- 3     Anesthesia Type- general with ASA Monitors  Additional Monitors:   Airway Plan: ETT  Plan Factors-Exercise tolerance (METS): >4 METS  Chart reviewed  EKG reviewed  Existing labs reviewed  Patient summary reviewed  Patient is not a current smoker  Obstructive sleep apnea risk education given perioperatively  Induction- intravenous  Postoperative Plan- Plan for postoperative opioid use  Informed Consent- Anesthetic plan and risks discussed with patient  I personally reviewed this patient with the CRNA  Discussed and agreed on the Anesthesia Plan with the CRNA  Flakito Cantu

## 2020-08-03 ENCOUNTER — TELEPHONE (OUTPATIENT)
Dept: FAMILY MEDICINE CLINIC | Facility: CLINIC | Age: 55
End: 2020-08-03

## 2020-08-03 ENCOUNTER — HOSPITAL ENCOUNTER (OUTPATIENT)
Facility: HOSPITAL | Age: 55
Setting detail: OUTPATIENT SURGERY
Discharge: HOME | End: 2020-08-03
Attending: SURGERY | Admitting: SURGERY
Payer: COMMERCIAL

## 2020-08-03 ENCOUNTER — ANESTHESIA (OUTPATIENT)
Dept: PERIOP | Facility: HOSPITAL | Age: 55
End: 2020-08-03
Payer: COMMERCIAL

## 2020-08-03 VITALS
OXYGEN SATURATION: 96 % | WEIGHT: 187.5 LBS | DIASTOLIC BLOOD PRESSURE: 57 MMHG | SYSTOLIC BLOOD PRESSURE: 96 MMHG | HEIGHT: 58 IN | RESPIRATION RATE: 18 BRPM | BODY MASS INDEX: 39.36 KG/M2 | HEART RATE: 80 BPM | TEMPERATURE: 97.8 F

## 2020-08-03 DIAGNOSIS — E11.43 DIABETES MELLITUS WITH GASTROPARESIS (HCC): Primary | ICD-10-CM

## 2020-08-03 DIAGNOSIS — Z20.822 COVID-19 RULED OUT BY LABORATORY TESTING: Primary | ICD-10-CM

## 2020-08-03 DIAGNOSIS — L76.82 INCISIONAL PAIN: ICD-10-CM

## 2020-08-03 LAB — GLUCOSE SERPL-MCNC: 128 MG/DL (ref 65–140)

## 2020-08-03 PROCEDURE — 43247 EGD REMOVE FOREIGN BODY: CPT | Performed by: SURGERY

## 2020-08-03 PROCEDURE — 49320 DIAG LAPARO SEPARATE PROC: CPT | Performed by: SURGERY

## 2020-08-03 PROCEDURE — 82948 REAGENT STRIP/BLOOD GLUCOSE: CPT

## 2020-08-03 PROCEDURE — C9290 INJ, BUPIVACAINE LIPOSOME: HCPCS | Performed by: SURGERY

## 2020-08-03 PROCEDURE — 87081 CULTURE SCREEN ONLY: CPT | Performed by: SURGERY

## 2020-08-03 RX ORDER — ROCURONIUM BROMIDE 10 MG/ML
INJECTION, SOLUTION INTRAVENOUS AS NEEDED
Status: DISCONTINUED | OUTPATIENT
Start: 2020-08-03 | End: 2020-08-03

## 2020-08-03 RX ORDER — METOCLOPRAMIDE 5 MG/1
5 TABLET ORAL 4 TIMES DAILY
Qty: 56 TABLET | Refills: 0 | Status: SHIPPED | OUTPATIENT
Start: 2020-08-03 | End: 2020-08-17

## 2020-08-03 RX ORDER — FENTANYL CITRATE 50 UG/ML
INJECTION, SOLUTION INTRAMUSCULAR; INTRAVENOUS AS NEEDED
Status: DISCONTINUED | OUTPATIENT
Start: 2020-08-03 | End: 2020-08-03

## 2020-08-03 RX ORDER — LIDOCAINE HYDROCHLORIDE 10 MG/ML
INJECTION, SOLUTION EPIDURAL; INFILTRATION; INTRACAUDAL; PERINEURAL AS NEEDED
Status: DISCONTINUED | OUTPATIENT
Start: 2020-08-03 | End: 2020-08-03

## 2020-08-03 RX ORDER — BUPIVACAINE HYDROCHLORIDE 5 MG/ML
INJECTION, SOLUTION EPIDURAL; INTRACAUDAL AS NEEDED
Status: DISCONTINUED | OUTPATIENT
Start: 2020-08-03 | End: 2020-08-03 | Stop reason: HOSPADM

## 2020-08-03 RX ORDER — DEXAMETHASONE SODIUM PHOSPHATE 4 MG/ML
INJECTION, SOLUTION INTRA-ARTICULAR; INTRALESIONAL; INTRAMUSCULAR; INTRAVENOUS; SOFT TISSUE AS NEEDED
Status: DISCONTINUED | OUTPATIENT
Start: 2020-08-03 | End: 2020-08-03

## 2020-08-03 RX ORDER — ONDANSETRON 2 MG/ML
4 INJECTION INTRAMUSCULAR; INTRAVENOUS ONCE AS NEEDED
Status: DISCONTINUED | OUTPATIENT
Start: 2020-08-03 | End: 2020-08-03 | Stop reason: HOSPADM

## 2020-08-03 RX ORDER — OXYCODONE HYDROCHLORIDE AND ACETAMINOPHEN 5; 325 MG/1; MG/1
1 TABLET ORAL EVERY 4 HOURS PRN
Status: DISCONTINUED | OUTPATIENT
Start: 2020-08-03 | End: 2020-08-03 | Stop reason: HOSPADM

## 2020-08-03 RX ORDER — MAGNESIUM HYDROXIDE 1200 MG/15ML
LIQUID ORAL AS NEEDED
Status: DISCONTINUED | OUTPATIENT
Start: 2020-08-03 | End: 2020-08-03 | Stop reason: HOSPADM

## 2020-08-03 RX ORDER — SCOLOPAMINE TRANSDERMAL SYSTEM 1 MG/1
1 PATCH, EXTENDED RELEASE TRANSDERMAL ONCE
Status: DISCONTINUED | OUTPATIENT
Start: 2020-08-03 | End: 2020-08-03 | Stop reason: HOSPADM

## 2020-08-03 RX ORDER — HYDROMORPHONE HCL 110MG/55ML
PATIENT CONTROLLED ANALGESIA SYRINGE INTRAVENOUS AS NEEDED
Status: DISCONTINUED | OUTPATIENT
Start: 2020-08-03 | End: 2020-08-03

## 2020-08-03 RX ORDER — ONDANSETRON 2 MG/ML
INJECTION INTRAMUSCULAR; INTRAVENOUS AS NEEDED
Status: DISCONTINUED | OUTPATIENT
Start: 2020-08-03 | End: 2020-08-03

## 2020-08-03 RX ORDER — MIDAZOLAM HYDROCHLORIDE 2 MG/2ML
INJECTION, SOLUTION INTRAMUSCULAR; INTRAVENOUS AS NEEDED
Status: DISCONTINUED | OUTPATIENT
Start: 2020-08-03 | End: 2020-08-03

## 2020-08-03 RX ORDER — ACETAMINOPHEN 500 MG
1000 TABLET ORAL EVERY 8 HOURS SCHEDULED
Qty: 18 TABLET | Refills: 0
Start: 2020-08-03 | End: 2020-08-06

## 2020-08-03 RX ORDER — PROPOFOL 10 MG/ML
INJECTION, EMULSION INTRAVENOUS CONTINUOUS PRN
Status: DISCONTINUED | OUTPATIENT
Start: 2020-08-03 | End: 2020-08-03

## 2020-08-03 RX ORDER — CELECOXIB 100 MG/1
200 CAPSULE ORAL ONCE
Status: COMPLETED | OUTPATIENT
Start: 2020-08-03 | End: 2020-08-03

## 2020-08-03 RX ORDER — FENTANYL CITRATE/PF 50 MCG/ML
50 SYRINGE (ML) INJECTION
Status: DISCONTINUED | OUTPATIENT
Start: 2020-08-03 | End: 2020-08-03 | Stop reason: HOSPADM

## 2020-08-03 RX ORDER — GABAPENTIN 300 MG/1
600 CAPSULE ORAL ONCE
Status: COMPLETED | OUTPATIENT
Start: 2020-08-03 | End: 2020-08-03

## 2020-08-03 RX ORDER — ACETAMINOPHEN 325 MG/1
975 TABLET ORAL ONCE
Status: COMPLETED | OUTPATIENT
Start: 2020-08-03 | End: 2020-08-03

## 2020-08-03 RX ORDER — SODIUM CHLORIDE 9 MG/ML
INJECTION, SOLUTION INTRAVENOUS CONTINUOUS PRN
Status: DISCONTINUED | OUTPATIENT
Start: 2020-08-03 | End: 2020-08-03

## 2020-08-03 RX ORDER — PROPOFOL 10 MG/ML
INJECTION, EMULSION INTRAVENOUS AS NEEDED
Status: DISCONTINUED | OUTPATIENT
Start: 2020-08-03 | End: 2020-08-03

## 2020-08-03 RX ORDER — SODIUM CHLORIDE, SODIUM LACTATE, POTASSIUM CHLORIDE, CALCIUM CHLORIDE 600; 310; 30; 20 MG/100ML; MG/100ML; MG/100ML; MG/100ML
125 INJECTION, SOLUTION INTRAVENOUS CONTINUOUS
Status: DISCONTINUED | OUTPATIENT
Start: 2020-08-03 | End: 2020-08-03 | Stop reason: HOSPADM

## 2020-08-03 RX ORDER — FENTANYL CITRATE/PF 50 MCG/ML
25 SYRINGE (ML) INJECTION
Status: DISCONTINUED | OUTPATIENT
Start: 2020-08-03 | End: 2020-08-03 | Stop reason: HOSPADM

## 2020-08-03 RX ORDER — LEVOFLOXACIN 5 MG/ML
750 INJECTION, SOLUTION INTRAVENOUS ONCE
Status: COMPLETED | OUTPATIENT
Start: 2020-08-03 | End: 2020-08-03

## 2020-08-03 RX ADMIN — PROPOFOL 100 MCG/KG/MIN: 10 INJECTION, EMULSION INTRAVENOUS at 07:37

## 2020-08-03 RX ADMIN — LEVOFLOXACIN 750 MG: 5 INJECTION, SOLUTION INTRAVENOUS at 06:56

## 2020-08-03 RX ADMIN — PHENYLEPHRINE HYDROCHLORIDE 100 MCG: 10 INJECTION INTRAVENOUS at 07:49

## 2020-08-03 RX ADMIN — CELECOXIB 200 MG: 100 CAPSULE ORAL at 06:08

## 2020-08-03 RX ADMIN — FENTANYL CITRATE 50 MCG: 50 INJECTION, SOLUTION INTRAMUSCULAR; INTRAVENOUS at 07:29

## 2020-08-03 RX ADMIN — ROCURONIUM BROMIDE 50 MG: 10 INJECTION, SOLUTION INTRAVENOUS at 07:29

## 2020-08-03 RX ADMIN — SODIUM CHLORIDE, SODIUM LACTATE, POTASSIUM CHLORIDE, AND CALCIUM CHLORIDE 125 ML/HR: .6; .31; .03; .02 INJECTION, SOLUTION INTRAVENOUS at 06:53

## 2020-08-03 RX ADMIN — MIDAZOLAM HYDROCHLORIDE 2 MG: 1 INJECTION, SOLUTION INTRAMUSCULAR; INTRAVENOUS at 07:20

## 2020-08-03 RX ADMIN — SUGAMMADEX 200 MG: 100 INJECTION, SOLUTION INTRAVENOUS at 08:23

## 2020-08-03 RX ADMIN — FENTANYL CITRATE 50 MCG: 50 INJECTION, SOLUTION INTRAMUSCULAR; INTRAVENOUS at 07:41

## 2020-08-03 RX ADMIN — PHENYLEPHRINE HYDROCHLORIDE 50 MCG: 10 INJECTION INTRAVENOUS at 07:42

## 2020-08-03 RX ADMIN — ACETAMINOPHEN 975 MG: 325 TABLET, FILM COATED ORAL at 06:10

## 2020-08-03 RX ADMIN — PHENYLEPHRINE HYDROCHLORIDE 100 MCG: 10 INJECTION INTRAVENOUS at 08:03

## 2020-08-03 RX ADMIN — DEXAMETHASONE SODIUM PHOSPHATE 4 MG: 4 INJECTION, SOLUTION INTRA-ARTICULAR; INTRALESIONAL; INTRAMUSCULAR; INTRAVENOUS; SOFT TISSUE at 08:18

## 2020-08-03 RX ADMIN — GABAPENTIN 300 MG: 300 CAPSULE ORAL at 06:17

## 2020-08-03 RX ADMIN — Medication 0.1 MCG/KG/HR: at 07:37

## 2020-08-03 RX ADMIN — PHENYLEPHRINE HYDROCHLORIDE 200 MCG: 10 INJECTION INTRAVENOUS at 07:58

## 2020-08-03 RX ADMIN — HYDROMORPHONE HYDROCHLORIDE 1 MG: 2 INJECTION INTRAMUSCULAR; INTRAVENOUS; SUBCUTANEOUS at 08:12

## 2020-08-03 RX ADMIN — SODIUM CHLORIDE: 9 INJECTION, SOLUTION INTRAVENOUS at 07:50

## 2020-08-03 RX ADMIN — PROPOFOL 200 MG: 10 INJECTION, EMULSION INTRAVENOUS at 07:29

## 2020-08-03 RX ADMIN — ONDANSETRON 4 MG: 2 INJECTION INTRAMUSCULAR; INTRAVENOUS at 08:18

## 2020-08-03 RX ADMIN — SCOPALAMINE 1 PATCH: 1 PATCH, EXTENDED RELEASE TRANSDERMAL at 06:07

## 2020-08-03 RX ADMIN — LIDOCAINE HYDROCHLORIDE 50 MG: 10 INJECTION, SOLUTION EPIDURAL; INFILTRATION; INTRACAUDAL; PERINEURAL at 07:29

## 2020-08-03 RX ADMIN — ENOXAPARIN SODIUM 40 MG: 40 INJECTION SUBCUTANEOUS at 06:18

## 2020-08-03 NOTE — TELEPHONE ENCOUNTER
Patient is requesting a call back from Dr Carole Scott about her surgery that was cancelled for today

## 2020-08-03 NOTE — PERIOPERATIVE NURSING NOTE
Per Dr Timmy Palencia, okay to give pt only 300 mg of gabapentin as pt reports feeling "euphoric" on 600 mg

## 2020-08-03 NOTE — ANESTHESIA POSTPROCEDURE EVALUATION
Post-Op Assessment Note    CV Status:  Stable  Pain Score: 2    Pain management: adequate     Mental Status:  Alert and awake   Hydration Status:  Stable   PONV Controlled:  Controlled   Airway Patency:  Patent and adequate      Post Op Vitals Reviewed: Yes      Staff: CRNA         No complications documented      BP     Temp      Pulse     Resp      SpO2

## 2020-08-03 NOTE — OP NOTE
OPERATIVE REPORT  PATIENT NAME: Rosa Bella    :  1965  MRN: [id number]  Pt Location: [facility] OR ROOM 02    SURGERY DATE: 8/3/2020    Surgeon(s) and Role:     * Mary Lloyd MD - Primary     * Donovan Duverney, MD - Assisting     * Shelby Jim PA-C - Assisting    Preop Diagnosis:  Morbid obesity ([address] ) [E66 01]  АНДРЕЙ (obstructive sleep apnea) [G47 33]  Hypertension, essential [I10]  Diabetes mellitus type 2, uncontrolled ([address] ) [E11 65]  Hyperlipidemia [E78 5]    Post-Op Diagnosis Codes:     * Morbid obesity ([address] ) [E66 01]     * АНДРЕЙ (obstructive sleep apnea) [G47 33]     * Hypertension, essential [I10]     * Diabetes mellitus type 2, uncontrolled ([address] ) [E11 65]     * Hyperlipidemia [E78 5]    Procedure(s) (LRB):  LAPAROSCOPY  EGD REMOVAL OF FOOD BEAZOR (N/A)    Specimen(s):  * No specimens in log *    Estimated Blood Loss:   5CC    Drains:  * No LDAs found *    Anesthesia Type:   General    Operative Indications: Morbid obesity ([address] ) [E66 01]  АНДРЕЙ (obstructive sleep apnea) [G47 33]  Hypertension, essential [I10]  Diabetes mellitus type 2, uncontrolled ([address] ) [E11 65]  Hyperlipidemia [E78 5]      Operative Findings:  Significant amount of food contents remained in the antrum and fundus  Some of the food contents were removed by EGD but there still remained a large amount  Due to the risk the sleeve gastrectomy will be rescheduled  Complications:   None    Procedure and Technique:  INDICATION:    Karen Peng is a 50 y o  female with a Body mass index is 39 19 kg/m²  and a long standing history of morbid obesity and inability to lose a significant amount of weight on its own  This patient was found to be a good candidate to undergo a bariatric procedure upon being enrolled here at the [place] [address] [facility] in [facility]  OPERATIVE TECHNIQUE    The patient was taken to the operating room and placed in a supine position   A dose of IV antibiotic prophylaxis that consisted of Levofloxacin 750mg was given  Also, 40 mg of lovenox to prevent DVT were administered preoperatively  Sequential compression devices were placed on both lower extremities  After satisfactory general anesthesia induction and endotracheal intubation was achieved, the extremities were secured to prevent neurovascular and musculoskeletal injuries as best as possible  Subsequently, the abdominal wall was prepped and draped in a surgical standard sterile fashion  After a timeout was done and the patient was properly identified and the type of procedure was confirmed a LUQ transverse skin incision was made, and the subcutaneous tissues dissected  A veress needle technique was used for access to the peritoneal cavity and pneumoperitoneum was created to 15mmHg  Access to the peritoneal cavity was gained with an 12mm optical trocar  With this device, we were able to visualize the layers of the abdominal wall, and enter the peritoneal cavity under direct visualization  Under direct laparoscopic visualization, a four quadrant transversus abdominis plane block was performed  Four additional trocars were placed: a 5 mm in the right upper quadrant subcostal position in the anterior axillary line, a 15-mm port was placed in the right flank midclavicular line, a 12-mm port was placed in the left flank position in the midclavicular line and another 12-mm port was placed in the suprumbilical position to the patient's left of the midline  The patient was repositioned to a reverse Trendelenburg position  The stomach was distended and given her history of food contents remaining in her stomach on previous EGD, I decided to proceed with and EGD to start the case  Upon examination of the stomach endoluminally, there was a large amount of vegetable matter encountered in the fundus, body of the stomach and antrum  Food contents were removed with multiple passes of the EGD scope, but there still remained a significant amount   At this point given the difficulty of retrieving loose vegetable matter from the stomach and risk it added to stapling when performing the sleeve gastrectomy, I decided to cancel and reschedule the case with multiple preoperative strict liquid diet days  The sponge, needle and instrument count was reported complete  The 15-mm trocar and periumbilical trocar sites were then closed with use of a suture closure device and a figure-of-eight with absorbable suture  The liver retractor and the remainder ports were then removed under direct laparoscopic visualization and no back bleeding was noted  The skin incisions were all closed with 4-0 absorbable subcuticular suture  The patient tolerated the procedure well, was extubated uneventfully and was transferred to the recovery room in stable condition  I was present for the entire length of the procedure as the attending of record  No qualified resident was available to assist   The presence of an assistant was necessary for camera holding, traction and counter traction and for help with suturing and stapling in addition to performing the intraop-EGD       I was present for the entire procedure and I was present for all critical portions of the procedure    Patient Disposition:  PACU  and extubated and stable    SIGNATURE: Tari Lozada MD  DATE: August 3, 2020  TIME: 8:32 AM

## 2020-08-03 NOTE — ANESTHESIA PREPROCEDURE EVALUATION
Procedure:  LAPAROSCOPY  EGD REMOVAL OF FOOD BEAZOR (N/A Abdomen)    Relevant Problems   CARDIO   (+) Essential hypertension   (+) Hyperlipidemia      /RENAL   (+) Diabetic nephropathy associated with type 2 diabetes mellitus (HCC)   (+) Microalbuminuria due to type 2 diabetes mellitus (HCC)      NEURO/PSYCH   (+) Current moderate episode of major depressive disorder without prior episode (HCC)      PULMONARY   (+) АНДРЕЙ (obstructive sleep apnea)        Physical Exam    Airway    Mallampati score: II  TM Distance: >3 FB  Neck ROM: full     Dental   No notable dental hx     Cardiovascular  Cardiovascular exam normal    Pulmonary  Pulmonary exam normal     Other Findings        Anesthesia Plan  ASA Score- 3     Anesthesia Type- general with ASA Monitors  Additional Monitors:   Airway Plan: ETT  Plan Factors-Exercise tolerance (METS): >4 METS  Chart reviewed  EKG reviewed  Imaging results reviewed  Existing labs reviewed  Patient summary reviewed  Patient is not a current smoker  Obstructive sleep apnea risk education given perioperatively  Induction- intravenous  Postoperative Plan- Plan for postoperative opioid use  Informed Consent- Anesthetic plan and risks discussed with patient  I personally reviewed this patient with the CRNA  Discussed and agreed on the Anesthesia Plan with the STANLEY Dickson

## 2020-08-03 NOTE — PERIOPERATIVE NURSING NOTE
Patient returned to [facility] after procedure cancelled while in OR  Patient A&O  Will continue to monitor

## 2020-08-03 NOTE — TELEPHONE ENCOUNTER
Returning patient's call  She's very distressed because her surgery had to be postponed due to retained gastric contents in her stomach  She had been following instructions, having 3 shakes/day and 2 cups veggies/day and then liquid diet, and unfortunately had retained vegetables  She had lost 10 lbs doing the pre-op diet  She notes that since she's been on a lower diet, she has had visual disturbances, lower BP (today was 86/64) and feeling lightheaded  Sugars have been 120-130  Plan:  - Cut lisinopril from 20 to 10mg QD and monitor for 3 days, if BP still < 100/80 she is to stop lisinopril and call  - Call for hypoglycemia  - Resume all diabetes medications  - For presumed gastroparesis, start Reglan QID (TID AC & HS) 2 weeks prior to planned surgery  We discussed possible adverse effects including tardive dyskinesia     Follow up as needed  All questions answered to the patient's satisfaction

## 2020-08-04 ENCOUNTER — PREP FOR PROCEDURE (OUTPATIENT)
Dept: BARIATRICS | Facility: CLINIC | Age: 55
End: 2020-08-04

## 2020-08-04 DIAGNOSIS — G47.33 OBSTRUCTIVE SLEEP APNEA: ICD-10-CM

## 2020-08-04 DIAGNOSIS — E66.01 MORBID OBESITY (HCC): Primary | ICD-10-CM

## 2020-08-04 DIAGNOSIS — E66.9 DIABETES MELLITUS TYPE 2 IN OBESE (HCC): ICD-10-CM

## 2020-08-04 DIAGNOSIS — I10 HYPERTENSION, ESSENTIAL: ICD-10-CM

## 2020-08-04 DIAGNOSIS — E78.5 HYPERLIPIDEMIA: ICD-10-CM

## 2020-08-04 DIAGNOSIS — E11.69 DIABETES MELLITUS TYPE 2 IN OBESE (HCC): ICD-10-CM

## 2020-08-04 LAB — MRSA NOSE QL CULT: NORMAL

## 2020-08-05 ENCOUNTER — TELEPHONE (OUTPATIENT)
Dept: BARIATRICS | Facility: CLINIC | Age: 55
End: 2020-08-05

## 2020-08-13 ENCOUNTER — TELEPHONE (OUTPATIENT)
Dept: BARIATRICS | Facility: CLINIC | Age: 55
End: 2020-08-13

## 2020-08-13 NOTE — TELEPHONE ENCOUNTER
Emailed pt about starting her pre-op diet on August 17th for bariatric surgery on August 31st  Pt called to discuse further  Advised pt liver shrinking pre-op diet of  gm of protein per day, 80oz calorie free drinks and 2 cups of non-starchy veggies the first week, then to omit the veggies the second week (starting august 25th) due to her gastroparesis  Pt can have SF jello, SF ice pops and broth  She purchased a plant based protein powder with 30gm protein and 5gm of carbs and was mixing with 1% milk  Recommended to use fairlife milk to get 1/2 the carbs than regular milk to keep under 50gm carbs per day  Pt verbalized understanding and without further questions at this time

## 2020-08-20 RX ORDER — METOCLOPRAMIDE 5 MG/1
5 TABLET ORAL 4 TIMES DAILY
COMMUNITY
End: 2020-09-01 | Stop reason: HOSPADM

## 2020-08-20 NOTE — PRE-PROCEDURE INSTRUCTIONS
My Surgical Experience    The following information was developed to assist you to prepare for your operation  What do I need to do before coming to the hospital?   Arrange for a responsible person to drive you to and from the hospital    Arrange care for your children at home  Children are not allowed in the recovery areas of the hospital   Plan to wear clothing that is easy to put on and take off  If you are having shoulder surgery, wear a shirt that buttons or zippers in the front  Bathing  o Shower the evening before and the morning of your surgery with an antibacterial soap  Please refer to the Pre Op Showering Instructions for Surgery Patients Sheet   o Remove nail polish and all body piercing jewelry  o Do not shave any body part for at least 24 hours before surgery-this includes face, arms, legs and upper body  Food  o Nothing to eat or drink after midnight the night before your surgery  This includes candy and chewing gum  o Exception: If your surgery is after 12:00pm (noon), you may have clear liquids such as 7-Up®, ginger ale, apple or cranberry juice, Jell-O®, water, or clear broth until 8:00 am  o Do not drink milk or juice with pulp on the morning before surgery  o Do not drink alcohol 24 hours before surgery  Medicine  o Follow instructions you received from your surgeon about which medicines you may take on the day of surgery  o If instructed to take medicine on the morning of surgery, take pills with just a small sip of water  Call your prescribing doctor for specific infroamtion on what to do if you take insulin    What should I bring to the hospital?    Bring:  Patience [place] or a walker, if you have them, for foot or knee surgery   A list of the daily medicines, vitamins, minerals, herbals and nutritional supplements you take   Include the dosages of medicines and the time you take them each day   Glasses, dentures or hearing aids   Minimal clothing; you will be wearing hospital sleepwear   Photo ID; required to verify your identity   If you have a Living Will or Power of , bring a copy of the documents   If you have an ostomy, bring an extra pouch and any supplies you use    Do not bring   Medicines or inhalers   Money, valuables or jewelry    What other information should I know about the day of surgery?  Notify your surgeons if you develop a cold, sore throat, cough, fever, rash or any other illness   Report to the Ambulatory Surgical/Same Day Surgery Unit   You will be instructed to stop at Registration only if you have not been pre-registered   Inform your  fi they do not stay that they will be asked by the staff to leave a phone number where they can be reached   Be available to be reached before surgery  In the event the operating room schedule changes, you may be asked to come in earlier or later than expected    *It is important to tell your doctor and others involved in your health care if you are taking or have been taking any non-prescription drugs, vitamins, minerals, herbals or other nutritional supplements  Any of these may interact with some food or medicines and cause a reaction      Pre-Surgery Instructions:   Medication Instructions    aspirin (ECOTRIN LOW STRENGTH) 81 mg EC tablet Instructed patient per Anesthesia Guidelines   azelastine (ASTELIN) 0 1 % nasal spray Instructed patient per Anesthesia Guidelines   Blood Glucose Monitoring Suppl (FREESTYLE LITE) GREG Instructed patient per Anesthesia Guidelines   clindamycin-benzoyl peroxide (BENZACLIN) gel Instructed patient per Anesthesia Guidelines   Dapagliflozin-metFORMIN HCl ER 2 5-1000 MG TB24 Instructed patient per Anesthesia Guidelines   enoxaparin (LOVENOX) 40 mg/0 4 mL Instructed patient per Anesthesia Guidelines   FreeStyle Unistick II Lancets MISC Instructed patient per Anesthesia Guidelines      glucose blood (FREESTYLE LITE) test strip Instructed patient per Anesthesia Guidelines   Insulin Pen Needle (BD Pen Needle Zhane U/F) 32G X 4 MM MISC Instructed patient per Anesthesia Guidelines   Insulin Pen Needle 32G X 5 MM MISC Instructed patient per Anesthesia Guidelines   lisinopril (ZESTRIL) 20 mg tablet Instructed patient per Anesthesia Guidelines   metoclopramide (REGLAN) 5 mg tablet Instructed patient per Anesthesia Guidelines   mometasone (Nasonex) 50 mcg/act nasal spray Instructed patient per Anesthesia Guidelines   naproxen sodium (ALEVE) 220 MG tablet Instructed patient per Anesthesia Guidelines   NOVOLOG FLEXPEN 100 units/mL SOPN Instructed patient per Anesthesia Guidelines   omeprazole (PriLOSEC) 40 MG capsule Instructed patient per Anesthesia Guidelines   oxyCODONE (ROXICODONE) 5 mg immediate release tablet Instructed patient per Anesthesia Guidelines   rosuvastatin (CRESTOR) 20 MG tablet Instructed patient per Anesthesia Guidelines   SAXENDA injection Instructed patient per Anesthesia Guidelines   sertraline (ZOLOFT) 25 mg tablet Instructed patient per Anesthesia Guidelines   traZODone (DESYREL) 50 mg tablet Instructed patient per Anesthesia Guidelines      Instructed by PCP to take 1/2/ usual dose of insulin the lefty before surgery

## 2020-08-21 ENCOUNTER — OFFICE VISIT (OUTPATIENT)
Dept: BARIATRICS | Facility: CLINIC | Age: 55
End: 2020-08-21
Payer: COMMERCIAL

## 2020-08-21 VITALS
HEIGHT: 58 IN | WEIGHT: 187.4 LBS | BODY MASS INDEX: 39.34 KG/M2 | HEART RATE: 94 BPM | DIASTOLIC BLOOD PRESSURE: 70 MMHG | SYSTOLIC BLOOD PRESSURE: 110 MMHG | TEMPERATURE: 98.4 F

## 2020-08-21 DIAGNOSIS — E66.01 OBESITY, CLASS III, BMI 40-49.9 (MORBID OBESITY) (HCC): Primary | ICD-10-CM

## 2020-08-21 DIAGNOSIS — E78.5 HYPERLIPIDEMIA, UNSPECIFIED HYPERLIPIDEMIA TYPE: ICD-10-CM

## 2020-08-21 DIAGNOSIS — I10 ESSENTIAL HYPERTENSION: ICD-10-CM

## 2020-08-21 DIAGNOSIS — E66.01 MORBID (SEVERE) OBESITY DUE TO EXCESS CALORIES (HCC): Primary | ICD-10-CM

## 2020-08-21 DIAGNOSIS — K31.84 GASTROPARESIS: ICD-10-CM

## 2020-08-21 DIAGNOSIS — E11.65 UNCONTROLLED TYPE 2 DIABETES MELLITUS WITH HYPERGLYCEMIA (HCC): ICD-10-CM

## 2020-08-21 DIAGNOSIS — G47.33 OSA (OBSTRUCTIVE SLEEP APNEA): ICD-10-CM

## 2020-08-21 PROCEDURE — 3046F HEMOGLOBIN A1C LEVEL >9.0%: CPT | Performed by: SURGERY

## 2020-08-21 PROCEDURE — 99214 OFFICE O/P EST MOD 30 MIN: CPT | Performed by: SURGERY

## 2020-08-21 PROCEDURE — 1036F TOBACCO NON-USER: CPT | Performed by: SURGERY

## 2020-08-21 PROCEDURE — 3066F NEPHROPATHY DOC TX: CPT | Performed by: SURGERY

## 2020-08-21 PROCEDURE — 3078F DIAST BP <80 MM HG: CPT | Performed by: SURGERY

## 2020-08-21 PROCEDURE — 3008F BODY MASS INDEX DOCD: CPT | Performed by: SURGERY

## 2020-08-21 PROCEDURE — 3074F SYST BP LT 130 MM HG: CPT | Performed by: SURGERY

## 2020-08-21 RX ORDER — ENOXAPARIN SODIUM 300 MG/3ML
40 INJECTION INTRAVENOUS; SUBCUTANEOUS
Status: CANCELLED | OUTPATIENT
Start: 2020-08-22 | End: 2020-08-23

## 2020-08-21 RX ORDER — CELECOXIB 100 MG/1
200 CAPSULE ORAL ONCE
Status: CANCELLED | OUTPATIENT
Start: 2020-08-31 | End: 2020-08-21

## 2020-08-21 RX ORDER — GABAPENTIN 100 MG/1
300 CAPSULE ORAL ONCE
Status: CANCELLED | OUTPATIENT
Start: 2020-08-31 | End: 2020-08-21

## 2020-08-21 RX ORDER — PANTOPRAZOLE SODIUM 40 MG/1
40 TABLET, DELAYED RELEASE ORAL DAILY
Qty: 90 TABLET | Refills: 1 | Status: SHIPPED | OUTPATIENT
Start: 2020-08-21 | End: 2021-11-16 | Stop reason: ALTCHOICE

## 2020-08-21 RX ORDER — ACETAMINOPHEN 325 MG/1
975 TABLET ORAL ONCE
Status: CANCELLED | OUTPATIENT
Start: 2020-08-31 | End: 2020-08-21

## 2020-08-21 RX ORDER — LEVOFLOXACIN 5 MG/ML
750 INJECTION, SOLUTION INTRAVENOUS ONCE
Status: CANCELLED | OUTPATIENT
Start: 2020-08-31 | End: 2020-08-21

## 2020-08-21 RX ORDER — SCOLOPAMINE TRANSDERMAL SYSTEM 1 MG/1
1 PATCH, EXTENDED RELEASE TRANSDERMAL ONCE
Status: CANCELLED | OUTPATIENT
Start: 2020-08-31 | End: 2020-08-21

## 2020-08-21 NOTE — H&P (VIEW-ONLY)
H&P Exam - Bariatric Surgery   Jose Edmondson 50 y o  female MRN: [id number]   Encounter: [id number]      HPI:    50 y o  yo morbidly obese female found to be a good candidate to undergo a weight loss operation upon being enrolled here at the [facility]   She has been pre certified to undergo a Laparoscopic sleeve gastrectomy  I have discussed with the patient that 20-30% of patient's may experience worsened GERD and 18% risk of Hwang's esophagitis requiring surveillance EGDs after a sleeve gastrectomy  The patient understands this fully and accepts the risks to undergo a sleeve gastrectomy      ++Suffers from DM2 (15yrs on insulin + oral + saxenda), HTN, HLD, carpal tunnel L hand, АНДРЕЙ, GERD after medication use (last episode July 2019)  S/p C-sxn x2, lap tubal ligation, CTR R    Here today to review her pre op test results  Has been medically cleared for the procedure  I have explained our Enhanced Recovery After Bariatric Surgery (ERABS) protocol and benefits including preoperative, intraoperative and postoperative elements  I have discussed with her at length the risks and benefits of the operation and reiterated the components of our multidisciplinary program and the importance of compliance and follow up in the post operative period  Although there is a great statistical chance of improvement or even resolution of most of her associated comorbidities, the results vary from patient to patient and they largely depend on his commitment  The patient was also instructed with regards to the importance of behavior modification, nutritional counseling, support meeting attendance and lifestyle changes that are important to ensure success  She was given the opportunity to ask questions and I have answered all of them      I have addressed with the patient the level of CODE STATUS for this hospital stay and after explaining the different options currently she wishes to be a Level I    She understands and wishes to proceed  She has lost all the weight required prior to surgery  Review of Systems   Constitutional: Negative  Respiratory: Negative  Cardiovascular: Negative  Gastrointestinal: Negative  Musculoskeletal: Negative  Neurological: Negative  All other systems reviewed and are negative        Historical Information   Past Medical History:   Diagnosis Date    Anemia     Last Assessed:  3/27/13a    Arthritis     Asthma     Blurred vision     Chronic kidney disease     Diabetes mellitus [address] )     was in touch with PCP re [facility]- she adjusted insulin doses and is seeing surgeon 20    Diabetic retinopathy ([facility] )     Dream enactment behavior     Hypercholesterolemia     Hyperlipidemia     Hypertension     Increased urinary frequency     Irritable bowel syndrome     Last Assessed:  10/2/12    Joint pain     Morbid obesity with BMI of 40 0-44 9, adult ([facility])     Nausea     Night sweat     Nightmare disorder 2020    Numbness     UTI (urinary tract infection)     Weakness      Past Surgical History:   Procedure Laterality Date    ABDOMINAL SURGERY      CARPAL TUNNEL RELEASE Right      SECTION      x 2    COLONOSCOPY  2016    COLONOSCOPY      AR WRIST Charmel Keara LIG Left 2019    Procedure: RELEASE CARPAL TUNNEL ENDOSCOPIC-left;  Surgeon: Keyla Garcia MD;  [facility]: [facility] OR;  Service: Orthopedics    TUBAL LIGATION      WISDOM TOOTH EXTRACTION       Social History   Social History     Substance and Sexual Activity   Alcohol Use Yes    Comment: rarely     Social History     Substance and Sexual Activity   Drug Use No     Social History     Tobacco Use   Smoking Status Former Smoker    Types: Cigarettes    Last attempt to quit:  [address] Years since quittin 6   Smokeless Tobacco Former User     Family History: non-contributory    Meds/Allergies   all medications and allergies reviewed  Allergies Allergen Reactions    Iodinated Diagnostic Agents Facial Swelling    Iodine Facial Swelling    Shellfish-Derived Products Throat Swelling    Amoxicillin Hives    Metformin Diarrhea    Penicillins      Yeast Infection       Objective     Current Vitals:   Blood Pressure: 110/70 (08/21/20 1259)  Pulse: 94 (08/21/20 1259)  Temperature: 98 4 °F (36 9 °C) (08/21/20 1259)  Temp Source: Tympanic (08/21/20 1259)  Height: 4' 10" (147 3 cm) (08/21/20 1259)  Weight - Scale: 85 kg (187 lb 6 4 oz) (08/21/20 1259)        Physical Exam  Constitutional:       Appearance: She is well-developed  HENT:      Head: Normocephalic  Neck:      Musculoskeletal: Normal range of motion  Cardiovascular:      Rate and Rhythm: Normal rate  Heart sounds: Normal heart sounds  Pulmonary:      Effort: Pulmonary effort is normal       Breath sounds: Normal breath sounds  Abdominal:      General: There is no distension  Palpations: Abdomen is soft  Tenderness: There is no abdominal tenderness  Comments: Incisions c/d/i   Musculoskeletal: Normal range of motion  Skin:     General: Skin is warm and dry  Neurological:      Mental Status: She is alert and oriented to person, place, and time  Psychiatric:         Behavior: Behavior normal          Thought Content: Thought content normal          Judgment: Judgment normal          Lab Results: I have personally reviewed pertinent lab results  Imaging: I have personally reviewed pertinent reports  EKG, Pathology, and Other Studies: I have personally reviewed pertinent reports  Code Status: Level 1    Assessment:  50 y o  yo morbidly obese female found to be a good candidate to undergo a weight loss operation upon being enrolled here at the [address] [place] [address]  She has completed all of the preoperative process for bariatric surgery      Plan:  - Plan for laparoscopic possible open SG with intraoperative EGD  - I discussed the risk of rescheduling/canceling the case if goal weight not met    - consent signed  - preop orders placed

## 2020-08-21 NOTE — H&P
H&P Exam - Bariatric Surgery   Raine Hancock 50 y o  female MRN: [id number]   Encounter: [id number]      HPI:    50 y o  yo morbidly obese female found to be a good candidate to undergo a weight loss operation upon being enrolled here at the [facility]   She has been pre certified to undergo a Laparoscopic sleeve gastrectomy  I have discussed with the patient that 20-30% of patient's may experience worsened GERD and 18% risk of Hwang's esophagitis requiring surveillance EGDs after a sleeve gastrectomy  The patient understands this fully and accepts the risks to undergo a sleeve gastrectomy      ++Suffers from DM2 (15yrs on insulin + oral + saxenda), HTN, HLD, carpal tunnel L hand, АНДРЕЙ, GERD after medication use (last episode July 2019)  S/p C-sxn x2, lap tubal ligation, CTR R    Here today to review her pre op test results  Has been medically cleared for the procedure  I have explained our Enhanced Recovery After Bariatric Surgery (ERABS) protocol and benefits including preoperative, intraoperative and postoperative elements  I have discussed with her at length the risks and benefits of the operation and reiterated the components of our multidisciplinary program and the importance of compliance and follow up in the post operative period  Although there is a great statistical chance of improvement or even resolution of most of her associated comorbidities, the results vary from patient to patient and they largely depend on his commitment  The patient was also instructed with regards to the importance of behavior modification, nutritional counseling, support meeting attendance and lifestyle changes that are important to ensure success  She was given the opportunity to ask questions and I have answered all of them      I have addressed with the patient the level of CODE STATUS for this hospital stay and after explaining the different options currently she wishes to be a Level I    She understands and wishes to proceed  She has lost all the weight required prior to surgery  Review of Systems   Constitutional: Negative  Respiratory: Negative  Cardiovascular: Negative  Gastrointestinal: Negative  Musculoskeletal: Negative  Neurological: Negative  All other systems reviewed and are negative        Historical Information   Past Medical History:   Diagnosis Date    Anemia     Last Assessed:  3/27/13a    Arthritis     Asthma     Blurred vision     Chronic kidney disease     Diabetes mellitus ([facility] [address] )     was in [facility] with PCP re [facility]- she adjusted insulin doses and is seeing surgeon 20    Diabetic retinopathy ([facility] [address] )     Dream enactment behavior     Hypercholesterolemia     Hyperlipidemia     Hypertension     Increased urinary frequency     Irritable bowel syndrome     Last Assessed:  10/2/12    Joint pain     Morbid obesity with BMI of 40 0-44 9, adult ([facility])     Nausea     Night sweat     Nightmare disorder 2020    Numbness     UTI (urinary tract infection)     Weakness      Past Surgical History:   Procedure Laterality Date    ABDOMINAL SURGERY      CARPAL TUNNEL RELEASE Right      SECTION      x 2    COLONOSCOPY  2016    COLONOSCOPY      [place] WRIST [place] LIG Left 2019    Procedure: RELEASE CARPAL TUNNEL ENDOSCOPIC-left;  Surgeon: Arlen Beltran MD;  Location: [facility];  Service: Orthopedics    TUBAL LIGATION      WISDOM TOOTH EXTRACTION       Social History   Social History     Substance and Sexual Activity   Alcohol Use Yes    Comment: rarely     Social History     Substance and Sexual Activity   Drug Use No     Social History     Tobacco Use   Smoking Status Former Smoker    Types: Cigarettes    Last attempt to quit:  [address] Years since quittin 6   Smokeless Tobacco Former User     Family History: non-contributory    Meds/Allergies   all medications and allergies reviewed  Allergies Allergen Reactions    Iodinated Diagnostic Agents Facial Swelling    Iodine Facial Swelling    Shellfish-Derived Products Throat Swelling    Amoxicillin Hives    Metformin Diarrhea    Penicillins      Yeast Infection       Objective     Current Vitals:   Blood Pressure: 110/70 (08/21/20 1259)  Pulse: 94 (08/21/20 1259)  Temperature: 98 4 °F (36 9 °C) (08/21/20 1259)  Temp Source: Tympanic (08/21/20 1259)  Height: 4' 10" (147 3 cm) (08/21/20 1259)  Weight - Scale: 85 kg (187 lb 6 4 oz) (08/21/20 1259)        Physical Exam  Constitutional:       Appearance: She is well-developed  HENT:      Head: Normocephalic  Neck:      Musculoskeletal: Normal range of motion  Cardiovascular:      Rate and Rhythm: Normal rate  Heart sounds: Normal heart sounds  Pulmonary:      Effort: Pulmonary effort is normal       Breath sounds: Normal breath sounds  Abdominal:      General: There is no distension  Palpations: Abdomen is soft  Tenderness: There is no abdominal tenderness  Comments: Incisions c/d/i   Musculoskeletal: Normal range of motion  Skin:     General: Skin is warm and dry  Neurological:      Mental Status: She is alert and oriented to person, place, and time  Psychiatric:         Behavior: Behavior normal          Thought Content: Thought content normal          Judgment: Judgment normal          Lab Results: I have personally reviewed pertinent lab results  Imaging: I have personally reviewed pertinent reports  EKG, Pathology, and Other Studies: I have personally reviewed pertinent reports  Code Status: Level 1    Assessment:  50 y o  yo morbidly obese female found to be a good candidate to undergo a weight loss operation upon being enrolled here at the [address] [place] [address]  She has completed all of the preoperative process for bariatric surgery      Plan:  - Plan for laparoscopic possible open SG with intraoperative EGD  - I discussed the risk of rescheduling/canceling the case if goal weight not met    - consent signed  - preop orders placed

## 2020-08-21 NOTE — PROGRESS NOTES
H&P Exam - Bariatric Surgery   Hans Spencer 50 y o  female MRN: [id number]   Encounter: [id number]      HPI:    50 y o  yo morbidly obese female found to be a good candidate to undergo a weight loss operation upon being enrolled here at the [facility]   She has been pre certified to undergo a Laparoscopic sleeve gastrectomy  I have discussed with the patient that 20-30% of patient's may experience worsened GERD and 18% risk of Hwang's esophagitis requiring surveillance EGDs after a sleeve gastrectomy  The patient understands this fully and accepts the risks to undergo a sleeve gastrectomy      ++Suffers from DM2 (15yrs on insulin + oral + saxenda), HTN, HLD, carpal tunnel L hand, АНДРЕЙ, GERD after medication use (last episode July 2019)  S/p C-sxn x2, lap tubal ligation, CTR R    Here today to review her pre op test results  Has been medically cleared for the procedure  I have explained our Enhanced Recovery After Bariatric Surgery (ERABS) protocol and benefits including preoperative, intraoperative and postoperative elements  I have discussed with her at length the risks and benefits of the operation and reiterated the components of our multidisciplinary program and the importance of compliance and follow up in the post operative period  Although there is a great statistical chance of improvement or even resolution of most of her associated comorbidities, the results vary from patient to patient and they largely depend on his commitment  The patient was also instructed with regards to the importance of behavior modification, nutritional counseling, support meeting attendance and lifestyle changes that are important to ensure success  She was given the opportunity to ask questions and I have answered all of them      I have addressed with the patient the level of CODE STATUS for this hospital stay and after explaining the different options currently she wishes to be a Level I    She understands and wishes to proceed  She has lost all the weight required prior to surgery  Review of Systems   Constitutional: Negative  Respiratory: Negative  Cardiovascular: Negative  Gastrointestinal: Negative  Musculoskeletal: Negative  Neurological: Negative  All other systems reviewed and are negative        Historical Information   Past Medical History:   Diagnosis Date    Anemia     Last Assessed:  3/27/13a    Arthritis     Asthma     Blurred vision     Chronic kidney disease     Diabetes mellitus ([facility] [address] )     was in touch with PCP re [facility]- she adjusted insulin doses and is seeing surgeon 20    Diabetic retinopathy ([facility] [address] )     Dream enactment behavior     Hypercholesterolemia     Hyperlipidemia     Hypertension     Increased urinary frequency     Irritable bowel syndrome     Last Assessed:  10/2/12    Joint pain     Morbid obesity with BMI of 40 0-44 9, adult ([facility])     Nausea     Night sweat     Nightmare disorder 2020    Numbness     UTI (urinary tract infection)     Weakness      Past Surgical History:   Procedure Laterality Date    ABDOMINAL SURGERY      CARPAL TUNNEL RELEASE Right      SECTION      x 2    COLONOSCOPY  2016    COLONOSCOPY      [place] WRIST [place]arm[place] LIG Left 2019    Procedure: RELEASE CARPAL TUNNEL ENDOSCOPIC-left;  Surgeon: Keyla Garcia MD;  Location: [facility];  Service: Orthopedics    TUBAL LIGATION      WISDOM TOOTH EXTRACTION       Social History   Social History     Substance and Sexual Activity   Alcohol Use Yes    Comment: rarely     Social History     Substance and Sexual Activity   Drug Use No     Social History     Tobacco Use   Smoking Status Former Smoker    Types: Cigarettes    Last attempt to quit:  [address] Years since quittin 6   Smokeless Tobacco Former User     Family History: non-contributory    Meds/Allergies   all medications and allergies reviewed  Allergies Allergen Reactions    Iodinated Diagnostic Agents Facial Swelling    Iodine Facial Swelling    Shellfish-Derived Products Throat Swelling    Amoxicillin Hives    Metformin Diarrhea    Penicillins      Yeast Infection       Objective     Current Vitals:   Blood Pressure: 110/70 (08/21/20 1259)  Pulse: 94 (08/21/20 1259)  Temperature: 98 4 °F (36 9 °C) (08/21/20 1259)  Temp Source: Tympanic (08/21/20 1259)  Height: 4' 10" (147 3 cm) (08/21/20 1259)  Weight - Scale: 85 kg (187 lb 6 4 oz) (08/21/20 1259)        Physical Exam  Constitutional:       Appearance: She is well-developed  HENT:      Head: Normocephalic  Neck:      Musculoskeletal: Normal range of motion  Cardiovascular:      Rate and Rhythm: Normal rate  Heart sounds: Normal heart sounds  Pulmonary:      Effort: Pulmonary effort is normal       Breath sounds: Normal breath sounds  Abdominal:      General: There is no distension  Palpations: Abdomen is soft  Tenderness: There is no abdominal tenderness  Comments: Incisions c/d/i   Musculoskeletal: Normal range of motion  Skin:     General: Skin is warm and dry  Neurological:      Mental Status: She is alert and oriented to person, place, and time  Psychiatric:         Behavior: Behavior normal          Thought Content: Thought content normal          Judgment: Judgment normal          Lab Results: I have personally reviewed pertinent lab results  Imaging: I have personally reviewed pertinent reports  EKG, Pathology, and Other Studies: I have personally reviewed pertinent reports  Code Status: Level 1    Assessment:  50 y o  yo morbidly obese female found to be a good candidate to undergo a weight loss operation upon being enrolled here at the [address] [place] [address]  She has completed all of the preoperative process for bariatric surgery      Plan:  - Plan for laparoscopic possible open SG with intraoperative EGD  - I discussed the risk of rescheduling/canceling the case if goal weight not met    - consent signed  - preop orders placed

## 2020-08-27 ENCOUNTER — TELEPHONE (OUTPATIENT)
Dept: BARIATRICS | Facility: CLINIC | Age: 55
End: 2020-08-27

## 2020-08-28 ENCOUNTER — ANESTHESIA EVENT (OUTPATIENT)
Dept: PERIOP | Facility: HOSPITAL | Age: 55
DRG: 620 | End: 2020-08-28
Payer: COMMERCIAL

## 2020-08-28 DIAGNOSIS — E66.01 MORBID OBESITY (HCC): ICD-10-CM

## 2020-08-28 PROCEDURE — U0003 INFECTIOUS AGENT DETECTION BY NUCLEIC ACID (DNA OR RNA); SEVERE ACUTE RESPIRATORY SYNDROME CORONAVIRUS 2 (SARS-COV-2) (CORONAVIRUS DISEASE [COVID-19]), AMPLIFIED PROBE TECHNIQUE, MAKING USE OF HIGH THROUGHPUT TECHNOLOGIES AS DESCRIBED BY CMS-2020-01-R: HCPCS | Performed by: SURGERY

## 2020-08-29 LAB — SARS-COV-2 RNA SPEC QL NAA+PROBE: NOT DETECTED

## 2020-08-31 ENCOUNTER — ANESTHESIA (OUTPATIENT)
Dept: PERIOP | Facility: HOSPITAL | Age: 55
DRG: 620 | End: 2020-08-31
Payer: COMMERCIAL

## 2020-08-31 ENCOUNTER — HOSPITAL ENCOUNTER (INPATIENT)
Facility: HOSPITAL | Age: 55
LOS: 1 days | Discharge: HOME | DRG: 620 | End: 2020-09-01
Attending: SURGERY | Admitting: SURGERY
Payer: COMMERCIAL

## 2020-08-31 DIAGNOSIS — K31.84 GASTROPARESIS: Primary | ICD-10-CM

## 2020-08-31 DIAGNOSIS — G47.33 OBSTRUCTIVE SLEEP APNEA: ICD-10-CM

## 2020-08-31 DIAGNOSIS — I10 PRIMARY HYPERTENSION: ICD-10-CM

## 2020-08-31 DIAGNOSIS — E11.69 DIABETES MELLITUS TYPE 2 IN OBESE (HCC): ICD-10-CM

## 2020-08-31 DIAGNOSIS — F32.1 CURRENT MODERATE EPISODE OF MAJOR DEPRESSIVE DISORDER WITHOUT PRIOR EPISODE (HCC): ICD-10-CM

## 2020-08-31 DIAGNOSIS — I10 HYPERTENSION, ESSENTIAL: ICD-10-CM

## 2020-08-31 DIAGNOSIS — E78.5 HYPERLIPIDEMIA: ICD-10-CM

## 2020-08-31 DIAGNOSIS — E66.9 DIABETES MELLITUS TYPE 2 IN OBESE (HCC): ICD-10-CM

## 2020-08-31 DIAGNOSIS — E66.01 MORBID OBESITY (HCC): ICD-10-CM

## 2020-08-31 DIAGNOSIS — R80.1 PERSISTENT PROTEINURIA: ICD-10-CM

## 2020-08-31 DIAGNOSIS — Z98.84 S/P LAPAROSCOPIC SLEEVE GASTRECTOMY: ICD-10-CM

## 2020-08-31 PROBLEM — E11.9 DIABETES MELLITUS, TYPE 2 (HCC): Status: ACTIVE | Noted: 2020-08-31

## 2020-08-31 LAB — GLUCOSE SERPL-MCNC: 171 MG/DL (ref 65–140)

## 2020-08-31 PROCEDURE — 0DB64Z3 EXCISION OF STOMACH, PERCUTANEOUS ENDOSCOPIC APPROACH, VERTICAL: ICD-10-PCS | Performed by: SURGERY

## 2020-08-31 PROCEDURE — 88341 IMHCHEM/IMCYTCHM EA ADD ANTB: CPT | Performed by: PATHOLOGY

## 2020-08-31 PROCEDURE — 94760 N-INVAS EAR/PLS OXIMETRY 1: CPT

## 2020-08-31 PROCEDURE — 43775 LAP SLEEVE GASTRECTOMY: CPT | Performed by: SURGERY

## 2020-08-31 PROCEDURE — 88342 IMHCHEM/IMCYTCHM 1ST ANTB: CPT | Performed by: PATHOLOGY

## 2020-08-31 PROCEDURE — 94762 N-INVAS EAR/PLS OXIMTRY CONT: CPT

## 2020-08-31 PROCEDURE — 88307 TISSUE EXAM BY PATHOLOGIST: CPT | Performed by: PATHOLOGY

## 2020-08-31 PROCEDURE — 82948 REAGENT STRIP/BLOOD GLUCOSE: CPT

## 2020-08-31 PROCEDURE — 87081 CULTURE SCREEN ONLY: CPT | Performed by: SURGERY

## 2020-08-31 PROCEDURE — 99253 IP/OBS CNSLTJ NEW/EST LOW 45: CPT | Performed by: FAMILY MEDICINE

## 2020-08-31 PROCEDURE — 0DJ08ZZ INSPECTION OF UPPER INTESTINAL TRACT, VIA NATURAL OR ARTIFICIAL OPENING ENDOSCOPIC: ICD-10-PCS | Performed by: SURGERY

## 2020-08-31 PROCEDURE — C9290 INJ, BUPIVACAINE LIPOSOME: HCPCS | Performed by: SURGERY

## 2020-08-31 DEVICE — SEAMGUARD STPL REINF ENDO GIA ULTRA UNV 60 BLACK: Type: IMPLANTABLE DEVICE | Site: ABDOMEN | Status: FUNCTIONAL

## 2020-08-31 DEVICE — SEAMGUARD STPL REINF ENDO GIA ULTRA UNIV 60 PURPLE: Type: IMPLANTABLE DEVICE | Site: ABDOMEN | Status: FUNCTIONAL

## 2020-08-31 RX ORDER — METOCLOPRAMIDE HYDROCHLORIDE 5 MG/ML
10 INJECTION INTRAMUSCULAR; INTRAVENOUS EVERY 6 HOURS PRN
Status: DISCONTINUED | OUTPATIENT
Start: 2020-08-31 | End: 2020-09-01 | Stop reason: HOSPADM

## 2020-08-31 RX ORDER — SODIUM CHLORIDE, SODIUM LACTATE, POTASSIUM CHLORIDE, CALCIUM CHLORIDE 600; 310; 30; 20 MG/100ML; MG/100ML; MG/100ML; MG/100ML
125 INJECTION, SOLUTION INTRAVENOUS CONTINUOUS
Status: DISCONTINUED | OUTPATIENT
Start: 2020-08-31 | End: 2020-08-31

## 2020-08-31 RX ORDER — KETOROLAC TROMETHAMINE 30 MG/ML
15 INJECTION, SOLUTION INTRAMUSCULAR; INTRAVENOUS EVERY 6 HOURS SCHEDULED
Status: DISCONTINUED | OUTPATIENT
Start: 2020-08-31 | End: 2020-09-01

## 2020-08-31 RX ORDER — LIDOCAINE HYDROCHLORIDE 10 MG/ML
INJECTION, SOLUTION EPIDURAL; INFILTRATION; INTRACAUDAL; PERINEURAL AS NEEDED
Status: DISCONTINUED | OUTPATIENT
Start: 2020-08-31 | End: 2020-08-31

## 2020-08-31 RX ORDER — HYDROMORPHONE HCL/PF 1 MG/ML
0.5 SYRINGE (ML) INJECTION
Status: DISCONTINUED | OUTPATIENT
Start: 2020-08-31 | End: 2020-08-31 | Stop reason: HOSPADM

## 2020-08-31 RX ORDER — MIDAZOLAM HYDROCHLORIDE 2 MG/2ML
INJECTION, SOLUTION INTRAMUSCULAR; INTRAVENOUS AS NEEDED
Status: DISCONTINUED | OUTPATIENT
Start: 2020-08-31 | End: 2020-08-31

## 2020-08-31 RX ORDER — SODIUM CHLORIDE, SODIUM LACTATE, POTASSIUM CHLORIDE, CALCIUM CHLORIDE 600; 310; 30; 20 MG/100ML; MG/100ML; MG/100ML; MG/100ML
75 INJECTION, SOLUTION INTRAVENOUS CONTINUOUS
Status: DISCONTINUED | OUTPATIENT
Start: 2020-08-31 | End: 2020-09-01 | Stop reason: HOSPADM

## 2020-08-31 RX ORDER — TRAZODONE HYDROCHLORIDE 50 MG/1
50 TABLET ORAL
Status: DISCONTINUED | OUTPATIENT
Start: 2020-08-31 | End: 2020-09-01 | Stop reason: HOSPADM

## 2020-08-31 RX ORDER — MAGNESIUM HYDROXIDE 1200 MG/15ML
LIQUID ORAL AS NEEDED
Status: DISCONTINUED | OUTPATIENT
Start: 2020-08-31 | End: 2020-08-31 | Stop reason: HOSPADM

## 2020-08-31 RX ORDER — PROMETHAZINE HYDROCHLORIDE 25 MG/ML
6.25 INJECTION, SOLUTION INTRAMUSCULAR; INTRAVENOUS EVERY 6 HOURS PRN
Status: DISCONTINUED | OUTPATIENT
Start: 2020-08-31 | End: 2020-08-31

## 2020-08-31 RX ORDER — DEXAMETHASONE SODIUM PHOSPHATE 10 MG/ML
INJECTION, SOLUTION INTRAMUSCULAR; INTRAVENOUS AS NEEDED
Status: DISCONTINUED | OUTPATIENT
Start: 2020-08-31 | End: 2020-08-31

## 2020-08-31 RX ORDER — ONDANSETRON 2 MG/ML
INJECTION INTRAMUSCULAR; INTRAVENOUS AS NEEDED
Status: DISCONTINUED | OUTPATIENT
Start: 2020-08-31 | End: 2020-08-31

## 2020-08-31 RX ORDER — SODIUM CHLORIDE, SODIUM LACTATE, POTASSIUM CHLORIDE, CALCIUM CHLORIDE 600; 310; 30; 20 MG/100ML; MG/100ML; MG/100ML; MG/100ML
75 INJECTION, SOLUTION INTRAVENOUS CONTINUOUS
Status: DISCONTINUED | OUTPATIENT
Start: 2020-08-31 | End: 2020-08-31 | Stop reason: SDUPTHER

## 2020-08-31 RX ORDER — SIMETHICONE 80 MG
80 TABLET,CHEWABLE ORAL EVERY 12 HOURS SCHEDULED
Status: DISCONTINUED | OUTPATIENT
Start: 2020-08-31 | End: 2020-09-01 | Stop reason: HOSPADM

## 2020-08-31 RX ORDER — ONDANSETRON 2 MG/ML
4 INJECTION INTRAMUSCULAR; INTRAVENOUS EVERY 6 HOURS PRN
Status: DISCONTINUED | OUTPATIENT
Start: 2020-08-31 | End: 2020-09-01 | Stop reason: HOSPADM

## 2020-08-31 RX ORDER — LISINOPRIL 20 MG/1
20 TABLET ORAL
Status: DISCONTINUED | OUTPATIENT
Start: 2020-08-31 | End: 2020-09-01 | Stop reason: HOSPADM

## 2020-08-31 RX ORDER — BUPIVACAINE HYDROCHLORIDE 5 MG/ML
INJECTION, SOLUTION EPIDURAL; INTRACAUDAL AS NEEDED
Status: DISCONTINUED | OUTPATIENT
Start: 2020-08-31 | End: 2020-08-31 | Stop reason: HOSPADM

## 2020-08-31 RX ORDER — SERTRALINE HYDROCHLORIDE 25 MG/1
25 TABLET, FILM COATED ORAL
Status: DISCONTINUED | OUTPATIENT
Start: 2020-08-31 | End: 2020-09-01 | Stop reason: HOSPADM

## 2020-08-31 RX ORDER — CELECOXIB 100 MG/1
200 CAPSULE ORAL ONCE
Status: COMPLETED | OUTPATIENT
Start: 2020-08-31 | End: 2020-08-31

## 2020-08-31 RX ORDER — PROPOFOL 10 MG/ML
INJECTION, EMULSION INTRAVENOUS AS NEEDED
Status: DISCONTINUED | OUTPATIENT
Start: 2020-08-31 | End: 2020-08-31

## 2020-08-31 RX ORDER — LEVOFLOXACIN 5 MG/ML
750 INJECTION, SOLUTION INTRAVENOUS ONCE
Status: COMPLETED | OUTPATIENT
Start: 2020-08-31 | End: 2020-08-31

## 2020-08-31 RX ORDER — PROMETHAZINE HYDROCHLORIDE 25 MG/ML
25 INJECTION, SOLUTION INTRAMUSCULAR; INTRAVENOUS EVERY 6 HOURS PRN
Status: DISCONTINUED | OUTPATIENT
Start: 2020-08-31 | End: 2020-09-01 | Stop reason: HOSPADM

## 2020-08-31 RX ORDER — GABAPENTIN 100 MG/1
300 CAPSULE ORAL ONCE
Status: COMPLETED | OUTPATIENT
Start: 2020-08-31 | End: 2020-08-31

## 2020-08-31 RX ORDER — SCOLOPAMINE TRANSDERMAL SYSTEM 1 MG/1
1 PATCH, EXTENDED RELEASE TRANSDERMAL ONCE
Status: DISCONTINUED | OUTPATIENT
Start: 2020-08-31 | End: 2020-09-01 | Stop reason: HOSPADM

## 2020-08-31 RX ORDER — ACETAMINOPHEN 325 MG/1
975 TABLET ORAL ONCE
Status: COMPLETED | OUTPATIENT
Start: 2020-08-31 | End: 2020-08-31

## 2020-08-31 RX ORDER — ONDANSETRON 2 MG/ML
4 INJECTION INTRAMUSCULAR; INTRAVENOUS ONCE AS NEEDED
Status: COMPLETED | OUTPATIENT
Start: 2020-08-31 | End: 2020-08-31

## 2020-08-31 RX ORDER — MORPHINE SULFATE 4 MG/ML
4 INJECTION, SOLUTION INTRAMUSCULAR; INTRAVENOUS EVERY 2 HOUR PRN
Status: DISCONTINUED | OUTPATIENT
Start: 2020-08-31 | End: 2020-09-01 | Stop reason: HOSPADM

## 2020-08-31 RX ORDER — PROPOFOL 10 MG/ML
INJECTION, EMULSION INTRAVENOUS CONTINUOUS PRN
Status: DISCONTINUED | OUTPATIENT
Start: 2020-08-31 | End: 2020-08-31

## 2020-08-31 RX ORDER — PROMETHAZINE HYDROCHLORIDE 25 MG/ML
6.25 INJECTION, SOLUTION INTRAMUSCULAR; INTRAVENOUS EVERY 6 HOURS PRN
Status: DISCONTINUED | OUTPATIENT
Start: 2020-08-31 | End: 2020-08-31 | Stop reason: HOSPADM

## 2020-08-31 RX ORDER — ACETAMINOPHEN 325 MG/1
975 TABLET ORAL EVERY 6 HOURS SCHEDULED
Status: DISCONTINUED | OUTPATIENT
Start: 2020-08-31 | End: 2020-09-01 | Stop reason: HOSPADM

## 2020-08-31 RX ORDER — SODIUM CHLORIDE 9 MG/ML
INJECTION, SOLUTION INTRAVENOUS CONTINUOUS PRN
Status: DISCONTINUED | OUTPATIENT
Start: 2020-08-31 | End: 2020-08-31

## 2020-08-31 RX ORDER — OXYCODONE HCL 5 MG/5 ML
5 SOLUTION, ORAL ORAL EVERY 4 HOURS PRN
Status: DISCONTINUED | OUTPATIENT
Start: 2020-08-31 | End: 2020-09-01 | Stop reason: HOSPADM

## 2020-08-31 RX ORDER — ROCURONIUM BROMIDE 10 MG/ML
INJECTION, SOLUTION INTRAVENOUS AS NEEDED
Status: DISCONTINUED | OUTPATIENT
Start: 2020-08-31 | End: 2020-08-31

## 2020-08-31 RX ORDER — GLYCOPYRROLATE 0.2 MG/ML
INJECTION INTRAMUSCULAR; INTRAVENOUS AS NEEDED
Status: DISCONTINUED | OUTPATIENT
Start: 2020-08-31 | End: 2020-08-31

## 2020-08-31 RX ORDER — OXYCODONE HCL 5 MG/5 ML
10 SOLUTION, ORAL ORAL EVERY 4 HOURS PRN
Status: DISCONTINUED | OUTPATIENT
Start: 2020-08-31 | End: 2020-09-01 | Stop reason: HOSPADM

## 2020-08-31 RX ORDER — NEOSTIGMINE METHYLSULFATE 1 MG/ML
INJECTION INTRAVENOUS AS NEEDED
Status: DISCONTINUED | OUTPATIENT
Start: 2020-08-31 | End: 2020-08-31

## 2020-08-31 RX ORDER — FENTANYL CITRATE 50 UG/ML
INJECTION, SOLUTION INTRAMUSCULAR; INTRAVENOUS AS NEEDED
Status: DISCONTINUED | OUTPATIENT
Start: 2020-08-31 | End: 2020-08-31

## 2020-08-31 RX ADMIN — ACETAMINOPHEN 975 MG: 325 TABLET, FILM COATED ORAL at 08:29

## 2020-08-31 RX ADMIN — SIMETHICONE CHEW TAB 80 MG 80 MG: 80 TABLET ORAL at 17:14

## 2020-08-31 RX ADMIN — ROCURONIUM BROMIDE 20 MG: 10 INJECTION, SOLUTION INTRAVENOUS at 11:37

## 2020-08-31 RX ADMIN — SODIUM CHLORIDE, SODIUM LACTATE, POTASSIUM CHLORIDE, AND CALCIUM CHLORIDE 125 ML/HR: .6; .31; .03; .02 INJECTION, SOLUTION INTRAVENOUS at 14:55

## 2020-08-31 RX ADMIN — SCOPALAMINE 1 PATCH: 1 PATCH, EXTENDED RELEASE TRANSDERMAL at 08:30

## 2020-08-31 RX ADMIN — METOCLOPRAMIDE 10 MG: 5 INJECTION, SOLUTION INTRAMUSCULAR; INTRAVENOUS at 17:22

## 2020-08-31 RX ADMIN — INSULIN LISPRO 1 UNITS: 100 INJECTION, SOLUTION INTRAVENOUS; SUBCUTANEOUS at 17:21

## 2020-08-31 RX ADMIN — FENTANYL CITRATE 100 MCG: 50 INJECTION, SOLUTION INTRAMUSCULAR; INTRAVENOUS at 10:58

## 2020-08-31 RX ADMIN — MIDAZOLAM HYDROCHLORIDE 2 MG: 1 INJECTION, SOLUTION INTRAMUSCULAR; INTRAVENOUS at 10:52

## 2020-08-31 RX ADMIN — FAMOTIDINE 20 MG: 10 INJECTION INTRAVENOUS at 17:14

## 2020-08-31 RX ADMIN — Medication: at 11:01

## 2020-08-31 RX ADMIN — CELECOXIB 200 MG: 100 CAPSULE ORAL at 08:30

## 2020-08-31 RX ADMIN — PHENYLEPHRINE HYDROCHLORIDE 20 MCG/MIN: 10 INJECTION INTRAVENOUS at 11:06

## 2020-08-31 RX ADMIN — ONDANSETRON 4 MG: 2 INJECTION INTRAMUSCULAR; INTRAVENOUS at 12:58

## 2020-08-31 RX ADMIN — Medication 0.15 MCG/KG/HR: at 11:01

## 2020-08-31 RX ADMIN — ACETAMINOPHEN 975 MG: 325 TABLET, FILM COATED ORAL at 17:14

## 2020-08-31 RX ADMIN — NEOSTIGMINE METHYLSULFATE 3 MG: 1 INJECTION INTRAVENOUS at 12:23

## 2020-08-31 RX ADMIN — ROCURONIUM BROMIDE 50 MG: 10 INJECTION, SOLUTION INTRAVENOUS at 10:58

## 2020-08-31 RX ADMIN — SODIUM CHLORIDE, SODIUM LACTATE, POTASSIUM CHLORIDE, AND CALCIUM CHLORIDE 125 ML/HR: .6; .31; .03; .02 INJECTION, SOLUTION INTRAVENOUS at 08:45

## 2020-08-31 RX ADMIN — LEVOFLOXACIN 750 MG: 5 INJECTION, SOLUTION INTRAVENOUS at 09:55

## 2020-08-31 RX ADMIN — ENOXAPARIN SODIUM 40 MG: 40 INJECTION SUBCUTANEOUS at 08:29

## 2020-08-31 RX ADMIN — ONDANSETRON 4 MG: 2 INJECTION INTRAMUSCULAR; INTRAVENOUS at 14:55

## 2020-08-31 RX ADMIN — GLYCOPYRROLATE 0.4 MG: 0.2 INJECTION, SOLUTION INTRAMUSCULAR; INTRAVENOUS at 12:23

## 2020-08-31 RX ADMIN — SODIUM CHLORIDE, SODIUM LACTATE, POTASSIUM CHLORIDE, AND CALCIUM CHLORIDE 125 ML/HR: .6; .31; .03; .02 INJECTION, SOLUTION INTRAVENOUS at 13:09

## 2020-08-31 RX ADMIN — PROPOFOL 200 MG: 10 INJECTION, EMULSION INTRAVENOUS at 10:58

## 2020-08-31 RX ADMIN — MORPHINE SULFATE 4 MG: 4 INJECTION INTRAVENOUS at 14:59

## 2020-08-31 RX ADMIN — KETOROLAC TROMETHAMINE 15 MG: 30 INJECTION, SOLUTION INTRAMUSCULAR at 17:14

## 2020-08-31 RX ADMIN — PROPOFOL 100 MCG/KG/MIN: 10 INJECTION, EMULSION INTRAVENOUS at 11:01

## 2020-08-31 RX ADMIN — PHENYLEPHRINE HYDROCHLORIDE 100 MCG: 10 INJECTION INTRAVENOUS at 11:42

## 2020-08-31 RX ADMIN — GABAPENTIN 300 MG: 100 CAPSULE ORAL at 08:30

## 2020-08-31 RX ADMIN — PROMETHAZINE HYDROCHLORIDE 6.25 MG: 25 INJECTION INTRAMUSCULAR; INTRAVENOUS at 13:18

## 2020-08-31 RX ADMIN — SERTRALINE HYDROCHLORIDE 25 MG: 25 TABLET ORAL at 22:23

## 2020-08-31 RX ADMIN — ONDANSETRON 4 MG: 2 INJECTION INTRAMUSCULAR; INTRAVENOUS at 12:18

## 2020-08-31 RX ADMIN — DEXAMETHASONE SODIUM PHOSPHATE 4 MG: 10 INJECTION, SOLUTION INTRAMUSCULAR; INTRAVENOUS at 11:12

## 2020-08-31 RX ADMIN — LISINOPRIL 20 MG: 20 TABLET ORAL at 22:22

## 2020-08-31 RX ADMIN — TRAZODONE HYDROCHLORIDE 50 MG: 50 TABLET ORAL at 22:23

## 2020-08-31 RX ADMIN — LIDOCAINE HYDROCHLORIDE 50 MG: 10 INJECTION, SOLUTION EPIDURAL; INFILTRATION; INTRACAUDAL; PERINEURAL at 10:58

## 2020-08-31 NOTE — RESPIRATORY THERAPY NOTE
Family member will bring in cpap from [facility]  Patient set up on capnography today and cont pulse ox

## 2020-08-31 NOTE — ANESTHESIA POSTPROCEDURE EVALUATION
Post-Op Assessment Note    CV Status:  Stable  Pain Score: 0    Pain management: adequate     Mental Status:  Alert and awake   Hydration Status:  Euvolemic   PONV Controlled:  Controlled   Airway Patency:  Patent      Post Op Vitals Reviewed: Yes      Staff: CRNA         No complications documented      BP   121/68   Temp   97 1   Pulse  75   Resp   16   SpO2   99% on 4L simple mask

## 2020-08-31 NOTE — CONSULTS
Inpatient Medical Consultation - [facility] Internal Medicine    Patient Information: Batsheva Cespedes 50 y o  female MRN: [id number]  Unit/Bed#: [address] [place] [address] Encounter: [id number]    PCP: Sangeeta Haley MD  Date of Admission:  8/31/2020  Date of Consultation: 08/31/20  Requesting Physician: Radha Wong MD    Reason For Consultation:     Medical management    of Present Illness:    Batsheva Cespedes is a 50 y o  female who is originally admitted to the bariatric surgery service on 8/31/2020 status post laparoscopic sleeve gastrectomy with EGD for morbid obesity  We are consulted for medical management  Patient reports some abdominal pain and nausea but denies any vomiting    Review of Systems:    Review of Systems   Constitutional: Negative for chills and fever  HENT: Negative for congestion and trouble swallowing  Eyes: Negative for photophobia and visual disturbance  Respiratory: Negative for shortness of breath  Cardiovascular: Negative for chest pain and leg swelling  Gastrointestinal: Positive for abdominal pain and nausea  Negative for blood in stool and vomiting  Genitourinary: Negative for dysuria, frequency and hematuria  Musculoskeletal: Negative for back pain  Skin: Negative for wound  Neurological: Negative for dizziness, syncope, speech difficulty, light-headedness and headaches  Hematological: Does not bruise/bleed easily  Psychiatric/Behavioral: Negative for agitation         Past Medical and Surgical History:     Past Medical History:   Diagnosis Date    Anemia     Last Assessed:  3/27/13a    Arthritis     Asthma     Blurred vision     Chronic kidney disease     Diabetes mellitus ([facility] [address] )     was in touch with PCP re [facility]- she adjusted insulin doses and is seeing surgeon 7/31/20    Diabetic retinopathy ([facility] [address] )     Dream enactment behavior     Hypercholesterolemia     Hyperlipidemia     Hypertension     Increased urinary frequency     Irritable bowel syndrome     Last Assessed:  10/2/12    Joint pain     Morbid obesity with BMI of 40 0-44 9, adult ([facility])     Nausea     Night sweat     Nightmare disorder 2020    Numbness     UTI (urinary tract infection)     Weakness        Past Surgical History:   Procedure Laterality Date    ABDOMINAL SURGERY      CARPAL TUNNEL RELEASE Right      SECTION      x 2    COLONOSCOPY  2016    COLONOSCOPY      [place] WRIST [place] LIG Left 2019    Procedure: RELEASE CARPAL TUNNEL ENDOSCOPIC-left;  Surgeon: Malaika Farias MD;  Location:  MAIN OR;  Service: Orthopedics    TUBAL LIGATION      WISDOM TOOTH EXTRACTION         Meds/Allergies:    PTA meds:   Prior to Admission Medications   Prescriptions Last Dose Informant Patient Reported? Taking? Blood Glucose Monitoring Suppl (FREESTYLE LITE) GREG  Self No Yes   Sig: Patient tests twice daily   Dapagliflozin-metFORMIN HCl ER 2 5-1000 MG   No Yes   Sig: Take 1 tablet by mouth daily with breakfast   FreeStyle Unistick II Lancets MISC  Self No Yes   Sig: Patient tests blood sugars twice daily   Insulin Glargine (TOUJEO) 300 units/mL CONCETRATED U-300 injection pen 2020 at 2300 Self No Yes   Sig: Inject 28 Units under the skin daily at bedtime   Insulin Pen Needle (BD Pen Needle Zhane U/F) 32G X 4 MM MISC  Self No Yes   Sig: by Does not apply route 2 (two) times a day   Insulin Pen Needle 32G X 5 MM MISC  Self Yes Yes   Sig: Inject at bedtime     NOVOLOG FLEXPEN 100 units/mL SOPN 2020 at 2300 Self No Yes   Sig: Inject 15-30 Units under the skin 2 (two) times a day with meals   Patient taking differently: Inject 15-30 Units under the skin 2 (two) times a day    SAXENDA injection 2020 at 2300 Self No Yes   Sig: Inject 0 5 mL (3 mg total) under the skin daily at bedtime   aspirin (ECOTRIN LOW STRENGTH) 81 mg EC tablet 2020 Self Yes Yes   Sig: Take 81 mg by mouth daily Last dose will be 20   azelastine (ASTELIN) 0 1 % nasal spray More than a month at Unknown time Self No No   Si sprays into each nostril 2 (two) times a day Use in each nostril as directed   clindamycin-benzoyl peroxide (BENZACLIN) gel Past Month at Unknown time Self No Yes   Sig: Apply topically 2 (two) times a day   Patient taking differently: Apply 1 application topically as needed    enoxaparin (LOVENOX) 40 mg/0 4 mL   No Yes   Sig: Inject 0 4 mL (40 mg total) under the skin daily for 13 days   glucose blood (FREESTYLE LITE) test strip  Self No Yes   Si each by Other route 2 (two) times a day Use as instructed   insulin glargine (Toujeo SoloStar) 300 units/mL CONCETRATED U-300 injection pen (1-unit dial)   No No   Sig: Inject 30 Units under the skin daily at bedtime   Patient not taking: Reported on 2020   insulin glargine (Toujeo SoloStar) 300 units/mL CONCETRATED U-300 injection pen (1-unit dial)   No No   Sig: Inject 30 Units under the skin daily   lisinopril (ZESTRIL) 20 mg tablet 2020 at 2300 Self No Yes   Sig: Take 1 tablet (20 mg total) by mouth daily   Patient taking differently: Take 20 mg by mouth daily at bedtime    magnesium oxide (MAG-OX) 400 mg More than a month at Unknown time Self No No   Sig: Take 1 tablet (400 mg total) by mouth daily   Patient not taking: Reported on 2020   metoclopramide (REGLAN) 5 mg tablet 2020 at 0800  Yes Yes   Sig: Take 5 mg by mouth 4 (four) times a day   mometasone (ELOCON) 0 1 % cream  Self No No   Sig: Apply topically daily Apply topically BID x 2 weeks then qhs prn   mometasone (Nasonex) 50 mcg/act nasal spray More than a month at Unknown time Self No No   Si sprays into each nostril as needed (allergies)   naproxen sodium (ALEVE) 220 MG tablet   No Yes   Sig: Take 1 tablet (220 mg total) by mouth 2 (two) times a day with meals for 5 days   omeprazole (PriLOSEC) 40 MG capsule   Yes No   Sig: Take 40 mg by mouth daily at bedtime    oxyCODONE (ROXICODONE) 5 mg immediate release tablet No Yes   Sig: Take 1 tablet (5 mg total) by mouth every 4 (four) hours as needed for moderate painMax Daily Amount: 30 mg   pantoprazole (PROTONIX) 40 mg tablet  Self No No   Sig: Take 1 tablet (40 mg total) by mouth daily   Patient not taking: Reported on 2020   pantoprazole (PROTONIX) 40 mg tablet 2020 at 2300  No Yes   Sig: Take 1 tablet (40 mg total) by mouth daily   rosuvastatin (CRESTOR) 20 MG tablet 2020 at 2300 Self No Yes   Sig: Take 1 tablet (20 mg total) by mouth daily at bedtime   sertraline (ZOLOFT) 25 mg tablet 2020 at 2300  No Yes   Sig: Take 1 tablet (25 mg total) by mouth daily   Patient taking differently: Take 25 mg by mouth daily at bedtime    traZODone (DESYREL) 50 mg tablet 2020 at 2300  No Yes   Sig: Take 1 tablet (50 mg total) by mouth daily at bedtime      Facility-Administered Medications: None       Allergies: Allergies   Allergen Reactions    Iodinated Diagnostic Agents Facial Swelling    Iodine Facial Swelling    Shellfish-Derived Products Throat Swelling    Amoxicillin Hives    Metformin Diarrhea    Penicillins      Yeast Infection     History:     Marital Status:      Substance Use History:   Social History     Substance and Sexual Activity   Alcohol Use Yes    Comment: rarely     Social History     Tobacco Use   Smoking Status Former Smoker    Types: Cigarettes    Last attempt to quit: [place] Years since quittin 6   Smokeless Tobacco Former User     Social History     Substance and Sexual Activity   Drug Use No       Family History:    Family History   Problem Relation Age of Onset    Diabetes Mother     Hypertension Mother     Diabetes Father     Heart disease Father     Stroke Father     Hypertension Father     Diverticulitis Father     Hyperlipidemia Father     Heart attack Father     Diabetes Maternal Grandmother     Pancreatic cancer Maternal Grandmother     Liver cancer Maternal Grandfather     Alcohol abuse Maternal Grandfather     Heart disease Paternal Grandmother     Crohn's disease Sister     Diabetes type I Daughter     Eczema Daughter     Diabetes Daughter     Heart attack Paternal Grandfather     Asthma Maternal Aunt     Alcohol abuse Maternal Uncle     Mental illness Paternal Aunt     Schizophrenia Paternal Aunt     Diabetes Paternal Aunt     Kidney disease Paternal Aunt     Alcohol abuse Paternal Uncle     Diabetes Paternal Uncle     Eczema Son     Colon cancer Neg Hx     Breast cancer Neg Hx        Physical Exam:     Vitals:   Blood Pressure: 120/71 (08/31/20 1429)  Pulse: 70 (08/31/20 1429)  Temperature: (!) 97 4 °F (36 3 °C) (08/31/20 1429)  Temp Source: Oral (08/31/20 1429)  Respirations: 18 (08/31/20 1429)  Height: 4' 10" (147 3 cm) (08/31/20 9164)  Weight - Scale: 83 9 kg (185 lb) (08/31/20 0812)  SpO2: 99 % (08/31/20 1429)    Physical Exam  Constitutional:       General: She is not in acute distress  Appearance: She is well-developed  She is not diaphoretic  HENT:      Head: Normocephalic and atraumatic  Eyes:      General: No scleral icterus  Right eye: No discharge  Left eye: No discharge  Cardiovascular:      Rate and Rhythm: Normal rate and regular rhythm  Pulmonary:      Effort: Pulmonary effort is normal  No respiratory distress  Breath sounds: Normal breath sounds  No wheezing or rales  Abdominal:      General: There is distension  Palpations: Abdomen is soft  Tenderness: There is abdominal tenderness (generalized)  Comments: Dressings noted in place   Neurological:      Mental Status: She is oriented to person, place, and time  Cranial Nerves: No cranial nerve deficit  Lab Results: I Have Reviewed All Lab Data Below: Invalid input(s): LABALBU        * Additional Pertinent Lab Tests Reviewed: N/A    Imaging: N/A    No results found          Hospital Problem List:     Principal Problem:    Class 3 severe obesity due to excess calories with serious comorbidity and body mass index (BMI) of 40 0 to 44 9 in adult [facility])  Active Problems:    Essential hypertension    Diabetes mellitus type 2, uncontrolled (HCC)    АНДРЕЙ (obstructive sleep apnea)      Assessment/Plan  * Class 3 severe obesity due to excess calories with serious comorbidity and body mass index (BMI) of 40 0 to 44 9 in adult [facility])  Assessment & Plan  Patient is status post laparoscopic sleeve gastrectomy with EGD  Continue with IVF, clear liquids this with bariatric surgery  Pain medications for bariatric surgery  Repeat lab work in a m     АНДРЕЙ (obstructive sleep apnea)  Assessment & Plan  Continue CPAP    Diabetes mellitus type 2, uncontrolled ([facility] [address] )  Assessment & Plan  Lab Results   Component Value Date    HGBA1C 9 0 (H) 07/24/2020       Recent Labs     08/31/20  1717   POCGLU 171*     Hold home medications  Place patient on Accu-Cheks with sliding scale insulin      Essential hypertension  Assessment & Plan  Continue lisinopril and monitor blood pressures      VTE Prophylaxis: Enoxaparin (Lovenox)  / sequential compression device       Counseling / Coordination of Care Time: 45 minutes  Greater than 50% of total time spent on patient counseling and coordination of care  ** Please Note: "This note has been constructed using a voice recognition system  Therefore there may be syntax, spelling, and/or grammatical errors   Please call if you have any questions  "**

## 2020-08-31 NOTE — PLAN OF CARE
Problem: GASTROINTESTINAL - ADULT  Goal: Minimal or absence of nausea and/or vomiting  Description: INTERVENTIONS:  - Administer IV fluids if ordered to ensure adequate hydration  - Maintain NPO status until nausea and vomiting are resolved  - Nasogastric tube if ordered  - Administer ordered antiemetic medications as needed  - Provide nonpharmacologic comfort measures as appropriate  - Advance diet as tolerated, if ordered  - Consider nutrition services referral to assist patient with adequate nutrition and appropriate food choices  Outcome: Progressing

## 2020-08-31 NOTE — OP NOTE
OPERATIVE REPORT  PATIENT NAME: Hiral Dobbins    :  1965  MRN: [id number]  Pt Location: [place] OR ROOM 02    SURGERY DATE: 2020    Surgeon(s) and Role:     * Carlos Win MD - Primary     * Orlando Lopez MD - Assisting     * Katty Arizmendi PA-C - Assisting    Preop Diagnosis:  Morbid obesity ([facility] [address] ) [E66 01]  Diabetes mellitus type 2 in obese ([facility] ) [E11 69, E66 9]  Obstructive sleep apnea [G47 33]  Hypertension, essential [I10]  Hyperlipidemia [E78 5]    Post-Op Diagnosis Codes:     * Morbid obesity ([facility] ) [E66 01]     * Diabetes mellitus type 2 in obese ([facility] ) [E11 69, E66 9]     * Obstructive sleep apnea [G47 33]     * Hypertension, essential [I10]     * Hyperlipidemia [E78 5]    Procedure(s) (LRB):  LAPAROSCOPIC SLEEVE GASTRECTOMY  WITH EGD (N/A)    Specimen(s):  ID Type Source Tests Collected by Time Destination   1 : Portion of Stomach - R/o H  Pylori Tissue Stomach TISSUE EXAM Carlos Win MD 2020 1213        Estimated Blood Loss:   20cc    Drains:  * No LDAs found *    Anesthesia Type:   General    Operative Indications: Morbid obesity ([facility] ) [E66 01]  Diabetes mellitus type 2 in obese ([facility] ) [E11 69, E66 9]  Obstructive sleep apnea [G47 33]  Hypertension, essential [I10]  Hyperlipidemia [E78 5]      Operative Findings:  Normal anatomy      Complications:   None    Procedure and Technique:  INDICATION:    Karen Leblanc is a 50 y o  female with a Body mass index is 38 67 kg/m²  and a long standing history of morbid obesity and inability to lose a significant amount of weight on its own  This patient was found to be a good candidate to undergo a bariatric procedure upon being enrolled here at the [place] [address] [facility] in [place]  OPERATIVE TECHNIQUE    The patient was taken to the operating room and placed in a supine position  A dose of IV antibiotic prophylaxis that consisted of Levofloxacin 750mg was given   Also, 40 mg of lovenox to prevent DVT were administered preoperatively  Sequential compression devices were placed on both lower extremities  After satisfactory general anesthesia induction and endotracheal intubation was achieved, the extremities were secured to prevent neurovascular and musculoskeletal injuries as best as possible  Subsequently, the abdominal wall was prepped and draped in a surgical standard sterile fashion  After a timeout was done and the patient was properly identified and the type of procedure was confirmed a LUQ transverse skin incision was made, and the subcutaneous tissues dissected  A veress needle technique was used for access to the peritoneal cavity and pneumoperitoneum was created to 15mmHg  Access to the peritoneal cavity was gained with an 12mm optical trocar  With this device, we were able to visualize the layers of the abdominal wall, and enter the peritoneal cavity under direct visualization  Under direct laparoscopic visualization, a four quadrant transversus abdominis plane block was performed  Four additional trocars were placed: a 5 mm in the right upper quadrant subcostal position in the anterior axillary line, a 15-mm port was placed in the right flank midclavicular line, a 12-mm port was placed in the left flank position in the midclavicular line and another 12-mm port was placed in the suprumbilical position to the patient's left of the midline  The T-Ricardo liver retractor was placed in the subxiphoid position through the use of a 5-mm trocar incision  The patient was repositioned to a reverse Trendelenburg position  We proceeded to divide the gastrocolic ligament with the energy device to enter the lesser sac  We continued to divide this ligament along the greater curvature of the stomach towards the angle of His  Special care was taken while dividing the short gastric vessels close to the spleen  This process was completely hemostatic      We then turned to the creation of an elongated and thin gastric pouch  A 39 German calibration tube was placed by the anesthesia staff into the stomach under our laparoscopic surveillance  Once the tip of the bougie was confirmed to be next to the pylorus, serial firings of the laparoscopic stapler with 60-mm cartridges were utilized  We started in a point inferior to the incisura angularis and the Crows foot nerve looking to preserve the gastric emptying  This was 5 to 6 centimeters proximal to the pylorus  The staple lines were reinforced with buttressing material  We created a pouch based on the lesser curve, and in a semi vertical orientation  We continued the vertical serial firings of the stapler to the angle of His gently cinching the bougie with our laparoscopic stapler looking to create a thin pouch  As we approached the fundus of the stomach and the angle of His, the stapler loads were changed appropriately according to the variable thickness of the tissue  This completely  the pouch from the remnant stomach  We then turned our attention to the newly created pouch and examined it for bleeding or obvious defects on the staple lines and none were found  The distal stomach pouch was occluded with a Edgar clamp, and an EGD as well as an air insufflation test was performed  Neither intraoperative bleeding nor leaks were detected  The gastric remnant was externalized through the right sided 15-mm trocar site and passed off the surgical field to be sent to pathology  The sponge, needle and instrument count was reported complete  The 15-mm trocar and periumbilical trocar sites were then closed with use of a suture closure device and a figure-of-eight with absorbable suture  The liver retractor and the remainder ports were then removed under direct laparoscopic visualization and no back bleeding was noted  The skin incisions were all closed with 4-0 absorbable subcuticular suture       The patient tolerated the procedure well, was extubated uneventfully and was transferred to the recovery room in stable condition  I was present for the entire length of the procedure as the attending of record  No qualified resident was available to assist   The presence of an assistant was necessary for camera holding, traction and counter traction and for help with suturing and stapling in addition to performing the intraop-EGD         I was present for the entire procedure and I was present for all critical portions of the procedure    Patient Disposition:  PACU  and extubated and stable    SIGNATURE: Lashanda Hanson MD  DATE: August 31, 2020  TIME: 12:32 PM

## 2020-08-31 NOTE — INTERVAL H&P NOTE
H&P reviewed  After examining the patient I find no changes in the patients condition since the H&P had been written      Vitals:    08/31/20 0812   BP: 93/51   Pulse: 84   Resp: 20   Temp: 97 6 °F (36 4 °C)   SpO2: 98%

## 2020-09-01 VITALS
DIASTOLIC BLOOD PRESSURE: 62 MMHG | SYSTOLIC BLOOD PRESSURE: 116 MMHG | OXYGEN SATURATION: 95 % | BODY MASS INDEX: 38.83 KG/M2 | TEMPERATURE: 98.9 F | HEART RATE: 83 BPM | RESPIRATION RATE: 16 BRPM | HEIGHT: 58 IN | WEIGHT: 185 LBS

## 2020-09-01 LAB
ANION GAP SERPL CALCULATED.3IONS-SCNC: 9 MMOL/L (ref 4–13)
BUN SERPL-MCNC: 20 MG/DL (ref 5–25)
CALCIUM SERPL-MCNC: 9 MG/DL (ref 8.3–10.1)
CHLORIDE SERPL-SCNC: 103 MMOL/L (ref 100–108)
CO2 SERPL-SCNC: 24 MMOL/L (ref 21–32)
CREAT SERPL-MCNC: 1.19 MG/DL (ref 0.6–1.3)
ERYTHROCYTE [DISTWIDTH] IN BLOOD BY AUTOMATED COUNT: 13.4 % (ref 11.6–15.1)
GFR SERPL CREATININE-BSD FRML MDRD: 52 ML/MIN/1.73SQ M
GLUCOSE SERPL-MCNC: 107 MG/DL (ref 65–140)
GLUCOSE SERPL-MCNC: 112 MG/DL (ref 65–140)
GLUCOSE SERPL-MCNC: 153 MG/DL (ref 65–140)
GLUCOSE SERPL-MCNC: 95 MG/DL (ref 65–140)
HCT VFR BLD AUTO: 32.1 % (ref 34.8–46.1)
HGB BLD-MCNC: 10.3 G/DL (ref 11.5–15.4)
MCH RBC QN AUTO: 27.3 PG (ref 26.8–34.3)
MCHC RBC AUTO-ENTMCNC: 32.1 G/DL (ref 31.4–37.4)
MCV RBC AUTO: 85 FL (ref 82–98)
MRSA NOSE QL CULT: NORMAL
PLATELET # BLD AUTO: 303 THOUSANDS/UL (ref 149–390)
PMV BLD AUTO: 8 FL (ref 8.9–12.7)
POTASSIUM SERPL-SCNC: 4.4 MMOL/L (ref 3.5–5.3)
RBC # BLD AUTO: 3.77 MILLION/UL (ref 3.81–5.12)
SODIUM SERPL-SCNC: 136 MMOL/L (ref 136–145)
WBC # BLD AUTO: 8.79 THOUSAND/UL (ref 4.31–10.16)

## 2020-09-01 PROCEDURE — 99024 POSTOP FOLLOW-UP VISIT: CPT | Performed by: SURGERY

## 2020-09-01 PROCEDURE — 94760 N-INVAS EAR/PLS OXIMETRY 1: CPT

## 2020-09-01 PROCEDURE — 85027 COMPLETE CBC AUTOMATED: CPT | Performed by: PHYSICIAN ASSISTANT

## 2020-09-01 PROCEDURE — 82948 REAGENT STRIP/BLOOD GLUCOSE: CPT

## 2020-09-01 PROCEDURE — NC001 PR NO CHARGE: Performed by: SURGERY

## 2020-09-01 PROCEDURE — 80048 BASIC METABOLIC PNL TOTAL CA: CPT | Performed by: PHYSICIAN ASSISTANT

## 2020-09-01 PROCEDURE — 99232 SBSQ HOSP IP/OBS MODERATE 35: CPT | Performed by: INTERNAL MEDICINE

## 2020-09-01 RX ORDER — LISINOPRIL 20 MG/1
10 TABLET ORAL DAILY
Qty: 30 TABLET | Refills: 0
Start: 2020-09-01 | End: 2021-05-24 | Stop reason: SDUPTHER

## 2020-09-01 RX ORDER — ACETAMINOPHEN 325 MG/1
975 TABLET ORAL EVERY 8 HOURS SCHEDULED
Qty: 27 TABLET | Refills: 0
Start: 2020-09-01 | End: 2020-09-04

## 2020-09-01 RX ADMIN — SIMETHICONE CHEW TAB 80 MG 80 MG: 80 TABLET ORAL at 09:05

## 2020-09-01 RX ADMIN — KETOROLAC TROMETHAMINE 15 MG: 30 INJECTION, SOLUTION INTRAMUSCULAR at 06:03

## 2020-09-01 RX ADMIN — INSULIN LISPRO 1 UNITS: 100 INJECTION, SOLUTION INTRAVENOUS; SUBCUTANEOUS at 00:21

## 2020-09-01 RX ADMIN — ENOXAPARIN SODIUM 40 MG: 40 INJECTION SUBCUTANEOUS at 09:00

## 2020-09-01 RX ADMIN — FAMOTIDINE 20 MG: 10 INJECTION INTRAVENOUS at 09:00

## 2020-09-01 RX ADMIN — SODIUM CHLORIDE, SODIUM LACTATE, POTASSIUM CHLORIDE, AND CALCIUM CHLORIDE 75 ML/HR: .6; .31; .03; .02 INJECTION, SOLUTION INTRAVENOUS at 03:48

## 2020-09-01 RX ADMIN — KETOROLAC TROMETHAMINE 15 MG: 30 INJECTION, SOLUTION INTRAMUSCULAR at 00:20

## 2020-09-01 RX ADMIN — ACETAMINOPHEN 975 MG: 325 TABLET, FILM COATED ORAL at 11:22

## 2020-09-01 NOTE — DISCHARGE SUMMARY
Discharge Summary - Trish Pacheco 50 y o  female MRN: [id number]    Unit/Bed#: 2 [place] [address] Encounter: [id number]      Pre-Operative Diagnosis: Pre-Op Diagnosis Codes:     * Morbid obesity ([facility] ) [E66 01]     * Diabetes mellitus type 2 in obese ([facility] ) [E11 69, E66 9]     * Obstructive sleep apnea [G47 33]     * Hypertension, essential [I10]     * Hyperlipidemia [E78 5]    Post-Operative Diagnosis: Post-Op Diagnosis Codes:     * Morbid obesity ([facility] ) [E66 01]     * Diabetes mellitus type 2 in obese ([facility] ) [E11 69, E66 9]     * Obstructive sleep apnea [G47 33]     * Hypertension, essential [I10]     * Hyperlipidemia [E78 5]    Procedures Performed:  Procedure(s):  LAPAROSCOPIC SLEEVE GASTRECTOMY  WITH EGD    Surgeon: Yuki Kearns MD    See H & P for full details of admission and Operative Note for full details of operations performed  Hospital Course:  Patient was admitted for a Laparoscopic Sleeve Gastrectomy  Post operatively pain was controlled with oral analgesics and the patient is ambulating/micturating without difficulty  Vital signs and lab work were stable  The patient is tolerating clear liquid diet without nausea or vomiting  The patient is cleared for D/C by the surgeon on POD1  Patient was seen and examined prior to discharge  Provisions for Follow-Up Care:  See After Visit Summary for information related to follow-up care and home orders  Disposition: Home, in stable condition  Patient should refer to "Discharge Instructions" for further information  Planned Readmission: No    Discharge Medications:  See After Visit Summary for reconciled discharge medications provided to patient and family  Post Operative instructions: Reviewed with patient and/or family  This text is generated with voice recognition software  There may be translation, syntax,  or grammatical errors  If you have any questions, please contact the dictating provider       Signature:   Zeenat Varner NIKITA  Date: 9/1/2020 Time: 8:36 AM

## 2020-09-01 NOTE — DISCHARGE INSTRUCTIONS
Bariatric/Weight Loss Surgery  Hospital Discharge Instructions  1  ACTIVITY:  a  Progress as feels comfortable - a good rule is:  if you are doing something and it begins to hurt, stop doing the activity  Walk every hour while at home  b  Genie Ayala may walk stairs if you do so slowly  c  You may shower 48 hours after surgery  d  Use your incentive spirometer 10 times per hour while awake for 1 week  e  Do NOT drive for 48 hours after surgery  No driving 24 hours after taking certain prescription pain medications   Examples of such medication are Percocet, Darvocet, Oxycodone, Tylenol #3, and Tylenol with Codeine  Follow your pharmacists orders  2  DIET  a  Stay on a liquid diet for 7 days after your surgery date, sipping slowly  Refer to your manual for examples of choices  Remember to keep your liquids sugar free or low calorie  You may have protein drinks  Make sure to drink 48 to 64 ounces per day of fluids  b  Genie Ayala may advance to a pureed diet one week after surgery as instructed by your diet progression pamphlet  Once you get approval from your surgeon at your first post operative visit you may advance to the soft diet  3  MEDICATIONS:  a  The abdominal nerve block will wear off during the first 1-2 days that you are home, and you may become sore  Continue to take your Tylenol and your pain medication as instructed  b  Start vitamins and minerals when you get home  c  Anti-acid Medication as per prescription  d  Other medications as indicated on the Physician Patient Discharge Instructions form given to you at the time of discharge  e  Make sure that you are splitting your pill or tablet medications in halves or fourths or even crushing them before you take them  Capsules should be opened and mixed with water or jello  You need to do this for at least 4 weeks after surgery  Eventually you will be able to take your medications the regular way as they were prescribed     f  Genie Ayala will need to consult with your Family Doctor in regards to all your prescribed medication, particularly those for blood pressure and diabetes  As you lose weight, medical conditions may change, requiring an alteration or elimination of the drug dose  g  DO NOT TAKE BIRTH CONTROL(BC) MEDICATIONS, INSERT BC VAGINAL RINGS, OR PLACE IUD OR ANY OTHER BC METHODS UNTIL 31 DAYS FROM DAY OF DISCHARGE FROM HOSPITAL  THIS PLACES YOU AT HIGH RISK FOR A POTENTIALLY LIFE THREATENING BLOOD CLOT  Remember to always use barrier methods for birth control and speak to your GYN about using two forms of birth control to start 31 days after surgery  It is very important to avoid pregnancy until at least 18-24 months after surgery  4  INCISION CARE  a  You may shower and get incisions wet 2 days after surgery  No soaking tub baths or swimming for 30 days after surgery  Keep abdominal area and incisions clean  Use soap and water to create a good lather and rinse off  Do not scrub incisions  b  If you have a drain, empty the drain as the nurses instructed  5  FOLLOW-UP APPOINTMENT should be made for one week after discharge  Call surgeons office at [phone] to schedule an appointment  6  CALL YOUR DOCTOR FOR:  pain not controlled by pain medications, a temperature greater than 101 5° F, any increase or change in drainage or redness from any incision, any vomiting or inability to keep liquids down, shortness of breath, shoulder pain, or bleeding        Letter and Information for Patient's Primary Health Care Provider      Dear Levon Bernstein,       Your patient had bariatric surgery on this admission to [address] [place] [address]  Due to the restrictive and/or malabsorptive nature of their procedure our surgeons recommend routine lab studies  Your patients surgeon will provide orders initially and ask that they continue to follow-up with us for life even if they see you     As time moves on some patients prefer to follow-up only with their family doctor  We ask that you discuss this regular work-up with them when they make an appointment to see you  *The lab studies recommended to best identify deficiencies are: CBC, CMP, Lipid Profile, Fe, TIBC, %Sat, Vitamin D, Folate, B12, Whole Blood Thiamine, Vitamin A, PTH, Zinc and Ferritin  We recommend HgbA1C for diabetics  *These studies should be done minimally at 6 months and a year post-operatively and then yearly thereafter  *Recommended Daily Supplements: Please see the attached Vitamin Sheet    If your patient has any dietary or psycho-social concerns, they can follow-up with our Team Dietitian and Licensed Clinical   They can reach these team members by calling the [facility]  We also encourage them to follow up at our regularly scheduled support groups or join our [facility] at [phone] Patient Forum  For your convenience we have also included a list of medicines that should be avoided after weight loss surgery and a list of the vitamin and minerals they should be taking for the rest of their lives  The patients are provided this information as well  The [facility] Bariatric Team would like to thank you for the opportunity to assist in the care of your patient  Feel free to contact us with any questions by calling the [facility] office at [phone]    Sincerely,         [facility] Team    Additional Information for Providers and Patients                      Vitamins After Amie en Y Gastric Bypass or Sleeve Gastrectomy Surgery    Due to the decreased absorption of nutrients and the decreased amount of food eaten it is difficult to obtain all the nutrients needed consuming food  We recommend a bariatric formulated vitamin for the rest of your life    If you wish to use an over the counter vitamins please understand you may not get all the recommended daily requirements  Use the following guidelines for over the counter vitamins  Multivitamins    We recommend 2 chewable multivitamins with iron (do not take gummy chewable as they do not contain thiamine)     You can continue with the chewable or take any well formulated, high potency multivitamin containing 22 nutrients including zinc and copper   If you decide to take a bariatric vitamin the number of vitamins that you need to take will vary  Refer to the chart provided at team meeting    Calcium - Calcium is absorbed in the part of the small bowel that is bypassed in gastric bypass patients  In addition, as you lose weight, you are more at risk for loss of bone density leading to osteoporosis   The best form of calcium is Calcium Citrate  This form of calcium is better absorbed after your surgery    Recommended daily dose is 1500 mg  Take 500 mg in (3) divided doses  You can only absorb about 500 mg at a time   We recommend 2000 IU of vitamin D3 per day, in addition to what is in your calcium supplement  If you were instructed to take a higher dose based on a deficiency, then continue to take the higher vitamin D dose  Iron - Iron is absorbed in the part of the small bowel that is bypassed   You will need to take extra iron in addition to what is already in the multivitamin if you are a menstruating woman (25-45 mg of additional elemental iron) or have been diagnosed with an iron deficiency   We recommend iron in the form of Ferrous Fumarate with Vitamin C    Follow the instructions on the package or bottle unless your physician has given other dosage amounts  B12 (Cyanocobalamin) may also be decreased   Additional Vitamin B12 is recommended if you are not taking a bariatric vitamin   Take 350 to 500 micrograms (mcg) per day of B12 (Cyanocobalamin) in a sublingual form (for under the tongue)      Note:  Calcium interferes with the absorption of iron, so it is recommended that you take the calcium at least 2 hours apart from iron  The tannins in tea also interfere with the absorption of iron   Note: Anti-ulcer medications interfere with the absorption of calcium iron and B12  Space your anti-ulcer medication 2 hours apart from your vitamins  * Based on the recommendations of the ASMBS and the National Osteoporosis foundation    Non-steroidal anti-inflammatory drugs or medications containing them  You should take caution or avoid these medications as they could harm your pouch or sleeve  **This is a sample list and is not all inclusive  Please read labels carefully  **      Non Steroidal anti-inflammatory drugs  Advil (ibuprofen)  Aleve (naproxen)  Anaprox (naproxen)  Ansaid (flurbiprofen)  Azolid (phenylbutazone)  Bextra (valdecoxib)  Butazolidin (phenylbutazone)  Celebrex (celecoxib)  Clinoril (sulindac)  Dolobid (diflunisal)  Excedrin IB (ibuprofen)  Feldene (piroxicam)  Ibuprin (ibuprofen)  Indocin (indomethacin)  Lodine (etodolac)  Meclomen (meclofenamate)  Midol IB (ibuprofen)  Motrin IB (ibuprofen)  Nalfon (fenoprofen)  Naprosyn (naproxen)  Nuprin (ibuprofen)  Orudis (ketoprofen)  Oruvail (ketoprofen)  Pamprin - IB (ibuprofen)  Ponstel (mefenamic acid)  Rexolate (sodium thiosalicylate)  Tandearil (oxyphenbutazone)  Tolectin (tolmetin)  Voltaren (diclofenac)      Barbiturate  Fiorinal (butalbital/aspirin/caffeine)    Salicylates  Amigesic (salsalate)  Anacin (aspirin)  Arthropan (choline salicylate)  Ascriptin (buffered aspirin)  Aspirin (aspirin)  Aspirtab (aspirin)  Bufferin (buffered aspirin)  Disalcid (salsalate)  Ecotrin (aspirin)  Uracel (sodium salicylate)    Analgesics  Equagesic (meprobamate/aspirin)  Micrainin (meprobamate/aspirin)  Percodan (oxycodone/aspirin)    OTC  Pepto-Bismol®  Roxana-[place]®  Excedrin®    For Gastric Bypass Patients  Extended Release Medications  Sustained Release Medications  Time Released Medications

## 2020-09-01 NOTE — UTILIZATION REVIEW
Initial Clinical Review    Elective  surgical procedure  Age/Sex: 50 y o  female  Surgery Date: 8/31/20  Procedure: LAPAROSCOPIC SLEEVE GASTRECTOMY  WITH EGD (N/A)  Anesthesia: General  Operative Findings: Normal anatomy  POD#1 Progress Note:   Doing well overall, incisions c/d/i, pain controlled, no PONV, tolerating adequate PO fluid intake, ambulating hallways  1  Cont PO sips of clears  2  Cont OOB in halls  3  Cont ATC analgesia & PRNs, antiemetics prn  4  Restart home meds - appreciate IM recs  5  VTE ppx: Enoxaparin (Lovenox) and Sequential pneumatic compression stocking  6  Cont pulm toilet  7   DC home today  Admission Orders: Date/Time/Statement:   Admission Orders (From admission, onward)     Ordered        08/31/20 1245  Inpatient Admission  Once                   Orders Placed This Encounter   Procedures    Inpatient Admission     Standing Status:   Standing     Number of Occurrences:   1     Order Specific Question:   Admitting Physician     Answer:   Tia Matta     Order Specific Question:   Level of Care     Answer:   Med Surg [16]     Order Specific Question:   Bed Type     Answer:   Bariatric [1]     Order Specific Question:   Estimated length of stay     Answer:   Inpatient Only Surgery     Vital Signs: /62   Pulse 83   Temp 98 9 °F (37 2 °C)   Resp 16   Ht 4' 10" (1 473 m)   Wt 83 9 kg (185 lb)   LMP 04/25/2020   SpO2 95%   BMI 38 67 kg/m²   Diet: Bariatric clear liquid  Mobility: ambulate  DVT Prophylaxis: lovenox  Medications/Pain Control: iv morphine   Scheduled Medications:  acetaminophen, 975 mg, Oral, Q6H VIRAL  enoxaparin, 40 mg, Subcutaneous, Daily  famotidine, 20 mg, Intravenous, BID  insulin lispro, 1-6 Units, Subcutaneous, Q6H VIRAL  lisinopril, 20 mg, Oral, HS  scopolamine, 1 patch, Transdermal, Once  sertraline, 25 mg, Oral, HS  simethicone, 80 mg, Oral, Q12H VIRAL  traZODone, 50 mg, Oral, HS      Continuous IV Infusions:  lactated ringers, 75 mL/hr, Intravenous, Continuous      PRN Meds:  metoclopramide, 10 mg, Intravenous, Q6H PRN  morphine injection, 4 mg, Intravenous, Q2H PRN  morphine injection, 2 mg, Intravenous, Q2H PRN  ondansetron, 4 mg, Intravenous, Q6H PRN  oxyCODONE, 10 mg, Oral, Q4H PRN  oxyCODONE, 5 mg, Oral, Q4H PRN  phenol, 2 spray, Mouth/Throat, Q2H PRN  promethazine, 25 mg, Intramuscular, Q6H PRN          Network Utilization Review Department  [email or web address]  ATTENTION: Please call with any questions or concerns to [phone] and carefully listen to the prompts so that you are directed to the right person  All voicemails are confidential   [place] all requests for admission clinical reviews, approved or denied determinations and any other requests to dedicated fax number below belonging to the campus where the patient is receiving treatment   List of dedicated fax numbers for the Facilities:  [address] [place] [address] DENIALS (Administrative/Medical Necessity) [phone]   [address] [place] [address] (Maternity/NICU/Pediatrics) [phone]   Vignesh Villagran [phone]   [facility] [phone]   [facility] [phone]   Abrahan Nino [phone]   [address] [place] [address] [address] [facility] [phone]   Vero Adan [phone]   [address] [facility] [place] [facility] [address] [facility]  [address] [facility] [address] [facility] [phone]

## 2020-09-01 NOTE — ASSESSMENT & PLAN NOTE
Patient is status post laparoscopic sleeve gastrectomy with EGD  Patient is on clear liquid diet which can be advanced as tolerated per bariatric surgery  Pain medications for bariatric surgery  Labs are stable

## 2020-09-01 NOTE — PROGRESS NOTES
Progress Note - Nicolasa Olsen 1965, 50 y o  female MRN: [id number]    Unit/Bed#: [address] [place] [address] Encounter: [id number]    Primary Care Provider: Toi Painter MD   Date and time admitted to hospital: 2020  7:48 AM        Diabetes mellitus type 2, uncontrolled ([address] )  Assessment & Plan  Lab Results   Component Value Date    HGBA1C 9 0 (H) 2020       Recent Labs     20  1717   POCGLU 171*     Blood sugars stable  Patient can resume Toujeo, Liraglutide and  Dapagloflozin/Metformin once diet advanced to regular diet  Patient advised to monitor blood sugars      Essential hypertension  Assessment & Plan  Blood pressure stable on lisinopril    * Class 3 severe obesity due to excess calories with serious comorbidity and body mass index (BMI) of 40 0 to 44 9 in adult [facility])  Assessment & Plan  Patient is status post laparoscopic sleeve gastrectomy with EGD  Patient is on clear liquid diet which can be advanced as tolerated per bariatric surgery  Pain medications for bariatric surgery  Labs are stable    АНДРЕЙ (obstructive sleep apnea)  Assessment & Plan  Patient rarely uses CPAP      VTE Pharmacologic Prophylaxis:   Pharmacologic: Enoxaparin (Lovenox)  Mechanical VTE Prophylaxis in Place: Yes    Patient Centered Rounds: I have performed bedside rounds with nursing staff today  Education and Discussions with Family / Patient: yes    Time Spent for Care: 45 minutes  More than 50% of total time spent on counseling and coordination of care as described above  Current Length of Stay: 1 day(s)    Current Patient Status: Inpatient       Code Status: No Order      Subjective:   Patient denies any nausea  Minimal abdominal discomfort  Patient did not have a bowel movement    Tolerating clear liquid diet    Objective:     Vitals:   Temp (24hrs), Av °F (36 7 °C), Min:97 1 °F (36 2 °C), Max:98 9 °F (37 2 °C)    Temp:  [97 1 °F (36 2 °C)-98 9 °F (37 2 °C)] 98 9 °F (37 2 °C)  HR:  [57-93] 83  Resp: [14-19] 16  BP: (115-137)/(60-81) 116/62  SpO2:  [95 %-100 %] 95 %  Body mass index is 38 67 kg/m²  Input and Output Summary (last 24 hours): Intake/Output Summary (Last 24 hours) at 9/1/2020 0941  Last data filed at 9/1/2020 0645  Gross per 24 hour   Intake 2050 ml   Output 20 ml   Net 2030 ml       Physical Exam:     Physical Exam  Constitutional:       General: She is not in acute distress  HENT:      Head: Normocephalic and atraumatic  Nose: Nose normal    Eyes:      Conjunctiva/sclera: Conjunctivae normal       Pupils: Pupils are equal, round, and reactive to light  Neck:      Musculoskeletal: Normal range of motion and neck supple  Cardiovascular:      Rate and Rhythm: Normal rate and regular rhythm  Heart sounds: Normal heart sounds  Pulmonary:      Effort: Pulmonary effort is normal  No respiratory distress  Breath sounds: Normal breath sounds  No wheezing or rales  Abdominal:      General: Bowel sounds are normal  There is no distension  Palpations: Abdomen is soft  Tenderness: There is no abdominal tenderness  There is no guarding or rebound  Comments: Abdominal incisions clean dry and intact   Skin:     General: Skin is warm and dry  Findings: No rash  Neurological:      Mental Status: She is alert  Cranial Nerves: No cranial nerve deficit  Additional Data:     Labs:    Results from last 7 days   Lab Units 09/01/20  0603   WBC Thousand/uL 8 79   HEMOGLOBIN g/dL 10 3*   HEMATOCRIT % 32 1*   PLATELETS Thousands/uL 303     Results from last 7 days   Lab Units 09/01/20  0603   POTASSIUM mmol/L 4 4   CHLORIDE mmol/L 103   CO2 mmol/L 24   BUN mg/dL 20   CREATININE mg/dL 1 19   CALCIUM mg/dL 9 0           * I Have Reviewed All Lab Data Listed Above  * Additional Pertinent Lab Tests Reviewed:  All [place] [place] [facility] Reviewed      Recent Cultures (last 7 days):           Last 24 Hours Medication List:   Current Facility-Administered Medications   Medication Dose Route Frequency Provider Last Rate    acetaminophen  975 mg Oral Q6H [address] Bethena Loges, PA-C      enoxaparin  40 mg Subcutaneous Daily Bethena Loges, PA-C      famotidine  20 mg Intravenous BID Bethena Loges, PA-C      insulin lispro  1-6 Units Subcutaneous Q6H [address] Bethena Loges, PA-C      lactated ringers  75 mL/hr Intravenous Continuous Bethena Loges, PA-C 75 mL/hr (09/01/20 0348)    lisinopril  20 mg Oral HS Bethena Loges, PA-C      metoclopramide  10 mg Intravenous Q6H PRN Bethena Loges, PA-C      morphine injection  4 mg Intravenous Q2H PRN Bethena Loges, PA-C      morphine injection  2 mg Intravenous Q2H PRN Bethena Loges, PA-C      ondansetron  4 mg Intravenous Q6H PRN Bethena Loges, PA-C      oxyCODONE  10 mg Oral Q4H PRN Bethena Loges, PA-C      oxyCODONE  5 mg Oral Q4H PRN Bethena Loges, PA-C      phenol  2 spray Mouth/Throat Q2H PRN Bethena Loges, PA-C      promethazine  25 mg Intramuscular Q6H PRN Bethena Loges, PA-C      scopolamine  1 patch Transdermal Renard Damico Cea, MD      sertraline  25 mg Oral HS Bethena Loges, PA-C      simethicone  80 mg Oral Q12H [address] Bethena Loges, PA-C      traZODone  50 mg Oral HS Bethena Loges, PA-C          Today, Patient Was Seen By: El Bear MD    ** Please Note: Dictation voice to text software may have been used in the creation of this document   **

## 2020-09-01 NOTE — PROGRESS NOTES
Progress Note - Bariatric Surgery   Nicolasa Olsen 50 y o  female MRN: [id number]  Unit/Bed#: [address] [address] Encounter: [id number]      Subjective/Objective     Subjective: Patient with morbid obesity s/p lap sleeve gastrectomy POD1  Tolerating liquid diet without nausea or vomiting, pain adequately controlled on oral pain medication, ambulating without assistance, voiding well, using incentive spirometer  Denies fevers, chills, sweats, SOB, CP, calf pain  Objective:    /62   Pulse 83   Temp 98 9 °F (37 2 °C)   Resp 16   Ht 4' 10" (1 473 m)   Wt 83 9 kg (185 lb)   LMP 04/25/2020   SpO2 98%   BMI 38 67 kg/m²       Intake/Output Summary (Last 24 hours) at 9/1/2020 0801  Last data filed at 9/1/2020 0645  Gross per 24 hour   Intake 2050 ml   Output 20 ml   Net 2030 ml       Invasive Devices     Peripheral Intravenous Line            Peripheral IV 08/31/20 Left Forearm less than 1 day    Peripheral IV 08/31/20 Right Forearm less than 1 day                ROS: 10-point system completed  All negative except see HPI  Physical Exam    General Appearance:    Alert, cooperative, no distress, appears stated age   Head:    Normocephalic, without obvious abnormality, atraumatic   Lungs:     Clear to auscultation bilaterally, respirations unlabored   Heart:    Regular rate and rhythm, S1 and S2 normal, no murmur, rub    or gallop   Abdomen:     Soft, appropriate tenderness, bowel sounds active, non distended, LLQ dressing removed - all incisions clean, dry, and intact   Extremities:   Extremities normal, atraumatic, no cyanosis or edema                   Lab, Imaging and other studies:  I have personally reviewed pertinent lab results    , CBC:   Lab Results   Component Value Date    WBC 8 79 09/01/2020    HGB 10 3 (L) 09/01/2020    HCT 32 1 (L) 09/01/2020    MCV 85 09/01/2020     09/01/2020    MCH 27 3 09/01/2020    MCHC 32 1 09/01/2020    RDW 13 4 09/01/2020    MPV 8 0 (L) 09/01/2020   , CMP:   Lab Results   Component Value Date    SODIUM 136 09/01/2020    K 4 4 09/01/2020     09/01/2020    CO2 24 09/01/2020    BUN 20 09/01/2020    CREATININE 1 19 09/01/2020    CALCIUM 9 0 09/01/2020    EGFR 52 09/01/2020        VTE Mechanical Prophylaxis: sequential compression device, lovenox    Assessment/Plan  1)  Morbid Obesity s/p lap sleeve gastrectomy POD1 with stable post op course  Encourage PO fluids, ambulation, and incentive spirometry  If patient continues to tolerate adequate PO fluids will plan for D/C this afternoon  2) DM - Blood sugars well controlled; on SSI inpatient  Per her PCP Dr Jed Florence she is to stop her Novolog and carefully track her blood sugars and follow up with him in 3 days  D/C Xigduo and Kadie  He would like her to continue Saxenda  3) АНДРЕЙ - continue CPAP    4) BP has been well controlled; continue with Lisinopril 10mg daily and f/u with PCP in 3 days  Discussed s/s of low BP    Plan of care was discussed with patient and patient's nurse  Care plan discussed with Dr Fly Waggoner: Continue bariatric clear liquid diet, ambulation, incentive spirometry         Rakel Cutler PA-C  9/1/2020  8:12 AM

## 2020-09-01 NOTE — NURSING NOTE
Care Completed  Patient stable for discharge  AVS reviewed with patient at bedside  All questions answered  Masimo and IV removed  Patient discharged home   Patient walked to the discharge area accompanied by PCA

## 2020-09-01 NOTE — PLAN OF CARE
Problem: GASTROINTESTINAL - ADULT  Goal: Minimal or absence of nausea and/or vomiting  Description: INTERVENTIONS:  - Administer IV fluids if ordered to ensure adequate hydration  - Maintain NPO status until nausea and vomiting are resolved  - Nasogastric tube if ordered  - Administer ordered antiemetic medications as needed  - Provide nonpharmacologic comfort measures as appropriate  - Advance diet as tolerated, if ordered  - Consider nutrition services referral to assist patient with adequate nutrition and appropriate food choices  Outcome: Progressing  Note: Patient has been free from nausea and vomiting     Problem: METABOLIC, FLUID AND ELECTROLYTES - ADULT  Goal: Electrolytes maintained within normal limits  Description: INTERVENTIONS:  - Monitor labs and assess patient for signs and symptoms of electrolyte imbalances  - Administer electrolyte replacement as ordered  - Monitor response to electrolyte replacements, including repeat lab results as appropriate  - Instruct patient on fluid and nutrition as appropriate  Outcome: Progressing  Note: Metabolic fluid and electrolyte remains within normal limit      Problem: SKIN/TISSUE INTEGRITY - ADULT  Goal: Incision(s), wounds(s) or drain site(s) healing without S/S of infection  Description: INTERVENTIONS  - Assess and document risk factors for skin impairment   - Assess and document dressing, incision, wound bed, drain sites and surrounding tissue  - Consider nutrition services referral as needed  - Oral mucous membranes remain intact  - Provide patient/ family education  Outcome: Progressing  Note: Incision remains intake  No signs of infection noted        Problem: RESPIRATORY - ADULT  Goal: Achieves optimal ventilation and oxygenation  Description: INTERVENTIONS:  - Assess for changes in respiratory status  - Assess for changes in mentation and behavior  - Position to facilitate oxygenation and minimize respiratory effort  - Oxygen administered by appropriate delivery if ordered  - Initiate smoking cessation education as indicated  - Encourage broncho-pulmonary hygiene including cough, deep breathe, Incentive Spirometry  - Assess the need for suctioning and aspirate as needed  - Assess and instruct to report SOB or any respiratory difficulty  - Respiratory Therapy support as indicated  Outcome: Progressing  Note: Airway remains patent   Oxygenation remains optimal for ventilation

## 2020-09-01 NOTE — ASSESSMENT & PLAN NOTE
Lab Results   Component Value Date    HGBA1C 9 0 (H) 07/24/2020       Recent Labs     08/31/20  1717   POCGLU 171*     Blood sugars stable    Patient can resume Toujeo, Liraglutide and  Dapagloflozin/Metformin once diet advanced to regular diet  Patient advised to monitor blood sugars

## 2020-09-02 ENCOUNTER — TELEPHONE (OUTPATIENT)
Dept: FAMILY MEDICINE CLINIC | Facility: CLINIC | Age: 55
End: 2020-09-02

## 2020-09-02 ENCOUNTER — TELEPHONE (OUTPATIENT)
Dept: BARIATRICS | Facility: CLINIC | Age: 55
End: 2020-09-02

## 2020-09-02 ENCOUNTER — TRANSITIONAL CARE MANAGEMENT (OUTPATIENT)
Dept: FAMILY MEDICINE CLINIC | Facility: CLINIC | Age: 55
End: 2020-09-02

## 2020-09-02 NOTE — TELEPHONE ENCOUNTER
Spoke with Leighann she had requested that I send her FMLA paperwork to [employer] via email  I emailed paperwork as soon as we hung up     ----- Message from Lu Thao sent at 9/2/2020  2:14 PM EDT -----  Regarding: Patient Call  Michelle Xiao,    The aforementioned patient called and wanted to speak to you       Thank you!    - Raven Henderson

## 2020-09-02 NOTE — UTILIZATION REVIEW
Notification of Discharge  This is a Notification of Discharge from our facility [address]  Please be advised that this patient has been discharge from our facility  Below you will find the admission and discharge date and time including the patients disposition  PRESENTATION DATE: 8/31/2020  7:48 AM  OBS ADMISSION DATE:   IP ADMISSION DATE: 8/31/20 1245   DISCHARGE DATE: 9/1/2020 12:35 PM  DISPOSITION: Home/Self Care Home/Self Care   Admission Orders listed below:  Admission Orders (From admission, onward)     Ordered        08/31/20 1245  Inpatient Admission  Once                   Please contact the UR Department if additional information is required to close this patient's authorization/case  [facility] Utilization Review Department  Main: [phone] x carefully listen to the prompts  All voicemails are confidential   [email or web address]  Send all requests for admission clinical reviews, approved or denied determinations and any other requests to dedicated fax number below belonging to the campus where the patient is receiving treatment   List of dedicated fax numbers:  [address] DENIALS (Administrative/Medical Necessity) [phone]   [address] (Maternity/NICU/Pediatrics) [phone]   Aaliyah Gordon [phone]   Nikki Hobbs [phone]   Cullen Opitz [phone]   [address] [phone]   [facility]  [phone]   [address] [facility]  [address] [phone]

## 2020-09-03 ENCOUNTER — TELEPHONE (OUTPATIENT)
Dept: BARIATRICS | Facility: CLINIC | Age: 55
End: 2020-09-03

## 2020-09-03 NOTE — TELEPHONE ENCOUNTER
Spoke with Leighann, let her know I called [employer] and gave them our correct fax number for her disability paperwork

## 2020-09-04 ENCOUNTER — TELEPHONE (OUTPATIENT)
Dept: BARIATRICS | Facility: CLINIC | Age: 55
End: 2020-09-04

## 2020-09-04 ENCOUNTER — OFFICE VISIT (OUTPATIENT)
Dept: FAMILY MEDICINE CLINIC | Facility: CLINIC | Age: 55
End: 2020-09-04
Payer: COMMERCIAL

## 2020-09-04 VITALS
HEART RATE: 76 BPM | BODY MASS INDEX: 38.62 KG/M2 | TEMPERATURE: 98.4 F | RESPIRATION RATE: 16 BRPM | WEIGHT: 184 LBS | SYSTOLIC BLOOD PRESSURE: 132 MMHG | DIASTOLIC BLOOD PRESSURE: 80 MMHG | HEIGHT: 58 IN

## 2020-09-04 DIAGNOSIS — E66.01 CLASS 3 SEVERE OBESITY DUE TO EXCESS CALORIES WITH SERIOUS COMORBIDITY AND BODY MASS INDEX (BMI) OF 40.0 TO 44.9 IN ADULT (HCC): Primary | ICD-10-CM

## 2020-09-04 DIAGNOSIS — Z09 HOSPITAL DISCHARGE FOLLOW-UP: ICD-10-CM

## 2020-09-04 DIAGNOSIS — E11.65 UNCONTROLLED TYPE 2 DIABETES MELLITUS WITH HYPERGLYCEMIA (HCC): ICD-10-CM

## 2020-09-04 DIAGNOSIS — F32.1 CURRENT MODERATE EPISODE OF MAJOR DEPRESSIVE DISORDER WITHOUT PRIOR EPISODE (HCC): ICD-10-CM

## 2020-09-04 DIAGNOSIS — E78.2 MIXED HYPERLIPIDEMIA: ICD-10-CM

## 2020-09-04 PROBLEM — Z98.84 STATUS POST LAPAROSCOPIC SLEEVE GASTRECTOMY: Status: ACTIVE | Noted: 2020-09-04

## 2020-09-04 PROCEDURE — 1111F DSCHRG MED/CURRENT MED MERGE: CPT | Performed by: FAMILY MEDICINE

## 2020-09-04 PROCEDURE — 99496 TRANSJ CARE MGMT HIGH F2F 7D: CPT | Performed by: FAMILY MEDICINE

## 2020-09-04 RX ORDER — INSULIN ASPART 100 [IU]/ML
INJECTION, SUSPENSION SUBCUTANEOUS
COMMUNITY
End: 2020-09-04

## 2020-09-04 NOTE — TELEPHONE ENCOUNTER
Spoke with Leighann received paperwork from [facility] this morning  I filled the paperwork out and faxed back to them

## 2020-09-04 NOTE — ASSESSMENT & PLAN NOTE
S/p lap sleeve gastrectomy  Currently on liquid diet  Pain well controlled  Did have BM and urinating regularly  Starting weight: 288lbs (7/2020)  Current weight: 284lbs   Weight change: -4 lbs  Continue management per bariatric team

## 2020-09-04 NOTE — ASSESSMENT & PLAN NOTE
Lab Results   Component Value Date    HGBA1C 9 0 (H) 07/24/2020   Patient status post bariatric surgery, glucoses are  off all medication  Recommend she restart Saxenda once she advances to soft diet  Call for any glucose <80 or if hypoglycemic episodes  Discontinue Toujeo U 300, discontinue Novolog 70/30 BID  Continue glucose monitoring BID or PRN if symptomatic

## 2020-09-04 NOTE — PROGRESS NOTES
Trish Pacheco 1965 female MRN: [id number]    Family Medicine Transition of Care Visit      Assessment/Plan    Diabetes mellitus type 2, uncontrolled ([facility] )    Lab Results   Component Value Date    HGBA1C 9 0 (H) 07/24/2020   Patient status post bariatric surgery, glucoses are  off all medication  Recommend she restart Saxenda once she advances to soft diet  Call for any glucose <80 or if hypoglycemic episodes  Discontinue Toujeo U 300, discontinue Novolog 70/30 BID  Continue glucose monitoring BID or PRN if symptomatic    Hyperlipidemia  Continue rosuvastatin 20mg HS  Recheck lipids prior to next visit     Current moderate episode of major depressive disorder without prior episode (HCC)  Chronic, stable  Continue Zoloft 25mg HS  - We discussed the benefits of counseling/therapy support  - Offered referrals as appropriate  - Counseled regarding lifestyle changes, sleep hygiene, breathing exercises, utilizing social supports, and CBT/breathing phone applications  Handout was provided on the same   - ED precautions reviewed for suicidal ideation/plan    Class 3 severe obesity due to excess calories with serious comorbidity and body mass index (BMI) of 40 0 to 44 9 in [facility])  S/p lap sleeve gastrectomy  Currently on liquid diet  Pain well controlled  Did have BM and urinating regularly  Starting weight: 288lbs (7/2020)  Current weight: 284lbs   Weight change: -4 lbs  Continue management per bariatric team    Karen was seen today for transition of care management      Diagnoses and all orders for this visit:    Class 3 severe obesity due to excess calories with serious comorbidity and body mass index (BMI) of 40 0 to 44 9 in [facility])    Hospital discharge follow-up    Mixed hyperlipidemia    Uncontrolled type 2 diabetes mellitus with hyperglycemia (HCC)    Current moderate episode of major depressive disorder without prior episode ([facility] )        - PCP: Maria Alejandra Masterson MD  - Follow-up appointments: Future Appointments   Date Time Provider Joe Elizabeth   9/9/2020 11:15 AM Glory Mcgovern MD [facility]   9/9/2020 11:30 AM Deandre Madsen MD [facility]   9/9/2020  1:00 PM Rebel Magana RD [facility]   10/2/2020  1:00 PM Taty Barrientos MD [facility]   10/23/2020  1:00 PM Algie Paget, MD [facility] Keny Shetty MD  [address]  9/4/2020      Please be aware that this note contains text that was dictated and there may be errors pertaining to "sound-alike" words during the dictation process  SUBJECTIVE    CC: TOÑA    Transitional Care Management Review:  Tracey Abbasi is a 50 y o  female here for TCM follow up  Leighann was in the hospital from 8/31-9/1 for sleeve gastrectomy with Dr Lawyer Bryson  She has since been following instructions with liquid diet at home  She is taking her Lovenox as prescribed  She is taking the following medications as prescribed: Lisinopril, Crestor, Protonix, Zoloft, Trazodone  On Monday she starts a puree diet  The following Monday she will progress to a soft diet  Her fasting glucose has bee   At highest, her daytime glucose has been 154  Generally afternoon glucoses are 130-140  She reports some gas pains in her stomach but they are transient  She had a BM yesterday  She denies fevers, chills, cough       During the TCM phone call patient stated:    TCM Call (since 8/4/2020)     Date and time call was made  9/2/2020 10:29 AM    Hospital care reviewed  Records reviewed    Patient was hospitialized at  [address] [place] [address]    Date of Admission  08/31/20    Date of discharge  09/01/20    Diagnosis  Class 3 severe obesity due to excess calories with serious comorbidity and body mass index (BMI) of 40 0 to 44 9 in adult    Disposition  Home    Were the patients medications reviewed and updated  No    Current Symptoms  None      TCM Call (since 8/4/2020)     Post hospital issues  None    Should patient be enrolled in anticoag monitoring? No    Scheduled for follow up? Yes    Did you obtain your prescribed medications  Yes    Do you need help managing your prescriptions or medications  No    Is transportation to your appointment needed  No    I have advised the patient to call PCP with any new or worsening symptoms  Rose Habermann, RMA     Living Arrangements  Spouse or Significiant other    Are you recieving any outpatient services  No    Are you recieving home care services  No    Are you using any community resources  No    Current waiver services  No    Have you fallen in the last 12 months  No    Interperter language line needed  No    Counseling  Patient          During today's visit:    Adele Dugan generally feels well  She wants to know how to precede regarding medications for her diabetes  They are complying with their medication changes and discharge instructions  They have the following questions: which medications to take  Review of Systems   Constitutional: Negative for activity change, chills, fever and unexpected weight change  HENT: Negative for congestion, rhinorrhea and sore throat  Eyes: Negative for visual disturbance  Respiratory: Negative for cough, shortness of breath and wheezing  Cardiovascular: Negative for chest pain and palpitations  Gastrointestinal: Positive for abdominal distention, constipation and nausea  Negative for abdominal pain, blood in stool, diarrhea and vomiting  Genitourinary: Negative for dysuria  Musculoskeletal: Negative for arthralgias and myalgias  Skin: Negative for rash  Neurological: Negative for dizziness, weakness and headaches  All other systems reviewed and are negative        Historical Information     The patient history was reviewed as follows:    Past Medical History:   Diagnosis Date    Anemia     Last Assessed:  3/27/13a    Arthritis     Asthma     Blurred vision     Chronic kidney disease     Diabetes mellitus ([facility] )     [facility]- she adjusted insulin doses and is seeing surgeon 20    Diabetic retinopathy ([facility] )     Dream enactment behavior     Hypercholesterolemia     Hyperlipidemia     Hypertension     Increased urinary frequency     Irritable bowel syndrome     Last Assessed:  10/2/12    Joint pain     Morbid obesity with BMI of 40 0-44 9, adult (HCC)     Nausea     Night sweat     Nightmare disorder 2020    Numbness     UTI (urinary tract infection)     Weakness      Past Surgical History:   Procedure Laterality Date    ABDOMINAL SURGERY      CARPAL TUNNEL RELEASE Right      SECTION      x 2    COLONOSCOPY  2016    COLONOSCOPY      [place] LAP, ALBAN RESTRICT PROC, LONGITUDINAL GASTRECTOMY N/A 2020    Procedure: LAPAROSCOPIC SLEEVE GASTRECTOMY  WITH EGD;  Surgeon: Donn Van MD;  Location: [facility];  Service: Bariatrics    [place] WRIST Zhang Heman LIG Left 2019    Procedure: RELEASE CARPAL TUNNEL ENDOSCOPIC-left;  Surgeon: Jose Oneal MD;  Location:  MAIN OR;  Service: Orthopedics    TUBAL LIGATION      WISDOM TOOTH EXTRACTION       Family History   Problem Relation Age of Onset    Diabetes Mother     Hypertension Mother     Diabetes Father     Heart disease Father     Stroke Father     Hypertension Father     Diverticulitis Father     Hyperlipidemia Father     Heart attack Father     Diabetes Maternal Grandmother     Pancreatic cancer Maternal Grandmother     Liver cancer Maternal Grandfather     Alcohol abuse Maternal Grandfather     Heart disease Paternal Grandmother     Crohn's disease Sister     Diabetes type I Daughter     Eczema Daughter     Diabetes Daughter     Heart attack Paternal Grandfather     Asthma Maternal Aunt     Alcohol abuse Maternal Uncle     Mental illness Paternal Aunt     Schizophrenia Paternal Aunt     Diabetes Paternal Aunt     Kidney disease Paternal Aunt     Alcohol abuse Paternal Uncle     Diabetes Paternal Uncle     Eczema Son     Colon cancer Neg Hx     Breast cancer Neg Hx       Social History   Social History     Substance and Sexual Activity   Alcohol Use Yes    Comment: rarely     Social History     Substance and Sexual Activity   Drug Use No     Social History     Tobacco Use   Smoking Status Former Smoker    Types: Cigarettes    Last attempt to quit: Darshan Bray Years since quittin 6   Smokeless Tobacco Former User       Medications:   Meds/Allergies     Current Outpatient Medications:     acetaminophen (TYLENOL) 325 mg tablet, Take 3 tablets (975 mg total) by mouth every 8 (eight) hours for 3 days Must cut or crush tabs, Disp: 27 tablet, Rfl: 0    azelastine (ASTELIN) 0 1 % nasal spray, 2 sprays into each nostril 2 (two) times a day Use in each nostril as directed, Disp: 1 Bottle, Rfl: 6    Blood Glucose Monitoring Suppl (FREESTYLE LITE) GREG, Patient tests twice daily, Disp: 1 each, Rfl: 0    clindamycin-benzoyl peroxide (BENZACLIN) gel, Apply topically 2 (two) times a day (Patient taking differently: Apply 1 application topically as needed ), Disp: 25 g, Rfl: 0    enoxaparin (LOVENOX) 40 mg/0 4 mL, Inject 0 4 mL (40 mg total) under the skin daily for 13 days, Disp: 5 2 mL, Rfl: 0    FreeStyle Unistick II Lancets MISC, Patient tests blood sugars twice daily, Disp: 180 each, Rfl: 1    glucose blood (FREESTYLE LITE) test strip, 1 each by Other route 2 (two) times a day Use as instructed, Disp: 200 each, Rfl: 1    Insulin Pen Needle (BD Pen Needle Zhane U/F) 32G X 4 MM MISC, by Does not apply route 2 (two) times a day, Disp: 60 each, Rfl: 3    Insulin Pen Needle 32G X 5 MM MISC, Inject at bedtime  , Disp: , Rfl:     lisinopril (ZESTRIL) 20 mg tablet, Take 0 5 tablets (10 mg total) by mouth daily, Disp: 30 tablet, Rfl: 0    mometasone (ELOCON) 0 1 % cream, Apply topically daily Apply topically BID x 2 weeks then qhs prn, Disp: 45 g, Rfl: 3    mometasone (Nasonex) 50 mcg/act nasal spray, 2 sprays into each nostril as needed (allergies), Disp: 1 Act, Rfl: 3    oxyCODONE (ROXICODONE) 5 mg immediate release tablet, Take 1 tablet (5 mg total) by mouth every 4 (four) hours as needed for moderate painMax Daily Amount: 30 mg, Disp: 10 tablet, Rfl: 0    pantoprazole (PROTONIX) 40 mg tablet, Take 1 tablet (40 mg total) by mouth daily, Disp: 90 tablet, Rfl: 1    rosuvastatin (CRESTOR) 20 MG tablet, Take 1 tablet (20 mg total) by mouth daily at bedtime, Disp: 90 tablet, Rfl: 3    sertraline (ZOLOFT) 25 mg tablet, Take 1 tablet (25 mg total) by mouth daily (Patient taking differently: Take 25 mg by mouth daily at bedtime ), Disp: 90 tablet, Rfl: 1    traZODone (DESYREL) 50 mg tablet, Take 1 tablet (50 mg total) by mouth daily at bedtime, Disp: 90 tablet, Rfl: 1    aspirin (ECOTRIN LOW STRENGTH) 81 mg EC tablet, Take 81 mg by mouth daily Last dose will be 8/23/20, Disp: , Rfl:     SAXENDA injection, Inject 0 5 mL (3 mg total) under the skin daily at bedtime (Patient not taking: Reported on 9/4/2020), Disp: 30 pen, Rfl: 3  Allergies   Allergen Reactions    Iodinated Diagnostic Agents Facial Swelling    Iodine Facial Swelling    Shellfish-Derived Products Throat Swelling    Amoxicillin Hives    Metformin Diarrhea    Penicillins      Yeast Infection       OBJECTIVE    Vitals:   Vitals:    09/04/20 1256   BP: 132/80   Pulse: 76   Resp: 16   Temp: 98 4 °F (36 9 °C)   Weight: 83 5 kg (184 lb)   Height: 4' 10" (1 473 m)     Body mass index is 38 46 kg/m²  Physical Exam:    Physical Exam  Vitals signs and nursing note reviewed  Constitutional:       General: She is not in acute distress  Appearance: Normal appearance  She is well-developed  She is not ill-appearing or diaphoretic  HENT:      Head: Normocephalic and atraumatic        Right Ear: External ear normal       Left Ear: External ear normal       Nose: Nose normal  Eyes:      General: Lids are normal       Conjunctiva/sclera: Conjunctivae normal    Neck:      Vascular: No JVD  Trachea: No tracheal deviation  Cardiovascular:      Rate and Rhythm: Normal rate and regular rhythm  Pulmonary:      Effort: Pulmonary effort is normal  No accessory muscle usage or respiratory distress  Breath sounds: Normal breath sounds  Abdominal:      General: Abdomen is protuberant  Skin:     Findings: No rash  Neurological:      Mental Status: She is alert  Labs: I have personally reviewed all pertinent results  Imaging:  I have personally reviewed all pertinent results

## 2020-09-04 NOTE — ASSESSMENT & PLAN NOTE
Chronic, stable  Continue Zoloft 25mg HS  - We discussed the benefits of counseling/therapy support  - Offered referrals as appropriate  - Counseled regarding lifestyle changes, sleep hygiene, breathing exercises, utilizing social supports, and CBT/breathing phone applications   Handout was provided on the same   - ED precautions reviewed for suicidal ideation/plan

## 2020-09-08 ENCOUNTER — TELEPHONE (OUTPATIENT)
Dept: BARIATRICS | Facility: CLINIC | Age: 55
End: 2020-09-08

## 2020-09-09 ENCOUNTER — OFFICE VISIT (OUTPATIENT)
Dept: BARIATRICS | Facility: CLINIC | Age: 55
End: 2020-09-09

## 2020-09-09 VITALS
BODY MASS INDEX: 37.7 KG/M2 | WEIGHT: 179.6 LBS | DIASTOLIC BLOOD PRESSURE: 70 MMHG | SYSTOLIC BLOOD PRESSURE: 110 MMHG | HEIGHT: 58 IN | TEMPERATURE: 97.2 F

## 2020-09-09 VITALS — HEIGHT: 58 IN | WEIGHT: 179 LBS | BODY MASS INDEX: 37.57 KG/M2

## 2020-09-09 DIAGNOSIS — K29.70 GASTRITIS, HELICOBACTER PYLORI: Primary | ICD-10-CM

## 2020-09-09 DIAGNOSIS — K91.2 POSTSURGICAL MALABSORPTION: Primary | ICD-10-CM

## 2020-09-09 DIAGNOSIS — B96.81 GASTRITIS, HELICOBACTER PYLORI: Primary | ICD-10-CM

## 2020-09-09 PROCEDURE — RECHECK

## 2020-09-09 PROCEDURE — 99024 POSTOP FOLLOW-UP VISIT: CPT | Performed by: SURGERY

## 2020-09-09 RX ORDER — LANSOPRAZOLE, AMOXICILLIN, CLARITHROMYCIN 30-500-500
KIT ORAL 2 TIMES DAILY
Qty: 1 KIT | Refills: 0 | Status: SHIPPED | OUTPATIENT
Start: 2020-09-09 | End: 2020-09-10

## 2020-09-09 NOTE — PROGRESS NOTES
Weight Management Nutrition Class     Diagnosis: Obesity    Bariatric Surgeon: Dr Saniya Barrera     Surgery: Vertical Sleeve Gastrectomy    Class: first post op note    Topics discussed today include:     fluid goals post op, protein goals post op, constipation, chew food well, exercise, avoidance of alcohol, PPI use, diet progression, hypoglycemia, dumping syndrome, protein supplems, vitamin/mineral supplements and calcium supplements    Patient was able to verbalize basic diet (protein, fluid, vitamin and mineral) recommendations and possible nutrition-related complications  Yes     -Started pureed diet on Monday  Tried cottage cheese, low fat homemade refried beans, creamed soups, thinned mashed potatoes made with fairlife milk, unsweet applesauce  -Unflavored protein powder (1 scoop=23gm) with 1 tsp of PB2 (<5gm) mixed with fairlife milk    Sometimes will add in some oatmeal (1 scoop)  -Measuring 1/4 cup portion  -Following 30/60 rule  -Can't do really hot or cold fluids, needs to be room temp  -Fluids:  32oz water + 8oz G2 + 8oz protein shake + pureed cannelope mixed with water (very diluted)  -Vitamins: Bariatric fusion 4/day + Vitamin C and E, nephrologist advised against calcium at this time because "body produces too much calcium on its own"

## 2020-09-09 NOTE — PROGRESS NOTES
FIRST POST-OPERATIVE VISIT - BARIATRIC SURGERY  Karen Ngo 50 y o  female MRN: [id number]  Unit/Bed#:  Encounter: [id number]      HPI:  Irena Sylvester is a 50 y o  female who presents for follow up s/p laparoscopic sleeve gastrectomy  Doing very well postop  Tolerating fluids 48oz but mostly G2  Encouraged increasing her water intake  She is tolerating pureed foods without issue  +F/BM, denies n/v/cp/sob  Ambulating daily    Review of Systems   Constitutional: Negative  Respiratory: Negative  Cardiovascular: Negative  Gastrointestinal: Negative  Musculoskeletal: Negative  Neurological: Negative  All other systems reviewed and are negative        Historical Information   Past Medical History:   Diagnosis Date    Anemia     Last Assessed:  3/27/13a    Arthritis     Asthma     Blurred vision     Chronic kidney disease     Diabetes mellitus ([facility] )     was in touch with PCP re [facility]- she adjusted insulin doses and is seeing surgeon 20    Diabetic retinopathy ([facility] )     Dream enactment behavior     Hypercholesterolemia     Hyperlipidemia     Hypertension     Increased urinary frequency     Irritable bowel syndrome     Last Assessed:  10/2/12    Joint pain     Morbid obesity with BMI of 40 0-44 9, adult (HCC)     Nausea     Night sweat     Nightmare disorder 2020    Numbness     UTI (urinary tract infection)     Weakness      Past Surgical History:   Procedure Laterality Date    ABDOMINAL SURGERY      CARPAL TUNNEL RELEASE Right      SECTION      x 2    COLONOSCOPY  2016    COLONOSCOPY      [place] LAP, ALBAN RESTRICT PROC, LONGITUDINAL GASTRECTOMY N/A 2020    Procedure: LAPAROSCOPIC SLEEVE GASTRECTOMY  WITH EGD;  Surgeon: Samantha Peña MD;  Location: [address] [place] [address];  Service: Bariatrics    [place] WRIST Kylie Torres LIG Left 2019    Procedure: RELEASE CARPAL TUNNEL ENDOSCOPIC-left;  Surgeon: Chad Farnsworth MD;  Location:  MAIN OR; Service: Orthopedics    TUBAL LIGATION      WISDOM TOOTH EXTRACTION       Social History   Social History     Substance and Sexual Activity   Alcohol Use Yes    Comment: rarely     Social History     Substance and Sexual Activity   Drug Use No     Social History     Tobacco Use   Smoking Status Former Smoker    Types: Cigarettes    Last attempt to quit:  [address] Years since quittin 7   Smokeless Tobacco Former User     Family History: non-contributory    Meds/Allergies   all medications and allergies reviewed  Allergies   Allergen Reactions    Iodinated Diagnostic Agents Facial Swelling    Iodine Facial Swelling    Shellfish-Derived Products Throat Swelling    Amoxicillin Hives    Metformin Diarrhea    Penicillins      Yeast Infection       Objective     Current Vitals:   /70   Temp (!) 97 2 °F (36 2 °C)   Ht 4' 10" (1 473 m)   Wt 81 5 kg (179 lb 9 6 oz)   BMI 37 54 kg/m²       Physical Exam  Constitutional:       Appearance: She is well-developed  HENT:      Head: Normocephalic  Neck:      Musculoskeletal: Normal range of motion  Cardiovascular:      Rate and Rhythm: Normal rate  Pulmonary:      Effort: Pulmonary effort is normal    Abdominal:      General: There is no distension  Palpations: Abdomen is soft  Tenderness: There is no abdominal tenderness  There is no rebound  Comments: Incisions c/d/i  No signs of infection   Musculoskeletal: Normal range of motion  Skin:     General: Skin is warm and dry  Neurological:      Mental Status: She is alert and oriented to person, place, and time  Psychiatric:         Behavior: Behavior normal          Thought Content:  Thought content normal          Judgment: Judgment normal          Assessment/Plan:    Patient is presenting for the first postoperative visit, patient hospital stay was uneventful without any complications, patient is doing well, has no complaints, is taking vitamins as instructed, currently tolerating the blenderized diet, will advance to soft diet  The patient is also tolerating Lovenox injections and will be completing the remaining doses  Patient will also be meeting with our dietician today to review her vitamin and mineral supplements and also go over her diet and emphasize postoperative commitment and compliance  The patient was also instructed to start exercising on a regular basis  However, I recommended no heavy lifting, or weight exercises for another 2 weeks  F/U in 4 weeks  Patient was instructed to call if develops nausea, vomiting, fever or chills  Pathology was also reviewed and was positive for H pylori and will be sent medications

## 2020-09-09 NOTE — PROGRESS NOTES
H  pylori positive sleeve gastrectomy specimen   Ordering therapy for patient and will inform today at first postop visit

## 2020-09-10 DIAGNOSIS — B96.81 GASTRITIS, HELICOBACTER PYLORI: Primary | ICD-10-CM

## 2020-09-10 DIAGNOSIS — K29.70 GASTRITIS, HELICOBACTER PYLORI: Primary | ICD-10-CM

## 2020-09-10 RX ORDER — METRONIDAZOLE 500 MG/1
500 TABLET ORAL EVERY 8 HOURS SCHEDULED
Qty: 30 TABLET | Refills: 0 | Status: SHIPPED | OUTPATIENT
Start: 2020-09-10 | End: 2020-09-20

## 2020-09-10 RX ORDER — TETRACYCLINE HYDROCHLORIDE 500 MG/1
500 CAPSULE ORAL 4 TIMES DAILY
Qty: 40 CAPSULE | Refills: 0 | Status: SHIPPED | OUTPATIENT
Start: 2020-09-10 | End: 2020-09-20

## 2020-09-10 RX ORDER — BISMUTH SUBSALICYLATE 262 MG/1
524 TABLET, CHEWABLE ORAL
Qty: 80 TABLET | Refills: 0 | Status: SHIPPED | OUTPATIENT
Start: 2020-09-10 | End: 2020-09-20

## 2020-09-18 ENCOUNTER — PATIENT MESSAGE (OUTPATIENT)
Dept: FAMILY MEDICINE CLINIC | Facility: CLINIC | Age: 55
End: 2020-09-18

## 2020-09-22 ENCOUNTER — TELEPHONE (OUTPATIENT)
Dept: FAMILY MEDICINE CLINIC | Facility: CLINIC | Age: 55
End: 2020-09-22

## 2020-09-22 NOTE — TELEPHONE ENCOUNTER
Please call patient and ask what her glucose has been since her surgery  Ask for fasting and other sugars

## 2020-09-24 NOTE — TELEPHONE ENCOUNTER
Patient called with update  Glucose has been ranging from   100-125-am  [id number]-wgrnw time  170-200- night  1  She has been taking a small amount of Novalog in the evening when her sugar is the highest    2  She restarted her Saxenda @ 0 6dose   3   She takes the truility  26 if her sugars are between 170-190, anything above 200 she takes 28-30

## 2020-09-25 NOTE — TELEPHONE ENCOUNTER
Please return Leighann's call  Thank you for the sugar update      - She should continue taking the [address]  - She should continue taking the Toujeo U-300 26u every night  - Her sliding scale at lunch and dinner should be:  · BG< 150 = no insulin  · -199 = 2u Novalog  · -249 = 4u Novalog  · -299 = 7u Novalog  · -349 = 10u Novalog  · -399 = 12u Novalog    We can review in greater detail at her next visit

## 2020-09-30 ENCOUNTER — TELEPHONE (OUTPATIENT)
Dept: BARIATRICS | Facility: CLINIC | Age: 55
End: 2020-09-30

## 2020-09-30 NOTE — TELEPHONE ENCOUNTER
Pt called stating she is having trouble getting in her fluids and having issues with constipation  Reviewed pt's diet recall and she appears to only be consuming about 16oz fluids per day and 25gm of protein (using some protein powder in foods, but not drinking a protein shake on a regular basis)  Advised pt of the following and sent an email to reinforce:    Real Lawton,    Here is the constipation protocol:    - Do NOT go more than 4 days without a BM  - Work from the bottom up!  - Glycerin Suppository: 2x in 24 hrs  - Dulcolax suppository: 2x in 24 hrs  - Warm water enema  Like we discussed try to do Miralax today and some plum smart light juice and another dose of Miralax tomorrow morning  If nothing, then follow the protocol above  Once you do have the good BM continue the Miralax every day  Some things to work on:  -Increase the fluids--try Hint water or propel  Sometimes a hint of flavor makes it easier to get the fluid in  -Take very small sips of fluids constantly to work on getting more than 16oz fluids per day  The got   -Drink one protein shake per day to better meet 60gm of protein  Please keep me updated!

## 2020-10-06 ENCOUNTER — TELEPHONE (OUTPATIENT)
Dept: BARIATRICS | Facility: CLINIC | Age: 55
End: 2020-10-06

## 2020-10-07 ENCOUNTER — OFFICE VISIT (OUTPATIENT)
Dept: BARIATRICS | Facility: CLINIC | Age: 55
End: 2020-10-07

## 2020-10-07 DIAGNOSIS — K91.2 POSTSURGICAL MALABSORPTION: Primary | ICD-10-CM

## 2020-10-07 PROCEDURE — RECHECK

## 2020-10-08 VITALS — BODY MASS INDEX: 35.89 KG/M2 | WEIGHT: 171 LBS | HEIGHT: 58 IN

## 2020-10-08 DIAGNOSIS — E11.65 UNCONTROLLED TYPE 2 DIABETES MELLITUS WITH HYPERGLYCEMIA (HCC): ICD-10-CM

## 2020-10-08 RX ORDER — INSULIN ASPART 100 [IU]/ML
INJECTION, SOLUTION INTRAVENOUS; SUBCUTANEOUS
Qty: 15 ML | Refills: 0 | OUTPATIENT
Start: 2020-10-08

## 2020-10-09 ENCOUNTER — HOSPITAL ENCOUNTER (OUTPATIENT)
Dept: RADIOLOGY | Facility: HOSPITAL | Age: 55
Discharge: HOME | End: 2020-10-09
Attending: ORTHOPAEDIC SURGERY
Payer: COMMERCIAL

## 2020-10-09 ENCOUNTER — OFFICE VISIT (OUTPATIENT)
Dept: OBGYN CLINIC | Facility: HOSPITAL | Age: 55
End: 2020-10-09
Payer: COMMERCIAL

## 2020-10-09 ENCOUNTER — OFFICE VISIT (OUTPATIENT)
Dept: OCCUPATIONAL THERAPY | Facility: HOSPITAL | Age: 55
End: 2020-10-09
Attending: ORTHOPAEDIC SURGERY

## 2020-10-09 VITALS
DIASTOLIC BLOOD PRESSURE: 68 MMHG | BODY MASS INDEX: 35.89 KG/M2 | WEIGHT: 171 LBS | HEIGHT: 58 IN | SYSTOLIC BLOOD PRESSURE: 101 MMHG | HEART RATE: 82 BPM

## 2020-10-09 DIAGNOSIS — M79.641 RIGHT HAND PAIN: Primary | ICD-10-CM

## 2020-10-09 DIAGNOSIS — M79.641 RIGHT HAND PAIN: ICD-10-CM

## 2020-10-09 DIAGNOSIS — G56.31 RADIAL TUNNEL SYNDROME OF RIGHT UPPER EXTREMITY: ICD-10-CM

## 2020-10-09 DIAGNOSIS — M77.11 LATERAL EPICONDYLITIS OF RIGHT ELBOW: ICD-10-CM

## 2020-10-09 DIAGNOSIS — M65.342 TRIGGER RING FINGER OF LEFT HAND: ICD-10-CM

## 2020-10-09 PROCEDURE — 97760 ORTHOTIC MGMT&TRAING 1ST ENC: CPT | Performed by: OCCUPATIONAL THERAPIST

## 2020-10-09 PROCEDURE — 73130 X-RAY EXAM OF HAND: CPT

## 2020-10-09 PROCEDURE — 20550 NJX 1 TENDON SHEATH/LIGAMENT: CPT | Performed by: ORTHOPAEDIC SURGERY

## 2020-10-09 PROCEDURE — 99214 OFFICE O/P EST MOD 30 MIN: CPT | Performed by: ORTHOPAEDIC SURGERY

## 2020-10-09 RX ORDER — BETAMETHASONE SODIUM PHOSPHATE AND BETAMETHASONE ACETATE 3; 3 MG/ML; MG/ML
6 INJECTION, SUSPENSION INTRA-ARTICULAR; INTRALESIONAL; INTRAMUSCULAR; SOFT TISSUE
Status: COMPLETED | OUTPATIENT
Start: 2020-10-09 | End: 2020-10-09

## 2020-10-09 RX ORDER — LIDOCAINE HYDROCHLORIDE 20 MG/ML
1 INJECTION, SOLUTION EPIDURAL; INFILTRATION; INTRACAUDAL; PERINEURAL
Status: COMPLETED | OUTPATIENT
Start: 2020-10-09 | End: 2020-10-09

## 2020-10-09 RX ADMIN — LIDOCAINE HYDROCHLORIDE 1 ML: 20 INJECTION, SOLUTION EPIDURAL; INFILTRATION; INTRACAUDAL; PERINEURAL at 10:17

## 2020-10-09 RX ADMIN — BETAMETHASONE SODIUM PHOSPHATE AND BETAMETHASONE ACETATE 6 MG: 3; 3 INJECTION, SUSPENSION INTRA-ARTICULAR; INTRALESIONAL; INTRAMUSCULAR; SOFT TISSUE at 10:17

## 2020-10-22 NOTE — TELEPHONE ENCOUNTER
Patient called stating she was told by one of her specialists one of her blood work slips stating her blood level was off  She would like to speak to [facility] about her blood work please  Provider discussed disease process, treatment plan, medications,and discharge instructions. Family agrees with plan. Any questions were answered.

## 2020-10-23 ENCOUNTER — OFFICE VISIT (OUTPATIENT)
Dept: FAMILY MEDICINE CLINIC | Facility: CLINIC | Age: 55
End: 2020-10-23
Payer: COMMERCIAL

## 2020-10-23 VITALS
HEART RATE: 74 BPM | WEIGHT: 170 LBS | DIASTOLIC BLOOD PRESSURE: 82 MMHG | BODY MASS INDEX: 35.68 KG/M2 | HEIGHT: 58 IN | SYSTOLIC BLOOD PRESSURE: 122 MMHG | TEMPERATURE: 98.6 F

## 2020-10-23 DIAGNOSIS — E66.01 CLASS 2 SEVERE OBESITY DUE TO EXCESS CALORIES WITH SERIOUS COMORBIDITY AND BODY MASS INDEX (BMI) OF 35.0 TO 35.9 IN ADULT (HCC): ICD-10-CM

## 2020-10-23 DIAGNOSIS — Z79.4 TYPE 2 DIABETES MELLITUS WITH OTHER SPECIFIED COMPLICATION, WITH LONG-TERM CURRENT USE OF INSULIN (HCC): Primary | ICD-10-CM

## 2020-10-23 DIAGNOSIS — E11.21 TYPE 2 DIABETES MELLITUS WITH DIABETIC NEPHROPATHY, WITH LONG-TERM CURRENT USE OF INSULIN (HCC): ICD-10-CM

## 2020-10-23 DIAGNOSIS — Z23 IMMUNIZATION DUE: ICD-10-CM

## 2020-10-23 DIAGNOSIS — Z79.4 TYPE 2 DIABETES MELLITUS WITH DIABETIC NEPHROPATHY, WITH LONG-TERM CURRENT USE OF INSULIN (HCC): ICD-10-CM

## 2020-10-23 DIAGNOSIS — E11.69 TYPE 2 DIABETES MELLITUS WITH OTHER SPECIFIED COMPLICATION, WITH LONG-TERM CURRENT USE OF INSULIN (HCC): Primary | ICD-10-CM

## 2020-10-23 PROBLEM — K21.9 GASTROESOPHAGEAL REFLUX DISEASE: Status: ACTIVE | Noted: 2020-08-21

## 2020-10-23 LAB — SL AMB POCT HEMOGLOBIN AIC: 7.6 (ref ?–6.5)

## 2020-10-23 PROCEDURE — 83036 HEMOGLOBIN GLYCOSYLATED A1C: CPT | Performed by: FAMILY MEDICINE

## 2020-10-23 PROCEDURE — 90471 IMMUNIZATION ADMIN: CPT | Performed by: FAMILY MEDICINE

## 2020-10-23 PROCEDURE — 99213 OFFICE O/P EST LOW 20 MIN: CPT | Performed by: FAMILY MEDICINE

## 2020-10-23 PROCEDURE — 90682 RIV4 VACC RECOMBINANT DNA IM: CPT | Performed by: FAMILY MEDICINE

## 2020-10-23 RX ORDER — LIRAGLUTIDE 6 MG/ML
3 INJECTION, SOLUTION SUBCUTANEOUS
Qty: 30 PEN | Refills: 3
Start: 2020-10-23 | End: 2020-11-16

## 2020-10-26 ENCOUNTER — TELEPHONE (OUTPATIENT)
Dept: ADMINISTRATIVE | Facility: OTHER | Age: 55
End: 2020-10-26

## 2020-11-05 ENCOUNTER — TELEPHONE (OUTPATIENT)
Dept: NEPHROLOGY | Facility: CLINIC | Age: 55
End: 2020-11-05

## 2020-11-16 DIAGNOSIS — Z79.4 TYPE 2 DIABETES MELLITUS WITH OTHER SPECIFIED COMPLICATION, WITH LONG-TERM CURRENT USE OF INSULIN (HCC): ICD-10-CM

## 2020-11-16 DIAGNOSIS — Z79.4 TYPE 2 DIABETES MELLITUS WITH OTHER SPECIFIED COMPLICATION, WITH LONG-TERM CURRENT USE OF INSULIN (HCC): Primary | ICD-10-CM

## 2020-11-16 DIAGNOSIS — E11.69 TYPE 2 DIABETES MELLITUS WITH OTHER SPECIFIED COMPLICATION, WITH LONG-TERM CURRENT USE OF INSULIN (HCC): Primary | ICD-10-CM

## 2020-11-16 DIAGNOSIS — E11.69 TYPE 2 DIABETES MELLITUS WITH OTHER SPECIFIED COMPLICATION, WITH LONG-TERM CURRENT USE OF INSULIN (HCC): ICD-10-CM

## 2020-11-16 RX ORDER — LIRAGLUTIDE 6 MG/ML
INJECTION, SOLUTION SUBCUTANEOUS
Qty: 15 ML | Refills: 0 | Status: SHIPPED | OUTPATIENT
Start: 2020-11-16 | End: 2021-01-07 | Stop reason: SDUPTHER

## 2020-11-16 RX ORDER — INSULIN GLARGINE 300 U/ML
INJECTION, SOLUTION SUBCUTANEOUS
Qty: 4.5 ML | Refills: 0 | Status: SHIPPED | OUTPATIENT
Start: 2020-11-16 | End: 2021-01-22

## 2020-12-08 ENCOUNTER — TELEMEDICINE (OUTPATIENT)
Dept: FAMILY MEDICINE CLINIC | Facility: CLINIC | Age: 55
End: 2020-12-08
Payer: COMMERCIAL

## 2020-12-08 DIAGNOSIS — B34.9 VIRAL INFECTION, UNSPECIFIED: Primary | ICD-10-CM

## 2020-12-08 PROBLEM — Z48.815 ENCOUNTER FOR SURGICAL AFTERCARE FOLLOWING SURGERY OF DIGESTIVE SYSTEM: Status: ACTIVE | Noted: 2020-09-04

## 2020-12-08 PROBLEM — K91.2 POSTSURGICAL MALABSORPTION: Status: ACTIVE | Noted: 2020-12-08

## 2020-12-08 PROCEDURE — 99213 OFFICE O/P EST LOW 20 MIN: CPT | Performed by: FAMILY MEDICINE

## 2020-12-09 DIAGNOSIS — B34.9 VIRAL INFECTION, UNSPECIFIED: ICD-10-CM

## 2020-12-09 PROCEDURE — U0003 INFECTIOUS AGENT DETECTION BY NUCLEIC ACID (DNA OR RNA); SEVERE ACUTE RESPIRATORY SYNDROME CORONAVIRUS 2 (SARS-COV-2) (CORONAVIRUS DISEASE [COVID-19]), AMPLIFIED PROBE TECHNIQUE, MAKING USE OF HIGH THROUGHPUT TECHNOLOGIES AS DESCRIBED BY CMS-2020-01-R: HCPCS | Performed by: FAMILY MEDICINE

## 2020-12-10 LAB — SARS-COV-2 RNA SPEC QL NAA+PROBE: NOT DETECTED

## 2020-12-11 ENCOUNTER — OFFICE VISIT (OUTPATIENT)
Dept: BARIATRICS | Facility: CLINIC | Age: 55
End: 2020-12-11
Payer: COMMERCIAL

## 2020-12-11 VITALS
BODY MASS INDEX: 34.8 KG/M2 | WEIGHT: 165.8 LBS | SYSTOLIC BLOOD PRESSURE: 102 MMHG | TEMPERATURE: 97.3 F | DIASTOLIC BLOOD PRESSURE: 68 MMHG | HEIGHT: 58 IN | HEART RATE: 85 BPM

## 2020-12-11 DIAGNOSIS — I10 BENIGN ESSENTIAL HYPERTENSION: ICD-10-CM

## 2020-12-11 DIAGNOSIS — Z79.4 TYPE 2 DIABETES MELLITUS WITH DIABETIC NEPHROPATHY, WITH LONG-TERM CURRENT USE OF INSULIN (HCC): ICD-10-CM

## 2020-12-11 DIAGNOSIS — E78.2 MIXED HYPERLIPIDEMIA: ICD-10-CM

## 2020-12-11 DIAGNOSIS — A04.8 H. PYLORI INFECTION: ICD-10-CM

## 2020-12-11 DIAGNOSIS — K91.2 POSTSURGICAL MALABSORPTION: ICD-10-CM

## 2020-12-11 DIAGNOSIS — E11.21 TYPE 2 DIABETES MELLITUS WITH DIABETIC NEPHROPATHY, WITH LONG-TERM CURRENT USE OF INSULIN (HCC): ICD-10-CM

## 2020-12-11 DIAGNOSIS — G47.33 OSA (OBSTRUCTIVE SLEEP APNEA): ICD-10-CM

## 2020-12-11 DIAGNOSIS — Z48.815 ENCOUNTER FOR SURGICAL AFTERCARE FOLLOWING SURGERY OF DIGESTIVE SYSTEM: Primary | ICD-10-CM

## 2020-12-11 DIAGNOSIS — K21.9 GASTROESOPHAGEAL REFLUX DISEASE: ICD-10-CM

## 2020-12-11 PROCEDURE — 99214 OFFICE O/P EST MOD 30 MIN: CPT | Performed by: PHYSICIAN ASSISTANT

## 2020-12-14 ENCOUNTER — OFFICE VISIT (OUTPATIENT)
Dept: BARIATRICS | Facility: CLINIC | Age: 55
End: 2020-12-14

## 2020-12-14 VITALS — HEIGHT: 58 IN | WEIGHT: 165.8 LBS | BODY MASS INDEX: 34.8 KG/M2

## 2020-12-14 DIAGNOSIS — K91.2 POSTSURGICAL MALABSORPTION: Primary | ICD-10-CM

## 2020-12-14 PROCEDURE — RECHECK

## 2020-12-31 ENCOUNTER — IMMUNIZATIONS (OUTPATIENT)
Dept: FAMILY MEDICINE CLINIC | Facility: HOSPITAL | Age: 55
End: 2020-12-31

## 2020-12-31 DIAGNOSIS — Z23 ENCOUNTER FOR IMMUNIZATION: ICD-10-CM

## 2020-12-31 PROCEDURE — 0011A SARS-COV-2 / COVID-19 MRNA VACCINE (MODERNA) 100 MCG: CPT

## 2020-12-31 PROCEDURE — 91301 SARS-COV-2 / COVID-19 MRNA VACCINE (MODERNA) 100 MCG: CPT

## 2021-01-07 DIAGNOSIS — Z79.4 TYPE 2 DIABETES MELLITUS WITH OTHER SPECIFIED COMPLICATION, WITH LONG-TERM CURRENT USE OF INSULIN (HCC): ICD-10-CM

## 2021-01-07 DIAGNOSIS — E11.69 TYPE 2 DIABETES MELLITUS WITH OTHER SPECIFIED COMPLICATION, WITH LONG-TERM CURRENT USE OF INSULIN (HCC): ICD-10-CM

## 2021-01-07 RX ORDER — LIRAGLUTIDE 6 MG/ML
3 INJECTION, SOLUTION SUBCUTANEOUS DAILY
Qty: 45 ML | Refills: 1 | Status: SHIPPED | OUTPATIENT
Start: 2021-01-07 | End: 2021-01-22

## 2021-01-22 ENCOUNTER — APPOINTMENT (OUTPATIENT)
Dept: LAB | Facility: CLINIC | Age: 56
End: 2021-01-22
Payer: COMMERCIAL

## 2021-01-22 ENCOUNTER — TELEPHONE (OUTPATIENT)
Dept: BARIATRICS | Facility: CLINIC | Age: 56
End: 2021-01-22

## 2021-01-22 ENCOUNTER — OFFICE VISIT (OUTPATIENT)
Dept: FAMILY MEDICINE CLINIC | Facility: CLINIC | Age: 56
End: 2021-01-22
Payer: COMMERCIAL

## 2021-01-22 ENCOUNTER — TRANSCRIBE ORDERS (OUTPATIENT)
Dept: LAB | Facility: CLINIC | Age: 56
End: 2021-01-22

## 2021-01-22 VITALS
WEIGHT: 165.8 LBS | TEMPERATURE: 96.8 F | OXYGEN SATURATION: 98 % | SYSTOLIC BLOOD PRESSURE: 126 MMHG | DIASTOLIC BLOOD PRESSURE: 72 MMHG | HEART RATE: 73 BPM | BODY MASS INDEX: 34.8 KG/M2 | HEIGHT: 58 IN

## 2021-01-22 DIAGNOSIS — E11.319 DIABETIC RETINOPATHY OF BOTH EYES ASSOCIATED WITH TYPE 2 DIABETES MELLITUS, MACULAR EDEMA PRESENCE UNSPECIFIED, UNSPECIFIED RETINOPATHY SEVERITY (HCC): ICD-10-CM

## 2021-01-22 DIAGNOSIS — K91.2 POSTSURGICAL MALABSORPTION: ICD-10-CM

## 2021-01-22 DIAGNOSIS — Z00.00 ANNUAL PHYSICAL EXAM: Primary | ICD-10-CM

## 2021-01-22 DIAGNOSIS — E11.21 TYPE 2 DIABETES MELLITUS WITH DIABETIC NEPHROPATHY, WITH LONG-TERM CURRENT USE OF INSULIN (HCC): ICD-10-CM

## 2021-01-22 DIAGNOSIS — E61.1 LOW IRON: ICD-10-CM

## 2021-01-22 DIAGNOSIS — E11.21 DIABETIC NEPHROPATHY ASSOCIATED WITH TYPE 2 DIABETES MELLITUS (HCC): ICD-10-CM

## 2021-01-22 DIAGNOSIS — D35.2 PITUITARY MICROADENOMA (HCC): ICD-10-CM

## 2021-01-22 DIAGNOSIS — E55.9 VITAMIN D DEFICIENCY: ICD-10-CM

## 2021-01-22 DIAGNOSIS — E78.2 MIXED HYPERLIPIDEMIA: ICD-10-CM

## 2021-01-22 DIAGNOSIS — K91.2 POSTSURGICAL MALABSORPTION: Primary | ICD-10-CM

## 2021-01-22 DIAGNOSIS — E11.29 MICROALBUMINURIA DUE TO TYPE 2 DIABETES MELLITUS (HCC): ICD-10-CM

## 2021-01-22 DIAGNOSIS — Z79.4 TYPE 2 DIABETES MELLITUS WITH DIABETIC NEPHROPATHY, WITH LONG-TERM CURRENT USE OF INSULIN (HCC): ICD-10-CM

## 2021-01-22 DIAGNOSIS — E53.8 LOW VITAMIN B12 LEVEL: ICD-10-CM

## 2021-01-22 DIAGNOSIS — F32.1 CURRENT MODERATE EPISODE OF MAJOR DEPRESSIVE DISORDER WITHOUT PRIOR EPISODE (HCC): ICD-10-CM

## 2021-01-22 DIAGNOSIS — I10 BENIGN ESSENTIAL HYPERTENSION: ICD-10-CM

## 2021-01-22 DIAGNOSIS — R80.9 MICROALBUMINURIA DUE TO TYPE 2 DIABETES MELLITUS (HCC): ICD-10-CM

## 2021-01-22 DIAGNOSIS — E66.09 CLASS 1 OBESITY DUE TO EXCESS CALORIES WITH SERIOUS COMORBIDITY AND BODY MASS INDEX (BMI) OF 34.0 TO 34.9 IN ADULT: ICD-10-CM

## 2021-01-22 PROBLEM — Z48.815 ENCOUNTER FOR SURGICAL AFTERCARE FOLLOWING SURGERY OF DIGESTIVE SYSTEM: Status: RESOLVED | Noted: 2020-09-04 | Resolved: 2021-01-22

## 2021-01-22 PROBLEM — H60.542 ECZEMA OF LEFT EXTERNAL EAR: Status: RESOLVED | Noted: 2019-09-11 | Resolved: 2021-01-22

## 2021-01-22 LAB
25(OH)D3 SERPL-MCNC: 17.8 NG/ML (ref 30–100)
ALBUMIN SERPL BCP-MCNC: 3.4 G/DL (ref 3.5–5)
ALP SERPL-CCNC: 107 U/L (ref 46–116)
ALT SERPL W P-5'-P-CCNC: 27 U/L (ref 12–78)
ANION GAP SERPL CALCULATED.3IONS-SCNC: 9 MMOL/L (ref 4–13)
AST SERPL W P-5'-P-CCNC: 17 U/L (ref 5–45)
BASOPHILS # BLD AUTO: 0.07 THOUSANDS/ΜL (ref 0–0.1)
BASOPHILS NFR BLD AUTO: 1 % (ref 0–1)
BILIRUB SERPL-MCNC: 0.25 MG/DL (ref 0.2–1)
BUN SERPL-MCNC: 20 MG/DL (ref 5–25)
CALCIUM ALBUM COR SERPL-MCNC: 9.8 MG/DL (ref 8.3–10.1)
CALCIUM SERPL-MCNC: 9.3 MG/DL (ref 8.3–10.1)
CHLORIDE SERPL-SCNC: 104 MMOL/L (ref 100–108)
CHOLEST SERPL-MCNC: 237 MG/DL (ref 50–200)
CO2 SERPL-SCNC: 24 MMOL/L (ref 21–32)
CREAT SERPL-MCNC: 1.05 MG/DL (ref 0.6–1.3)
CREAT UR-MCNC: 226 MG/DL
EOSINOPHIL # BLD AUTO: 0.6 THOUSAND/ΜL (ref 0–0.61)
EOSINOPHIL NFR BLD AUTO: 7 % (ref 0–6)
ERYTHROCYTE [DISTWIDTH] IN BLOOD BY AUTOMATED COUNT: 14.1 % (ref 11.6–15.1)
EST. AVERAGE GLUCOSE BLD GHB EST-MCNC: 148 MG/DL
FERRITIN SERPL-MCNC: 10 NG/ML (ref 8–388)
FOLATE SERPL-MCNC: 9.6 NG/ML (ref 3.1–17.5)
GFR SERPL CREATININE-BSD FRML MDRD: 61 ML/MIN/1.73SQ M
GLUCOSE P FAST SERPL-MCNC: 173 MG/DL (ref 65–99)
HBA1C MFR BLD: 6.8 %
HCT VFR BLD AUTO: 39.8 % (ref 34.8–46.1)
HDLC SERPL-MCNC: 58 MG/DL
HGB BLD-MCNC: 13.1 G/DL (ref 11.5–15.4)
IMM GRANULOCYTES # BLD AUTO: 0.02 THOUSAND/UL (ref 0–0.2)
IMM GRANULOCYTES NFR BLD AUTO: 0 % (ref 0–2)
IRON SATN MFR SERPL: 14 %
IRON SERPL-MCNC: 59 UG/DL (ref 50–170)
LDLC SERPL CALC-MCNC: 132 MG/DL (ref 0–100)
LYMPHOCYTES # BLD AUTO: 2.81 THOUSANDS/ΜL (ref 0.6–4.47)
LYMPHOCYTES NFR BLD AUTO: 33 % (ref 14–44)
MCH RBC QN AUTO: 27.1 PG (ref 26.8–34.3)
MCHC RBC AUTO-ENTMCNC: 32.9 G/DL (ref 31.4–37.4)
MCV RBC AUTO: 82 FL (ref 82–98)
MICROALBUMIN UR-MCNC: 37.6 MG/L (ref 0–20)
MICROALBUMIN/CREAT 24H UR: 17 MG/G CREATININE (ref 0–30)
MONOCYTES # BLD AUTO: 0.59 THOUSAND/ΜL (ref 0.17–1.22)
MONOCYTES NFR BLD AUTO: 7 % (ref 4–12)
NEUTROPHILS # BLD AUTO: 4.52 THOUSANDS/ΜL (ref 1.85–7.62)
NEUTS SEG NFR BLD AUTO: 52 % (ref 43–75)
NONHDLC SERPL-MCNC: 179 MG/DL
NRBC BLD AUTO-RTO: 0 /100 WBCS
PLATELET # BLD AUTO: 375 THOUSANDS/UL (ref 149–390)
PMV BLD AUTO: 8.4 FL (ref 8.9–12.7)
POTASSIUM SERPL-SCNC: 3.7 MMOL/L (ref 3.5–5.3)
PROT SERPL-MCNC: 7.7 G/DL (ref 6.4–8.2)
PTH-INTACT SERPL-MCNC: 49.2 PG/ML (ref 18.4–80.1)
RBC # BLD AUTO: 4.84 MILLION/UL (ref 3.81–5.12)
SODIUM SERPL-SCNC: 137 MMOL/L (ref 136–145)
TIBC SERPL-MCNC: 416 UG/DL (ref 250–450)
TRIGL SERPL-MCNC: 234 MG/DL
VIT B12 SERPL-MCNC: 420 PG/ML (ref 100–900)
WBC # BLD AUTO: 8.61 THOUSAND/UL (ref 4.31–10.16)

## 2021-01-22 PROCEDURE — 82607 VITAMIN B-12: CPT

## 2021-01-22 PROCEDURE — 83540 ASSAY OF IRON: CPT

## 2021-01-22 PROCEDURE — 84630 ASSAY OF ZINC: CPT

## 2021-01-22 PROCEDURE — 83036 HEMOGLOBIN GLYCOSYLATED A1C: CPT

## 2021-01-22 PROCEDURE — 99214 OFFICE O/P EST MOD 30 MIN: CPT | Performed by: FAMILY MEDICINE

## 2021-01-22 PROCEDURE — 80053 COMPREHEN METABOLIC PANEL: CPT

## 2021-01-22 PROCEDURE — 83550 IRON BINDING TEST: CPT

## 2021-01-22 PROCEDURE — 82746 ASSAY OF FOLIC ACID SERUM: CPT

## 2021-01-22 PROCEDURE — 84590 ASSAY OF VITAMIN A: CPT

## 2021-01-22 PROCEDURE — 82306 VITAMIN D 25 HYDROXY: CPT

## 2021-01-22 PROCEDURE — 36415 COLL VENOUS BLD VENIPUNCTURE: CPT

## 2021-01-22 PROCEDURE — 83970 ASSAY OF PARATHORMONE: CPT

## 2021-01-22 PROCEDURE — 82043 UR ALBUMIN QUANTITATIVE: CPT | Performed by: FAMILY MEDICINE

## 2021-01-22 PROCEDURE — 84207 ASSAY OF VITAMIN B-6: CPT

## 2021-01-22 PROCEDURE — 85025 COMPLETE CBC W/AUTO DIFF WBC: CPT

## 2021-01-22 PROCEDURE — 99396 PREV VISIT EST AGE 40-64: CPT | Performed by: FAMILY MEDICINE

## 2021-01-22 PROCEDURE — 82570 ASSAY OF URINE CREATININE: CPT | Performed by: FAMILY MEDICINE

## 2021-01-22 PROCEDURE — 80061 LIPID PANEL: CPT

## 2021-01-22 PROCEDURE — 84425 ASSAY OF VITAMIN B-1: CPT

## 2021-01-22 PROCEDURE — 82728 ASSAY OF FERRITIN: CPT

## 2021-01-22 RX ORDER — ERGOCALCIFEROL 1.25 MG/1
50000 CAPSULE ORAL WEEKLY
Qty: 52 CAPSULE | Refills: 0 | Status: SHIPPED | OUTPATIENT
Start: 2021-01-22 | End: 2021-09-17

## 2021-01-22 RX ORDER — ORAL SEMAGLUTIDE 3 MG/1
1 TABLET ORAL DAILY
Qty: 30 TABLET | Refills: 0 | Status: SHIPPED | OUTPATIENT
Start: 2021-01-22 | End: 2021-02-21

## 2021-01-22 RX ORDER — TRAZODONE HYDROCHLORIDE 50 MG/1
50 TABLET ORAL
Qty: 90 TABLET | Refills: 1 | Status: SHIPPED | OUTPATIENT
Start: 2021-01-22 | End: 2021-09-17 | Stop reason: SDUPTHER

## 2021-01-22 RX ORDER — ERGOCALCIFEROL 1.25 MG/1
50000 CAPSULE ORAL
Qty: 24 CAPSULE | Refills: 0 | Status: SHIPPED | OUTPATIENT
Start: 2021-01-25 | End: 2021-01-22 | Stop reason: SDUPTHER

## 2021-01-22 NOTE — PATIENT INSTRUCTIONS

## 2021-01-22 NOTE — ASSESSMENT & PLAN NOTE
Current exacerbation  Increase Zoloft to 50mg HS  - We discussed the benefits of counseling/therapy support  - Offered referrals as appropriate  - Counseled regarding lifestyle changes, sleep hygiene, breathing exercises, utilizing social supports, and CBT/breathing phone applications   Handout was provided on the same   - ED precautions reviewed for suicidal ideation/plan

## 2021-01-22 NOTE — ASSESSMENT & PLAN NOTE
Lab Results   Component Value Date    HGBA1C 6 8 (H) 01/22/2021   Improving! Patient has stopped the Saxenda the last month, however to continue to promote ongoing weight loss and diabetic control, recommend she restart  She prefers non-injection  Rybelsus 3mg sent to pharmacy, will increase to 7mg next month    Obtain eye exam records

## 2021-01-22 NOTE — PROGRESS NOTES
ADULT ANNUAL [address]    NAME: Karen Ngo  AGE: 50 y o  SEX: female  : 1965  DATE: 2021     Assessment and Plan:   Immunizations and preventive care screenings were discussed with patient today  Appropriate education was printed on patient's after visit summary  Counseling:  Alcohol/drug use: discussed moderation in alcohol intake, the recommendations for healthy alcohol use, and avoidance of illicit drug use  Dental Health: discussed importance of regular tooth brushing, flossing, and dental visits  Injury prevention: discussed safety/seat belts, safety helmets, smoke detectors, carbon dioxide detectors, and smoking near bedding or upholstery  Sexual health: discussed sexually transmitted diseases, partner selection, use of condoms, avoidance of unintended pregnancy, and contraceptive alternatives  · Exercise: the importance of regular exercise/physical activity was discussed  Recommend exercise 3-5 times per week for at least 30 minutes  Return in about 6 months (around 2021) for Next scheduled follow up  Chief Complaint:     Chief Complaint   Patient presents with    Annual Exam     medication refill      History of Present Illness:     Adult Annual Physical   Patient here for a comprehensive physical exam  The patient reports no problems  Patient reports continued efforts to lose weight  She is now down over 20lbs after surgery  She feels a little more down that usual due to lifestyle changes right now, would like dose increase  Had diabetic eye exam in late  - need records  Reported a day of spotting earlier this week, before this last menses was in 2020  Diet and Physical Activity  · Diet/Nutrition: well balanced diet  · Exercise: walking        Depression Screening  PHQ-9 Depression Screening    PHQ-9:   Frequency of the following problems over the past two weeks: Little interest or pleasure in doing things: 2 - more than half the days  Feeling down, depressed, or hopeless: 1 - several days  Trouble falling or staying asleep, or sleeping too much: 0 - not at all  Feeling tired or having little energy: 2 - more than half the days  Poor appetite or overeatin - several days  Feeling bad about yourself - or that you are a failure or have let yourself or your family down: 0 - not at all  Trouble concentrating on things, such as reading the newspaper or watching television: 1 - several days  Moving or speaking so slowly that other people could have noticed  Or the opposite - being so fidgety or restless that you have been moving around a lot more than usual: 1 - several days  Thoughts that you would be better off dead, or of hurting yourself in some way: 0 - not at all  PHQ-2 Score: 3  PHQ-9 Score: 8       General Health  · Sleep: sleeps well  · Hearing: normal - bilateral   · Vision: most recent eye exam <1 year ago and wears glasses  · Dental: regular dental visits  /GYN Health  · Patient is: perimenopausal  · Last menstrual period: Patient's last menstrual period was 2021  ·    Review of Systems:     Review of Systems   Constitutional: Negative for activity change, chills and fever  HENT: Negative for congestion, rhinorrhea and sore throat  Eyes: Negative for visual disturbance  Respiratory: Negative for cough, shortness of breath and wheezing  Cardiovascular: Negative for chest pain and palpitations  Gastrointestinal: Negative for abdominal pain, blood in stool, constipation, diarrhea, nausea and vomiting  Genitourinary: Positive for vaginal bleeding (spotting)  Negative for dysuria  Musculoskeletal: Negative for arthralgias and myalgias  Skin: Negative for rash  Neurological: Negative for dizziness, weakness and headaches  Psychiatric/Behavioral: Positive for dysphoric mood  All other systems reviewed and are negative       Past Medical History:     Past Medical History:   Diagnosis Date    Anemia     Last Assessed:  3/27/13a    Arthritis     Asthma     Blurred vision     Chronic kidney disease     Diabetes mellitus ([facility] [address] )     was in touch with PCP re [facility]- she adjusted insulin doses and is seeing surgeon 20    Diabetic retinopathy ([facility] [address] )     Dream enactment behavior     Hypercholesterolemia     Hyperlipidemia     Hypertension     Increased urinary frequency     Irritable bowel syndrome     Last Assessed:  10/2/12    Joint pain     Morbid obesity with BMI of 40 0-44 9, adult (HCC)     Nausea     Night sweat     Nightmare disorder 2020    Numbness     UTI (urinary tract infection)     Weakness       Past Surgical History:     Past Surgical History:   Procedure Laterality Date    ABDOMINAL SURGERY      CARPAL TUNNEL RELEASE Right      SECTION      x 2    COLONOSCOPY  2016    COLONOSCOPY      PA LAP, ALBAN RESTRICT PROC, LONGITUDINAL GASTRECTOMY N/A 2020    Procedure: LAPAROSCOPIC SLEEVE GASTRECTOMY  WITH EGD;  Surgeon: Parul Baltazar MD;  Location: [address] [place] [address];  Service: Bariatrics    PA WRIST Gemma Quarry LIG Left 2019    Procedure: RELEASE CARPAL TUNNEL ENDOSCOPIC-left;  Surgeon: Vandy Runner, MD;  Location: [facility]  Service: Orthopedics    TUBAL LIGATION      WISDOM TOOTH EXTRACTION        Social History:        Social History     Socioeconomic History    Marital status:       Spouse name: Jamie Sorto    Number of children: 2    Years of education: 15    Highest education level: Some college, no degree   Occupational History     Comment: financial counseler   Social Needs    Financial resource strain: Not hard at all   [place] insecurity     Worry: Never true     Inability: Never true   [employer] needs     Medical: No     Non-medical: No   Tobacco Use    Smoking status: Former Smoker     Types: Cigarettes     Quit date:      Years since quittin 0    Smokeless tobacco: Former User   Substance and Sexual Activity    Alcohol use: Yes     Comment: rarely    Drug use: No    Sexual activity: Yes     Partners: Male     Birth control/protection: Female Sterilization   Lifestyle    Physical activity     Days per week: 0 days     Minutes per session: 0 min    Stress: To some extent   Relationships    Social connections     Talks on phone: More than three times a week     Gets together: More than three times a week     Attends Mandaen service: None     Active member of club or organization: Yes     Attends meetings of clubs or organizations: More than 4 times per year     Relationship status:      Intimate partner violence     Fear of current or ex partner: No     Emotionally abused: No     Physically abused: No     Forced sexual activity: No   Other Topics Concern    None   Social History Narrative    Consumes 1 cup of coffee per day      Family History:     Family History   Problem Relation Age of Onset    Diabetes Mother     Hypertension Mother     Diabetes Father     Heart disease Father     Stroke Father     Hypertension Father     Diverticulitis Father     Hyperlipidemia Father     Heart attack Father     Diabetes Maternal Grandmother     Pancreatic cancer Maternal Grandmother     Liver cancer Maternal Grandfather     Alcohol abuse Maternal Grandfather     Heart disease Paternal Grandmother     Crohn's disease Sister     Diabetes type I Daughter     Eczema Daughter     Diabetes Daughter     Heart attack Paternal Grandfather     Asthma Maternal Aunt     Alcohol abuse Maternal Uncle     Mental illness Paternal Aunt     Schizophrenia Paternal Aunt     Diabetes Paternal Aunt     Kidney disease Paternal Aunt     Alcohol abuse Paternal Uncle     Diabetes Paternal Uncle     Eczema Son     Colon cancer Neg Hx     Breast cancer Neg Hx       Current Medications:     Current Outpatient Medications Medication Sig Dispense Refill    aspirin (ECOTRIN LOW STRENGTH) 81 mg EC tablet Take 81 mg by mouth daily Last dose will be 8/23/20      azelastine (ASTELIN) 0 1 % nasal spray 2 sprays into each nostril 2 (two) times a day Use in each nostril as directed 1 Bottle 6    Blood Glucose Monitoring Suppl (FREESTYLE LITE) GREG Patient tests twice daily 1 each 0    clindamycin-benzoyl peroxide (BENZACLIN) gel Apply topically 2 (two) times a day 25 g 0    ergocalciferol (VITAMIN D2) 50,000 units Take 1 capsule (50,000 Units total) by mouth once a week 52 capsule 0    FreeStyle Unistick II Lancets MISC Patient tests blood sugars twice daily 180 each 1    glucose blood (FREESTYLE LITE) test strip 1 each by Other route 2 (two) times a day Use as instructed 200 each 1    lisinopril (ZESTRIL) 20 mg tablet Take 0 5 tablets (10 mg total) by mouth daily 30 tablet 0    mometasone (ELOCON) 0 1 % cream Apply topically daily Apply topically BID x 2 weeks then qhs prn 45 g 3    mometasone (Nasonex) 50 mcg/act nasal spray 2 sprays into each nostril as needed (allergies) 1 Act 3    pantoprazole (PROTONIX) 40 mg tablet Take 1 tablet (40 mg total) by mouth daily 90 tablet 1    rosuvastatin (CRESTOR) 20 MG tablet Take 1 tablet (20 mg total) by mouth daily at bedtime 90 tablet 3    sertraline (ZOLOFT) 50 mg tablet Take 1 tablet (50 mg total) by mouth daily 90 tablet 1    traZODone (DESYREL) 50 mg tablet Take 1 tablet (50 mg total) by mouth daily at bedtime 90 tablet 1    Semaglutide (Rybelsus) 3 MG TABS Take 1 tablet by mouth daily 30 min before first meal  30 tablet 0     No current facility-administered medications for this visit  Allergies:      Allergies   Allergen Reactions    Iodinated Diagnostic Agents Facial Swelling    Iodine Facial Swelling    Shellfish-Derived Products Throat Swelling    Amoxicillin Hives    Metformin Diarrhea    Penicillins      Yeast Infection      Physical Exam:     /72   Pulse 73   Temp (!) 96 8 °F (36 °C)   Ht 4' 10" (1 473 m)   Wt 75 2 kg (165 lb 12 8 oz)   LMP 01/19/2021 Comment: spotting  SpO2 98%   BMI 34 65 kg/m²     Physical Exam  Vitals signs and nursing note reviewed  Constitutional:       General: She is not in acute distress  Appearance: She is well-developed  She is obese  She is not ill-appearing  HENT:      Head: Normocephalic and atraumatic  Right Ear: Tympanic membrane, ear canal and external ear normal  No middle ear effusion  Left Ear: Tympanic membrane, ear canal and external ear normal   No middle ear effusion  Nose: Nose normal  No congestion or rhinorrhea  Mouth/Throat:      Lips: Pink  Mouth: Mucous membranes are moist       Pharynx: Oropharynx is clear  Uvula midline  No oropharyngeal exudate  Tonsils: No tonsillar exudate  Eyes:      General: Lids are normal       Extraocular Movements: Extraocular movements intact  Conjunctiva/sclera: Conjunctivae normal       Pupils: Pupils are equal, round, and reactive to light  Neck:      Thyroid: No thyromegaly  Trachea: No tracheal deviation  Cardiovascular:      Rate and Rhythm: Normal rate and regular rhythm  Pulses: no weak pulses          Dorsalis pedis pulses are 2+ on the right side and 2+ on the left side  Posterior tibial pulses are 2+ on the right side and 2+ on the left side  Heart sounds: Normal heart sounds, S1 normal and S2 normal  No murmur  Pulmonary:      Effort: Pulmonary effort is normal  No respiratory distress  Breath sounds: Normal breath sounds  No decreased breath sounds, wheezing, rhonchi or rales  Abdominal:      General: Bowel sounds are normal  There is no distension  Palpations: Abdomen is soft  Tenderness: There is no abdominal tenderness  Musculoskeletal:      Right lower leg: No edema  Left lower leg: No edema     Feet:      Right foot:      Skin integrity: No ulcer, skin breakdown, erythema, warmth, callus or dry skin  Left foot:      Skin integrity: No ulcer, skin breakdown, erythema, warmth, callus or dry skin  Lymphadenopathy:      Cervical: No cervical adenopathy  Skin:     General: Skin is warm and dry  Capillary Refill: Capillary refill takes less than 2 seconds  Neurological:      Mental Status: She is alert and oriented to person, place, and time  Cranial Nerves: Cranial nerves are intact  Deep Tendon Reflexes: Reflexes normal       Reflex Scores:       Patellar reflexes are 2+ on the right side and 2+ on the left side  Psychiatric:         Attention and Perception: Attention normal          Mood and Affect: Mood normal          Thought Content: Thought content does not include suicidal ideation  Diabetic Foot Exam    Patient's shoes and socks removed  Right Foot/Ankle   Right Foot Inspection  Skin Exam: skin normal and skin intact no dry skin, no warmth, no callus, no erythema, no maceration, no abnormal color, no pre-ulcer, no ulcer and no callus                            Sensory       Monofilament testing: intact  Vascular  Capillary refills: < 3 seconds  The right DP pulse is 2+  The right PT pulse is 2+  Left Foot/Ankle  Left Foot Inspection  Skin Exam: skin normal and skin intactno dry skin, no warmth, no erythema, no maceration, normal color, no pre-ulcer, no ulcer and no callus                                         Sensory       Monofilament: intact  Vascular  Capillary refills: < 3 seconds  The left DP pulse is 2+  The left PT pulse is 2+  Assign Risk Category:  Deformity present; No loss of protective sensation;  No weak pulses       Risk: 0      MD Jonathan LozoyaBanner [facility]cotillo Medical Center

## 2021-01-22 NOTE — TELEPHONE ENCOUNTER
Please advise patient of labs from 01/22/21:    -Vitamin D deficiency - start Rx for 50,000IU 2x/week with food x 12 weeks    -B12 low normal (420) - take an additional 1,000mcg sublingual B12 daily     -Iron low normal (59), ferritin low (10) - suggest add an additional 1 Vitron C daily     -HgbA1C improved from 7 6 to 6 8% - keep up the great work with healthy diet, exercise, and weight loss    -Cholesterol panel: Triglycerides and LDL cholesterol greatly increased, but HDL improved - suggest continue with healthy diet and exercise and f/u with PCP to discuss large increase in LDL (d/t stopping statin?)    Addendum:    -Vitamin A mildly elevated - avoid ETOH, any retinol creams, hair/skin/nails supplements - may be r/t hx  diabetic nephropathy but renal function WNL on labs    -Rest of vitamins WNL   Will repeat panel in 3 months

## 2021-01-22 NOTE — ASSESSMENT & PLAN NOTE
Good weight loss  Continue steady progress  Starting weight 188lbs (08/2020)  Current weight 165lbs  Weight loss -23 lbs

## 2021-01-22 NOTE — ASSESSMENT & PLAN NOTE
Continue Crestor 20mg   Likely elevated in the setting of rapid weight loss  Continue dose and repeat 6 months

## 2021-01-22 NOTE — PROGRESS NOTES
Iza Wiggins 1965 female MRN: [id number]    Family Medicine Follow-up Visit    Assessment/Plan   Type 2 diabetes mellitus with diabetic nephropathy, with long-term current use of insulin ([facility] )    Lab Results   Component Value Date    HGBA1C 6 8 (H) 01/22/2021   Improving! Patient has stopped the Saxenda the last month, however to continue to promote ongoing weight loss and diabetic control, recommend she restart  She prefers non-injection  Rybelsus 3mg sent to pharmacy, will increase to 7mg next month  Obtain eye exam records     Pituitary microadenoma [facility])  asymptomatic    Microalbuminuria due to type 2 diabetes mellitus ([facility] )  Obtained urine today     Diabetic retinopathy of both eyes associated with type 2 diabetes mellitus ([facility] )  Obtain eye records    Diabetic nephropathy associated with type 2 diabetes mellitus ([facility] )  Follow up labs drawn today    Benign essential hypertension  Well controlled  Continue lisinopril 10mg QD    Hyperlipidemia  Continue Crestor 20mg   Likely elevated in the setting of rapid weight loss  Continue dose and repeat 6 months     Current moderate episode of major depressive disorder without prior episode ([facility] )  Current exacerbation  Increase Zoloft to 50mg HS  - We discussed the benefits of counseling/therapy support  - Offered referrals as appropriate  - Counseled regarding lifestyle changes, sleep hygiene, breathing exercises, utilizing social supports, and CBT/breathing phone applications   Handout was provided on the same   - ED precautions reviewed for suicidal ideation/plan    Class 1 obesity due to excess calories with serious comorbidity in adult  Good weight loss  Continue steady progress  Starting weight 188lbs (08/2020)  Current weight 165lbs  Weight loss -23 lbs    Karen was seen today for annual exam     Diagnoses and all orders for this visit:    Annual physical exam    Current moderate episode of major depressive disorder without prior episode ([facility] )  - traZODone (DESYREL) 50 mg tablet; Take 1 tablet (50 mg total) by mouth daily at bedtime  -     sertraline (ZOLOFT) 50 mg tablet; Take 1 tablet (50 mg total) by mouth daily    Type 2 diabetes mellitus with diabetic nephropathy, with long-term current use of insulin (HCC)  -     Microalbumin / creatinine urine ratio  -     Semaglutide (Rybelsus) 3 MG TABS; Take 1 tablet by mouth daily 30 min before first meal     Class 1 obesity due to excess calories with serious comorbidity and body mass index (BMI) of 34 0 to 34 9 in adult    Mixed hyperlipidemia    Diabetic nephropathy associated with type 2 diabetes mellitus (HCC)    Diabetic retinopathy of both eyes associated with type 2 diabetes mellitus, macular edema presence unspecified, unspecified retinopathy severity (HCC)    Microalbuminuria due to type 2 diabetes mellitus (HCC)    Pituitary microadenoma (HCC)    Benign essential hypertension    Postsurgical malabsorption  -     ergocalciferol (VITAMIN D2) 50,000 units; Take 1 capsule (50,000 Units total) by mouth once a week    Vitamin D deficiency  -     ergocalciferol (VITAMIN D2) 50,000 units; Take 1 capsule (50,000 Units total) by mouth once a week        Batool Barros MD  [address]  1/22/2021      Please be aware that this note contains text that was dictated and there may be errors pertaining to "sound-alike" words during the dictation process  SUBJECTIVE    CC: diabetes f/u    HPI:  Magi Wilson is a 50 y o  female who presented for a follow-up of chronic conditions  Patient reports continued efforts to lose weight  She is now down over 20lbs after surgery  She feels a little more down that usual due to lifestyle changes right now, would like dose increase  Had diabetic eye exam in late 2020 - need records  Reported a day of spotting earlier this week, before this last menses was in March 2020       Review of Systems   Constitutional: Negative for activity change, chills and fever  HENT: Negative for congestion, rhinorrhea and sore throat  Eyes: Negative for visual disturbance  Respiratory: Negative for cough, shortness of breath and wheezing  Cardiovascular: Negative for chest pain and palpitations  Gastrointestinal: Negative for abdominal pain, blood in stool, constipation, diarrhea, nausea and vomiting  Genitourinary: Negative for dysuria  Musculoskeletal: Negative for arthralgias and myalgias  Skin: Negative for rash  Neurological: Negative for dizziness, weakness and headaches  Psychiatric/Behavioral: Positive for decreased concentration  All other systems reviewed and are negative      Historical Information     The following portions of the patient's history were reviewed and updated as appropriate: allergies, current medications, past medical history, past social history and problem list     Medications:   Meds/Allergies     Current Outpatient Medications:     aspirin (ECOTRIN LOW STRENGTH) 81 mg EC tablet, Take 81 mg by mouth daily Last dose will be 8/23/20, Disp: , Rfl:     azelastine (ASTELIN) 0 1 % nasal spray, 2 sprays into each nostril 2 (two) times a day Use in each nostril as directed, Disp: 1 Bottle, Rfl: 6    Blood Glucose Monitoring Suppl (FREESTYLE LITE) [facility], Patient tests twice daily, Disp: 1 each, Rfl: 0    clindamycin-benzoyl peroxide (BENZACLIN) gel, Apply topically 2 (two) times a day, Disp: 25 g, Rfl: 0    ergocalciferol (VITAMIN D2) 50,000 units, Take 1 capsule (50,000 Units total) by mouth once a week, Disp: 52 capsule, Rfl: 0    FreeStyle Unistick II Lancets MISC, Patient tests blood sugars twice daily, Disp: 180 each, Rfl: 1    glucose blood (FREESTYLE LITE) test strip, 1 each by Other route 2 (two) times a day Use as instructed, Disp: 200 each, Rfl: 1    lisinopril (ZESTRIL) 20 mg tablet, Take 0 5 tablets (10 mg total) by mouth daily, Disp: 30 tablet, Rfl: 0    mometasone (ELOCON) 0 1 % cream, Apply topically daily Apply topically BID x 2 weeks then qhs prn, Disp: 45 g, Rfl: 3    mometasone (Nasonex) 50 mcg/act nasal spray, 2 sprays into each nostril as needed (allergies), Disp: 1 Act, Rfl: 3    pantoprazole (PROTONIX) 40 mg tablet, Take 1 tablet (40 mg total) by mouth daily, Disp: 90 tablet, Rfl: 1    rosuvastatin (CRESTOR) 20 MG tablet, Take 1 tablet (20 mg total) by mouth daily at bedtime, Disp: 90 tablet, Rfl: 3    sertraline (ZOLOFT) 50 mg tablet, Take 1 tablet (50 mg total) by mouth daily, Disp: 90 tablet, Rfl: 1    traZODone (DESYREL) 50 mg tablet, Take 1 tablet (50 mg total) by mouth daily at bedtime, Disp: 90 tablet, Rfl: 1    Semaglutide (Rybelsus) 3 MG TABS, Take 1 tablet by mouth daily 30 min before first meal , Disp: 30 tablet, Rfl: 0  Allergies   Allergen Reactions    Iodinated Diagnostic Agents Facial Swelling    Iodine Facial Swelling    Shellfish-Derived Products Throat Swelling    Amoxicillin Hives    Metformin Diarrhea    Penicillins      Yeast Infection       OBJECTIVE    Vitals:   Vitals:    01/22/21 1301   BP: 126/72   Pulse: 73   Temp: (!) 96 8 °F (36 °C)   SpO2: 98%   Weight: 75 2 kg (165 lb 12 8 oz)   Height: 4' 10" (1 473 m)     Wt Readings from Last 3 Encounters:   01/22/21 75 2 kg (165 lb 12 8 oz)   12/14/20 75 2 kg (165 lb 12 8 oz)   12/11/20 75 2 kg (165 lb 12 8 oz)     Body mass index is 34 65 kg/m²  BP Readings from Last 3 Encounters:   01/22/21 126/72   12/11/20 102/68   10/23/20 122/82     Pulse Readings from Last 3 Encounters:   01/22/21 73   12/11/20 85   10/23/20 74     Patient's last menstrual period was 01/19/2021  Physical Exam:    Physical Exam  Vitals signs and nursing note reviewed  Constitutional:       General: She is not in acute distress  Appearance: She is well-developed  She is obese  She is not ill-appearing  HENT:      Head: Normocephalic and atraumatic        Right Ear: Tympanic membrane, ear canal and external ear normal       Left Ear: Tympanic membrane, ear canal and external ear normal       Nose: Nose normal       Mouth/Throat:      Pharynx: Uvula midline  No oropharyngeal exudate  Tonsils: No tonsillar exudate  Eyes:      Conjunctiva/sclera: Conjunctivae normal    Neck:      Thyroid: No thyromegaly  Cardiovascular:      Rate and Rhythm: Normal rate and regular rhythm  Heart sounds: Normal heart sounds  No murmur  Pulmonary:      Effort: Pulmonary effort is normal  No respiratory distress  Breath sounds: Normal breath sounds  No decreased breath sounds, wheezing, rhonchi or rales  Abdominal:      General: Bowel sounds are normal  There is no distension  Palpations: Abdomen is soft  Tenderness: There is no abdominal tenderness  Lymphadenopathy:      Cervical: No cervical adenopathy  Skin:     General: Skin is warm and dry  Capillary Refill: Capillary refill takes less than 2 seconds  Neurological:      Mental Status: She is alert and oriented to person, place, and time  Sensory: No sensory deficit  Motor: No abnormal muscle tone  Deep Tendon Reflexes: Reflexes normal           Labs: I have personally reviewed all pertinent results       Appointment on 01/22/2021   Component Date Value Ref Range Status    WBC 01/22/2021 8 61  4 31 - 10 16 Thousand/uL Final    RBC 01/22/2021 4 84  3 81 - 5 12 Million/uL Final    Hemoglobin 01/22/2021 13 1  11 5 - 15 4 g/dL Final    Hematocrit 01/22/2021 39 8  34 8 - 46 1 % Final    MCV 01/22/2021 82  82 - 98 fL Final    MCH 01/22/2021 27 1  26 8 - 34 3 pg Final    MCHC 01/22/2021 32 9  31 4 - 37 4 g/dL Final    RDW 01/22/2021 14 1  11 6 - 15 1 % Final    MPV 01/22/2021 8 4* 8 9 - 12 7 fL Final    Platelets 07/02/9547 375  149 - 390 Thousands/uL Final    nRBC 01/22/2021 0  /100 WBCs Final    Neutrophils Relative 01/22/2021 52  43 - 75 % Final    Immat GRANS % 01/22/2021 0  0 - 2 % Final    Lymphocytes Relative 01/22/2021 33  14 - 44 % Final    Monocytes Relative 01/22/2021 7  4 - 12 % Final    Eosinophils Relative 01/22/2021 7* 0 - 6 % Final    Basophils Relative 01/22/2021 1  0 - 1 % Final    Neutrophils Absolute 01/22/2021 4 52  1 85 - 7 62 Thousands/µL Final    Immature Grans Absolute 01/22/2021 0 02  0 00 - 0 20 Thousand/uL Final    Lymphocytes Absolute 01/22/2021 2 81  0 60 - 4 47 Thousands/µL Final    Monocytes Absolute 01/22/2021 0 59  0 17 - 1 22 Thousand/µL Final    Eosinophils Absolute 01/22/2021 0 60  0 00 - 0 61 Thousand/µL Final    Basophils Absolute 01/22/2021 0 07  0 00 - 0 10 Thousands/µL Final    Sodium 01/22/2021 137  136 - 145 mmol/L Final    Potassium 01/22/2021 3 7  3 5 - 5 3 mmol/L Final    Chloride 01/22/2021 104  100 - 108 mmol/L Final    CO2 01/22/2021 24  21 - 32 mmol/L Final    ANION GAP 01/22/2021 9  4 - 13 mmol/L Final    BUN 01/22/2021 20  5 - 25 mg/dL Final    Creatinine 01/22/2021 1 05  0 60 - 1 30 mg/dL Final    Standardized to IDMS reference method    Glucose, Fasting 01/22/2021 173* 65 - 99 mg/dL Final    Specimen collection should occur prior to Sulfasalazine administration due to the potential for falsely depressed results  Specimen collection should occur prior to Sulfapyridine administration due to the potential for falsely elevated results   Calcium 01/22/2021 9 3  8 3 - 10 1 mg/dL Final    Corrected Calcium 01/22/2021 9 8  8 3 - 10 1 mg/dL Final    AST 01/22/2021 17  5 - 45 U/L Final    Specimen collection should occur prior to Sulfasalazine administration due to the potential for falsely depressed results   ALT 01/22/2021 27  12 - 78 U/L Final    Specimen collection should occur prior to Sulfasalazine administration due to the potential for falsely depressed results       Alkaline Phosphatase 01/22/2021 107  46 - 116 U/L Final    Total Protein 01/22/2021 7 7  6 4 - 8 2 g/dL Final    Albumin 01/22/2021 3 4* 3 5 - 5 0 g/dL Final    Total Bilirubin 01/22/2021 0 25  0 20 - 1 00 mg/dL Final Use of this assay is not recommended for patients undergoing treatment with eltrombopag due to the potential for falsely elevated results   eGFR 01/22/2021 61  ml/min/1 73sq m Final    Folate 01/22/2021 9 6  3 1 - 17 5 ng/mL Final    Hemoglobin A1C 01/22/2021 6 8* Normal 3 8-5 6%; PreDiabetic 5 7-6 4%; Diabetic >=6 5%; Glycemic control for adults with diabetes <7 0% % Final    EAG 01/22/2021 148  mg/dl Final    Vit D, 25-Hydroxy 01/22/2021 17 8* 30 0 - 100 0 ng/mL Final    Vitamin B-12 01/22/2021 420  100 - 900 pg/mL Final    PTH 01/22/2021 49 2  18 4 - 80 1 pg/mL Final    Cholesterol 01/22/2021 237* 50 - 200 mg/dL Final    Cholesterol:       Desirable         <200 mg/dl       Borderline         200-239 mg/dl       High              >239           Triglycerides 01/22/2021 234* <=150 mg/dL Final    Triglyceride:     Normal          <150 mg/dl     Borderline High 150-199 mg/dl     High            200-499 mg/dl        Very High       >499 mg/dl    Specimen collection should occur prior to N-Acetylcysteine or Metamizole administration due to the potential for falsely depressed results   HDL, Direct 01/22/2021 58  >=40 mg/dL Final    HDL Cholesterol:       Low     <41 mg/dL  Specimen collection should occur prior to Metamizole administration due to the potential for falsley depressed results   LDL Calculated 01/22/2021 132* 0 - 100 mg/dL Final    LDL Cholesterol:     Optimal           <100 mg/dl     Near Optimal      100-129 mg/dl     Above Optimal       Borderline High 130-159 mg/dl       High            160-189 mg/dl       Very High       >189 mg/dl         This screening LDL is a calculated result  It does not have the accuracy of the Direct Measured LDL in the monitoring of patients with hyperlipidemia and/or statin therapy  Direct Measure LDL (KNK522) must be ordered separately in these patients      Non-HDL-Chol (CHOL-HDL) 01/22/2021 179  mg/dl Final    Iron Saturation 01/22/2021 14  % Final    TIBC 01/22/2021 416  250 - 450 ug/dL Final    Iron 01/22/2021 59  50 - 170 ug/dL Final    Patients treated with metal-binding drugs (ie  Deferoxamine) may have depressed iron values   Ferritin 01/22/2021 10  8 - 388 ng/mL Final     Imaging:  I have personally reviewed all pertinent results

## 2021-01-25 ENCOUNTER — TELEPHONE (OUTPATIENT)
Dept: ADMINISTRATIVE | Facility: OTHER | Age: 56
End: 2021-01-25

## 2021-01-25 NOTE — LETTER
Diabetic Eye Exam Form    Date Requested: 21  Patient: Laurita Ochoa  Patient : 1965   Referring Provider: Heber Preciado MD    Dilated Retinal Exam, Optomap-Iris Exam, or Fundus Photography Done         Yes ([place] one above)         No     Date of Diabetic Eye Exam ______________________________  Left Eye      Exam did show retinopathy    Exam did not show retinopathy         Mild       Moderate       None       Proliferative       Severe     Right Eye     Exam did show retinopathy    Exam did not show retinopathy         Mild       Moderate       None       Proliferative       Severe     Comments __________________________________________________________    Practice Providing Exam ______________________________________________    Exam Performed By (print name) _______________________________________      Provider Signature ___________________________________________________      These reports are needed for  compliance    Please fax this completed form and a copy of the Diabetic Eye Exam report to our office located at [place] [address] as soon as possible to [phone] salima Lawrence Both: Phone [phone]    We thank you for your assistance in treating our mutual patient

## 2021-01-25 NOTE — TELEPHONE ENCOUNTER
----- Message from Seema Zheng sent at 2021  2:22 PM EST -----  [address]: [phone]  21 2:39 PM    Hello, our patient Peter Lopez has had Diabetic Eye Exam completed/performed  Please assist in updating the patient chart by making an External outreach to [facility]  facility located in [place]  The date of service is 2020      Thank you,  Ramon Lopez MA  [facility] [place]

## 2021-01-25 NOTE — LETTER
Diabetic Eye Exam Form    Date Requested: 21  Patient: Kailyn Telles  Patient : 1965   Referring Provider: Meeta Belle MD    Dilated Retinal Exam, Optomap-Iris Exam, or Fundus Photography Done         Yes ([place] one above)         No     Date of Diabetic Eye Exam ______________________________  Left Eye      Exam did show retinopathy    Exam did not show retinopathy         Mild       Moderate       None       Proliferative       Severe     Right Eye     Exam did show retinopathy    Exam did not show retinopathy         Mild       Moderate       None       Proliferative       Severe     Comments __________________________________________________________    Practice Providing Exam ______________________________________________    Exam Performed By (print name) _______________________________________      Provider Signature ___________________________________________________      These reports are needed for  compliance    Please fax this completed form and a copy of the Diabetic Eye Exam report to our office located at [place] [address] as soon as possible to [phone] [facility]: Phone [phone]    We thank you for your assistance in treating our mutual patient

## 2021-01-25 NOTE — TELEPHONE ENCOUNTER
Upon review of the In Basket request and the patient's chart, initial outreach has been made via fax, please see Contacts section for details       Thank you  Michelle Jara

## 2021-01-26 LAB — ZINC SERPL-MCNC: 73 UG/DL (ref 44–115)

## 2021-01-28 LAB — VIT A SERPL-MCNC: 63.1 UG/DL (ref 20.1–62)

## 2021-01-29 ENCOUNTER — IMMUNIZATIONS (OUTPATIENT)
Dept: FAMILY MEDICINE CLINIC | Facility: HOSPITAL | Age: 56
End: 2021-01-29

## 2021-01-29 DIAGNOSIS — Z23 ENCOUNTER FOR IMMUNIZATION: Primary | ICD-10-CM

## 2021-01-29 LAB — VIT B6 SERPL-MCNC: 8.2 UG/L (ref 2–32.8)

## 2021-01-29 PROCEDURE — 91301 SARS-COV-2 / COVID-19 MRNA VACCINE (MODERNA) 100 MCG: CPT

## 2021-01-29 PROCEDURE — 0012A SARS-COV-2 / COVID-19 MRNA VACCINE (MODERNA) 100 MCG: CPT

## 2021-01-30 LAB — VIT B1 BLD-SCNC: 110.4 NMOL/L (ref 66.5–200)

## 2021-02-01 NOTE — TELEPHONE ENCOUNTER
As a follow-up, a second attempt has been made for outreach via fax, please see Contacts section for details      Thank you  Carlos Cochran

## 2021-02-03 NOTE — TELEPHONE ENCOUNTER
Call placed to patient [facility] regarding results  Awaiting call back from patient

## 2021-02-05 NOTE — TELEPHONE ENCOUNTER
Patient prefers Vitamin B12 shot  Patient inquiring about elevated Vitamin A level  Patient is avoiding any ETOH, Retinol creams, or Hair/Skin/Nails supplements  Please advise  Patient is awaiting call back  Patient stated okay to leave detailed message on cell phone

## 2021-02-05 NOTE — TELEPHONE ENCOUNTER
Left detailed voicemail that excess Vitamin A may be r/t simply taking bariatric Vitamin too close to time of blood draw  She does not have any issues that I see with renal function and denies ETOH or retinal creams or hair/skin/nails  I advised her that she may use B12 shots with PCP, but they may not cover them d/t levels only in low normal range

## 2021-02-05 NOTE — TELEPHONE ENCOUNTER
As a final attempt, a third outreach has been made via telephone call  Please see Contacts section for details  This encounter will be closed and completed by end of day  Should we receive the requested information because of previous outreach attempts, the requested patient's chart will be updated appropriately       Thank you  Daron Arrington

## 2021-02-10 ENCOUNTER — HOSPITAL ENCOUNTER (OUTPATIENT)
Dept: RADIOLOGY | Facility: HOSPITAL | Age: 56
Discharge: HOME | End: 2021-02-10
Payer: COMMERCIAL

## 2021-02-10 VITALS — BODY MASS INDEX: 34.63 KG/M2 | HEIGHT: 58 IN | WEIGHT: 165 LBS

## 2021-02-10 DIAGNOSIS — Z12.31 ENCOUNTER FOR SCREENING MAMMOGRAM FOR BREAST CANCER: ICD-10-CM

## 2021-02-10 PROCEDURE — 77063 BREAST TOMOSYNTHESIS BI: CPT

## 2021-02-10 PROCEDURE — 77067 SCR MAMMO BI INCL CAD: CPT

## 2021-02-15 DIAGNOSIS — J30.1 SEASONAL ALLERGIC RHINITIS DUE TO POLLEN: ICD-10-CM

## 2021-02-15 RX ORDER — MOMETASONE FUROATE 50 UG/1
2 SPRAY, METERED NASAL AS NEEDED
Qty: 1 ACT | Refills: 0 | Status: SHIPPED | OUTPATIENT
Start: 2021-02-15 | End: 2021-11-12

## 2021-02-26 ENCOUNTER — OFFICE VISIT (OUTPATIENT)
Dept: URGENT CARE | Facility: CLINIC | Age: 56
End: 2021-02-26
Payer: COMMERCIAL

## 2021-02-26 VITALS — OXYGEN SATURATION: 98 % | TEMPERATURE: 99.8 F | RESPIRATION RATE: 16 BRPM | HEART RATE: 117 BPM

## 2021-02-26 DIAGNOSIS — J01.00 ACUTE MAXILLARY SINUSITIS, RECURRENCE NOT SPECIFIED: ICD-10-CM

## 2021-02-26 DIAGNOSIS — J06.9 UPPER RESPIRATORY TRACT INFECTION, UNSPECIFIED TYPE: Primary | ICD-10-CM

## 2021-02-26 DIAGNOSIS — Z20.822 ENCOUNTER FOR SCREENING LABORATORY TESTING FOR COVID-19 VIRUS: ICD-10-CM

## 2021-02-26 PROCEDURE — G0382 LEV 3 HOSP TYPE B ED VISIT: HCPCS | Performed by: NURSE PRACTITIONER

## 2021-02-26 PROCEDURE — U0005 INFEC AGEN DETEC AMPLI PROBE: HCPCS | Performed by: NURSE PRACTITIONER

## 2021-02-26 PROCEDURE — U0003 INFECTIOUS AGENT DETECTION BY NUCLEIC ACID (DNA OR RNA); SEVERE ACUTE RESPIRATORY SYNDROME CORONAVIRUS 2 (SARS-COV-2) (CORONAVIRUS DISEASE [COVID-19]), AMPLIFIED PROBE TECHNIQUE, MAKING USE OF HIGH THROUGHPUT TECHNOLOGIES AS DESCRIBED BY CMS-2020-01-R: HCPCS | Performed by: NURSE PRACTITIONER

## 2021-02-26 RX ORDER — AZITHROMYCIN 250 MG/1
TABLET, FILM COATED ORAL
Qty: 6 TABLET | Refills: 0 | Status: SHIPPED | OUTPATIENT
Start: 2021-02-26 | End: 2021-03-03

## 2021-02-26 NOTE — PATIENT INSTRUCTIONS
--Rest, drink plenty of fluids  --Saline nasal spray, OTC steroid nasal spray, warm compresses  --Fill and start antibiotic if ongoing/worsening sinus pain/pressure over the next 24-48 hours  --COVID testing sent  Will call with results  Average turn around time is 48-72 hours  --Advise self-quarantining until you hear back from us regarding test results (at the earliest)  This involves not leaving your house, wearing a mask at all times while outside your immediate living area, and disinfecting all commonly used surfaces and personal items  If your COVID test results are positive, you will need to self-quarantine at home for at least 10 days from the onset of your symptoms, until you are feeling better, and until you are without a fever for at least 72 hours  --For nasal/sinus congestion, helpful measures include steam, warm compresses, an OTC saline nasal spray or Neti pot, or an OTC decongestant (such as Sudafed)  The decongestant should be avoided, however, if you are under 10years of age, or have a history of high blood pressure or heart disease  In addition, an OTC nasal steroid (Flonase, Nasocort) or nasal decongestant (Afrin, Saleem-synephrine) may be taken  The nasal steroid should be used at bedtime, after the saline nasal spray  The nasal decongestant should not be taken more than 3 days consecutively in order to prevent rebound congestion  --For nasal drainage, postnasal drip, sneezing and itching, an OTC antihistamine (Allegra, Benadryl, etc) can be taken  --For cough, you can take an OTC expectorant such as plain Robitussion or Mucinex (active ingredient guaifenesin)  A spoonful of honey at bedtime may also be helpful, as may a prescription cough medicine  Also recommended is the use of a cool mist humidifier (with or without Vicks) in the bedroom at night     --For sore throat, you can take OTC lozenges, use warm gargles (salt water or apple cider vinegar and honey), herbal teas, or an OTC throat spray (Chloraseptic)  --You can take Tylenol or Motrin/Advil as needed for fever, headache, body aches  Motrin/Advil should be avoided, however, if you have a history of heart disease, bleeding ulcers, or if you take blood thinners  --You should contact your primary care provider and/or go to the ER if your symptoms are not improved or get worse over the next 7 days  This includes new onset fever, localized ear pain, sinus pain, as well as worsening cough, chest pain, shortness of breath, or significant weakness/fatigue

## 2021-02-26 NOTE — PROGRESS NOTES
[facility]        NAME: Shaista Torrez is a 50 y o  female  : 1965    MRN: [id number]  DATE: 2021  TIME: 5:59 PM    Assessment and Plan   Upper respiratory tract infection, unspecified type [J06 9]  1  Upper respiratory tract infection, unspecified type     2  Encounter for screening laboratory testing for COVID-19 virus  Novel Coronavirus (Covid-19),PCR [facility]   3  Acute maxillary sinusitis, recurrence not specified  azithromycin (ZITHROMAX) 250 mg tablet     --COVID unlikely, as she had vaccine, but she would like to get PCR test done as precaution and so she can go back to work  Patient Instructions     --Rest, drink plenty of fluids  --Saline nasal spray, OTC steroid nasal spray, warm compresses  --Fill and start antibiotic if ongoing/worsening sinus pain/pressure over the next 24-48 hours  --COVID testing sent  Will call with results  Average turn around time is 48-72 hours  --Advise self-quarantining until you hear back from us regarding test results (at the earliest)  This involves not leaving your house, wearing a mask at all times while outside your immediate living area, and disinfecting all commonly used surfaces and personal items  If your COVID test results are positive, you will need to self-quarantine at home for at least 10 days from the onset of your symptoms, until you are feeling better, and until you are without a fever for at least 72 hours  --For nasal/sinus congestion, helpful measures include steam, warm compresses, an OTC saline nasal spray or Neti pot, or an OTC decongestant (such as Sudafed)  The decongestant should be avoided, however, if you are under 10years of age, or have a history of high blood pressure or heart disease  In addition, an OTC nasal steroid (Flonase, Nasocort) or nasal decongestant (Afrin, Saleem-synephrine) may be taken  The nasal steroid should be used at bedtime, after the saline nasal spray   The nasal decongestant should not be taken more than 3 days consecutively in order to prevent rebound congestion  --For nasal drainage, postnasal drip, sneezing and itching, an OTC antihistamine (Allegra, Benadryl, etc) can be taken  --For cough, you can take an OTC expectorant such as plain Robitussion or Mucinex (active ingredient guaifenesin)  A spoonful of honey at bedtime may also be helpful, as may a prescription cough medicine  Also recommended is the use of a cool mist humidifier (with or without Vicks) in the bedroom at night  --For sore throat, you can take OTC lozenges, use warm gargles (salt water or apple cider vinegar and honey), herbal teas, or an OTC throat spray (Chloraseptic)  --You can take Tylenol or Motrin/Advil as needed for fever, headache, body aches  Motrin/Advil should be avoided, however, if you have a history of heart disease, bleeding ulcers, or if you take blood thinners  --You should contact your primary care provider and/or go to the ER if your symptoms are not improved or get worse over the next 7 days  This includes new onset fever, localized ear pain, sinus pain, as well as worsening cough, chest pain, shortness of breath, or significant weakness/fatigue  Chief Complaint     Chief Complaint   Patient presents with    Cough     Sinus congestion and headache started x 7 days ago  Progressed into body aches, cough, SOB and chills  Taking mucinex and tylenol  History of Present Illness       Nasal congestion starting a week ago  Clear rhinorrhea  Cough, body aches, chills, "fever" (97 7)starting yesterday  Mild sore throat  Increased sinus pain/pressure over left cheek  No loss of taste or smell  No shortness of breath, wheezing, chest pain  No abdominal pain, N/V/D  Taking Mucinex and Tylenol  No known sick contacts  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  Had flu shot, two doses of COVID vaccine  Has history of seasonal allergies    Not currently using nasal spray  Review of Systems   Review of Systems   Constitutional: Positive for fever  HENT: Positive for rhinorrhea, sinus pressure and sinus pain  Negative for ear pain  Respiratory: Positive for cough  Negative for shortness of breath and wheezing  Gastrointestinal: Negative for diarrhea, nausea and vomiting  Neurological: Positive for headaches           Current Medications       Current Outpatient Medications:     aspirin (ECOTRIN LOW STRENGTH) 81 mg EC tablet, Take 81 mg by mouth daily Last dose will be 8/23/20, Disp: , Rfl:     azelastine (ASTELIN) 0 1 % nasal spray, 2 sprays into each nostril 2 (two) times a day Use in each nostril as directed, Disp: 1 Bottle, Rfl: 6    ergocalciferol (VITAMIN D2) 50,000 units, Take 1 capsule (50,000 Units total) by mouth once a week, Disp: 52 capsule, Rfl: 0    lisinopril (ZESTRIL) 20 mg tablet, Take 0 5 tablets (10 mg total) by mouth daily, Disp: 30 tablet, Rfl: 0    mometasone (Nasonex) 50 mcg/act nasal spray, 2 sprays into each nostril as needed (allergies), Disp: 1 Act, Rfl: 0    pantoprazole (PROTONIX) 40 mg tablet, Take 1 tablet (40 mg total) by mouth daily, Disp: 90 tablet, Rfl: 1    rosuvastatin (CRESTOR) 20 MG tablet, Take 1 tablet (20 mg total) by mouth daily at bedtime, Disp: 90 tablet, Rfl: 3    sertraline (ZOLOFT) 50 mg tablet, Take 1 tablet (50 mg total) by mouth daily, Disp: 90 tablet, Rfl: 1    traZODone (DESYREL) 50 mg tablet, Take 1 tablet (50 mg total) by mouth daily at bedtime, Disp: 90 tablet, Rfl: 1    azithromycin (ZITHROMAX) 250 mg tablet, Take 2 tablets today then 1 tablet daily x 4 days, Disp: 6 tablet, Rfl: 0    Blood Glucose Monitoring Suppl (FREESTYLE LITE) GREG, Patient tests twice daily (Patient not taking: Reported on 2/26/2021), Disp: 1 each, Rfl: 0    clindamycin-benzoyl peroxide (BENZACLIN) gel, Apply topically 2 (two) times a day (Patient not taking: Reported on 2/26/2021), Disp: 25 g, Rfl: 0    FreeStyle Unistick II Lancets MISC, Patient tests blood sugars twice daily (Patient not taking: Reported on 2021), Disp: 180 each, Rfl: 1    glucose blood (FREESTYLE LITE) test strip, 1 each by Other route 2 (two) times a day Use as instructed (Patient not taking: Reported on 2021), Disp: 200 each, Rfl: 1    mometasone (ELOCON) 0 1 % cream, Apply topically daily Apply topically BID x 2 weeks then qhs prn (Patient not taking: Reported on 2021), Disp: 45 g, Rfl: 3    Current Allergies     Allergies as of 2021 - Reviewed 2021   Allergen Reaction Noted    Iodinated diagnostic agents Facial Swelling 2016    Iodine (food allergy) Facial Swelling 2016    Shellfish-derived products (food allergy) Throat Swelling 2016    Amoxicillin Hives 2016    Metformin Diarrhea 2016    Penicillins  10/02/2012            The following portions of the patient's history were reviewed and updated as appropriate: allergies, current medications, past family history, past medical history, past social history, past surgical history and problem list      Past Medical History:   Diagnosis Date    Anemia     Last Assessed:  3/27/13a    Arthritis     Asthma     Blurred vision     Chronic kidney disease     Diabetes mellitus ([address] )     was in touch with PCP re [facility]- she adjusted insulin doses and is seeing surgeon 20    Diabetic retinopathy ([address] )     Dream enactment behavior     Hypercholesterolemia     Hyperlipidemia     Hypertension     Increased urinary frequency     Irritable bowel syndrome     Last Assessed:  10/2/12    Joint pain     Morbid obesity with BMI of 40 0-44 9, adult ([address] )     Nausea     Night sweat     Nightmare disorder 2020    Numbness     UTI (urinary tract infection)     Weakness        Past Surgical History:   Procedure Laterality Date    ABDOMINAL SURGERY      CARPAL TUNNEL RELEASE Right      SECTION      x 2    COLONOSCOPY    COLONOSCOPY      ME LAP, ALBAN RESTRICT PROC, LONGITUDINAL GASTRECTOMY N/A 8/31/2020    Procedure: LAPAROSCOPIC SLEEVE GASTRECTOMY  WITH EGD;  Surgeon: Amna Perez MD;  Location: [facility] MAIN OR;  Service: Yogi Wyatt LIG Left 11/21/2019    Procedure: RELEASE CARPAL TUNNEL ENDOSCOPIC-left;  Surgeon: Lili Marin MD;  Location:  MAIN OR;  Service: Orthopedics    TUBAL LIGATION  1999    WISDOM TOOTH EXTRACTION         Family History   Problem Relation Age of Onset    Diabetes Mother     Hypertension Mother     Diabetes Father     Heart disease Father     Stroke Father     Hypertension Father     Diverticulitis Father     Hyperlipidemia Father     Heart attack Father     Diabetes Maternal Grandmother     Pancreatic cancer Maternal Grandmother     Liver cancer Maternal Grandfather     Alcohol abuse Maternal Grandfather     Heart disease Paternal Grandmother     Crohn's disease Sister     Diabetes type I Daughter     Eczema Daughter     Diabetes Daughter     Heart attack Paternal Grandfather     Asthma Maternal Aunt     Alcohol abuse Maternal Uncle     Mental illness Paternal Aunt     Schizophrenia Paternal Aunt     Diabetes Paternal Aunt     Kidney disease Paternal Aunt     Alcohol abuse Paternal Uncle     Diabetes Paternal Uncle     Eczema Son     Colon cancer Neg Hx     Breast cancer Neg Hx          Medications have been verified  Objective   Pulse (!) 117   Temp 99 8 °F (37 7 °C)   Resp 16   LMP 02/06/2021 Comment: spotting  SpO2 98%   Patient's last menstrual period was 02/06/2021  Physical Exam     Physical Exam  Constitutional:       General: She is not in acute distress  Appearance: She is not ill-appearing or diaphoretic  HENT:      Head: Normocephalic  Nose: Congestion and rhinorrhea present  Comments: Left maxillary sinus tenderness without overlying skin changes        Mouth/Throat:      Pharynx: No oropharyngeal exudate or posterior oropharyngeal erythema  Cardiovascular:      Rate and Rhythm: Regular rhythm  Pulmonary:      Effort: Pulmonary effort is normal       Breath sounds: Normal breath sounds  Neurological:      Mental Status: She is alert     Psychiatric:         Mood and Affect: Mood normal

## 2021-02-27 LAB — SARS-COV-2 RNA RESP QL NAA+PROBE: NEGATIVE

## 2021-02-27 NOTE — RESULT ENCOUNTER NOTE
I called Karen and let her know that her COVID-19 swab was negative  I advised her to continue social distancing procedures

## 2021-03-01 ENCOUNTER — TELEPHONE (OUTPATIENT)
Dept: FAMILY MEDICINE CLINIC | Facility: CLINIC | Age: 56
End: 2021-03-01

## 2021-03-01 NOTE — LETTER
March 1, 2021     Patient: Iza Wiggins   YOB: 1965   Date of Visit: 3/1/2021       To Whom It May Concern: It is my medical opinion that Zulema Speaker may return to work on 3/2/2021  If you have any questions or concerns, please don't hesitate to call           Sincerely,        Starr Hester MD    CC: No Recipients

## 2021-03-01 NOTE — TELEPHONE ENCOUNTER
She went to [facility] on Friday, she was diag with a sinus infect  She didn't go to work today can she have a note to return felipe 3/2

## 2021-03-09 NOTE — ASSESSMENT & PLAN NOTE
Lab Results   Component Value Date    HGBA1C 6 8 (H) 01/22/2021     -Improving; she is on Rybelsus now (did not like saxenda injections)  -Continue with healthy diet and exercise  -Repeat A1C ordered

## 2021-03-09 NOTE — ASSESSMENT & PLAN NOTE
-Cholesterol panel in January 2021: Triglycerides and LDL cholesterol greatly increased, but HDL improved   -PCP continued dose of crestor  -Continue healthy diet and execise and repeat labs per PCP

## 2021-03-09 NOTE — ASSESSMENT & PLAN NOTE
-Denies reflux  -Has tapered off PPI  -Treated for H Pylori s/p surgery   -H  Pylori breath test ordered - must be off PPI for at least 2 weeks

## 2021-03-09 NOTE — ASSESSMENT & PLAN NOTE
-At risk for malabsorption of vitamins/minerals secondary to malabsorption and restriction of intake from bariatric surgery  -Currently taking adequate postop bariatric surgery vitamin supplementation: Bariatric Fusion 2 BID, 1 Vitron C weekly, avoids calcium d/t nephrologist instruction   She drinks fairlife milk daily  -First set of bariatric labs 01/22/21:    -Vitamin D deficiency   -B12 low normal (420)    -Iron low normal (59), ferritin low (10) - suggest add an additional 1 Vitron C daily    -Vitamin A mildly elevated    -Repeat labs ordered for about 1 month  -Patient received education about the importance of adhering to a lifelong supplementation regimen to avoid vitamin/mineral deficiencies

## 2021-03-09 NOTE — ASSESSMENT & PLAN NOTE
-Not wearing CPAP  -Encouraged consistent use of CPAP and follow up with sleep medicine  -Discussed risks of untreated sleep apnea such as sudden cardiac death by arrhythmia, uncontrolled hypertension, difficulty with weight loss, decreased quality sleep, increased insulin resistance, and stroke

## 2021-03-09 NOTE — ASSESSMENT & PLAN NOTE
-s/p Vertical Sleeve Gastrectomy with Dr Cory Skinner on 08/31/20  Overall doing Well  EWL is 51%, which places the patient on schedule for expected post surgical weight loss at this time  Discussed increasing water, avoid juice, limit milk, and start exercising  She has RD f/u today and encouraged her to continue with regular f/u for additional support  Try natural calm and increase fluids for constipation  Initial: 199 4lbs   Current: 158 6lbs  EWL: 51%  Antony: current  Current BMI is Body mass index is 33 15 kg/m²  · Tolerating a regular diet-yes  · Eating at least 60 grams of protein per day-yes  · Following 30/60 minute rule with liquids-yes  · Drinking at least 64 ounces of fluid per day-no, recommend increase to goal and avoid juice, limit milk  · Drinking carbonated beverages-no  · Sufficient exercise-no, suggest slowly start walking program, band exercises  · Using NSAIDs regularly-no  · Using nicotine-no  · Using alcohol-no   Advised about the risks of alcohol s/p bariatric surgery and recommend avoiding all alcohol

## 2021-03-12 ENCOUNTER — OFFICE VISIT (OUTPATIENT)
Dept: BARIATRICS | Facility: CLINIC | Age: 56
End: 2021-03-12
Payer: COMMERCIAL

## 2021-03-12 ENCOUNTER — OFFICE VISIT (OUTPATIENT)
Dept: BARIATRICS | Facility: CLINIC | Age: 56
End: 2021-03-12

## 2021-03-12 VITALS
BODY MASS INDEX: 33.29 KG/M2 | TEMPERATURE: 97.7 F | DIASTOLIC BLOOD PRESSURE: 62 MMHG | HEART RATE: 63 BPM | RESPIRATION RATE: 16 BRPM | SYSTOLIC BLOOD PRESSURE: 104 MMHG | HEIGHT: 58 IN | WEIGHT: 158.6 LBS

## 2021-03-12 VITALS — BODY MASS INDEX: 33.29 KG/M2 | WEIGHT: 158.6 LBS | HEIGHT: 58 IN

## 2021-03-12 DIAGNOSIS — Z48.815 ENCOUNTER FOR SURGICAL AFTERCARE FOLLOWING SURGERY OF DIGESTIVE SYSTEM: Primary | ICD-10-CM

## 2021-03-12 DIAGNOSIS — G47.33 OSA (OBSTRUCTIVE SLEEP APNEA): ICD-10-CM

## 2021-03-12 DIAGNOSIS — K91.2 POSTSURGICAL MALABSORPTION: Primary | ICD-10-CM

## 2021-03-12 DIAGNOSIS — I10 BENIGN ESSENTIAL HYPERTENSION: ICD-10-CM

## 2021-03-12 DIAGNOSIS — E78.2 MIXED HYPERLIPIDEMIA: ICD-10-CM

## 2021-03-12 DIAGNOSIS — Z79.4 TYPE 2 DIABETES MELLITUS WITH DIABETIC NEPHROPATHY, WITH LONG-TERM CURRENT USE OF INSULIN (HCC): ICD-10-CM

## 2021-03-12 DIAGNOSIS — E11.21 TYPE 2 DIABETES MELLITUS WITH DIABETIC NEPHROPATHY, WITH LONG-TERM CURRENT USE OF INSULIN (HCC): ICD-10-CM

## 2021-03-12 DIAGNOSIS — K91.2 POSTSURGICAL MALABSORPTION: ICD-10-CM

## 2021-03-12 DIAGNOSIS — K21.9 GASTROESOPHAGEAL REFLUX DISEASE: ICD-10-CM

## 2021-03-12 PROCEDURE — RECHECK

## 2021-03-12 PROCEDURE — 99214 OFFICE O/P EST MOD 30 MIN: CPT | Performed by: PHYSICIAN ASSISTANT

## 2021-03-12 NOTE — PATIENT INSTRUCTIONS
GOALS:   · Continued/Maintain healthy weight loss with good nutrition intakes  · Adequate hydration with at least 64oz  fluid intake  · Normal vitamin and mineral levels  · Exercise as tolerated  · Increase water intake, avoid juice - goal is 64oz daily  · Try natural calm magnesium powder before bed    · Follow-up in 6 months  We kindly ask that your arrive 15 minutes before your scheduled appointment time with your provider to allow our staff to room you, get your vital signs and update your chart  · Follow diet as discussed  · Get lab work done in 1 month *(call before you go to the lab to check if they do H Pylori breath testing - I don't think [facility] can do this, but please confirm before going)  You have been given a lab slip today  Please call the office if you need a replacement  It is recommended to check with your insurance BEFORE getting labs done to make sure they are covered by your policy  Also, please check with your PCP and other providers before getting labs to avoid duplicate labs  Make sure to HOLD any multivitamins that may contain biotin and any biotin supplements FOR 5 DAYS before any labs since it can affect the results  · Follow vitamin and mineral recommendations as reviewed with you  · Call our office if you have any problems with abdominal pain especially associated with fever, chills, nausea, vomiting or any other concerns  · All  Post-bariatric surgery patients should be aware that very small quantities of any alcohol can cause impairment and it is very possible not to feel the effect  The effect can be in the system for several hours  It is also a stomach irritant  · It is advised to AVOID alcohol, Nonsteroidal antiinflammatory drugs (NSAIDS) and nicotine of all forms   Any of these can cause stomach irritation/pain

## 2021-03-12 NOTE — PROGRESS NOTES
Assessment/Plan:    Encounter for surgical aftercare following surgery of digestive system  -s/p Vertical Sleeve Gastrectomy with Dr Blanca Bernstein on 08/31/20  Overall doing Well  EWL is 51%, which places the patient on schedule for expected post surgical weight loss at this time  Discussed increasing water, avoid juice, limit milk, and start exercising  She has RD f/u today and encouraged her to continue with regular f/u for additional support  Try natural calm and increase fluids for constipation  Initial: 199 4lbs   Current: 158 6lbs  EWL: 51%  Antony: current  Current BMI is Body mass index is 33 15 kg/m²  · Tolerating a regular diet-yes  · Eating at least 60 grams of protein per day-yes  · Following 30/60 minute rule with liquids-yes  · Drinking at least 64 ounces of fluid per day-no, recommend increase to goal and avoid juice, limit milk  · Drinking carbonated beverages-no  · Sufficient exercise-no, suggest slowly start walking program, band exercises  · Using NSAIDs regularly-no  · Using nicotine-no  · Using alcohol-no  Advised about the risks of alcohol s/p bariatric surgery and recommend avoiding all alcohol    Postsurgical malabsorption  -At risk for malabsorption of vitamins/minerals secondary to malabsorption and restriction of intake from bariatric surgery  -Currently taking adequate postop bariatric surgery vitamin supplementation: Bariatric Fusion 2 BID, 1 Vitron C weekly, avoids calcium d/t nephrologist instruction   She drinks fairlife milk daily  -First set of bariatric labs 01/22/21:    -Vitamin D deficiency   -B12 low normal (420)    -Iron low normal (59), ferritin low (10) - suggest add an additional 1 Vitron C daily    -Vitamin A mildly elevated    -Repeat labs ordered for about 1 month  -Patient received education about the importance of adhering to a lifelong supplementation regimen to avoid vitamin/mineral deficiencies     Gastroesophageal reflux disease  -Denies reflux  -Has tapered off PPI  -Treated for H Pylori s/p surgery   -H  Pylori breath test ordered - must be off PPI for at least 2 weeks    Type 2 diabetes mellitus with diabetic nephropathy, with long-term current use of insulin (HCC)    Lab Results   Component Value Date    HGBA1C 6 8 (H) 01/22/2021     -Improving; she is on Rybelsus now (did not like saxenda injections)  -Continue with healthy diet and exercise  -Repeat A1C ordered    АНДРЕЙ (obstructive sleep apnea)  -Not wearing CPAP  -Encouraged consistent use of CPAP and follow up with sleep medicine  -Discussed risks of untreated sleep apnea such as sudden cardiac death by arrhythmia, uncontrolled hypertension, difficulty with weight loss, decreased quality sleep, increased insulin resistance, and stroke  Benign essential hypertension  -Normotensive   -On lisinopril  -Continue monitoring and management with PCP    Hyperlipidemia  -Cholesterol panel in January 2021: Triglycerides and LDL cholesterol greatly increased, but HDL improved   -PCP continued dose of crestor  -Continue healthy diet and execise and repeat labs per PCP       Diagnoses and all orders for this visit:    Encounter for surgical aftercare following surgery of digestive system    Postsurgical malabsorption    Gastroesophageal reflux disease    Type 2 diabetes mellitus with diabetic nephropathy, with long-term current use of insulin ([facility])    АНДРЕЙ (obstructive sleep apnea)    Benign essential hypertension    Mixed hyperlipidemia          Subjective:      Patient ID: Theresa Carlson is a 50 y o  female  -s/p Vertical Sleeve Gastrectomy with Dr Alber Vicente on 08/31/20  Presents to the office today for routine follow up  Tolerating a regular diet; denies N/V, dysphagia, reflux  Reports constipation and usually needs to take a laxative every 3-4 days; has BM every other day  Overall doing well and she is very excited about her weight loss  She is not exercising   She is having heavy periods and sometimes hot flashes; agrees to f/u with GYN  Diet Recall:   B - hot farina or sometimes egg and cheese and about 45min later 6-8oz glass low fat milk  L - Ground turkey meatballs or chicken or salmon, small slices cucumber and tomato, 2 strawberries  Snack - 1 cup of coffee in milk  D - protein soup or lean protein or tuna sandwich on 1 slice of bread or jaden  HS - sometimes 2 small cookies or piece of chocolate mindfully    Fluids -  water, tea, milk - 32oz combination       The following portions of the patient's history were reviewed and updated as appropriate: allergies, current medications, past family history, past medical history, past social history, past surgical history and problem list     Review of Systems   Constitutional: Negative for chills and fever  Unexpected weight change: planned weight loss  HENT: Negative for trouble swallowing  Respiratory: Negative for cough and shortness of breath  Cardiovascular: Negative for chest pain and palpitations  Gastrointestinal: Positive for constipation  Negative for abdominal pain, diarrhea, nausea and vomiting  Genitourinary: Positive for menstrual problem (heavy periods)  Neurological: Negative for dizziness  Psychiatric/Behavioral:        Denies anxiety and depression         Objective:      /62 (BP Location: Left arm, Patient Position: Sitting, Cuff Size: Adult)   Pulse 63   Temp 97 7 °F (36 5 °C)   Resp 16   Ht 4' 10" (1 473 m)   Wt 71 9 kg (158 lb 9 6 oz)   BMI 33 15 kg/m²     Colonoscopy-Completed in 2016 - f/u in 10 years per GI       Physical Exam  Vitals signs reviewed  Constitutional:       General: She is not in acute distress  Appearance: She is well-developed  HENT:      Head: Normocephalic and atraumatic  Eyes:      General: No scleral icterus  Cardiovascular:      Rate and Rhythm: Normal rate and regular rhythm  Pulmonary:      Effort: Pulmonary effort is normal  No respiratory distress     Abdominal:      General: There is no distension  Palpations: Abdomen is soft  Tenderness: There is no abdominal tenderness  Comments: No incisional hernias appreciated   Skin:     General: Skin is warm and dry  Neurological:      Mental Status: She is alert and oriented to person, place, and time  Psychiatric:         Mood and Affect: Mood normal          Behavior: Behavior normal            BARRIERS: none identified    GOALS:   · Continued/Maintain healthy weight loss with good nutrition intakes  · Adequate hydration with at least 64oz  fluid intake  · Normal vitamin and mineral levels  · Exercise as tolerated  · Increase water intake, avoid juice - goal is 64oz daily  · Try natural calm magnesium powder before bed    · Follow-up in 6 months  We kindly ask that your arrive 15 minutes before your scheduled appointment time with your provider to allow our staff to room you, get your vital signs and update your chart  · Follow diet as discussed  · Get lab work done in 1 month *(call before you go to the lab to check if they do H Pylori breath testing - I don't think [facility] can do this, but please confirm before going)  You have been given a lab slip today  Please call the office if you need a replacement  It is recommended to check with your insurance BEFORE getting labs done to make sure they are covered by your policy  Also, please check with your PCP and other providers before getting labs to avoid duplicate labs  Make sure to HOLD any multivitamins that may contain biotin and any biotin supplements FOR 5 DAYS before any labs since it can affect the results  · Follow vitamin and mineral recommendations as reviewed with you  · Call our office if you have any problems with abdominal pain especially associated with fever, chills, nausea, vomiting or any other concerns      · All  Post-bariatric surgery patients should be aware that very small quantities of any alcohol can cause impairment and it is very possible not to feel the effect  The effect can be in the system for several hours  It is also a stomach irritant  · It is advised to AVOID alcohol, Nonsteroidal antiinflammatory drugs (NSAIDS) and nicotine of all forms   Any of these can cause stomach irritation/pain

## 2021-03-12 NOTE — PROGRESS NOTES
Bariatric Follow Up Nutrition Note    Type of surgery  Vertical sleeve gastrectomy  Surgery Date: 20  6 5 months  post-op  Surgeon: Dr Alejandro Toro  50 y o   female  Height 4' 10" (1 473 m), weight 71 9 kg (158 lb 9 6 oz), not currently breastfeeding  Body mass index is 33 15 kg/m²      Weight on eval:  199 4lbs  Weight on Day of Weight Loss Surgery: 185lbs  Weight in (lb) to have BMI = 25: 119 1lbs  Pre-Op Excess Wt: 74lbs  Post-Op Wt Loss: 41lbs#/ 51% EBWL in 6 5 month(s)    Review of History and Medications     A1c: 2020 was 9 0, Oct 2020 was 7 6, 2021 6 8    Past Medical History:   Diagnosis Date    Anemia     Last Assessed:  3/27/13a    Arthritis     Asthma     Blurred vision     Chronic kidney disease     Diabetes mellitus ([address] )     was in touch with PCP re hgaic- she adjusted insulin doses and is seeing surgeon 20    Diabetic retinopathy ([facility] )     Dream enactment behavior     Hypercholesterolemia     Hyperlipidemia     Hypertension     Increased urinary frequency     Irritable bowel syndrome     Last Assessed:  10/2/12    Joint pain     Morbid obesity with BMI of 40 0-44 9, adult (HCC)     Nausea     Night sweat     Nightmare disorder 2020    Numbness     UTI (urinary tract infection)     Weakness      Past Surgical History:   Procedure Laterality Date    ABDOMINAL SURGERY      CARPAL TUNNEL RELEASE Right      SECTION      x 2    COLONOSCOPY  2016    COLONOSCOPY      OK LAP, ALBAN RESTRICT PROC, LONGITUDINAL GASTRECTOMY N/A 2020    Procedure: LAPAROSCOPIC SLEEVE GASTRECTOMY  WITH EGD;  Surgeon: Brandon Smith MD;  Location: [address] [place] [address];  Service: Bariatrics    OK WRIST Anitha Vega LIG Left 2019    Procedure: RELEASE CARPAL TUNNEL ENDOSCOPIC-left;  Surgeon: Alexx Bojorquez MD;  Location: [facility];  Service: Orthopedics   [address] [place] [address] Social History     Socioeconomic History    Marital status:      Spouse name: Mariano Heaton    Number of children: 2    Years of education: 15    Highest education level: Some college, no degree   Occupational History     Comment: financial counseler   Social Needs    Financial resource strain: Not hard at all   Moshe-Ras insecurity     Worry: Never true     Inability: Never true   [employer] needs     Medical: No     Non-medical: No   Tobacco Use    Smoking status: Former Smoker     Types: Cigarettes     Quit date:      Years since quittin 2    Smokeless tobacco: Former User   Substance and Sexual Activity    Alcohol use: Yes     Comment: rarely    Drug use: No    Sexual activity: Yes     Partners: Male     Birth control/protection: Female Sterilization   Lifestyle    Physical activity     Days per week: 0 days     Minutes per session: 0 min    Stress: To some extent   Relationships    Social connections     Talks on phone: More than three times a week     Gets together: More than three times a week     Attends [employer] service: Not on file     Active member of club or organization: Yes     Attends meetings of clubs or organizations: More than 4 times per year     Relationship status:      Intimate partner violence     Fear of current or ex partner: No     Emotionally abused: No     Physically abused: No     Forced sexual activity: No   Other Topics Concern    Not on file   Social History Narrative    Consumes 1 cup of coffee per day       Current Outpatient Medications:     aspirin (ECOTRIN LOW STRENGTH) 81 mg EC tablet, Take 81 mg by mouth daily Last dose will be 20, Disp: , Rfl:     azelastine (ASTELIN) 0 1 % nasal spray, 2 sprays into each nostril 2 (two) times a day Use in each nostril as directed (Patient not taking: Reported on 3/12/2021), Disp: 1 Bottle, Rfl: 6    Blood Glucose Monitoring Suppl (FREESTYLE LITE) GREG, Patient tests twice daily (Patient not taking: Reported on 2/26/2021), Disp: 1 each, Rfl: 0    clindamycin-benzoyl peroxide (BENZACLIN) gel, Apply topically 2 (two) times a day, Disp: 25 g, Rfl: 0    ergocalciferol (VITAMIN D2) 50,000 units, Take 1 capsule (50,000 Units total) by mouth once a week (Patient taking differently: Take 50,000 Units by mouth Two times a week), Disp: 52 capsule, Rfl: 0    FreeStyle Unistick II Lancets MISC, Patient tests blood sugars twice daily (Patient not taking: Reported on 2/26/2021), Disp: 180 each, Rfl: 1    glucose blood (FREESTYLE LITE) test strip, 1 each by Other route 2 (two) times a day Use as instructed (Patient not taking: Reported on 2/26/2021), Disp: 200 each, Rfl: 1    lisinopril (ZESTRIL) 20 mg tablet, Take 0 5 tablets (10 mg total) by mouth daily, Disp: 30 tablet, Rfl: 0    mometasone (ELOCON) 0 1 % cream, Apply topically daily Apply topically BID x 2 weeks then qhs prn, Disp: 45 g, Rfl: 3    mometasone (Nasonex) 50 mcg/act nasal spray, 2 sprays into each nostril as needed (allergies), Disp: 1 Act, Rfl: 0    pantoprazole (PROTONIX) 40 mg tablet, Take 1 tablet (40 mg total) by mouth daily, Disp: 90 tablet, Rfl: 1    rosuvastatin (CRESTOR) 20 MG tablet, Take 1 tablet (20 mg total) by mouth daily at bedtime, Disp: 90 tablet, Rfl: 3    sertraline (ZOLOFT) 50 mg tablet, Take 1 tablet (50 mg total) by mouth daily, Disp: 90 tablet, Rfl: 1    traZODone (DESYREL) 50 mg tablet, Take 1 tablet (50 mg total) by mouth daily at bedtime, Disp: 90 tablet, Rfl: 1    Food Intake and Lifestyle Assessment   Food Intake Assessment completed via usual diet recall:  Has a electric steamer at work so she will "cook" her meals at work (takes about 30-45min to cook)--protein and veggies, can even make hot cereal    Breakfast: 8-9am: hot tea w/ 1/2 tsp sugar warm cereal (corn based or oatmeal--makes about 1/2 cup cooked but often that doesn't finish)    Makes the hot cereal with the non-fat fairlife with a few raisins (1/2 min box, cinnamon)  Snack:  none  Lunch: 12pm-1pm: 2  turkey meatball or chicken or fish and veggies or soup (steams the veggies and then adds the bone broth) or 1/2 sandwich on light bread/toast  Snack: none  Dinner: 2-3oz turkey, tuna, salmon, etc   and veggies  Very occasional 1 tbsp rice, sweet potato or mashed potato  Snack:  None, if does have anything will be a piece of fruit  "cheat":  SF ice pop or SF lemon sorbet or 1-2 cookies or few bites of cake, 5 french fries, etc   Beverage intake: water, fairlife skim milk and coffee/tea (instant coffee in 8oz fairlife milk, occasional 4oz juice (3x/week)  Diet texture/stage: regular  Protein supplement: just with the fairlife milk mixed into things  Estimated protein intake per day: 60-70gm  Estimated fluid intake per day: only getting about 34oz fluids total per day between water, fairlife skim milk mixed into things and 1 tea and/or coffee/day  Meals eaten away from home: not often  Typical meal pattern: 3 meals per day, occasionally "treat", but not too often  Eating Behaviors: Appropriate diet advancement, Appropriate portion sizes and Does not drink with meals and waits 60-minutes after meal before resuming drinkingKamran advised can not drink 10-15 mins before meal instead of the 30 mins  Food allergies or intolerances: starchy foods, fried foods  Cultural or Worship considerations: none    Vitamins:  Bariatric Fusion:  Chewable 2BID (has 100mg calcium)--advised can change to capsule and provided with sample of fusion one a day (has no calcium)  No calcium per nephrologist  Pt on rx Vitamin D      Physical Assessment  Nutrition Related Findings  Constipation--BM every 2-3 days, takes a laxative by day 3  Advised to increase the fluid intake  Physical Activity  Types of exercise: None, just goes up and down 3 flights of stairs for 10 mins per day  Just started back to walking yesterday  And trying to walk more in her daily living     Current physical limitations: neuropathy pain in upper thigh starting from hip joint  Psychosocial Assessment   Support systems: significant other friend(s) relative(s)  Socioeconomic factors: none--[employer] employee    Nutrition Diagnosis  Diagnosis: Altered GI function (NC-1 4)  Related to: Altered GI function  As Evidenced by: Unintentional weight loss (expected)      Interventions and Teaching   Patient educated on post-op nutrition guidelines  Patient educated and handouts provided  Diet progression  Adequate hydration  Expected weight loss  Exercise  Protein supplements  Dietary and lifestyle changes  Techniques for self monitoring and keeping daily food journal  Vitamin / Mineral supplementation of Multivitamin with minerals:   See above       Education provided to: patient    Barriers to learning: No barriers identified    Readiness to change: action    Comprehension: verbalizes understanding     Expected Compliance: good    Pt has done well with weight loss over the past 3 months and is on track at 51% s/p sleeve at 6 5 months  She is meeting her protein but isn't meeting her fluid needs at this time  Likely only getting about about 34oz total fluids per day  Pt is aware  She feels she can now better tolerate the cold water therefore will work on increasing her intake  Lastly,now that the weather is getting warmer she plans on getting into a better walking/exericse regimen  Goals  Increase fluid intake to 64oz  Keep up with protein at each meal  Continue incorporating non-fat fairlife milk  Get into a routine exercise regimen         Time Spent:   30 Minutes

## 2021-05-20 DIAGNOSIS — E78.2 MIXED HYPERLIPIDEMIA: ICD-10-CM

## 2021-05-20 DIAGNOSIS — E66.01 MORBID OBESITY (HCC): ICD-10-CM

## 2021-05-24 DIAGNOSIS — R80.1 PERSISTENT PROTEINURIA: ICD-10-CM

## 2021-05-24 DIAGNOSIS — I10 PRIMARY HYPERTENSION: ICD-10-CM

## 2021-05-25 RX ORDER — LISINOPRIL 20 MG/1
10 TABLET ORAL DAILY
Qty: 90 TABLET | Refills: 1 | Status: SHIPPED | OUTPATIENT
Start: 2021-05-25 | End: 2021-09-17 | Stop reason: SDUPTHER

## 2021-05-26 RX ORDER — ROSUVASTATIN CALCIUM 20 MG/1
TABLET, COATED ORAL
Qty: 90 TABLET | Refills: 0 | Status: SHIPPED | OUTPATIENT
Start: 2021-05-26 | End: 2021-08-06 | Stop reason: SDUPTHER

## 2021-05-28 ENCOUNTER — OFFICE VISIT (OUTPATIENT)
Dept: DENTISTRY | Facility: CLINIC | Age: 56
End: 2021-05-28

## 2021-05-28 VITALS — HEART RATE: 76 BPM | TEMPERATURE: 97.5 F | DIASTOLIC BLOOD PRESSURE: 71 MMHG | SYSTOLIC BLOOD PRESSURE: 121 MMHG

## 2021-05-28 DIAGNOSIS — K00.4 DISTURBANCES OF TOOTH FORMATION: Primary | ICD-10-CM

## 2021-05-28 DIAGNOSIS — K05.6 PERIODONTAL DISEASE: ICD-10-CM

## 2021-05-28 NOTE — PROGRESS NOTES
Comprehensive Exam    Karen Ngo presents for a comprehensive exam  Verbal consent for treatment given in addition to the forms  Reviewed health history - Patient is ASA II  Consents signed: Yes    Perio: Generalized, Moderate bleeding, Recession and Gingivitis   Began probe charting; patient unable to tolerate complete exam  Probing depths of 4-5mm  Recommended for Scaling / Root planing with follow up periodontal examination  Clinically presents with generalized calculus recession and caries  Pain Scale: 0  Caries Assessment: high  Radiographs:  FMX completed  Radiographs demonstrate radiographic calculus with moderate horizontal bone loss  No evidence of PARLs or caries  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; Panoramic radiograph recommended for follow up  Patient will begin treatment with SRPs and fabrication of interim partial dentures to allow improvement in eating  Patient will then undergo orthodontic treatment to correct anterior edge to edge / collapse of posterior VDO  Patient understands once she begins orthodontic treatment the interim partials fabricated will become lose and may not be able to be used  Oral Hygiene instruction reviewed and given  Hygiene recall visits recommended to the patient  Treatment Plan:  1  Periodontal therapy: SRPs with follow up perio exam   2  Caries control: Restorative on #2, 15, 20, 28    3  Occlusal evaluation: recommended orthodontic treatment for correction / alignment of anteriors     Prognosis is Fair     Referrals needed: No  Next visit: Everton Estrada [place]

## 2021-06-01 ENCOUNTER — TELEPHONE (OUTPATIENT)
Dept: BARIATRICS | Facility: CLINIC | Age: 56
End: 2021-06-01

## 2021-06-01 NOTE — TELEPHONE ENCOUNTER
Pt reached out with c/o of some discomfort after eating and a 4lb weight gain (163lbs)  She states it has happened about 5-6 times over the past week  She explains that it has happened with different foods, not just specific foods such as carbs or fatty foods, etc   Says it has happened with oatmeal, fish and spinach, watermelon, etc  She reports to be following the 30/60 rule, taking her time eating, taking small bites and is still taking the protonix 40mg at night  She does report that she never completed the f/u H  Pylori breath test since she was told she needs to be off her protonix for at least 2 weeks prior to the test  She was worried to hold the medication for that long  At this time she sounds to be following the dietary rules/guidelines  Will f/u with Jamal Cleveland, for further guidance

## 2021-06-01 NOTE — TELEPHONE ENCOUNTER
Left patient voicemail to return my call to discuss further  When we last met in March she was tapering off of the protonix  Possible rebound hyperacidity from tapering off PPI vs galbladder  We do need to do confirmation breath testing for H Pylori when off PPI x 2 weeks at some point  Advised her to report to the ED if she develops severe pain, N/V, fevers, chills

## 2021-06-02 NOTE — TELEPHONE ENCOUNTER
Patient reports stopping Protonix 40mg without tapering in March, but soon after she had a episode of severe reflux and regurgitation HS  She has since restarted the protonix 40mg but takes it at night  For the last week she has been experiencing burning/gas bubble feeling in mid-epigastric area after eating intermittently (any type of foods from oatmeal to watermelon to jaden sandwich)  She is chewing thoroughly and taking her time eating but she admits to also drinking cold water usually prior to eating  She denies any other symptoms, N/V, dysphagia, pain in RUQ, diarrhea, constipation  Advised her to change to Protonix 40mg first thing in the morning and take pepcid HS and use gaviscon prn  Keep careful food records, avoid cold water and follow 30/60 minute rule, take time chewing thoroughly while eating slowly  Patient has hx of gastroparesis and may need to stay on PPI therapy or at least try a very slow taper at a later time  She was on omeprazole for years prior to surgery  She will update me on her progress in 1-2 weeks  Will need to continue with anti-reflux diet measures and recommend f/u with RD for additional support

## 2021-08-05 DIAGNOSIS — E78.2 MIXED HYPERLIPIDEMIA: ICD-10-CM

## 2021-08-05 DIAGNOSIS — E66.01 MORBID OBESITY (HCC): ICD-10-CM

## 2021-08-05 NOTE — TELEPHONE ENCOUNTER
Pharm requested refill of the following :   rosuvastatin (CRESTOR) 20 MG tablet  [facility]

## 2021-08-06 RX ORDER — ROSUVASTATIN CALCIUM 20 MG/1
20 TABLET, COATED ORAL
Qty: 90 TABLET | Refills: 0 | Status: SHIPPED | OUTPATIENT
Start: 2021-08-06 | End: 2022-04-13

## 2021-08-11 ENCOUNTER — TELEPHONE (OUTPATIENT)
Dept: BARIATRICS | Facility: CLINIC | Age: 56
End: 2021-08-11

## 2021-08-11 NOTE — TELEPHONE ENCOUNTER
Patient asked to meet with SW to discuss the return of old eating habits and some weight regain she is experiencing one year post-op  Patient is scheduled to meet with SW on 8/13

## 2021-08-13 ENCOUNTER — OFFICE VISIT (OUTPATIENT)
Dept: BARIATRICS | Facility: CLINIC | Age: 56
End: 2021-08-13

## 2021-08-13 VITALS — BODY MASS INDEX: 35.15 KG/M2 | WEIGHT: 168.2 LBS

## 2021-08-13 DIAGNOSIS — F32.1 CURRENT MODERATE EPISODE OF MAJOR DEPRESSIVE DISORDER WITHOUT PRIOR EPISODE (HCC): Primary | ICD-10-CM

## 2021-08-13 PROCEDURE — RECHECK

## 2021-08-13 NOTE — PROGRESS NOTES
Patient presents for post-op checkin with a weight gain of 9 6lbs since last visit  Eating behaviors/food choices: Patient reports the return of poor eating habits, grazing, snacking and choosing higher calorie items throughout the day  She does have three meals a day and feels restriction but she will have cravings for fats and sugars which she is able to tolerate  She didn't realize she was that much of an emotional eater but now she sees this as an issue and wants to get control of it  She does go to [facility] for support but she feels shame talking about her eating habits  CHALINO talked with patient about emotional eating and to manage this habit by going back to basic behaviors of planning and tracking  Patient used to track her food but has gotten out of the habit, CHALINO gave her two paper trackers and pointed out some of the benefits of the [employer] by [employer] jared  Activity/Exercise:  Patient goes for walks, could increase activity to help with mindless and emotional eating habits  Sleep/Rest:  Patient says she is getting good rest and doesn't believe she eats in relation to fatigue  She does notice her caffeine intake increasing but this may be more due to appetite than fatigue  She is trying to cut back and have tea instead of coffee  Mental Health/Wellness:  Patient identifies stressors at work and in her home life that contribute to anxieties and feelings of depression  She said a few times that she puts food in her mouth to keep herself from saying something she shouldn't  She knows that she tends to take care of others before herself and that she takes on other's problems but she doesn't always know how to stop that  CHALINO talked with patient about self care and by attending to her eating, activity, sleeping and mental health she would be investing in herself    CHALINO reviewed stop, think, act when it comes to grabbing for food to soothe, to create a list of non-food coping skills that she could engage in first before snacking and to set boundaries with others for her own self care  Patient does want to connect to a therapist so referral to [facility] is in chart, patient aware to call insurance as well for providers in network  Goals:  Patient will work on planning and tracking foods, tune into head versus stomach hunger with stop, think and act, create a non-food coping skills list and increase activity      Next Appointment:  One year post-op visit with Dr Kimberly Martinez is on 8/20 and will follow up with CHALINO and DACIA on 9/17

## 2021-08-19 ENCOUNTER — APPOINTMENT (OUTPATIENT)
Dept: LAB | Facility: CLINIC | Age: 56
End: 2021-08-19
Payer: COMMERCIAL

## 2021-08-19 DIAGNOSIS — K91.2 POSTSURGICAL MALABSORPTION: ICD-10-CM

## 2021-08-19 DIAGNOSIS — E61.1 LOW IRON: ICD-10-CM

## 2021-08-19 DIAGNOSIS — E53.8 LOW VITAMIN B12 LEVEL: ICD-10-CM

## 2021-08-19 DIAGNOSIS — E55.9 VITAMIN D DEFICIENCY: ICD-10-CM

## 2021-08-19 DIAGNOSIS — E11.21 TYPE 2 DIABETES MELLITUS WITH DIABETIC NEPHROPATHY, WITH LONG-TERM CURRENT USE OF INSULIN (HCC): ICD-10-CM

## 2021-08-19 DIAGNOSIS — Z79.4 TYPE 2 DIABETES MELLITUS WITH DIABETIC NEPHROPATHY, WITH LONG-TERM CURRENT USE OF INSULIN (HCC): ICD-10-CM

## 2021-08-19 LAB
25(OH)D3 SERPL-MCNC: 44.8 NG/ML (ref 30–100)
ALBUMIN SERPL BCP-MCNC: 3.6 G/DL (ref 3.5–5)
ALP SERPL-CCNC: 99 U/L (ref 46–116)
ALT SERPL W P-5'-P-CCNC: 19 U/L (ref 12–78)
ANION GAP SERPL CALCULATED.3IONS-SCNC: 6 MMOL/L (ref 4–13)
AST SERPL W P-5'-P-CCNC: 14 U/L (ref 5–45)
BASOPHILS # BLD AUTO: 0.07 THOUSANDS/ΜL (ref 0–0.1)
BASOPHILS NFR BLD AUTO: 1 % (ref 0–1)
BILIRUB SERPL-MCNC: 0.51 MG/DL (ref 0.2–1)
BUN SERPL-MCNC: 22 MG/DL (ref 5–25)
CALCIUM SERPL-MCNC: 9.8 MG/DL (ref 8.3–10.1)
CHLORIDE SERPL-SCNC: 106 MMOL/L (ref 100–108)
CO2 SERPL-SCNC: 24 MMOL/L (ref 21–32)
CREAT SERPL-MCNC: 0.98 MG/DL (ref 0.6–1.3)
EOSINOPHIL # BLD AUTO: 0.41 THOUSAND/ΜL (ref 0–0.61)
EOSINOPHIL NFR BLD AUTO: 5 % (ref 0–6)
ERYTHROCYTE [DISTWIDTH] IN BLOOD BY AUTOMATED COUNT: 13.2 % (ref 11.6–15.1)
EST. AVERAGE GLUCOSE BLD GHB EST-MCNC: 252 MG/DL
FERRITIN SERPL-MCNC: 21 NG/ML (ref 8–388)
FOLATE SERPL-MCNC: 9.4 NG/ML (ref 3.1–17.5)
GFR SERPL CREATININE-BSD FRML MDRD: 66 ML/MIN/1.73SQ M
GLUCOSE P FAST SERPL-MCNC: 197 MG/DL (ref 65–99)
HBA1C MFR BLD: 10.4 %
HCT VFR BLD AUTO: 37.6 % (ref 34.8–46.1)
HGB BLD-MCNC: 12.7 G/DL (ref 11.5–15.4)
IMM GRANULOCYTES # BLD AUTO: 0.02 THOUSAND/UL (ref 0–0.2)
IMM GRANULOCYTES NFR BLD AUTO: 0 % (ref 0–2)
IRON SATN MFR SERPL: 18 %
IRON SERPL-MCNC: 81 UG/DL (ref 50–170)
LYMPHOCYTES # BLD AUTO: 2.79 THOUSANDS/ΜL (ref 0.6–4.47)
LYMPHOCYTES NFR BLD AUTO: 30 % (ref 14–44)
MCH RBC QN AUTO: 27.4 PG (ref 26.8–34.3)
MCHC RBC AUTO-ENTMCNC: 33.8 G/DL (ref 31.4–37.4)
MCV RBC AUTO: 81 FL (ref 82–98)
MONOCYTES # BLD AUTO: 0.62 THOUSAND/ΜL (ref 0.17–1.22)
MONOCYTES NFR BLD AUTO: 7 % (ref 4–12)
NEUTROPHILS # BLD AUTO: 5.29 THOUSANDS/ΜL (ref 1.85–7.62)
NEUTS SEG NFR BLD AUTO: 57 % (ref 43–75)
NRBC BLD AUTO-RTO: 0 /100 WBCS
PLATELET # BLD AUTO: 360 THOUSANDS/UL (ref 149–390)
PMV BLD AUTO: 8.6 FL (ref 8.9–12.7)
POTASSIUM SERPL-SCNC: 3.9 MMOL/L (ref 3.5–5.3)
PROT SERPL-MCNC: 8.1 G/DL (ref 6.4–8.2)
PTH-INTACT SERPL-MCNC: 61.2 PG/ML (ref 18.4–80.1)
RBC # BLD AUTO: 4.63 MILLION/UL (ref 3.81–5.12)
SODIUM SERPL-SCNC: 136 MMOL/L (ref 136–145)
TIBC SERPL-MCNC: 459 UG/DL (ref 250–450)
VIT B12 SERPL-MCNC: 443 PG/ML (ref 100–900)
WBC # BLD AUTO: 9.2 THOUSAND/UL (ref 4.31–10.16)

## 2021-08-19 PROCEDURE — 82746 ASSAY OF FOLIC ACID SERUM: CPT

## 2021-08-19 PROCEDURE — 84425 ASSAY OF VITAMIN B-1: CPT

## 2021-08-19 PROCEDURE — 82306 VITAMIN D 25 HYDROXY: CPT

## 2021-08-19 PROCEDURE — 83970 ASSAY OF PARATHORMONE: CPT

## 2021-08-19 PROCEDURE — 82728 ASSAY OF FERRITIN: CPT

## 2021-08-19 PROCEDURE — 85025 COMPLETE CBC W/AUTO DIFF WBC: CPT

## 2021-08-19 PROCEDURE — 36415 COLL VENOUS BLD VENIPUNCTURE: CPT

## 2021-08-19 PROCEDURE — 80053 COMPREHEN METABOLIC PANEL: CPT

## 2021-08-19 PROCEDURE — 83540 ASSAY OF IRON: CPT

## 2021-08-19 PROCEDURE — 83550 IRON BINDING TEST: CPT

## 2021-08-19 PROCEDURE — 83036 HEMOGLOBIN GLYCOSYLATED A1C: CPT

## 2021-08-19 PROCEDURE — 82607 VITAMIN B-12: CPT

## 2021-08-19 PROCEDURE — 84590 ASSAY OF VITAMIN A: CPT

## 2021-08-19 PROCEDURE — 84630 ASSAY OF ZINC: CPT

## 2021-08-20 ENCOUNTER — OFFICE VISIT (OUTPATIENT)
Dept: BARIATRICS | Facility: CLINIC | Age: 56
End: 2021-08-20
Payer: COMMERCIAL

## 2021-08-20 ENCOUNTER — OFFICE VISIT (OUTPATIENT)
Dept: CARDIOLOGY CLINIC | Facility: CLINIC | Age: 56
End: 2021-08-20
Payer: COMMERCIAL

## 2021-08-20 VITALS
OXYGEN SATURATION: 99 % | HEART RATE: 78 BPM | HEIGHT: 58 IN | TEMPERATURE: 98 F | BODY MASS INDEX: 34.43 KG/M2 | WEIGHT: 164 LBS | SYSTOLIC BLOOD PRESSURE: 112 MMHG | DIASTOLIC BLOOD PRESSURE: 64 MMHG

## 2021-08-20 VITALS
HEIGHT: 58 IN | SYSTOLIC BLOOD PRESSURE: 110 MMHG | HEART RATE: 81 BPM | WEIGHT: 164.4 LBS | BODY MASS INDEX: 34.51 KG/M2 | DIASTOLIC BLOOD PRESSURE: 60 MMHG

## 2021-08-20 DIAGNOSIS — Z98.84 S/P LAPAROSCOPIC SLEEVE GASTRECTOMY: Primary | ICD-10-CM

## 2021-08-20 DIAGNOSIS — K91.2 POSTSURGICAL MALABSORPTION: ICD-10-CM

## 2021-08-20 DIAGNOSIS — I10 ESSENTIAL HYPERTENSION: Primary | ICD-10-CM

## 2021-08-20 DIAGNOSIS — E11.65 UNCONTROLLED TYPE 2 DIABETES MELLITUS WITH HYPERGLYCEMIA (HCC): ICD-10-CM

## 2021-08-20 DIAGNOSIS — E66.01 OBESITY, CLASS III, BMI 40-49.9 (MORBID OBESITY) (HCC): ICD-10-CM

## 2021-08-20 DIAGNOSIS — R06.00 EXERTIONAL DYSPNEA: ICD-10-CM

## 2021-08-20 DIAGNOSIS — E78.2 MIXED HYPERLIPIDEMIA: ICD-10-CM

## 2021-08-20 DIAGNOSIS — Z79.4 TYPE 2 DIABETES MELLITUS WITH DIABETIC NEPHROPATHY, WITH LONG-TERM CURRENT USE OF INSULIN (HCC): ICD-10-CM

## 2021-08-20 DIAGNOSIS — E11.21 TYPE 2 DIABETES MELLITUS WITH DIABETIC NEPHROPATHY, WITH LONG-TERM CURRENT USE OF INSULIN (HCC): ICD-10-CM

## 2021-08-20 PROCEDURE — 93000 ELECTROCARDIOGRAM COMPLETE: CPT | Performed by: INTERNAL MEDICINE

## 2021-08-20 PROCEDURE — 99214 OFFICE O/P EST MOD 30 MIN: CPT | Performed by: INTERNAL MEDICINE

## 2021-08-20 PROCEDURE — 99213 OFFICE O/P EST LOW 20 MIN: CPT | Performed by: SURGERY

## 2021-08-20 RX ORDER — MULTIVITAMIN
1 TABLET ORAL 4 TIMES DAILY
COMMUNITY
End: 2022-04-13

## 2021-08-20 RX ORDER — CHLORAL HYDRATE 500 MG
1000 CAPSULE ORAL 2 TIMES DAILY
Qty: 60 CAPSULE | Refills: 3 | Status: SHIPPED | COMMUNITY
Start: 2021-08-20

## 2021-08-20 NOTE — PROGRESS NOTES
[facility]  [address] [place] [address] [address]   [place], [address]  Cardiology Consultation    Aida Weinberg  [id number]  1965      Consult for: Dyspnea  Appreciate consult by: Perry Telles MD    1  Essential hypertension  POCT ECG   2  Mixed hyperlipidemia  POCT ECG   3  Exertional dyspnea  POCT ECG   4  Obesity, Class III, BMI 40-49 9 (morbid obesity) (HCC)  POCT ECG   5  Uncontrolled type 2 diabetes mellitus with hyperglycemia (HCC)  POCT ECG      Discussion/Summary:   Exertional shortness of breath- high [place] risk factors including Dm2, htn, family hx- plan for exercise stress echo to evaluate  Diabetes mellitus - she will tried target her hemoglobin A1c 6 8  Dyslipidemia- will switch her from atorvastatin to rosuvastatin 20 mg  There is less interference with  Blood sugars  We will target improved triglyceride control  Hypertension- significantly improved  S/p gastric sleeve    HPI:   57-year-old woman with history longstanding diabetes mellitus type 2 for 15 years, hypertension for 15 years, dyslipidemia who presents prior to surgical intervention  She states having some dyspnea when walking up stairs  No prior history of myocardial infarction  Her blood pressures have been well controlled on lisinopril therapy  In no history of atrial fibrillation  No history of trouble with anesthesia  Denies having bleeding or bruising  Compliant with her medications  Her blood sugars have been controlled  08/20/2021:  She denies having any chest heaviness  She has had a gastric sleeve without any significant symptoms  Denies having palpitations  No major EKG changes  We reviewed her last cholesterol panel  She has restarted her rosuvastatin        Past Medical History:   Diagnosis Date    Anemia     Last Assessed:  3/27/13a    Arthritis     Asthma     Blurred vision     Chronic kidney disease     Diabetes mellitus ([facility] [address] )     was in touch with PCP re hgaic- she adjusted insulin doses and is seeing surgeon 20    Diabetic retinopathy ([facility] [address] )     Dream enactment behavior     Hypercholesterolemia     Hyperlipidemia     Hypertension     Increased urinary frequency     Irritable bowel syndrome     Last Assessed:  10/2/12    Joint pain     Morbid obesity with BMI of 40 0-44 9, adult (HCC)     Nausea     Night sweat     Nightmare disorder 2020    Numbness     UTI (urinary tract infection)     Weakness      Social History     Socioeconomic History    Marital status:      Spouse name: Frida Massey    Number of children: 2    Years of education: 15    Highest education level: Some college, no degree   Occupational History     Comment: financial counseler   Tobacco Use    Smoking status: Former Smoker     Types: Cigarettes     Quit date:      Years since quittin 6    Smokeless tobacco: Former User   Vaping Use    Vaping Use: Never used   Substance and Sexual Activity    Alcohol use: Yes     Comment: rarely    Drug use: No    Sexual activity: Yes     Partners: Male     Birth control/protection: Female Sterilization   Other Topics Concern    Not on file   Social History Narrative    Consumes 1 cup of coffee per day     Social Determinants of Health     Financial Resource Strain: Low Risk     Difficulty of Paying Living Expenses: Not hard at all   Food Insecurity: No Food Insecurity    Worried About [facility] in the Last Year: Never true    Rowan of Food in the Last Year: Never true   Transportation Needs: No Transportation Needs    Lack of Transportation (Medical): No    Lack of Transportation (Non-Medical):  No   Physical Activity:     Days of Exercise per Week:     Minutes of Exercise per Session:    Stress:     Feeling of Stress :    Social Connections:     Frequency of Communication with Friends and Family:     Frequency of Social Gatherings with Friends and Family:     Attends Evangelical Services:     Active Member of Clubs or Organizations:     Attends Club or Organization Meetings:     Marital Status:    Intimate Partner Violence:     Fear of Current or Ex-Partner:     Emotionally Abused:     Physically Abused:     Sexually Abused:       Family History   Problem Relation Age of Onset    Diabetes Mother     Hypertension Mother     Diabetes Father     Heart disease Father     Stroke Father     Hypertension Father     Diverticulitis Father     Hyperlipidemia Father     Heart attack Father     Diabetes Maternal Grandmother     Pancreatic cancer Maternal Grandmother     Liver cancer Maternal Grandfather     Alcohol abuse Maternal Grandfather     Heart disease Paternal Grandmother     Crohn's disease Sister     Diabetes type I Daughter     Eczema Daughter     Diabetes Daughter     Heart attack Paternal Grandfather     Asthma Maternal Aunt     Alcohol abuse Maternal Uncle     Mental illness Paternal Aunt     Schizophrenia Paternal Aunt     Diabetes Paternal Aunt     Kidney disease Paternal Aunt     Alcohol abuse Paternal Uncle     Diabetes Paternal Uncle     Eczema Son     Colon cancer Neg Hx     Breast cancer Neg Hx      Past Surgical History:   Procedure Laterality Date    ABDOMINAL SURGERY      CARPAL TUNNEL RELEASE Right      SECTION      x 2    COLONOSCOPY  2016    COLONOSCOPY      [place] LAP, [place] RESTRICT PROC, LONGITUDINAL GASTRECTOMY N/A 2020    Procedure: LAPAROSCOPIC SLEEVE GASTRECTOMY  WITH EGD;  Surgeon: Samuel Hood MD;  Location: [address] [place] [address];  Service: Bariatrics    [place] WRIST Christia Oliveira LIG Left 2019    Procedure: RELEASE CARPAL TUNNEL ENDOSCOPIC-left;  Surgeon: Lisy Shelton MD;  [facility];  Service: Orthopedics    TUBAL LIGATION      WISDOM TOOTH EXTRACTION         Current Outpatient Medications:     aspirin (ECOTRIN LOW STRENGTH) 81 mg EC tablet, Take 81 mg by mouth daily Last dose will be 8/23/20, Disp: , Rfl:     mometasone (Nasonex) 50 mcg/act nasal spray, 2 sprays into each nostril as needed (allergies), Disp: 1 Act, Rfl: 0    Multiple Vitamin (multivitamin) tablet, Take 1 tablet by mouth 4 (four) times a day, Disp: , Rfl:     rosuvastatin (CRESTOR) 20 MG tablet, Take 1 tablet (20 mg total) by mouth daily at bedtime, Disp: 90 tablet, Rfl: 0    sertraline (ZOLOFT) 50 mg tablet, Take 1 tablet (50 mg total) by mouth daily, Disp: 90 tablet, Rfl: 1    traZODone (DESYREL) 50 mg tablet, Take 1 tablet (50 mg total) by mouth daily at bedtime, Disp: 90 tablet, Rfl: 1    azelastine (ASTELIN) 0 1 % nasal spray, 2 sprays into each nostril 2 (two) times a day Use in each nostril as directed (Patient not taking: Reported on 3/12/2021), Disp: 1 Bottle, Rfl: 6    Blood Glucose Monitoring Suppl (FREESTYLE LITE) GREG, Patient tests twice daily (Patient not taking: Reported on 2/26/2021), Disp: 1 each, Rfl: 0    clindamycin-benzoyl peroxide (BENZACLIN) gel, Apply topically 2 (two) times a day (Patient not taking: Reported on 5/28/2021), Disp: 25 g, Rfl: 0    ergocalciferol (VITAMIN D2) 50,000 units, Take 1 capsule (50,000 Units total) by mouth once a week (Patient not taking: Reported on 8/20/2021), Disp: 52 capsule, Rfl: 0    FreeStyle Unistick II Lancets MISC, Patient tests blood sugars twice daily (Patient not taking: Reported on 2/26/2021), Disp: 180 each, Rfl: 1    glucose blood (FREESTYLE LITE) test strip, 1 each by Other route 2 (two) times a day Use as instructed (Patient not taking: Reported on 2/26/2021), Disp: 200 each, Rfl: 1    lisinopril (ZESTRIL) 20 mg tablet, Take 0 5 tablets (10 mg total) by mouth daily (Patient not taking: Reported on 8/20/2021), Disp: 90 tablet, Rfl: 1    mometasone (ELOCON) 0 1 % cream, Apply topically daily Apply topically BID x 2 weeks then qhs prn (Patient not taking: Reported on 5/28/2021), Disp: 45 g, Rfl: 3    pantoprazole (PROTONIX) 40 mg tablet, Take 1 tablet (40 mg total) by mouth daily (Patient not taking: Reported on 8/20/2021), Disp: 90 tablet, Rfl: 1  Allergies   Allergen Reactions    Iodinated Diagnostic Agents Facial Swelling    Iodine - Food Allergy Facial Swelling    Shellfish-Derived Products - Food Allergy Throat Swelling    Amoxicillin Hives    Metformin Diarrhea    Penicillins      Yeast Infection     Vitals:    08/20/21 1338   BP: 112/64   BP Location: Right arm   Patient Position: Sitting   Cuff Size: Standard   Pulse: 78   Temp: 98 °F (36 7 °C)   SpO2: 99%   Weight: 74 4 kg (164 lb)   Height: 4' 10" (1 473 m)       Review of Systems:   Review of Systems   Respiratory: Positive for shortness of breath  Vitals:    08/20/21 1338   BP: 112/64   BP Location: Right arm   Patient Position: Sitting   Cuff Size: Standard   Pulse: 78   Temp: 98 °F (36 7 °C)   SpO2: 99%   Weight: 74 4 kg (164 lb)   Height: 4' 10" (1 473 m)     Physical Examination:   Physical Exam  Constitutional:       General: She is not in acute distress  Appearance: She is well-developed  She is not diaphoretic  HENT:      Head: Normocephalic and atraumatic  Right Ear: External ear normal       Left Ear: External ear normal    Eyes:      General: No scleral icterus  Right eye: No discharge  Left eye: No discharge  Conjunctiva/sclera: Conjunctivae normal       Pupils: Pupils are equal, round, and reactive to light  Neck:      Thyroid: No thyromegaly  Vascular: No JVD  Trachea: No tracheal deviation  Cardiovascular:      Rate and Rhythm: Normal rate and regular rhythm  Heart sounds: No murmur heard  No friction rub  Gallop present  Pulmonary:      Effort: Pulmonary effort is normal  No respiratory distress  Breath sounds: Normal breath sounds  No stridor  No wheezing or rales  Chest:      Chest wall: No tenderness  Abdominal:      General: Bowel sounds are normal  There is distension  Palpations: Abdomen is soft  There is no mass  Tenderness: There is no abdominal tenderness  There is no guarding or rebound  Musculoskeletal:         General: No tenderness or deformity  Normal range of motion  Cervical back: Normal range of motion and neck supple  Skin:     General: Skin is warm and dry  Coloration: Skin is not pale  Findings: No erythema or rash  Neurological:      Mental Status: She is alert and oriented to person, place, and time  Cranial Nerves: No cranial nerve deficit  Motor: No abnormal muscle tone  Coordination: Coordination normal       Deep Tendon Reflexes: Reflexes are normal and symmetric  Reflexes normal    Psychiatric:         Behavior: Behavior normal          Thought Content:  Thought content normal          Judgment: Judgment normal          Labs:     Lab Results   Component Value Date    WBC 9 20 08/19/2021    HGB 12 7 08/19/2021    HCT 37 6 08/19/2021    MCV 81 (L) 08/19/2021    RDW 13 2 08/19/2021     08/19/2021     BMP:  Lab Results   Component Value Date    SODIUM 136 08/19/2021    K 3 9 08/19/2021     08/19/2021    CO2 24 08/19/2021    ANIONGAP 12 05/30/2018    BUN 22 08/19/2021    CREATININE 0 98 08/19/2021    GLUC 107 09/01/2020    GLUF 197 (H) 08/19/2021    CALCIUM 9 8 08/19/2021    CORRECTEDCA 9 8 01/22/2021    EGFR 66 08/19/2021    MG 2 0 07/24/2020     LFT:  Lab Results   Component Value Date    AST 14 08/19/2021    ALT 19 08/19/2021    ALKPHOS 99 08/19/2021    TP 8 1 08/19/2021    ALB 3 6 08/19/2021      Lab Results   Component Value Date    EEA8HBSGVTQG 3 050 01/08/2020     Lab Results   Component Value Date    HGBA1C 10 4 (H) 08/19/2021     Lipid Profile:   Lab Results   Component Value Date    CHOLESTEROL 237 (H) 01/22/2021    HDL 58 01/22/2021    LDLCALC 132 (H) 01/22/2021    TRIG 234 (H) 01/22/2021     Lab Results   Component Value Date    CHOLESTEROL 237 (H) 01/22/2021    CHOLESTEROL 110 07/24/2020     No results found for: CKTOTAL, CKMB, CKMBINDEX, TROPONINI  No results found for: NTBNP   Recent Results (from the past 672 hour(s))   CBC and differential    Collection Time: 08/19/21  8:09 AM   Result Value Ref Range    WBC 9 20 4  31 - 10 16 Thousand/uL    RBC 4 63 3 81 - 5 12 Million/uL    Hemoglobin 12 7 11 5 - 15 4 g/dL    Hematocrit 37 6 34 8 - 46 1 %    MCV 81 (L) 82 - 98 fL    MCH 27 4 26 8 - 34 3 pg    MCHC 33 8 31 4 - 37 4 g/dL    RDW 13 2 11 6 - 15 1 %    MPV 8 6 (L) 8 9 - 12 7 fL    Platelets 316 456 - 963 Thousands/uL    nRBC 0 /100 WBCs    Neutrophils Relative 57 43 - 75 %    Immat GRANS % 0 0 - 2 %    Lymphocytes Relative 30 14 - 44 %    Monocytes Relative 7 4 - 12 %    Eosinophils Relative 5 0 - 6 %    Basophils Relative 1 0 - 1 %    Neutrophils Absolute 5 29 1 85 - 7 62 Thousands/µL    Immature Grans Absolute 0 02 0 00 - 0 20 Thousand/uL    Lymphocytes Absolute 2 79 0 60 - 4 47 Thousands/µL    Monocytes Absolute 0 62 0 17 - 1 22 Thousand/µL    Eosinophils Absolute 0 41 0 00 - 0 61 Thousand/µL    Basophils Absolute 0 07 0 00 - 0 10 Thousands/µL   Comprehensive metabolic panel    Collection Time: 08/19/21  8:09 AM   Result Value Ref Range    Sodium 136 136 - 145 mmol/L    Potassium 3 9 3 5 - 5 3 mmol/L    Chloride 106 100 - 108 mmol/L    CO2 24 21 - 32 mmol/L    ANION GAP 6 4 - 13 mmol/L    BUN 22 5 - 25 mg/dL    Creatinine 0 98 0 60 - 1 30 mg/dL    Glucose, Fasting 197 (H) 65 - 99 mg/dL    Calcium 9 8 8 3 - 10 1 mg/dL    AST 14 5 - 45 U/L    ALT 19 12 - 78 U/L    Alkaline Phosphatase 99 46 - 116 U/L    Total Protein 8 1 6 4 - 8 2 g/dL    Albumin 3 6 3 5 - 5 0 g/dL    Total Bilirubin 0 51 0 20 - 1 00 mg/dL    eGFR 66 ml/min/1 73sq m   Hemoglobin A1C    Collection Time: 08/19/21  8:09 AM   Result Value Ref Range    Hemoglobin A1C 10 4 (H) Normal 3 8-5 6%; PreDiabetic 5 7-6 4%;  Diabetic >=6 5%; Glycemic control for adults with diabetes <7 0% %     mg/dl   Folate    Collection Time: 08/19/21  8:09 AM   Result Value Ref Range    Folate 9 4 3 1 - 17 5 ng/mL   Vitamin D 25 hydroxy    Collection Time: 08/19/21  8:09 AM   Result Value Ref Range    Vit D, 25-Hydroxy 44 8 30 0 - 100 0 ng/mL   Vitamin B12    Collection Time: 08/19/21  8:09 AM   Result Value Ref Range    Vitamin B-12 443 100 - 900 pg/mL   PTH, intact    Collection Time: 08/19/21  8:09 AM   Result Value Ref Range    PTH 61 2 18 4 - 80 1 pg/mL   Iron Saturation %    Collection Time: 08/19/21  8:09 AM   Result Value Ref Range    Iron Saturation 18 %    TIBC 459 (H) 250 - 450 ug/dL    Iron 81 50 - 170 ug/dL   Ferritin    Collection Time: 08/19/21  8:09 AM   Result Value Ref Range    Ferritin 21 8 - 388 ng/mL       Imaging & Testing   I have personally reviewed pertinent reports  Cardiac Testing   EKG: Personally reviewed  Normal sinus rhythm no acute ST T wave changes     Ely Hamman MD Karilyn Lloyd [place] [facility]  [phone]  Please call with any questions or suggestions    Counseling :  A description of the counseling:   Goals and Barriers:  Patient's ability to self care:  Medication side effect reviewed with patient in detail and all their questions answered  "Portions of the record may have been created with voice recognition software  Occasional wrong word or "sound a like" substitutions may have occurred due to the inherent limitations of voice recognition software  Read the chart carefully and recognize, using context, where substitutions have occurred   Please call if you have any questions  "

## 2021-08-20 NOTE — PROGRESS NOTES
Assessment/Plan:    - weight loss is behind expected schedule at one year postop; patient is able to identify root cause as grazing throughout the day on poor dietary choices including sugars and simple carbohydrates; has made strides for improvement in these habits  - referral to [facility]  - obtain postop labs in 1 year  - DM2 now with A1c at 10 4 - f/u with PCP and endocrinologist and improve dietary restrictions for improving control  - f/u with our LCSW and RD as scheduled  - increase physical activity to at least 5 days per week       Diagnoses and all orders for this visit:    S/P laparoscopic sleeve gastrectomy    Type 2 diabetes mellitus with diabetic nephropathy, with long-term current use of insulin ([address] )    Other orders  -     Multiple Vitamin (multivitamin) tablet; Take 1 tablet by mouth 4 (four) times a day          Subjective:      Patient ID: Say Flores is a 49 y o  female  -s/p Vertical Sleeve Gastrectomy with Dr Anette Medina on 8/31/2020  Presents to the office today for routine follow up  Tolerating diet without issues; denies N/V, dysphagia, reflux  Overall doing Poor but working on getting back on track with diet and glucose control and weight loss  Current BMI is Body mass index is 34 36 kg/m²  The following portions of the patient's history were reviewed and updated as appropriate: allergies, current medications, past family history, past medical history, past social history, past surgical history and problem list     Review of Systems   Constitutional: Negative  Respiratory: Negative  Cardiovascular: Negative  Gastrointestinal: Negative  Musculoskeletal: Negative  Neurological: Negative  All other systems reviewed and are negative  Objective:      /60   Pulse 81   Ht 4' 10" (1 473 m)   Wt 74 6 kg (164 lb 6 4 oz)   BMI 34 36 kg/m²          Physical Exam  Vitals reviewed  Constitutional:       Appearance: She is well-developed     HENT:      Head: Normocephalic  Eyes:      Extraocular Movements: Extraocular movements intact  Cardiovascular:      Rate and Rhythm: Normal rate  Pulmonary:      Effort: Pulmonary effort is normal    Abdominal:      General: There is no distension  Musculoskeletal:         General: Normal range of motion  Cervical back: Normal range of motion  Neurological:      Mental Status: She is alert and oriented to person, place, and time  Psychiatric:         Mood and Affect: Mood normal          Behavior: Behavior normal          Thought Content: Thought content normal          Judgment: Judgment normal            BARRIERS: none identified    GOALS:   · Continued/Maintain healthy weight loss with good nutrition intakes  · Adequate hydration with at least 64oz  fluid intake  · Normal vitamin and mineral levels  · Exercise as tolerated  · Follow-up in 1 year with PA  We kindly ask that your arrive 15 minutes before your scheduled appointment time with your provider to allow our staff to room you, get your vital signs and update your chart  · Follow diet as discussed  · Get lab work done 2 weeks prior to your next annual visit  You have been given a lab slip today  Please call the office if you need a replacement  It is recommended to check with your insurance BEFORE getting labs done to make sure they are covered by your policy  Also, please check with your PCP and other providers before getting labs to avoid duplicate labs  Make sure to HOLD any multivitamins that may contain biotin and any biotin supplements FOR 5 DAYS before any labs since it can affect the results  · Follow vitamin and mineral recommendations as reviewed with you  · Call our office if you have any problems with abdominal pain especially associated with fever, chills, nausea, vomiting or any other concerns      · All  Post-bariatric surgery patients should be aware that very small quantities of any alcohol can cause impairment and it is very possible not to feel the effect  The effect can be in the system for several hours  It is also a stomach irritant  · It is advised to AVOID alcohol, Nonsteroidal antiinflammatory drugs (NSAIDS) and nicotine of all forms   Any of these can cause stomach irritation/pain

## 2021-08-23 ENCOUNTER — TELEPHONE (OUTPATIENT)
Dept: CARDIOLOGY CLINIC | Facility: CLINIC | Age: 56
End: 2021-08-23

## 2021-08-23 LAB — VIT A SERPL-MCNC: 49.6 UG/DL (ref 20.1–62)

## 2021-08-23 NOTE — LETTER
August 23, 2021     Patient: Paulina Caruso   YOB: 1965   Date of Visit: 8/23/2021       To Whom it May Concern:    Donato Godfrey is under my professional care  She was seen in my office on 8/23/2021  She {Return to school/sport/work:[id number]}  If you have any questions or concerns, please don't hesitate to call           Sincerely,        Odalis Stroud

## 2021-08-24 LAB — ZINC SERPL-MCNC: 99 UG/DL (ref 44–115)

## 2021-08-26 ENCOUNTER — TELEPHONE (OUTPATIENT)
Dept: BARIATRICS | Facility: CLINIC | Age: 56
End: 2021-08-26

## 2021-08-26 LAB — VIT B1 BLD-SCNC: 113.5 NMOL/L (ref 66.5–200)

## 2021-08-26 NOTE — TELEPHONE ENCOUNTER
Patient left message asking for RD to return phone call at the number [phone]    RD return phone call, no answer, RD left voicemail with contact information and contact information for RDs at [place] [address] as well

## 2021-08-27 ENCOUNTER — TELEPHONE (OUTPATIENT)
Dept: BARIATRICS | Facility: CLINIC | Age: 56
End: 2021-08-27

## 2021-08-27 NOTE — TELEPHONE ENCOUNTER
Left a message on patient's voicemail asking her to return my telephone call to discuss her lab results and PA recommendations    (Provided [employer] number for this purpose )

## 2021-08-27 NOTE — TELEPHONE ENCOUNTER
----- Message from Cyndi Oakley PA-C sent at 8/27/2021 10:27 AM EDT -----  Please call patient     Your blood sugar and A1c level HIGH  Continued weight loss ( if needed) can help  Jasmin Moffett Avoiding simple sugars/sweets and having more "complex carbohydrates" like vegetables, fruits and whole grains in  your diet can also help  This level should also be reviewed  with your primary care provider or endocrinologist at a  routine office visit since we do not manage your blood sugars

## 2021-09-17 ENCOUNTER — OFFICE VISIT (OUTPATIENT)
Dept: FAMILY MEDICINE CLINIC | Facility: CLINIC | Age: 56
End: 2021-09-17
Payer: COMMERCIAL

## 2021-09-17 ENCOUNTER — OFFICE VISIT (OUTPATIENT)
Dept: BARIATRICS | Facility: CLINIC | Age: 56
End: 2021-09-17
Payer: COMMERCIAL

## 2021-09-17 ENCOUNTER — OFFICE VISIT (OUTPATIENT)
Dept: BARIATRICS | Facility: CLINIC | Age: 56
End: 2021-09-17

## 2021-09-17 VITALS — BODY MASS INDEX: 35.3 KG/M2 | WEIGHT: 168.2 LBS | HEIGHT: 58 IN

## 2021-09-17 VITALS
HEART RATE: 86 BPM | HEIGHT: 58 IN | OXYGEN SATURATION: 94 % | WEIGHT: 162 LBS | SYSTOLIC BLOOD PRESSURE: 110 MMHG | BODY MASS INDEX: 34 KG/M2 | DIASTOLIC BLOOD PRESSURE: 64 MMHG | TEMPERATURE: 97.3 F | RESPIRATION RATE: 18 BRPM

## 2021-09-17 VITALS
HEART RATE: 71 BPM | TEMPERATURE: 97.1 F | HEIGHT: 58 IN | SYSTOLIC BLOOD PRESSURE: 124 MMHG | DIASTOLIC BLOOD PRESSURE: 80 MMHG | WEIGHT: 168 LBS | RESPIRATION RATE: 12 BRPM | BODY MASS INDEX: 35.26 KG/M2

## 2021-09-17 DIAGNOSIS — F32.1 CURRENT MODERATE EPISODE OF MAJOR DEPRESSIVE DISORDER WITHOUT PRIOR EPISODE (HCC): ICD-10-CM

## 2021-09-17 DIAGNOSIS — B37.3 VAGINAL CANDIDA: ICD-10-CM

## 2021-09-17 DIAGNOSIS — Z48.815 ENCOUNTER FOR SURGICAL AFTERCARE FOLLOWING SURGERY OF DIGESTIVE SYSTEM: Primary | ICD-10-CM

## 2021-09-17 DIAGNOSIS — E78.5 HYPERLIPIDEMIA: ICD-10-CM

## 2021-09-17 DIAGNOSIS — I10 BENIGN ESSENTIAL HYPERTENSION: ICD-10-CM

## 2021-09-17 DIAGNOSIS — E66.9 OBESITY, CLASS II, BMI 35-39.9: Primary | ICD-10-CM

## 2021-09-17 DIAGNOSIS — R80.1 PERSISTENT PROTEINURIA: ICD-10-CM

## 2021-09-17 DIAGNOSIS — K91.2 POSTSURGICAL MALABSORPTION: Primary | ICD-10-CM

## 2021-09-17 DIAGNOSIS — IMO0002 DIABETES MELLITUS TYPE 2, UNCONTROLLED: ICD-10-CM

## 2021-09-17 DIAGNOSIS — G47.33 OSA (OBSTRUCTIVE SLEEP APNEA): ICD-10-CM

## 2021-09-17 DIAGNOSIS — Z23 IMMUNIZATION DUE: ICD-10-CM

## 2021-09-17 DIAGNOSIS — K21.9 GASTROESOPHAGEAL REFLUX DISEASE: ICD-10-CM

## 2021-09-17 DIAGNOSIS — K91.2 POSTSURGICAL MALABSORPTION: ICD-10-CM

## 2021-09-17 DIAGNOSIS — Z79.4 TYPE 2 DIABETES MELLITUS WITH DIABETIC NEPHROPATHY, WITH LONG-TERM CURRENT USE OF INSULIN (HCC): Primary | ICD-10-CM

## 2021-09-17 DIAGNOSIS — E11.21 TYPE 2 DIABETES MELLITUS WITH DIABETIC NEPHROPATHY, WITH LONG-TERM CURRENT USE OF INSULIN (HCC): Primary | ICD-10-CM

## 2021-09-17 DIAGNOSIS — R63.5 ABNORMAL WEIGHT GAIN: ICD-10-CM

## 2021-09-17 DIAGNOSIS — E11.21 TYPE 2 DIABETES MELLITUS WITH DIABETIC NEPHROPATHY, WITH LONG-TERM CURRENT USE OF INSULIN (HCC): ICD-10-CM

## 2021-09-17 DIAGNOSIS — Z48.815 ENCOUNTER FOR SURGICAL AFTERCARE FOLLOWING SURGERY OF DIGESTIVE SYSTEM: ICD-10-CM

## 2021-09-17 DIAGNOSIS — Z79.4 TYPE 2 DIABETES MELLITUS WITH DIABETIC NEPHROPATHY, WITH LONG-TERM CURRENT USE OF INSULIN (HCC): ICD-10-CM

## 2021-09-17 DIAGNOSIS — I10 PRIMARY HYPERTENSION: ICD-10-CM

## 2021-09-17 PROCEDURE — 99214 OFFICE O/P EST MOD 30 MIN: CPT | Performed by: FAMILY MEDICINE

## 2021-09-17 PROCEDURE — RECHECK

## 2021-09-17 PROCEDURE — 90682 RIV4 VACC RECOMBINANT DNA IM: CPT

## 2021-09-17 PROCEDURE — 99214 OFFICE O/P EST MOD 30 MIN: CPT | Performed by: PHYSICIAN ASSISTANT

## 2021-09-17 PROCEDURE — 90471 IMMUNIZATION ADMIN: CPT

## 2021-09-17 RX ORDER — TRAZODONE HYDROCHLORIDE 50 MG/1
50 TABLET ORAL
Qty: 90 TABLET | Refills: 1 | Status: SHIPPED | OUTPATIENT
Start: 2021-09-17 | End: 2021-12-13 | Stop reason: SDUPTHER

## 2021-09-17 RX ORDER — FLUCONAZOLE 150 MG/1
150 TABLET ORAL ONCE
Qty: 1 TABLET | Refills: 0 | Status: SHIPPED | OUTPATIENT
Start: 2021-09-17 | End: 2021-09-17

## 2021-09-17 RX ORDER — LANCETS 21 GAUGE
EACH MISCELLANEOUS
Qty: 100 EACH | Refills: 2 | Status: SHIPPED | OUTPATIENT
Start: 2021-09-17

## 2021-09-17 RX ORDER — BLOOD-GLUCOSE METER
1 KIT MISCELLANEOUS DAILY
Qty: 100 EACH | Refills: 2 | Status: SHIPPED | OUTPATIENT
Start: 2021-09-17

## 2021-09-17 RX ORDER — LISINOPRIL 20 MG/1
10 TABLET ORAL DAILY
Qty: 90 TABLET | Refills: 1 | Status: SHIPPED | OUTPATIENT
Start: 2021-09-17 | End: 2021-11-16 | Stop reason: SDUPTHER

## 2021-09-17 RX ORDER — BLOOD-GLUCOSE METER
KIT MISCELLANEOUS
Qty: 1 EACH | Refills: 0 | Status: SHIPPED | OUTPATIENT
Start: 2021-09-17 | End: 2021-11-16 | Stop reason: ALTCHOICE

## 2021-09-17 NOTE — PROGRESS NOTES
Assessment/Plan:    Obesity, Class II, BMI 35-39 9  -Discussed options of [facility]-Intensive Lifestyle Intervention Program, Very Low Calorie Diet-VLCD, Conservative Program, Amie-En-Y Gastric Bypass and Vertical Sleeve Gastrectomy and the role of weight loss medications   -Initial weight loss goal of 5-10% weight loss for improved health  -Screening labs  Recommend checking lab coverage before having labs drawn   -cmp and a1c reviewed from 8/19/21 all within acceptable limits except for elevated a1c  - STOP BANG-3/8  -Patient is not interested in [facility]  Hyperlipidemia  Taking crestor  -should improve with weight loss, dietary, and lifestyle changes  -continue management with prescribing provider      Encounter for surgical aftercare following surgery of digestive system  -s/p Vertical Sleeve Gastrectomy with Dr Rogelio Abbasi on 08/31/20  Initial: 199 4 lbs   Current: lbs  Current BMI is There is no height or weight on file to calculate BMI        Current moderate episode of major depressive disorder without prior episode ([address] )  Taking zoloft  -continue management with prescribing provider      Benign essential hypertension  Taking lisinopril   -should improve with weight loss, dietary, and lifestyle changes  -continue management with prescribing provider      АНДРЕЙ (obstructive sleep apnea)  -encouraged continued use of CPAP machine  -may improve with 20-30% weight loss      Type 2 diabetes mellitus with diabetic nephropathy, with long-term current use of insulin (HCC)  Taking semaglutide  -should improve with weight loss, dietary, and lifestyle changes  -continue management with prescribing provider    Lab Results   Component Value Date    HGBA1C 10 4 (H) 08/19/2021       Gastroesophageal reflux disease  Taking protonix  -should improve with weight loss, dietary, and lifestyle changes  -continue management with prescribing provider      Postsurgical malabsorption  Pending labs in chart       Diagnoses and all orders for this visit:    Obesity, Class II, BMI 35-39 9    Hyperlipidemia    Encounter for surgical aftercare following surgery of digestive system    Current moderate episode of major depressive disorder without prior episode (HCC)    Benign essential hypertension    АНДРЕЙ (obstructive sleep apnea)    Type 2 diabetes mellitus with diabetic nephropathy, with long-term current use of insulin (HCC)    Gastroesophageal reflux disease    Postsurgical malabsorption          Subjective:   Chief Complaint   Patient presents with    Consult     initial visit with bariatrician         Patient ID: Soni Sampson  is a 49 y o  female with excess weight/obesity here to pursue weight managment  Patient is post op weight regain patient  HPI  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     Trying to get back on track  struggling with snacking and meal prepping for one person  32 oz of water,24 oz of coffee with powdered creamers and sugar,  4 cups of milk FF per week, 1 cup of hot with milk and sugar tea per week  No ETOH  Follows 30/60 rule  No food logging  No formal exercise  Colonoscopy-Completed 04/06/2016    The following portions of the patient's history were reviewed and updated as appropriate: allergies, current medications, past family history, past medical history, past social history, past surgical history and problem list     Review of Systems   HENT: Negative for sore throat  Respiratory: Negative for cough and shortness of breath  Cardiovascular: Negative for chest pain and palpitations  Gastrointestinal: Positive for constipation  Negative for abdominal pain, diarrhea, nausea and vomiting  Denies GERD   Skin: Negative for rash  Psychiatric/Behavioral: Negative for suicidal ideas (denies HI)          + depression and anxiety-controlled        Objective:    /80   Pulse 71   Temp (!) 97 1 °F (36 2 °C)   Resp 12   Ht 4' 10" (1 473 m)   Wt 76 2 kg (168 lb)   BMI 35 11 kg/m² Physical Exam  Vitals and nursing note reviewed  Constitutional   General appearance: Abnormal   well developed and morbidly obese  Eyes No conjunctival pallor  Pulmonary   Respiratory effort: No increased work of breathing or signs of respiratory distress  Abdomen   Abdomen: Abnormal   The abdomen was obese      Musculoskeletal   Gait and station: Normal     Psychiatric   Orientation to person, place and time: Normal     Affect: appropriate

## 2021-09-17 NOTE — ASSESSMENT & PLAN NOTE
Taking crestor  -should improve with weight loss, dietary, and lifestyle changes  -continue management with prescribing provider

## 2021-09-17 NOTE — ASSESSMENT & PLAN NOTE
-Discussed options of [facility]-Intensive Lifestyle Intervention Program, Very Low Calorie Diet-VLCD, Conservative Program, Amie-En-Y Gastric Bypass and Vertical Sleeve Gastrectomy and the role of weight loss medications   -Initial weight loss goal of 5-10% weight loss for improved health  -Screening labs  Recommend checking lab coverage before having labs drawn   -cmp and a1c reviewed from 8/19/21 all within acceptable limits except for elevated a1c  - STOP BANG-3/8  -Patient is not interested in [facility]

## 2021-09-17 NOTE — PROGRESS NOTES
Bariatric Follow Up Nutrition Note    Type of surgery  Vertical sleeve gastrectomy  Surgery Date: 20  1 year post-op  Surgeon: Dr Ankush Cho  49 y o   female  Height 4' 10" (1 473 m), weight 76 3 kg (168 lb 3 2 oz), not currently breastfeeding  Body mass index is 35 15 kg/m²      Weight on eval:  199 4lbs  Weight on Day of Weight Loss Surgery: 185lbs  Weight in (lb) to have BMI = 25: 119 1lbs  Pre-Op Excess Wt: 74lbs  Post-Op Wt Loss: 31lbs#/ 39% EBWL in 1 year    Review of History and Medications     A1c trend:   2020 was 9 0  Oct 2020 was 7 6  2021 6 8  Aug 2021 10 4--back on DM meds    Past Medical History:   Diagnosis Date    Anemia     Last Assessed:  3/27/13a    Arthritis     Asthma     Blurred vision     Chronic kidney disease     Diabetes mellitus ([facility] [address] )     was in touch with PCP re hgaic- she adjusted insulin doses and is seeing surgeon 20    [facility]     Dream enactment behavior     Hypercholesterolemia     Hyperlipidemia     Hypertension     Increased urinary frequency     Irritable bowel syndrome     Last Assessed:  10/2/12    Joint pain     Morbid obesity with BMI of 40 0-44 9, adult ([facility])     Nausea     Night sweat     Nightmare disorder 2020    Numbness     UTI (urinary tract infection)     Weakness      Past Surgical History:   Procedure Laterality Date    ABDOMINAL SURGERY      CARPAL TUNNEL RELEASE Right      SECTION      x 2    COLONOSCOPY  2016    COLONOSCOPY      [place] LAP, ALBAN RESTRICT PROC, LONGITUDINAL GASTRECTOMY N/A 2020    Procedure: LAPAROSCOPIC SLEEVE GASTRECTOMY  WITH EGD;  Surgeon: Jenn Lin MD;  Location: [address] [place] [address];  Service: Bariatrics    [place] WRIST Swift Fam LIG Left 2019    Procedure: RELEASE CARPAL TUNNEL ENDOSCOPIC-left;  Surgeon: Beth Rosen MD;  Location: [facility];  Service: [address] [place] [address] WISDOM TOOTH EXTRACTION       Social History     Socioeconomic History    Marital status:      Spouse name: Marjan Queen    Number of children: 2    Years of education: 15    Highest education level: Some college, no degree   Occupational History     Comment: financial counseler   Tobacco Use    Smoking status: Former Smoker     Types: Cigarettes     Quit date:      Years since quittin 7    Smokeless tobacco: Former User   Vaping Use    Vaping Use: Never used   Substance and Sexual Activity    Alcohol use: Yes     Comment: rarely    Drug use: No    Sexual activity: Yes     Partners: Male     Birth control/protection: Female Sterilization   Other Topics Concern    Not on file   Social History Narrative    Consumes 1 cup of coffee per day     Social Determinants of Health     Financial Resource Strain: Low Risk     Difficulty of Paying Living Expenses: Not hard at all   Food Insecurity: No Food Insecurity    Worried About [facility] [employer] in the Last Year: Never true    Rowan of Food in the Last Year: Never true   Transportation Needs: No Transportation Needs    Lack of Transportation (Medical): No    Lack of Transportation (Non-Medical):  No   Physical Activity:     Days of Exercise per Week:     Minutes of Exercise per Session:    Stress:     Feeling of Stress :    Social Connections:     Frequency of Communication with Friends and Family:     Frequency of Social Gatherings with Friends and Family:     Attends Confucianist Services:     Active Member of Clubs or Organizations:     Attends Club or Organization Meetings:     Marital Status:    Intimate Partner Violence:     Fear of Current or Ex-Partner:     Emotionally Abused:     Physically Abused:     Sexually Abused:        Current Outpatient Medications:     aspirin (ECOTRIN LOW STRENGTH) 81 mg EC tablet, Take 81 mg by mouth daily Last dose will be 20 (Patient not taking: Reported on 2021), Disp: , Rfl:   azelastine (ASTELIN) 0 1 % nasal spray, 2 sprays into each nostril 2 (two) times a day Use in each nostril as directed, Disp: 1 Bottle, Rfl: 6    Blood Glucose Monitoring Suppl (FreeStyle Lite) GREG, Patient tests daily, Disp: 1 each, Rfl: 0    fluconazole (DIFLUCAN) 150 mg tablet, Take 1 tablet (150 mg total) by mouth once for 1 dose, Disp: 1 tablet, Rfl: 0    FreeStyle Unistick II Lancets MISC, Patient tests blood sugars daily, Disp: 100 each, Rfl: 2    glucose blood (FREESTYLE LITE) test strip, Use 1 each daily Use as instructed, Disp: 100 each, Rfl: 2    lisinopril (ZESTRIL) 20 mg tablet, Take 0 5 tablets (10 mg total) by mouth daily, Disp: 90 tablet, Rfl: 1    mometasone (Nasonex) 50 mcg/act nasal spray, 2 sprays into each nostril as needed (allergies), Disp: 1 Act, Rfl: 0    Multiple Vitamin (multivitamin) tablet, Take 1 tablet by mouth 4 (four) times a day, Disp: , Rfl:     Omega-3 Fatty Acids (fish oil) 1,000 mg, Take 1 capsule (1,000 mg total) by mouth 2 (two) times a day, Disp: 60 capsule, Rfl: 3    pantoprazole (PROTONIX) 40 mg tablet, Take 1 tablet (40 mg total) by mouth daily (Patient not taking: Reported on 8/20/2021), Disp: 90 tablet, Rfl: 1    rosuvastatin (CRESTOR) 20 MG tablet, Take 1 tablet (20 mg total) by mouth daily at bedtime, Disp: 90 tablet, Rfl: 0    Semaglutide 3 MG TABS, Take 1 tablet by mouth daily, Disp: 30 tablet, Rfl: 1    sertraline (ZOLOFT) 50 mg tablet, Take 1 tablet (50 mg total) by mouth daily, Disp: 90 tablet, Rfl: 1    traZODone (DESYREL) 50 mg tablet, Take 1 tablet (50 mg total) by mouth daily at bedtime, Disp: 90 tablet, Rfl: 1    Food Intake and Lifestyle Assessment   Food Intake Assessment completed via usual diet recall:  1year old grandson living with her Mon-Fri  Now realizes she is an emotional eater and mindless eats often--grazes    Breakfast: 7-8am: Last few days-ff non-greek yogurt with granola (2 tbsp) w/ 16oz coffee through the morning w/ powdered creamer (1tsp) 1/2 tsp sugar or sometimes a homemade egg muffin  Snack: 9:30-10am: 1/2 PBJ sandwich on white bread  Lunch: 12pm-1pm: tuna pouch (lemon pepper) with a little olive oil and vinegar w/stonewheat 4 crackers (larger crackers) OR salami, cheese and cracker pack or cheese and salami roll up ([id number], 13gm fat, 9gm pro)  Snack: skinny pop   Dinner:  Eats some of her grandsons dinner--Last night was chicken and mac & cheese or [employer] or chicken with fruit--unsure of portions  Snack:  Has caught herself grazing and picking at more sweets--"cheat":  SF ice pop or SF lemon sorbet or 1-2 cookies or  Beverage intake: water, fairlife skim milk and coffee/tea (instant coffee in 8oz fairlife milk, occasional 4oz juice (3x/week)  Diet texture/stage: regular  Protein supplement: 16oz every other day unflavored (20gm per scoop with fairlife milk) will also add to eggs   Estimated protein intake per day: 60-70gm  Estimated fluid intake per day: only getting about 34oz fluids total per day between water, fairlife skim milk mixed into things and 1 tea and/or coffee/day  Meals eaten away from home: not often  Typical meal pattern: 3 meals per day, occasionally "treat", but not too often  Eating Behaviors: Appropriate diet advancement, Appropriate portion sizes and Does not drink with meals and waits 60-minutes after meal before resuming drinking, Santiago Ayala advised can not drink 10-15 mins before meal instead of the 30 mins  Food allergies or intolerances: starchy foods, fried foods  Cultural or Pentecostalism considerations: none        Physical Assessment  Nutrition Related Findings  Constipation--BM every 2-3 days, takes a laxative by day 3  Advised to increase the fluid intake  Physical Activity  Types of exercise: None, Having trouble finding time to get in exercise since grandson started living with her  Current physical limitations: neuropathy pain in upper thigh starting from hip joint  Psychosocial Assessment   Support systems: significant other friend(s) relative(s)  Socioeconomic factors: none--[employer] employee    Nutrition Diagnosis  Diagnosis: Altered GI function (NC-1 4)  Related to: Altered GI function  As Evidenced by: Unintentional weight loss (expected)      Interventions and Teaching   Patient educated on post-op nutrition guidelines  Patient educated and handouts provided  Adequate hydration  Expected weight loss  Exercise  Protein supplements  Dietary and lifestyle changes  Techniques for self monitoring and keeping daily food journal  Emotional eating    Education provided to: patient    Barriers to learning: No barriers identified    Readiness to change: action    Comprehension: verbalizes understanding     Expected Compliance: good    Pt has gained about 10lbs since her lowest weight at 6 months post-op  She is just realizing how much she is "addicted" to sugar and how she is an emotional eating  She has caught herself grazing often and going for sweets/carbs  Her meals look fairly well controlled focusing on protein and veggies and appropriate portions, but the snacks are frequent and adding up in calories  Talked to pt about keeping a food/mood log to better get an idea of what she is eating and if it is related to a stressor  Pt does appear to extent the time it takes to eat a meal ie:  1hr to eat 5oz yogurt  suggested to pt to eat what she can in 30 mins then wait the 60 mins before starts drinking to try to keep the food in her stomach as long as possible  Pt to f/u in 2 weeks via phone call      Goals  · Keep detailed food log, can also document mood  · Eat 3 meals and 2 snacks per day (provided with snack list)  · Take no more than 30 mins to eat meal  · Follow 15/30 rule  · Continue incorporating non-fat fairlife milk with unflavored protein powder  · F/U in two weeks via phone call    Time Spent:   30 Minutes

## 2021-09-17 NOTE — ASSESSMENT & PLAN NOTE
Taking protonix  -should improve with weight loss, dietary, and lifestyle changes  -continue management with prescribing provider

## 2021-09-17 NOTE — PROGRESS NOTES
Patient presents for post-op check in with a weight gain of 3 8lbs since last visit  Eating behaviors/food choices: Patient reports she is having three meals but still struggles with the mindless snacking and emotional eating, not logging foods consistently yet  She will log some foods but on days she is  Not eating what she is supposed to be, she won't log it, says she won't remember but admits she likely doesn't want to acknowledge the poor food choices she has made  Patient was receptive to education about ways to set up her environment for success, to put in place plans to follow through on when she is feeling emotional and possible to make poor food choices  She agreed to clean up her kitchen putting snack foods more out of reach and prepping healthier snacks  She also agreed to pre-log meals and snacks, focusing on those times that food consumption may be trickier and a plan would be helpful  Activity/Exercise:  Patient was encouraged to incorporate activity in her routine and consider it as a coping skill when she wants to snack  Mental Health/Wellness:  Patient feels she is struggling with an addiction to food, that she will reach for it compulsively when feeling stress or an emotion and then feels shame for her choices  She describes feeling like a little girl in a corner, ashamed of her choices when she gives in to her compulsion to eat  Patient was receptive to the addiction model of recovery, setting up safety plans when she's thinking rationally by cleaning up her space, talking with a trusted person to be her support she can call and journaling  She has been painting as a coping skill but is only able to do it on Sunday's when she's not taking care of her grandson and able to focus independently on that  She will continue to look at non-food coping skills and setting up plans for success       Goals:  Patient will start cleaning up her area and putting together plans to create a safe place when she feels stressed or emotions that make her want to eat  She will look at non-food coping skills, setting up support with someone she can talk to and consider journaling  She is going to call [facility] to follow up on referral and connect with [facility] for further guidance      Next Appointment:  Phone call with RD on 10/4 and then with SW on 10/5

## 2021-09-17 NOTE — ASSESSMENT & PLAN NOTE
Taking semaglutide  -should improve with weight loss, dietary, and lifestyle changes  -continue management with prescribing provider    Lab Results   Component Value Date    HGBA1C 10 4 (H) 08/19/2021

## 2021-09-17 NOTE — ASSESSMENT & PLAN NOTE
Taking lisinopril   -should improve with weight loss, dietary, and lifestyle changes  -continue management with prescribing provider

## 2021-09-17 NOTE — ASSESSMENT & PLAN NOTE
-s/p Vertical Sleeve Gastrectomy with Dr Kim Dickson on 08/31/20  Initial: 199 4 lbs   Current: lbs  Current BMI is There is no height or weight on file to calculate BMI

## 2021-09-17 NOTE — PATIENT INSTRUCTIONS
Nutrition: How to Make Alana Castano6  A healthy diet has a lot of benefits  It can prevent certain health conditions like heart disease and cancer, and it can lower your cholesterol  It can give you more energy, help you focus, and improve your mood  It can also help you lose weight or stay at a healthy weight  Path to Improved Health  The choices you make about what you eat and drink matter  They should add up to a balanced, nutritious diet  We all have different calorie needs based on our age, sex, and activity level  Health conditions can have a role, too  Fruits and Vegetables  Fruits and vegetables are rich in fiber, vitamins, and minerals  They should be the basis of your diet  Try to get many different colors of fruits and vegetables each day to add flavor and variety  Fruits and vegetables should cover half of your plate at each meal  Try not to add saturated fats and sugar to vegetables and fruits  This means avoiding margarine, butter, mayonnaise, and sour cream  You can use yogurt, healthy oils (such as canola or olive oil), or herbs instead  Potatoes and corn are not considered vegetables  Your body processes them more like grains  FRUITS & VEGETABLES   INSTEAD OF THIS: TRY THIS:   Regular or fried vegetables served with cream, cheese, or butter Raw, steamed, boiled, sautéed, or baked vegetables tossed with olive oil, salt, and pepper, or with onions or spices added (like garlic and cumin)   Fruits served with cream cheese or sugary sauces Fresh fruit with peanut, almond, or cashew butter or plain yogurt   Fried potatoes, including french fries, hash browns, and potato chips Baked sweet potatoes or other vegetables     Grains  Choose products that list whole grains as the first ingredient  Whole grains are high in fiber, protein, and vitamins  They are digested slowly, which helps you feel full longer and keeps you from overeating  Avoid products that say enriched    Hot cereals like oatmeal are usually low in saturated fat  However, instant cereals with cream may contain processed oils and can be high in sugar  Granola cereals usually contain a lot of sugar  Cold cereals are generally made with refined grains and are high in sugars  Look for whole-grain, low-sugar options instead  Try not to eat rich sweets, such as doughnuts, rolls, and muffins  Consider fruit or a piece of dark chocolate instead to satisfy your sweet tooth  GRAINS   INSTEAD OF THIS: TRY THIS:   Croissants, rolls, biscuits, and white breads Whole-grain breads, including wheat, rye, and pumpernickel   Doughnuts, pastries, and scones Whole-grain English muffins and small whole-grain bagels   Fried tortillas Soft tortillas (corn or whole wheat) without trans fats   Sugary cereals and regular granola Whole-grain cereal, oatmeal, and reduced-sugar granola   Snack crackers Whole-grain crackers   Potato or corn chips and buttered popcorn Unbuttered popcorn   White pasta Whole-wheat pasta   White rice Brown or wild rice   Fried rice or pasta mixes Brown rice or whole-grain pasta with low-sodium vegetable sauce   All-purpose white flour Whole-wheat flour     Protein  Protein can come from animal and vegetable sources  People who get more of their protein from animal sources tend to have more health problems that can lead to illness and early death  It is healthier to eat meat less often and get most of your protein from plant sources  When you eat meat, choose leaner cuts  Vegetable Protein Sources  There are many ways to get protein in your diet even if you do not eat meat  Most vegetables have some protein  When you eat these vegetables with whole grains, seeds, nuts, and especially beans, you can get a good amount of protein  You can swap beans for meat in recipes like lasagna or chili  Soy foods such as tofu, tempeh, and edamame are also good sources of protein      Beef, Pork, Veal, and [employer] and veal cuts have the words loin or round in their names  Lean pork cuts have the words loin or leg in their names  Trim off the outside fat before cooking the meat  Trim any inside fat before eating it  Use herbs, spices, and low-sodium marinades to season meat  Baking, broiling, grilling, and roasting are the healthiest ways to cook meats  Lean cuts can be panbroiled or stir-fried  Use a nonstick pan, canola oil, or olive oil instead of butter or margarine  Don't serve meat with high-fat sauces and gravies  Poultry  Chicken breasts are a good choice because they are low in fat and high in protein  Only eat duck and goose once in a while, because they are higher in saturated fat  Remove skin and visible fat before cooking  Baking, broiling, grilling, and roasting are the healthiest ways to [employer]  Skinless poultry can be pan broiled or stir fried  Use a nonstick pan, canola oil, or olive oil instead of butter or margarine  Seafood  Most seafood is high in healthy polyunsaturated fats  Healthy omega-3 fatty acids also are found in some fish, such as salmon and cold-water trout  If good-quality fresh fish isn't available, buy frozen fish  To prepare fish, you should poach, steam, bake, broil, or grill it      PROTEIN   INSTEAD OF THIS: TRY THIS:   Prime and marbled cuts of meat Select-grade lean beef, such as round, sirloin, and loin cuts   Pork spare ribs and ybarra Lean pork, such as tenderloin and loin chop, turkey ybarra, tofu ybarra   Regular ground beef Lean or extra-lean ground beef, ground chicken or turkey, tempeh, or beans   Lunch meats, such as pepperoni, salami, bologna, and liverwurst Lean lunch meats, such as turkey, chicken, and ham   Regular hot dogs and sausage Fat-free hot dogs, turkey dogs, tofu hot dogs   Breaded fish sticks and cakes, fish canned in oil, or seafood prepared with butter or served with high-fat sauce Fish (fresh, frozen, or canned in water), grilled fish sticks and cakes, or shellfish     Dairy  Choose low-fat, skim, or nondairy milk, such as soy, rice, or almond milk  Try low-fat or part-skim cheeses and other dairy products, or choose smaller portions of foods high in saturated fat  Yogurt can replace sour cream in many recipes  It is important to pick yogurt without added sugar  Try mixing yogurt with fruit for dessert  Sorbet and frozen yogurt are lower in fat than ice cream     DAIRY   INSTEAD OF THIS: TRY THIS:   Whole milk Skim (nonfat), 1% or 2% (low fat), or nondairy milk, such as soy, rice, almond, or cashew milk   Cream or evaporated milk Evaporated skim milk   Regular buttermilk Low-fat buttermilk   Yogurt made with whole milk Low-fat or nonfat yogurt   Regular cheese, including American, blue, Brie, cheddar, Stas, and Parmesan Low-fat cheese with less than 3 g fat per serving, or nondairy soy cheese   Regular cottage cheese Low-fat cottage cheese (less than 2% fat)   Regular cream cheese Low-fat cream cheese with less than 3 g fat per 1 oz, or skim ricotta   Ice cream Sorbet, sherbet, or frozen yogurt with less than 3 g fat per ½-cup serving     Fats and Oils  Although high-fat foods are higher in calories, they can help you feel satisfied with eating less  Don't be afraid to have fats in your diet, but try to limit saturated and trans fats  You need saturated and unsaturated fats in your diet, but most Americans get too much saturated fat  Heart disease, diabetes, some cancers, and arthritis have been linked to diets high in saturated fat, particularly saturated fats from animal products  FATS & OILS   INSTEAD OF THIS: TRY THIS:   Cookies Fruit or whole-grain cookies   Shortening, butter, and margarine Olive, canola, and soybean oils   Regular mayonnaise Yogurt   Regular salad dressing Vinaigrette with olive oil and vinegar   Butter or fat to grease pans Nonstick cooking spray, olive oil, or canola oil           Beverages  It is important that you stay hydrated  However, drinks that contain sugar are not healthy  This includes fruit juices, soda, sports and energy drinks, sweetened or flavored milk, and sweet tea  Artificial sweeteners may also be bad for your health  Drink mostly water or other unsweetened drinks  Don't drink too much alcohol  Women should have no more than one drink per day  Men should have no more than two drinks per day  Exercises    Set an Exercise Goal & Make a Plan  If youre ready to start getting active, its time to set a goal and make a plan  Staying Motivated  Its easy to start an exercise routine once youve decided its time for a change, but keeping it up is a challenge for many people  Positive Self-Talk Makes a Difference  The conversations you have with yourself about physical activity and your fitness abilities can have an impact on your performance  Overcoming Barriers to Activity Think about what is keeping you from being active and then check out some of our solutions to the most common barriers to physical activity  · Most adults with with type 1 and type 2 diabetes should engage in 150 min or more of moderate-to-vigorous intensity physical activity per week, spread over at least 3 days/week, with no more than 2 consecutive days without activity  [place] durations (minimum 75 min/week) of vigorous-intensity or interval training may be sufficient for younger and more physically fit individuals  · Adults with type 1  and type 2  diabetes should engage in 2-3 sessions/week of resistance exercise on nonconsecutive days  · All adults, and particularly those with type 2 diabetes, should decrease the amount of time spent in daily sedentary behavior  Prolonged sitting should be interrupted every 30 min for blood glucose benefits, particularly in adults with type 2 diabetes  · Flexibility training and balance training are recommended 2-3 times/week for older adults with diabetes   Yoga and huy chi may be included based on individual preferences to increase flexibility, muscular strength, and balance  Patient Education     [employer] Healthy Eating Plate [facility]  [employer] of [employer], Choose My Plate [employer] com br  Http://care  diabetesjournals  org/content/40/Supplement_1/S33    [facility]  org: Exercise & Fitness   http://familyPO-MOctor  org/familydoctor/en/prevention-wellness/exercise-fitness html     American Diabetes Association (ADA): Weight Loss   https://www Simtrol/  org/food-and-fitness/fitness/weight-loss/? utm_source=WWW&utm_medium=DropDownFF&utm_content=WeightLoss&utm_camp aign=CON     My Fitness Pal   http://Etacts   Online nutrition and calorie tracker  National Diabetes Education Program (NDEP): Tasty Recipes for People with Diabetes and Their Eleno Macadamia (English and [place]an)   https://www scar net/     Jose Cruz Arley Recipes is a bilingual booklet filled with recipes specifically designed for Latin Americans  Recipes are accompanied by their nutritional facts table  The booklet also includes diabetes health information and resources  [employer]  org: Body Mass Index Calculator   http://familyPO-MOctor  org/familydoctor/en/health-tools/bmi-calculator html     [facility]  org: Food and Nutrition Topics   http://familyPO-MOctor  org/familydoctor/en/prevention-wellness/food-nutrition  html     ADA: Preventing Diabetes with Good Nutrition   https://www Simtrol/  org/advocate/our-priorities/prevention/preventing-diabetes-nutrition  html     Health Power for Minorities: 10 Tips about Diabetes Prevention and Control   L[place].fr/HealthChannelDetails  aspx?cl=375        10% - bad control"> 10% - bad control,Hemoglobin A1c (HbA1c) greater than 10% indicating poor diabetic control,Haemoglobin A1c greater than 10% indicating poor diabetic control">   Diabetes Mellitus Type 2 in Adults, Ambulatory Care   GENERAL INFORMATION:   Diabetes mellitus type 2  is a disease that affects how your body uses glucose (sugar)  Insulin helps move sugar out of the blood so it can be used for energy  Normally, when the blood sugar level increases, the pancreas makes more insulin  Type 2 diabetes develops because either the body cannot make enough insulin, or it cannot use the insulin correctly  After many years, your pancreas may stop making insulin  Common symptoms include the following:   · More hunger or thirst than usual     · Frequent urination     · Weight loss without trying     · Blurred vision  Seek immediate care for the following symptoms:   · Severe abdominal pain, or pain that spreads to your back  You may also be vomiting  · Trouble staying awake or focusing    · Shaking or sweating    · Blurred or double vision    · Breath has a fruity, sweet smell    · Breathing is deep and labored, or rapid and shallow    · Heartbeat is fast and weak  Treatment for diabetes mellitus type 2  includes keeping your blood sugar at a normal level  You must eat the right foods, and exercise regularly  You may also need medicine if you cannot control your blood sugar level with nutrition and exercise  Manage diabetes mellitus type 2:   · Check your blood sugar level  You will be taught how to check a small drop of blood in a glucose monitor  Ask your healthcare provider when and how often to check during the day  Ask your healthcare provider what your blood sugar levels should be when you check them  · Keep track of carbohydrates (sugar and starchy foods)  Your blood sugar level can get too high if you eat too many carbohydrates  Your dietitian will help you plan meals and snacks that have the right amount of carbohydrates  · Eat low-fat foods  Some examples are skinless chicken and low-fat milk  · Eat less sodium (salt)    Some examples of high-sodium foods to limit are soy sauce, potato chips, and soup  Do not add salt to food you cook  Limit your use of table salt  · Eat high-fiber foods  Foods that are a good source of fiber include vegetables, whole grain bread, and beans  · Limit alcohol  Alcohol affects your blood sugar level and can make it harder to manage your diabetes  Women should limit alcohol to 1 drink a day  Men should limit alcohol to 2 drinks a day  A drink of alcohol is 12 ounces of beer, 5 ounces of wine, or 1½ ounces of liquor  · Get regular exercise  Exercise can help keep your blood sugar level steady, decrease your risk of heart disease, and help you lose weight  Exercise for at least 30 minutes, 5 days a week  Include muscle strengthening activities 2 days each week  Work with your healthcare provider to create an exercise plan  · Check your feet each day  for injuries or open sores  Ask your healthcare provider for activities you can do if you have an open sore  · Quit smoking  If you smoke, it is never too late to quit  Smoking can worsen the problems that may occur with diabetes  Ask your healthcare provider for information about how to stop smoking if you are having trouble quitting  · Ask about your weight:  Ask healthcare providers if you need to lose weight, and how much to lose  Ask them to help you with a weight loss program  Even a 10 to 15 pound weight loss can help you manage your blood sugar level  · Carry medical alert identification  Wear medical alert jewelry or carry a card that says you have diabetes  Ask your healthcare provider where to get these items  · Ask about vaccines  Diabetes puts you at risk of serious illness if you get the flu, pneumonia, or hepatitis  Ask your healthcare provider if you should get a flu, pneumonia, or hepatitis B vaccine, and when to get the vaccine  Follow up with your healthcare provider as directed:  Write down your questions so you remember to ask them during your visits  CARE AGREEMENT:   You have the right to help plan your care  Learn about your health condition and how it may be treated  Discuss treatment options with your caregivers to decide what care you want to receive  You always have the right to refuse treatment  The above information is an  only  It is not intended as medical advice for individual conditions or treatments  Talk to your doctor, nurse or pharmacist before following any medical regimen to see if it is safe and effective for you  © 2014 [address] is for End User's use only and may not be sold, redistributed or otherwise used for commercial purposes  All illustrations and images included in CareNotes® are the copyrighted property of [employer]  or Kirk Hatch

## 2021-09-18 NOTE — PROGRESS NOTES
Tony Ada 1965 female MRN: [id number]    Family Medicine Follow-up Visit    Assessment/Plan   Type 2 diabetes mellitus with diabetic nephropathy, with long-term current use of insulin ([facility] [address] )    Lab Results   Component Value Date    HGBA1C 10 4 (H) 08/19/2021   Poor control  Resume diabetic diet  Start Rybelsis 3mg QD x 1 month then increase to 7mg QD; she will submit glucose logs and will may possibly increase dosage further after that to 14mg qd  New Rx for glucometer, strips, lancets sent to pharmacy  She will check fasting glucose daily  Due for eye exam    Karen was seen today for follow-up  Diagnoses and all orders for this visit:    Type 2 diabetes mellitus with diabetic nephropathy, with long-term current use of insulin ([facility])  -     Semaglutide 3 MG TABS; Take 1 tablet by mouth daily (Patient not taking: Reported on 9/17/2021)  -     [facility] glucose flowsheet  -     Ambulatory referral to Diabetic Education; Future  -     Microalbumin / creatinine urine ratio; Future  -     UA (URINE) with reflex to Scope; Future  -     TSH, 3rd generation with Free T4 reflex; Future    Persistent proteinuria  -     lisinopril (ZESTRIL) 20 mg tablet; Take 0 5 tablets (10 mg total) by mouth daily    Primary hypertension  -     lisinopril (ZESTRIL) 20 mg tablet; Take 0 5 tablets (10 mg total) by mouth daily    Current moderate episode of major depressive disorder without prior episode ([facility])  -     sertraline (ZOLOFT) 50 mg tablet; Take 1 tablet (50 mg total) by mouth daily  -     traZODone (DESYREL) 50 mg tablet; Take 1 tablet (50 mg total) by mouth daily at bedtime    Vaginal candida  -     fluconazole (DIFLUCAN) 150 mg tablet;  Take 1 tablet (150 mg total) by mouth once for 1 dose    Immunization due  -     influenza vaccine, quadrivalent, recombinant, PF, 0 5 mL, for patients 18 yr+ (FLUBLOK)    Diabetes mellitus type 2, uncontrolled ([facility])  -     Blood Glucose Monitoring Suppl (FreeStyle Lite) GREG; Patient tests daily (Patient not taking: Reported on 9/17/2021)  -     FreeStyle Unistick II Lancets MISC; Patient tests blood sugars daily (Patient not taking: Reported on 9/17/2021)  -     glucose blood (FREESTYLE LITE) test strip; Use 1 each daily Use as instructed (Patient not taking: Reported on 9/17/2021)      Malena Joe MD  [address]  9/18/2021      Please be aware that this note contains text that was dictated and there may be errors pertaining to "sound-alike" words during the dictation process  SUBJECTIVE    CC: Follow-up    HPI:  Jose Orta is a 49 y o  female who presented for a follow-up of diabetes  Since her bariatric surgery, we gradually weaned medications as she lost weight  Unfortunately, since our last visit, patient admits to weight gain in the setting of liberalized diet  She admits to "taking it for granted" with the weight loss, and has been restarting efforts to lose weight  She also says during this time she wasn't checking sugars and doesn't know where her glucometer is now  Review of Systems   Constitutional: Negative for activity change, chills and fever  HENT: Negative for congestion, rhinorrhea and sore throat  Eyes: Negative for visual disturbance  Respiratory: Negative for cough, shortness of breath and wheezing  Cardiovascular: Negative for chest pain and palpitations  Gastrointestinal: Negative for abdominal pain, blood in stool, constipation, diarrhea, nausea and vomiting  Genitourinary: Negative for dysuria  Musculoskeletal: Negative for arthralgias and myalgias  Skin: Negative for rash  Neurological: Negative for dizziness, weakness and headaches  All other systems reviewed and are negative      Historical Information     The following portions of the patient's history were reviewed and updated as appropriate: allergies, current medications, past medical history, past social history and problem list     Medications: Meds/Allergies     Current Outpatient Medications:     azelastine (ASTELIN) 0 1 % nasal spray, 2 sprays into each nostril 2 (two) times a day Use in each nostril as directed, Disp: 1 Bottle, Rfl: 6    lisinopril (ZESTRIL) 20 mg tablet, Take 0 5 tablets (10 mg total) by mouth daily, Disp: 90 tablet, Rfl: 1    mometasone (Nasonex) 50 mcg/act nasal spray, 2 sprays into each nostril as needed (allergies), Disp: 1 Act, Rfl: 0    Multiple Vitamin (multivitamin) tablet, Take 1 tablet by mouth 4 (four) times a day, Disp: , Rfl:     Omega-3 Fatty Acids (fish oil) 1,000 mg, Take 1 capsule (1,000 mg total) by mouth 2 (two) times a day, Disp: 60 capsule, Rfl: 3    rosuvastatin (CRESTOR) 20 MG tablet, Take 1 tablet (20 mg total) by mouth daily at bedtime, Disp: 90 tablet, Rfl: 0    sertraline (ZOLOFT) 50 mg tablet, Take 1 tablet (50 mg total) by mouth daily, Disp: 90 tablet, Rfl: 1    traZODone (DESYREL) 50 mg tablet, Take 1 tablet (50 mg total) by mouth daily at bedtime, Disp: 90 tablet, Rfl: 1    aspirin (ECOTRIN LOW STRENGTH) 81 mg EC tablet, Take 81 mg by mouth daily Last dose will be 8/23/20 (Patient not taking: Reported on 9/17/2021), Disp: , Rfl:     Blood Glucose Monitoring Suppl (FreeStyle Lite) GREG, Patient tests daily (Patient not taking: Reported on 9/17/2021), Disp: 1 each, Rfl: 0    FreeStyle Unistick II Lancets MISC, Patient tests blood sugars daily (Patient not taking: Reported on 9/17/2021), Disp: 100 each, Rfl: 2    glucose blood (FREESTYLE LITE) test strip, Use 1 each daily Use as instructed (Patient not taking: Reported on 9/17/2021), Disp: 100 each, Rfl: 2    pantoprazole (PROTONIX) 40 mg tablet, Take 1 tablet (40 mg total) by mouth daily, Disp: 90 tablet, Rfl: 1    Semaglutide 3 MG TABS, Take 1 tablet by mouth daily (Patient not taking: Reported on 9/17/2021), Disp: 30 tablet, Rfl: 1  Allergies   Allergen Reactions    Iodinated Diagnostic Agents Facial Swelling    Iodine - Food Allergy Facial Swelling    Shellfish-Derived Products - Food Allergy Throat Swelling    Amoxicillin Hives    Metformin Diarrhea    Penicillins      Yeast Infection     OBJECTIVE    Vitals:   Vitals:    09/17/21 0810   BP: 110/64   Pulse: 86   Resp: 18   Temp: (!) 97 3 °F (36 3 °C)   SpO2: 94%   Weight: 73 5 kg (162 lb)   Height: 4' 10" (1 473 m)     Wt Readings from Last 3 Encounters:   09/17/21 76 2 kg (168 lb)   09/17/21 76 3 kg (168 lb 3 2 oz)   09/17/21 73 5 kg (162 lb)     Body mass index is 33 86 kg/m²  BP Readings from Last 3 Encounters:   09/17/21 124/80   09/17/21 110/64   08/20/21 112/64     Pulse Readings from Last 3 Encounters:   09/17/21 71   09/17/21 86   08/20/21 78     Patient's last menstrual period was 05/17/2021  Physical Exam:    Physical Exam  Vitals and nursing note reviewed  Constitutional:       General: She is not in acute distress  Appearance: Normal appearance  She is well-developed  She is not ill-appearing or diaphoretic  HENT:      Head: Normocephalic and atraumatic  Right Ear: External ear normal       Left Ear: External ear normal       Nose: Nose normal    Eyes:      General: Lids are normal       Conjunctiva/sclera: Conjunctivae normal    Neck:      Vascular: No JVD  Trachea: No tracheal deviation  Pulmonary:      Effort: No accessory muscle usage or respiratory distress  Skin:     Findings: No rash  Neurological:      Mental Status: She is alert  Labs: I have personally reviewed all pertinent results  Imaging:  I have personally reviewed all pertinent results

## 2021-10-04 ENCOUNTER — OFFICE VISIT (OUTPATIENT)
Dept: BARIATRICS | Facility: CLINIC | Age: 56
End: 2021-10-04

## 2021-10-04 VITALS — WEIGHT: 164 LBS | HEIGHT: 58 IN | BODY MASS INDEX: 34.43 KG/M2

## 2021-10-04 DIAGNOSIS — R63.5 ABNORMAL WEIGHT GAIN: ICD-10-CM

## 2021-10-04 DIAGNOSIS — K91.2 POSTSURGICAL MALABSORPTION: Primary | ICD-10-CM

## 2021-10-04 PROCEDURE — RECHECK

## 2021-10-05 ENCOUNTER — TELEPHONE (OUTPATIENT)
Dept: BARIATRICS | Facility: CLINIC | Age: 56
End: 2021-10-05

## 2021-10-06 ENCOUNTER — OFFICE VISIT (OUTPATIENT)
Dept: BARIATRICS | Facility: CLINIC | Age: 56
End: 2021-10-06

## 2021-10-06 ENCOUNTER — TELEPHONE (OUTPATIENT)
Dept: BARIATRICS | Facility: CLINIC | Age: 56
End: 2021-10-06

## 2021-10-06 DIAGNOSIS — Z48.815 ENCOUNTER FOR SURGICAL AFTERCARE FOLLOWING SURGERY OF DIGESTIVE SYSTEM: Primary | ICD-10-CM

## 2021-10-06 PROCEDURE — RECHECK

## 2021-10-08 ENCOUNTER — TELEPHONE (OUTPATIENT)
Dept: FAMILY MEDICINE CLINIC | Facility: CLINIC | Age: 56
End: 2021-10-08

## 2021-10-12 ENCOUNTER — PATIENT MESSAGE (OUTPATIENT)
Dept: FAMILY MEDICINE CLINIC | Facility: CLINIC | Age: 56
End: 2021-10-12

## 2021-10-12 DIAGNOSIS — E11.21 TYPE 2 DIABETES MELLITUS WITH DIABETIC NEPHROPATHY, WITH LONG-TERM CURRENT USE OF INSULIN (HCC): Primary | ICD-10-CM

## 2021-10-12 DIAGNOSIS — Z79.4 TYPE 2 DIABETES MELLITUS WITH DIABETIC NEPHROPATHY, WITH LONG-TERM CURRENT USE OF INSULIN (HCC): Primary | ICD-10-CM

## 2021-10-29 ENCOUNTER — OFFICE VISIT (OUTPATIENT)
Dept: DENTISTRY | Facility: CLINIC | Age: 56
End: 2021-10-29

## 2021-10-29 VITALS — SYSTOLIC BLOOD PRESSURE: 172 MMHG | DIASTOLIC BLOOD PRESSURE: 91 MMHG | TEMPERATURE: 97.3 F | HEART RATE: 76 BPM

## 2021-10-29 DIAGNOSIS — K05.6 PERIODONTAL DISEASE: Primary | ICD-10-CM

## 2021-10-29 PROCEDURE — D4341 PERIODONTAL SCALING AND ROOT PLANING - 4 OR MORE TEETH PER QUADRANT: HCPCS

## 2021-11-12 ENCOUNTER — TELEPHONE (OUTPATIENT)
Dept: OBGYN CLINIC | Facility: HOSPITAL | Age: 56
End: 2021-11-12

## 2021-11-12 DIAGNOSIS — J30.1 SEASONAL ALLERGIC RHINITIS DUE TO POLLEN: ICD-10-CM

## 2021-11-12 RX ORDER — MOMETASONE FUROATE 50 UG/1
SPRAY, METERED NASAL
Qty: 17 G | Refills: 0 | Status: SHIPPED | OUTPATIENT
Start: 2021-11-12

## 2021-11-16 ENCOUNTER — HOSPITAL ENCOUNTER (OUTPATIENT)
Dept: RADIOLOGY | Facility: HOSPITAL | Age: 56
Discharge: HOME | End: 2021-11-16
Payer: COMMERCIAL

## 2021-11-16 ENCOUNTER — OFFICE VISIT (OUTPATIENT)
Dept: FAMILY MEDICINE CLINIC | Facility: CLINIC | Age: 56
End: 2021-11-16
Payer: COMMERCIAL

## 2021-11-16 ENCOUNTER — TELEPHONE (OUTPATIENT)
Dept: PSYCHOLOGY | Facility: CLINIC | Age: 56
End: 2021-11-16

## 2021-11-16 VITALS
DIASTOLIC BLOOD PRESSURE: 70 MMHG | BODY MASS INDEX: 34.85 KG/M2 | HEART RATE: 88 BPM | TEMPERATURE: 96.1 F | HEIGHT: 58 IN | RESPIRATION RATE: 16 BRPM | SYSTOLIC BLOOD PRESSURE: 122 MMHG | WEIGHT: 166 LBS

## 2021-11-16 DIAGNOSIS — Z79.4 TYPE 2 DIABETES MELLITUS WITH DIABETIC NEPHROPATHY, WITH LONG-TERM CURRENT USE OF INSULIN (HCC): ICD-10-CM

## 2021-11-16 DIAGNOSIS — B37.3 VAGINA, CANDIDIASIS: ICD-10-CM

## 2021-11-16 DIAGNOSIS — I10 PRIMARY HYPERTENSION: ICD-10-CM
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
## 2021-11-16 DIAGNOSIS — R80.1 PERSISTENT PROTEINURIA: ICD-10-CM
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
## 2021-11-16 DIAGNOSIS — F32.2 CURRENT SEVERE EPISODE OF MAJOR DEPRESSIVE DISORDER WITHOUT PSYCHOTIC FEATURES WITHOUT PRIOR EPISODE (HCC): ICD-10-CM

## 2021-11-16 DIAGNOSIS — E11.21 TYPE 2 DIABETES MELLITUS WITH DIABETIC NEPHROPATHY, WITH LONG-TERM CURRENT USE OF INSULIN (HCC): ICD-10-CM

## 2021-11-16 PROCEDURE — 99215 OFFICE O/P EST HI 40 MIN: CPT | Performed by: FAMILY MEDICINE

## 2021-11-16 PROCEDURE — 73000 X-RAY EXAM OF COLLAR BONE: CPT

## 2021-11-16 PROCEDURE — 73030 X-RAY EXAM OF SHOULDER: CPT

## 2021-11-16 RX ORDER — INSULIN ASPART 100 [IU]/ML
5 INJECTION, SOLUTION INTRAVENOUS; SUBCUTANEOUS
Qty: 15 ML | Refills: 0 | Status: SHIPPED | OUTPATIENT
Start: 2021-11-16 | End: 2022-02-21

## 2021-11-16 RX ORDER — SERTRALINE HYDROCHLORIDE 100 MG/1
100 TABLET, FILM COATED ORAL DAILY
Qty: 90 TABLET | Refills: 1 | Status: SHIPPED | OUTPATIENT
Start: 2021-11-16

## 2021-11-16 RX ORDER — FLUCONAZOLE 150 MG/1
150 TABLET ORAL ONCE
Qty: 1 TABLET | Refills: 0 | Status: SHIPPED | OUTPATIENT
Start: 2021-11-16 | End: 2021-11-16

## 2021-11-16 RX ORDER — LISINOPRIL 20 MG/1
10 TABLET ORAL DAILY
Qty: 90 TABLET | Refills: 1 | Status: SHIPPED | OUTPATIENT
Start: 2021-11-16

## 2021-11-16 RX ORDER — INSULIN GLARGINE 300 U/ML
10 INJECTION, SOLUTION SUBCUTANEOUS
Qty: 6 ML | Refills: 0 | Status: SHIPPED | OUTPATIENT
Start: 2021-11-16

## 2021-11-17 ENCOUNTER — TELEPHONE (OUTPATIENT)
Dept: FAMILY MEDICINE CLINIC | Facility: CLINIC | Age: 56
End: 2021-11-17

## 2021-11-18 PROBLEM — E53.8 VITAMIN B12 DEFICIENCY (NON ANEMIC): Status: ACTIVE | Noted: 2021-11-18

## 2021-11-18 RX ORDER — BLOOD-GLUCOSE METER
EACH MISCELLANEOUS
COMMUNITY
Start: 2021-09-17

## 2021-11-23 ENCOUNTER — OFFICE VISIT (OUTPATIENT)
Dept: PSYCHOLOGY | Facility: CLINIC | Age: 56
End: 2021-11-23
Payer: COMMERCIAL

## 2021-11-23 ENCOUNTER — OFFICE VISIT (OUTPATIENT)
Dept: PSYCHIATRY | Facility: CLINIC | Age: 56
End: 2021-11-23
Payer: COMMERCIAL

## 2021-11-23 VITALS
BODY MASS INDEX: 34.22 KG/M2 | HEIGHT: 58 IN | SYSTOLIC BLOOD PRESSURE: 163 MMHG | WEIGHT: 163 LBS | TEMPERATURE: 97.6 F | DIASTOLIC BLOOD PRESSURE: 103 MMHG | HEART RATE: 103 BPM | RESPIRATION RATE: 16 BRPM

## 2021-11-23 DIAGNOSIS — F32.1 CURRENT MODERATE EPISODE OF MAJOR DEPRESSIVE DISORDER WITHOUT PRIOR EPISODE (HCC): Primary | ICD-10-CM

## 2021-11-23 PROCEDURE — 90792 PSYCH DIAG EVAL W/MED SRVCS: CPT | Performed by: PSYCHIATRY & NEUROLOGY

## 2021-11-24 ENCOUNTER — OFFICE VISIT (OUTPATIENT)
Dept: PSYCHOLOGY | Facility: CLINIC | Age: 56
End: 2021-11-24
Payer: COMMERCIAL

## 2021-11-24 DIAGNOSIS — F32.1 CURRENT MODERATE EPISODE OF MAJOR DEPRESSIVE DISORDER WITHOUT PRIOR EPISODE (HCC): Primary | ICD-10-CM

## 2021-11-24 PROCEDURE — G0177 OPPS/PHP; TRAIN & EDUC SERV: HCPCS

## 2021-11-24 PROCEDURE — G0176 OPPS/PHP;ACTIVITY THERAPY: HCPCS

## 2021-11-24 PROCEDURE — G0410 GRP PSYCH PARTIAL HOSP 45-50: HCPCS

## 2021-11-26 ENCOUNTER — OFFICE VISIT (OUTPATIENT)
Dept: PSYCHOLOGY | Facility: CLINIC | Age: 56
End: 2021-11-26
Payer: COMMERCIAL

## 2021-11-26 DIAGNOSIS — F32.1 CURRENT MODERATE EPISODE OF MAJOR DEPRESSIVE DISORDER WITHOUT PRIOR EPISODE (HCC): Primary | ICD-10-CM

## 2021-11-26 PROCEDURE — G0410 GRP PSYCH PARTIAL HOSP 45-50: HCPCS

## 2021-11-26 PROCEDURE — G0176 OPPS/PHP;ACTIVITY THERAPY: HCPCS

## 2021-11-26 PROCEDURE — G0177 OPPS/PHP; TRAIN & EDUC SERV: HCPCS

## 2021-11-29 ENCOUNTER — OFFICE VISIT (OUTPATIENT)
Dept: PSYCHIATRY | Facility: CLINIC | Age: 56
End: 2021-11-29
Payer: COMMERCIAL

## 2021-11-29 ENCOUNTER — OFFICE VISIT (OUTPATIENT)
Dept: PSYCHOLOGY | Facility: CLINIC | Age: 56
End: 2021-11-29
Payer: COMMERCIAL

## 2021-11-29 DIAGNOSIS — F32.1 CURRENT MODERATE EPISODE OF MAJOR DEPRESSIVE DISORDER WITHOUT PRIOR EPISODE (HCC): Primary | ICD-10-CM

## 2021-11-29 PROCEDURE — G0177 OPPS/PHP; TRAIN & EDUC SERV: HCPCS

## 2021-11-29 PROCEDURE — G0176 OPPS/PHP;ACTIVITY THERAPY: HCPCS

## 2021-11-29 PROCEDURE — 99214 OFFICE O/P EST MOD 30 MIN: CPT | Performed by: NURSE PRACTITIONER

## 2021-11-29 PROCEDURE — G0410 GRP PSYCH PARTIAL HOSP 45-50: HCPCS

## 2021-11-30 ENCOUNTER — OFFICE VISIT (OUTPATIENT)
Dept: PSYCHOLOGY | Facility: CLINIC | Age: 56
End: 2021-11-30
Payer: COMMERCIAL

## 2021-11-30 DIAGNOSIS — F32.1 CURRENT MODERATE EPISODE OF MAJOR DEPRESSIVE DISORDER WITHOUT PRIOR EPISODE (HCC): Primary | ICD-10-CM

## 2021-11-30 PROCEDURE — G0177 OPPS/PHP; TRAIN & EDUC SERV: HCPCS

## 2021-11-30 PROCEDURE — G0176 OPPS/PHP;ACTIVITY THERAPY: HCPCS

## 2021-11-30 PROCEDURE — G0410 GRP PSYCH PARTIAL HOSP 45-50: HCPCS

## 2021-12-01 ENCOUNTER — TELEPHONE (OUTPATIENT)
Dept: FAMILY MEDICINE CLINIC | Facility: CLINIC | Age: 56
End: 2021-12-01

## 2021-12-01 ENCOUNTER — TELEPHONE (OUTPATIENT)
Dept: PSYCHIATRY | Facility: CLINIC | Age: 56
End: 2021-12-01

## 2021-12-01 ENCOUNTER — OFFICE VISIT (OUTPATIENT)
Dept: PSYCHOLOGY | Facility: CLINIC | Age: 56
End: 2021-12-01
Payer: COMMERCIAL

## 2021-12-01 DIAGNOSIS — F32.1 CURRENT MODERATE EPISODE OF MAJOR DEPRESSIVE DISORDER WITHOUT PRIOR EPISODE (HCC): Primary | ICD-10-CM

## 2021-12-01 PROCEDURE — G0176 OPPS/PHP;ACTIVITY THERAPY: HCPCS

## 2021-12-01 PROCEDURE — G0177 OPPS/PHP; TRAIN & EDUC SERV: HCPCS

## 2021-12-01 PROCEDURE — G0410 GRP PSYCH PARTIAL HOSP 45-50: HCPCS

## 2021-12-02 ENCOUNTER — OFFICE VISIT (OUTPATIENT)
Dept: PSYCHOLOGY | Facility: CLINIC | Age: 56
End: 2021-12-02
Payer: COMMERCIAL

## 2021-12-02 DIAGNOSIS — F32.1 CURRENT MODERATE EPISODE OF MAJOR DEPRESSIVE DISORDER WITHOUT PRIOR EPISODE (HCC): Primary | ICD-10-CM

## 2021-12-02 PROCEDURE — G0410 GRP PSYCH PARTIAL HOSP 45-50: HCPCS

## 2021-12-02 PROCEDURE — G0177 OPPS/PHP; TRAIN & EDUC SERV: HCPCS

## 2021-12-02 PROCEDURE — G0176 OPPS/PHP;ACTIVITY THERAPY: HCPCS

## 2021-12-03 ENCOUNTER — OFFICE VISIT (OUTPATIENT)
Dept: PSYCHOLOGY | Facility: CLINIC | Age: 56
End: 2021-12-03
Payer: COMMERCIAL

## 2021-12-03 DIAGNOSIS — F32.1 CURRENT MODERATE EPISODE OF MAJOR DEPRESSIVE DISORDER WITHOUT PRIOR EPISODE (HCC): Primary | ICD-10-CM

## 2021-12-03 PROCEDURE — G0176 OPPS/PHP;ACTIVITY THERAPY: HCPCS

## 2021-12-03 PROCEDURE — G0410 GRP PSYCH PARTIAL HOSP 45-50: HCPCS

## 2021-12-03 PROCEDURE — G0177 OPPS/PHP; TRAIN & EDUC SERV: HCPCS

## 2021-12-06 ENCOUNTER — OFFICE VISIT (OUTPATIENT)
Dept: PSYCHOLOGY | Facility: CLINIC | Age: 56
End: 2021-12-06
Payer: COMMERCIAL

## 2021-12-06 ENCOUNTER — TELEPHONE (OUTPATIENT)
Dept: FAMILY MEDICINE CLINIC | Facility: CLINIC | Age: 56
End: 2021-12-06

## 2021-12-06 ENCOUNTER — OFFICE VISIT (OUTPATIENT)
Dept: PSYCHIATRY | Facility: CLINIC | Age: 56
End: 2021-12-06
Payer: COMMERCIAL

## 2021-12-06 DIAGNOSIS — F32.1 CURRENT MODERATE EPISODE OF MAJOR DEPRESSIVE DISORDER WITHOUT PRIOR EPISODE (HCC): Primary | ICD-10-CM

## 2021-12-06 PROCEDURE — G0176 OPPS/PHP;ACTIVITY THERAPY: HCPCS

## 2021-12-06 PROCEDURE — G0410 GRP PSYCH PARTIAL HOSP 45-50: HCPCS

## 2021-12-06 PROCEDURE — G0177 OPPS/PHP; TRAIN & EDUC SERV: HCPCS

## 2021-12-06 PROCEDURE — 99213 OFFICE O/P EST LOW 20 MIN: CPT | Performed by: NURSE PRACTITIONER

## 2021-12-07 ENCOUNTER — OFFICE VISIT (OUTPATIENT)
Dept: PSYCHOLOGY | Facility: CLINIC | Age: 56
End: 2021-12-07
Payer: COMMERCIAL

## 2021-12-07 DIAGNOSIS — F32.1 CURRENT MODERATE EPISODE OF MAJOR DEPRESSIVE DISORDER WITHOUT PRIOR EPISODE (HCC): Primary | ICD-10-CM

## 2021-12-07 PROCEDURE — G0177 OPPS/PHP; TRAIN & EDUC SERV: HCPCS

## 2021-12-07 PROCEDURE — G0410 GRP PSYCH PARTIAL HOSP 45-50: HCPCS

## 2021-12-08 ENCOUNTER — OFFICE VISIT (OUTPATIENT)
Dept: PSYCHOLOGY | Facility: CLINIC | Age: 56
End: 2021-12-08
Payer: COMMERCIAL

## 2021-12-08 DIAGNOSIS — F32.1 CURRENT MODERATE EPISODE OF MAJOR DEPRESSIVE DISORDER WITHOUT PRIOR EPISODE (HCC): Primary | ICD-10-CM

## 2021-12-08 PROCEDURE — G0410 GRP PSYCH PARTIAL HOSP 45-50: HCPCS

## 2021-12-08 PROCEDURE — G0176 OPPS/PHP;ACTIVITY THERAPY: HCPCS

## 2021-12-08 PROCEDURE — G0177 OPPS/PHP; TRAIN & EDUC SERV: HCPCS

## 2021-12-09 ENCOUNTER — OFFICE VISIT (OUTPATIENT)
Dept: PSYCHOLOGY | Facility: CLINIC | Age: 56
End: 2021-12-09
Payer: COMMERCIAL

## 2021-12-09 DIAGNOSIS — F32.1 CURRENT MODERATE EPISODE OF MAJOR DEPRESSIVE DISORDER WITHOUT PRIOR EPISODE (HCC): Primary | ICD-10-CM

## 2021-12-09 PROCEDURE — G0176 OPPS/PHP;ACTIVITY THERAPY: HCPCS

## 2021-12-09 PROCEDURE — G0410 GRP PSYCH PARTIAL HOSP 45-50: HCPCS

## 2021-12-09 PROCEDURE — G0177 OPPS/PHP; TRAIN & EDUC SERV: HCPCS

## 2021-12-10 ENCOUNTER — OFFICE VISIT (OUTPATIENT)
Dept: PSYCHOLOGY | Facility: CLINIC | Age: 56
End: 2021-12-10
Payer: COMMERCIAL

## 2021-12-10 DIAGNOSIS — F32.1 CURRENT MODERATE EPISODE OF MAJOR DEPRESSIVE DISORDER WITHOUT PRIOR EPISODE (HCC): Primary | ICD-10-CM

## 2021-12-10 PROCEDURE — G0177 OPPS/PHP; TRAIN & EDUC SERV: HCPCS

## 2021-12-10 PROCEDURE — G0176 OPPS/PHP;ACTIVITY THERAPY: HCPCS

## 2021-12-10 PROCEDURE — G0410 GRP PSYCH PARTIAL HOSP 45-50: HCPCS

## 2021-12-13 ENCOUNTER — OFFICE VISIT (OUTPATIENT)
Dept: PSYCHOLOGY | Facility: CLINIC | Age: 56
End: 2021-12-13
Payer: COMMERCIAL

## 2021-12-13 ENCOUNTER — OFFICE VISIT (OUTPATIENT)
Dept: PSYCHIATRY | Facility: CLINIC | Age: 56
End: 2021-12-13
Payer: COMMERCIAL

## 2021-12-13 DIAGNOSIS — F32.1 CURRENT MODERATE EPISODE OF MAJOR DEPRESSIVE DISORDER WITHOUT PRIOR EPISODE (HCC): Primary | ICD-10-CM

## 2021-12-13 PROCEDURE — G0176 OPPS/PHP;ACTIVITY THERAPY: HCPCS

## 2021-12-13 PROCEDURE — 99213 OFFICE O/P EST LOW 20 MIN: CPT | Performed by: NURSE PRACTITIONER

## 2021-12-13 PROCEDURE — G0410 GRP PSYCH PARTIAL HOSP 45-50: HCPCS

## 2021-12-13 PROCEDURE — G0177 OPPS/PHP; TRAIN & EDUC SERV: HCPCS

## 2021-12-13 RX ORDER — TRAZODONE HYDROCHLORIDE 50 MG/1
50 TABLET ORAL
Qty: 90 TABLET | Refills: 1 | Status: SHIPPED | OUTPATIENT
Start: 2021-12-13 | End: 2022-04-14 | Stop reason: SDUPTHER

## 2021-12-14 ENCOUNTER — OFFICE VISIT (OUTPATIENT)
Dept: PSYCHOLOGY | Facility: CLINIC | Age: 56
End: 2021-12-14
Payer: COMMERCIAL

## 2021-12-14 DIAGNOSIS — F32.1 CURRENT MODERATE EPISODE OF MAJOR DEPRESSIVE DISORDER WITHOUT PRIOR EPISODE (HCC): Primary | ICD-10-CM

## 2021-12-14 PROCEDURE — G0177 OPPS/PHP; TRAIN & EDUC SERV: HCPCS

## 2021-12-14 PROCEDURE — G0176 OPPS/PHP;ACTIVITY THERAPY: HCPCS

## 2021-12-14 PROCEDURE — G0410 GRP PSYCH PARTIAL HOSP 45-50: HCPCS

## 2021-12-15 ENCOUNTER — DOCUMENTATION (OUTPATIENT)
Dept: PSYCHOLOGY | Facility: CLINIC | Age: 56
End: 2021-12-15

## 2021-12-15 ENCOUNTER — APPOINTMENT (OUTPATIENT)
Dept: PSYCHOLOGY | Facility: CLINIC | Age: 56
End: 2021-12-15
Payer: COMMERCIAL

## 2021-12-16 ENCOUNTER — OFFICE VISIT (OUTPATIENT)
Dept: PSYCHOLOGY | Facility: CLINIC | Age: 56
End: 2021-12-16
Payer: COMMERCIAL

## 2021-12-16 DIAGNOSIS — F32.1 CURRENT MODERATE EPISODE OF MAJOR DEPRESSIVE DISORDER WITHOUT PRIOR EPISODE (HCC): Primary | ICD-10-CM

## 2021-12-16 PROCEDURE — G0177 OPPS/PHP; TRAIN & EDUC SERV: HCPCS

## 2021-12-16 PROCEDURE — G0176 OPPS/PHP;ACTIVITY THERAPY: HCPCS

## 2021-12-16 PROCEDURE — G0410 GRP PSYCH PARTIAL HOSP 45-50: HCPCS

## 2021-12-17 ENCOUNTER — APPOINTMENT (OUTPATIENT)
Dept: PSYCHOLOGY | Facility: CLINIC | Age: 56
End: 2021-12-17
Payer: COMMERCIAL

## 2021-12-29 ENCOUNTER — IMMUNIZATIONS (OUTPATIENT)
Dept: FAMILY MEDICINE CLINIC | Facility: HOSPITAL | Age: 56
End: 2021-12-29

## 2021-12-29 DIAGNOSIS — Z23 ENCOUNTER FOR IMMUNIZATION: Primary | ICD-10-CM

## 2021-12-29 PROCEDURE — 91306 COVID-19 MODERNA VACC 0.25 ML BOOSTER: CPT

## 2021-12-29 PROCEDURE — 0064A COVID-19 MODERNA VACC 0.25 ML BOOSTER: CPT

## 2022-01-05 ENCOUNTER — OFFICE VISIT (OUTPATIENT)
Dept: PSYCHIATRY | Facility: CLINIC | Age: 57
End: 2022-01-05
Payer: COMMERCIAL

## 2022-01-05 DIAGNOSIS — F32.1 CURRENT MODERATE EPISODE OF MAJOR DEPRESSIVE DISORDER WITHOUT PRIOR EPISODE (HCC): Primary | ICD-10-CM

## 2022-01-05 PROCEDURE — 90792 PSYCH DIAG EVAL W/MED SRVCS: CPT | Performed by: NURSE PRACTITIONER

## 2022-01-05 NOTE — PSYCH
Reason for visit:   Chief Complaint   Patient presents with   [facility] Establish Care   This note was not shared with the patient due to reasonable likelihood of causing patient harm      HPI     Catrachita Soler is a 49 y o  female with a history of Depression who presents for psychiatric evaluation due to establish care after her discharge from [address] [place] [address] where she was for 4 wks  Primary complaints include: depression worse, difficulty sleeping, fear of impending doom, increased irritability and trauma recollections  Onset of symptoms was abrupt starting several years ago with rapidly worsening course since that time  Psychosocial Stressors: death of her 33 yo daughter, death of her son's father, death of her grandmothe  Leighann was seen today for an initial evaluation at the referral of her primary care physician, who has been prescribing antidepressant and antianxiety medications for the last couple of years  She has a a 61-year-old female originally from [place], casually dressed, alert and oriented 3 X  and to maintain good eye contact     Her speech was clear and of normal rate rhythm and volume  Thought processes were clear and goal-directed  Born a broad, her mother turned custody of 2 of patient to her grandmother so that she could travel to the [place]  and get a better life  She never met her biological parents until until she was older, and she believes that this cost in a tendency to be more melancholic about life, but not really depressed  That her daughter, 32years old,  of complications from diabetes type 1 and patient stated I could not really grief because I was taking care of my a elderly father as well as my 66-year-old son, who took a big blow with her death     Since then she has been presenting with symptoms of depression including a sense of sadness, anxiety, the feeling of needing to escape her immediate situation and go somewhere else, increased in a sense of anger and being irritable  Eventually she went to see her primary care physician 2 years ago who started her on Zoloft 100 mg and trazodone 50 mg for sleep, and made a referral to [facility] partial care program where she attnded  4weeks and is now discharged  She has made this appointment for continuation of care  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  No other sxs presented today  She described sxs of bruxism, and not sure it is a s e  from Zoloft        Review Of Systems:     Mood Depression   Behavior Normal    Thought Content Normal   General Normal    Personality Normal   Other Psych Symptoms Normal   Constitutional As Noted in HPI   ENT As Noted in HPI   Cardiovascular As Noted in HPI   Respiratory As Noted in HPI   Gastrointestinal As Noted in HPI   Genitourinary As Noted in HPI   Musculoskeletal As Noted in HPI   Integumentary As Noted in HPI   Neurological As Noted in HPI   Endocrine Normal    Other Symptoms Normal        Past Psychiatric History:      Past Inpatient Psychiatric Treatment:   None   Past Outpatient Psychiatric Treatment:    Partial Care -[facility] 2021  Past Suicide Attempts:    no  Past Violent Behavior:    no  Past Psychiatric Medication Trials:    Zoloft, tarzodone    Family Psychiatric History:   Family History   Problem Relation Age of Onset    Diabetes Mother     Hypertension Mother     Diabetes Father     Heart disease Father     Stroke Father     Hypertension Father     Diverticulitis Father     Hyperlipidemia Father     Heart attack Father     Diabetes Maternal Grandmother     Pancreatic cancer Maternal Grandmother     Liver cancer Maternal Grandfather     Alcohol abuse Maternal Grandfather     Heart disease Paternal Grandmother     Crohn's disease Sister     Diabetes type I Daughter     Eczema Daughter     Diabetes Daughter     Heart attack Paternal Grandfather     Asthma Maternal Aunt     Alcohol abuse Maternal Uncle     Mental illness Paternal Aunt     Schizophrenia Paternal Aunt     Diabetes Paternal Aunt     Kidney disease Paternal Aunt     Alcohol abuse Paternal Uncle     Diabetes Paternal Uncle     Eczema Son     Drug abuse Cousin     Bipolar disorder Cousin     Colon cancer Neg Hx     Breast cancer Neg Hx        Social History:    Education: some college  Learning Disabilities: n/a  Marital history: co-habitating  Living arrangement, social support: The patient lives in home with significant other  Occupational History: [employer]  Functioning Relationships: good support system, good relationship with spouse or significant other and alone & isolated  Other Pertinent History: None     Social History     Substance and Sexual Activity   Drug Use No       Traumatic History:       Abuse: denied  Other Traumatic Events:Death of daughtrer    The following portions of the patient's history were reviewed and updated as appropriate: allergies, current medications, past family history, past medical history, past social history, past surgical history and problem list      Social History     Socioeconomic History    Marital status:       Spouse name: Yo Vivas    Number of children: 2    Years of education: 15    Highest education level: Some college, no degree   Occupational History     Comment: financial counseler   Tobacco Use    Smoking status: Former Smoker     Types: Cigarettes     Quit date:      Years since quittin 0    Smokeless tobacco: Former User   Vaping Use    Vaping Use: Never used   Substance and Sexual Activity    Alcohol use: Yes     Comment: rarely    Drug use: No    Sexual activity: Yes     Partners: Male     Birth control/protection: Female Sterilization   Other Topics Concern    Not on file   Social History Narrative    Consumes 1 cup of coffee per day     Social Determinants of Health     Financial Resource Strain: Low Risk     Difficulty of Paying Living Expenses: Not hard at all   Food Insecurity: No Food Insecurity    Worried About [facility] in the Last Year: Never true    Ran Out of Food in the Last Year: Never true   Transportation Needs: No Transportation Needs    Lack of Transportation (Medical): No    Lack of Transportation (Non-Medical): No   Physical Activity: Not on file   Stress: Not on file   Social Connections: Not on file   Intimate Partner Violence: Not on file   Housing Stability: Not on file     Social History     Social History Narrative    Consumes 1 cup of coffee per day       Mental status:  Appearance calm and cooperative , adequate hygiene and grooming and good eye contact    Mood dysphoric   Affect affect appropriate    Speech a normal rate   Thought Processes normal thought processes   Hallucinations no hallucinations present    Thought Content no delusions   Abnormal Thoughts no suicidal thoughts  and no homicidal thoughts    Orientation  oriented to person and place and time   Remote Memory short term memory intact and long term memory intact   Attention Span concentration intact   Intellect Appears to be of Average Intelligence   Insight Insight intact   Judgement judgment was intact   Muscle Strength Normal gait    Language Intact, bilingual   Fund of Knowledge Intact   Pain none   Pain Scale 0         Laboratory Results: No results found for this or any previous visit  Assessment/Plan:      Diagnoses and all orders for this visit:    Current moderate episode of major depressive disorder without prior episode [facility])          Treatment Recommendations- Risks Benefits         Immediate Medical/Psychiatric/Psychotherapy Treatments and Any Precautions: Continue present medications   F/U in 4 wks    Risks, Benefits And Possible Side Effects Of Medications:  Risks, benefits, and possible side effects of medications explained to patient and patient verbalizes understanding    Controlled Medication Discussion: No records found for controlled prescriptions according to [address] [place] [address] Monitoring Program

## 2022-01-05 NOTE — BH TREATMENT PLAN
TREATMENT PLAN (Medication Management Only)        [facility]    Name and Date of Birth:  Hiral Dobbins 49 y o  1965  Date of Treatment Plan: January 5, 2022  Diagnosis/Diagnoses:    1  Current moderate episode of major depressive disorder without prior episode [facility])      Strengths/Personal Resources for Self-Care: supportive family, supportive friends, taking medications as prescribed, ability to adapt to life changes, ability to communicate well, ability to listen, ability to reason, average or above intelligence, financial security, general fund of knowledge, good understanding of illness, motivation for treatment  Area/Areas of need (in own words): depression  1  Long Term Goal: continue improvement in depression  Target Date:3 months - 4/5/2022  Person/Persons responsible for completion of goal: Karen  2  Short Term Objective (s) - How will we reach this goal?:   A  Provider new recommended medication/dosage changes and/or continue medication(s): continue current medications as prescribed  B  N/A   C  N/A  Target Date:3 months - 4/5/2022  Person/Persons Responsible for Completion of Goal: Karen  Progress Towards Goals: continuing treatment  Treatment Modality: medication management every 3 months  Review due 180 days from date of this plan: 3 months - 4/5/2022  Expected length of service: ongoing treatment  My Physician/PA/NP and I have developed this plan together and I agree to work on the goals and objectives  I understand the treatment goals that were developed for my treatment

## 2022-01-26 ENCOUNTER — OFFICE VISIT (OUTPATIENT)
Dept: OBGYN CLINIC | Facility: HOSPITAL | Age: 57
End: 2022-01-26
Payer: COMMERCIAL

## 2022-01-26 VITALS
SYSTOLIC BLOOD PRESSURE: 135 MMHG | HEART RATE: 73 BPM | BODY MASS INDEX: 34.72 KG/M2 | WEIGHT: 165.4 LBS | DIASTOLIC BLOOD PRESSURE: 83 MMHG | HEIGHT: 58 IN

## 2022-01-26 DIAGNOSIS — M65.342 TRIGGER RING FINGER OF LEFT HAND: ICD-10-CM

## 2022-01-26 DIAGNOSIS — M75.40 IMPINGEMENT SYNDROME OF SHOULDER REGION, UNSPECIFIED LATERALITY: Primary | ICD-10-CM

## 2022-01-26 DIAGNOSIS — M77.12 LATERAL EPICONDYLITIS OF LEFT ELBOW: ICD-10-CM

## 2022-01-26 PROCEDURE — 99214 OFFICE O/P EST MOD 30 MIN: CPT | Performed by: ORTHOPAEDIC SURGERY

## 2022-01-26 RX ORDER — CEFAZOLIN SODIUM 1 G/50ML
1000 SOLUTION INTRAVENOUS ONCE
Status: CANCELLED | OUTPATIENT
Start: 2022-01-26 | End: 2022-01-26

## 2022-01-26 NOTE — PROGRESS NOTES
ASSESSMENT/PLAN:    Assessment:   Trigger Finger  left  ring finger  Bilateral shoulder impingement  Left lateral epicondylitis     Plan:   Trigger Finger Release  left  ring finger (sedation)    Start therapy for lateral epicondylitis and shoulder impingement  Follow up with Dr Blair Mae for the bilateral shoulders    Follow Up: After Surgery    To Do Next Visit:  Sutures out, Steri strips    General Discussions:     Lateral Epicondylitis: The anatomy and physiology of lateral epicondylitis was discussed with the patient today  Typically, a traumatic injury or repetitive use may cause a partial or complete tear of the extensor carpi radialis brevis muscle  This creates pain over the lateral epicondyle  This pain typically is made worse with palm down lifting activities as well as anything that involves strength and stability of the wrist   The pain may radiate from the wrist up to the elbow  At times, the shoulder may be weak as well which can predispose or cause continuation of the problem  Conservative treatment usually cures a majority of patients; however, this may take up to 6-9 months  Conservative treatment options typically include activity modification, therapy for strengthening of the shoulder and elbow, nocturnal wrist support splints, tennis elbow straps, and possible corticosteroid injections  Corticosteroid injections do not change the natural history of this process, may decrease the pain temporarily, and may increase the risk of recurrence  Surgery is required in fewer than 10% of patients  Discussed with the patient in the absence of shoulder x-rays she likely has impingement syndrome  I recommend she follow up with my colleague for evaluation and treatment  I would like the patient to start physical therapy in the mean time for the bilateral shoulders and left elbow  Operative Discussions:     Trigger Finger Release:  The anatomy and physiology of trigger finger was discussed with the patient today in the office  Edema and increased contact pressure within the flexor tendons at the A1 pulley can cause pain, crepitation, and limitation of function  Treatment options include resting MP blocking splints to decrease edema, oral anti-inflammatory medications, home or formal therapy exercises, up to 2 steroid injections or surgical release  While majority of patients do respond to conservative treatment, up to 20% may require surgical release  The patient has elected release of the trigger finger  The patient has elected to undergo a release of the A1 pulley (trigger finger)  A small incision will be made over the palmar aspect of the hand, the tendon sheath holding the flexor tendons will be released  In the postoperative period, light activities are allowed immediately, driving is allowed when narcotic medication has stopped, and the incision may get wet after 2 days  Heavy activities (lifting more than approximately 10 pounds) will be allowed after the follow up appointment in 1-2 weeks  While the pain and discomfort within the wrist typically improves rapidly, some residual discomfort may be present for up to 6 weeks  The nodule that is typically palpable in the palmar aspect of the hand will not be removed, as this would necessitate removal of a portion of the flexor tendon, however the catching, clicking, and locking should resolve  Approximate success rate is 98%  The risks and benefits of the procedure were explained to the patient, which include, but are not limited to: Bleeding, infection, recurrence, pain, scar, damage to tendons, damage to nerves, and damage to blood vessels, need for future surgery and complications related to anesthesia  If bony work is done, risks also include malunion and nonunion  These risks, along with alternative conservative treatment options, and postoperative protocols were voiced back and understood by the patient    All questions were answered to the patient's satisfaction  The patient agrees to comply with a standard postoperative protocol, and is willing to proceed  Education was provided via written and auditory forms  There were no barriers to learning  Written handouts regarding wound care, incision and scar care, and general preoperative information, as well as risks and benefits were provided to the patient       _____________________________________________________  CHIEF COMPLAINT:  Chief Complaint   Patient presents with    Left Ring Finger - Follow-up     TF- 2nd injection 10/9/20         SUBJECTIVE:  Devi Garsia is a 49 y o  female who presents for follow up regarding Trigger Finger  left  ring finger  Since last visit, Devi Pump has tried activity modification with only partial relief  Today she reports left elbow pain and bilateral shoulder pain       PAST MEDICAL HISTORY:  Past Medical History:   Diagnosis Date    Anemia     Last Assessed:  3/27/13a    Arthritis     Asthma     Blurred vision     Chronic kidney disease     Diabetes mellitus ([facility] [address] )     was in touch with PCP re [facility]- she adjusted insulin doses and is seeing surgeon 20    Diabetic retinopathy ([facility] [address] )     Dream enactment behavior     Head injury     Hypercholesterolemia     Hyperlipidemia     Hypertension     Increased urinary frequency     Irritable bowel syndrome     Last Assessed:  10/2/12    Joint pain     Morbid obesity with BMI of 40 0-44 9, adult (HCC)     Nausea     Night sweat     Nightmare disorder 2020    Numbness     UTI (urinary tract infection)     Weakness        PAST SURGICAL HISTORY:  Past Surgical History:   Procedure Laterality Date    ABDOMINAL SURGERY      CARPAL TUNNEL RELEASE Right      SECTION      x 2    COLONOSCOPY  2016    COLONOSCOPY      ME LAP, ALBAN RESTRICT PROC, LONGITUDINAL GASTRECTOMY N/A 2020    Procedure: LAPAROSCOPIC SLEEVE GASTRECTOMY  WITH EGD;  Surgeon: Criss Ospina MD; Location: [facility]IN OR;  Service: Manan Hecla LIG Left 2019    Procedure: RELEASE CARPAL TUNNEL ENDOSCOPIC-left;  Surgeon: Keyla Garcia MD;  Location:  MAIN OR;  Service: Orthopedics    TUBAL LIGATION      WISDOM TOOTH EXTRACTION         FAMILY HISTORY:  Family History   Problem Relation Age of Onset    Diabetes Mother     Hypertension Mother     Diabetes Father     Heart disease Father     Stroke Father     Hypertension Father     Diverticulitis Father     Hyperlipidemia Father     Heart attack Father     Diabetes Maternal Grandmother     Pancreatic cancer Maternal Grandmother     Liver cancer Maternal Grandfather     Alcohol abuse Maternal Grandfather     Heart disease Paternal Grandmother     Crohn's disease Sister     Diabetes type I Daughter     Eczema Daughter     Diabetes Daughter     Heart attack Paternal Grandfather     Asthma Maternal Aunt     Alcohol abuse Maternal Uncle     Mental illness Paternal Aunt     Schizophrenia Paternal Aunt     Diabetes Paternal Aunt     Kidney disease Paternal Aunt     Alcohol abuse Paternal Uncle     Diabetes Paternal Uncle     Eczema Son     Drug abuse Cousin     Bipolar disorder Cousin     Colon cancer Neg Hx     Breast cancer Neg Hx        SOCIAL HISTORY:  Social History     Tobacco Use    Smoking status: Former Smoker     Types: Cigarettes     Quit date:      Years since quittin 0    Smokeless tobacco: Former User   Vaping Use    Vaping Use: Never used   Substance Use Topics    Alcohol use: Yes     Comment: rarely    Drug use: No       MEDICATIONS:    Current Outpatient Medications:     Blood Glucose Monitoring Suppl (Contour Next Monitor) w/Device KIT, , Disp: , Rfl:     FreeStyle Unistick II Lancets MISC, Patient tests blood sugars daily, Disp: 100 each, Rfl: 2    glucose blood (FREESTYLE LITE) test strip, Use 1 each daily Use as instructed, Disp: 100 each, Rfl: 2   insulin aspart (NovoLOG FlexPen) 100 UNIT/ML injection pen, Inject 5 Units under the skin 3 (three) times a day with meals, Disp: 15 mL, Rfl: 0    insulin glargine (Toujeo Max SoloStar) 300 units/mL CONCENTRATED U-300 injection pen (2-unit dial), Inject 10 Units under the skin daily at bedtime, Disp: 6 mL, Rfl: 0    lisinopril (ZESTRIL) 20 mg tablet, Take 0 5 tablets (10 mg total) by mouth daily, Disp: 90 tablet, Rfl: 1    mometasone (NASONEX) 50 mcg/act nasal spray, SPRAY 2 SPRAYS INTO EACH NOSTRIL DAILY AS NEEDED (ALLERGIES), Disp: 17 g, Rfl: 0    Multiple Vitamin (multivitamin) tablet, Take 1 tablet by mouth 4 (four) times a day, Disp: , Rfl:     Omega-3 Fatty Acids (fish oil) 1,000 mg, Take 1 capsule (1,000 mg total) by mouth 2 (two) times a day, Disp: 60 capsule, Rfl: 3    rosuvastatin (CRESTOR) 20 MG tablet, Take 1 tablet (20 mg total) by mouth daily at bedtime, Disp: 90 tablet, Rfl: 0    Semaglutide 14 MG TABS, Take 1 tablet by mouth daily, Disp: 90 tablet, Rfl: 1    sertraline (ZOLOFT) 100 mg tablet, Take 1 tablet (100 mg total) by mouth daily, Disp: 90 tablet, Rfl: 1    traZODone (DESYREL) 50 mg tablet, Take 1 tablet (50 mg total) by mouth daily at bedtime, Disp: 90 tablet, Rfl: 1    aspirin (ECOTRIN LOW STRENGTH) 81 mg EC tablet, Take 81 mg by mouth daily Last dose will be 8/23/20, Disp: , Rfl:     ALLERGIES:  Allergies   Allergen Reactions    Iodinated Diagnostic Agents Facial Swelling    Iodine - Food Allergy Facial Swelling    Shellfish-Derived Products - Food Allergy Throat Swelling    Amoxicillin Hives    Metformin Diarrhea    Penicillins      Yeast Infection       REVIEW OF SYSTEMS:  Pertinent items are noted in HPI  A comprehensive review of systems was negative      LABS:  HgA1c:   Lab Results   Component Value Date    HGBA1C 10 4 (H) 08/19/2021     BMP:   Lab Results   Component Value Date    GLUCOSE 138 02/28/2014    CALCIUM 9 8 08/19/2021     05/30/2018    K 3 9 08/19/2021 CO2 24 08/19/2021     08/19/2021    BUN 22 08/19/2021    CREATININE 0 98 08/19/2021           _____________________________________________________  PHYSICAL EXAMINATION:  Vital signs: /83   Pulse 73   Ht 4' 10" (1 473 m)   Wt 75 kg (165 lb 6 4 oz)   BMI 34 57 kg/m²   General: well developed and well nourished, alert, oriented times 3 and appears comfortable  Psychiatric: Normal  HEENT: Trachea Midline, No torticollis  Cardiovascular: No discernable arrhythmia  Pulmonary: No wheezing or stridor  Abdomen: No rebound or guarding  Extremities: No peripheral edema  Skin: No masses, erythema, lacerations, fluctation, ulcerations  Neurovascular: Sensation Intact to the Median, Ulnar, Radial Nerve, Motor Intact to the Median, Ulnar, Radial Nerve and Pulses Intact    MUSCULOSKELETAL EXAMINATION:  left ring finger:  Positive palpable nodule over the A1 pulley  Positive tenderness to palpation over A1 pulley  Positive catching  Positive clicking  Left Elbow:  No swelling or deformity  Full range of motion with flexion, extension, supination and pronation  Positive tenderness to palpation over the Lateral Epicondyle and radial tunnel  Pain with passive flexion of the wrist with elbow fully extended  Pain with resisted wrist extension with elbow fully extended  Pain with resisted forearm supination with the elbow fully extended  Negative tinels over the ulnar nerve at the elbow  Bilateral Shoulder Exam:  Alignment / Posture:  Normal shoulder posture  Inspection:  No swelling  No edema  No erythema  Palpation:  subaromial  tenderness  ROM:  Shoulder   Shoulder ER 70  Shoulder IR lumbar  Strength:  Supraspinatus 4+/5  Infraspinatus 4+/5  Stability:  No objective shoulder instability  Tests: (+) Hale  (+) Neer      ___________________________________________________  STUDIES REVIEWED:  No New Studies to review      PROCEDURES PERFORMED:  Procedures  No Procedures performed today Scribe Attestation    I,:  Vishal Munoz am acting as a scribe while in the presence of the attending physician :       I,:  Arlen Beltran MD personally performed the services described in this documentation    as scribed in my presence :

## 2022-02-21 DIAGNOSIS — Z79.4 TYPE 2 DIABETES MELLITUS WITH DIABETIC NEPHROPATHY, WITH LONG-TERM CURRENT USE OF INSULIN (HCC): Primary | ICD-10-CM

## 2022-02-21 DIAGNOSIS — E11.21 TYPE 2 DIABETES MELLITUS WITH DIABETIC NEPHROPATHY, WITH LONG-TERM CURRENT USE OF INSULIN (HCC): Primary | ICD-10-CM

## 2022-02-21 RX ORDER — INSULIN GLARGINE 300 U/ML
10 INJECTION, SOLUTION SUBCUTANEOUS
Qty: 1.5 ML | Refills: 0 | Status: SHIPPED | OUTPATIENT
Start: 2022-02-21 | End: 2022-03-07

## 2022-02-21 RX ORDER — INSULIN ASPART 100 [IU]/ML
5 INJECTION, SOLUTION INTRAVENOUS; SUBCUTANEOUS
Qty: 15 ML | Refills: 0 | Status: SHIPPED | OUTPATIENT
Start: 2022-02-21

## 2022-03-07 ENCOUNTER — OFFICE VISIT (OUTPATIENT)
Dept: FAMILY MEDICINE CLINIC | Facility: CLINIC | Age: 57
End: 2022-03-07
Payer: COMMERCIAL

## 2022-03-07 VITALS
WEIGHT: 165 LBS | SYSTOLIC BLOOD PRESSURE: 116 MMHG | HEIGHT: 58 IN | HEART RATE: 78 BPM | DIASTOLIC BLOOD PRESSURE: 70 MMHG | BODY MASS INDEX: 34.63 KG/M2 | TEMPERATURE: 98.3 F

## 2022-03-07 DIAGNOSIS — F32.1 CURRENT MODERATE EPISODE OF MAJOR DEPRESSIVE DISORDER WITHOUT PRIOR EPISODE (HCC): ICD-10-CM

## 2022-03-07 DIAGNOSIS — F32.2 CURRENT SEVERE EPISODE OF MAJOR DEPRESSIVE DISORDER WITHOUT PSYCHOTIC FEATURES WITHOUT PRIOR EPISODE (HCC): ICD-10-CM

## 2022-03-07 DIAGNOSIS — Z79.4 TYPE 2 DIABETES MELLITUS WITH DIABETIC NEPHROPATHY, WITH LONG-TERM CURRENT USE OF INSULIN (HCC): ICD-10-CM

## 2022-03-07 DIAGNOSIS — Z00.00 ANNUAL PHYSICAL EXAM: Primary | ICD-10-CM

## 2022-03-07 DIAGNOSIS — B02.9 HERPES ZOSTER WITHOUT COMPLICATION: ICD-10-CM

## 2022-03-07 DIAGNOSIS — E11.21 TYPE 2 DIABETES MELLITUS WITH DIABETIC NEPHROPATHY, WITH LONG-TERM CURRENT USE OF INSULIN (HCC): ICD-10-CM

## 2022-03-07 LAB — SL AMB POCT HEMOGLOBIN AIC: 12 (ref ?–6.5)

## 2022-03-07 PROCEDURE — 83036 HEMOGLOBIN GLYCOSYLATED A1C: CPT | Performed by: FAMILY MEDICINE

## 2022-03-07 PROCEDURE — 99396 PREV VISIT EST AGE 40-64: CPT | Performed by: FAMILY MEDICINE

## 2022-03-07 PROCEDURE — 99214 OFFICE O/P EST MOD 30 MIN: CPT | Performed by: FAMILY MEDICINE

## 2022-03-07 RX ORDER — PREGABALIN 25 MG/1
25 CAPSULE ORAL 3 TIMES DAILY
Qty: 5 CAPSULE | Refills: 0 | Status: SHIPPED | OUTPATIENT
Start: 2022-03-07 | End: 2022-04-13

## 2022-03-07 RX ORDER — FLASH GLUCOSE SENSOR
1 KIT MISCELLANEOUS
Qty: 6 EACH | Refills: 1 | Status: SHIPPED | OUTPATIENT
Start: 2022-03-07 | End: 2022-03-08

## 2022-03-07 RX ORDER — FLASH GLUCOSE SCANNING READER
1 EACH MISCELLANEOUS ONCE
Qty: 1 EACH | Refills: 0 | Status: SHIPPED | OUTPATIENT
Start: 2022-03-07 | End: 2022-03-07

## 2022-03-07 RX ORDER — VALACYCLOVIR HYDROCHLORIDE 1 G/1
1000 TABLET, FILM COATED ORAL 3 TIMES DAILY
Qty: 21 TABLET | Refills: 0 | Status: SHIPPED | OUTPATIENT
Start: 2022-03-07 | End: 2022-04-13

## 2022-03-07 RX ORDER — OXYCODONE HYDROCHLORIDE AND ACETAMINOPHEN 5; 325 MG/1; MG/1
1 TABLET ORAL 2 TIMES DAILY PRN
Qty: 15 TABLET | Refills: 0 | Status: SHIPPED | OUTPATIENT
Start: 2022-03-07

## 2022-03-07 NOTE — ASSESSMENT & PLAN NOTE
Lab Results   Component Value Date    HGBA1C 12 0 (A) 03/07/2022   Very poor control  Continue Rybelsis 14mg QD  Increase insulin: Toujeo 26u qAM, Novolog TID AC 10/20/20  Obtain CGM due to poor control and checking glucose >4x daily  Refer for diabetic nutrition  Due for eye, foot exam  Obtain labs - severely overdue

## 2022-03-07 NOTE — PROGRESS NOTES
Hans Spencer 1965 female MRN: [id number]    Family Medicine Follow-up Visit    Assessment/Plan   Type 2 diabetes mellitus with diabetic nephropathy, with long-term current use of insulin ([facility] [address] )    Lab Results   Component Value Date    HGBA1C 12 0 (A) 03/07/2022   Very poor control  Continue Rybelsis 14mg QD  Increase insulin: Toujeo 26u qAM, Novolog TID AC 10/20/20  Obtain CGM due to poor control and checking glucose >4x daily  Refer for diabetic nutrition  Due for eye, foot exam  Obtain labs - severely overdue     Current moderate episode of major depressive disorder without prior episode ([facility] [address] )  Improving  Increase Zoloft to 150mg QD  - We discussed the benefits of counseling/therapy support  - Offered referrals as appropriate  - Counseled regarding lifestyle changes, sleep hygiene, breathing exercises, utilizing social supports, and CBT/breathing phone applications  Handout was provided on the same   - ED precautions reviewed for suicidal ideation/plan    Shingles  - medications as below  Recommend vaccine once recovered     Karen was seen today for follow-up and physical exam     Diagnoses and all orders for this visit:    Type 2 diabetes mellitus with diabetic nephropathy, with long-term current use of insulin [facility]  -     POCT hemoglobin A1c  -     Continuous Blood Gluc  (FreeStyle Lilia 14 Day [place]) GREG; Use 1 each 1 (one) time for 1 dose  -     Continuous Blood Gluc Sensor (FreeStyle Lilia 14 Day Sensor) MISC; Use 1 each every 14 (fourteen) days  -     Insulin Pen Needle 32G X 5 MM MISC; Use 4 (four) times a day  -     Ambulatory referral to Diabetic Education; Future    Annual physical exam    Herpes zoster without complication  -     pregabalin (LYRICA) 25 mg capsule; Take 1 capsule (25 mg total) by mouth 3 (three) times a day  -     valACYclovir (VALTREX) 1,000 mg tablet;  Take 1 tablet (1,000 mg total) by mouth 3 (three) times a day for 7 days  -     oxyCODONE-acetaminophen (Percocet) 5-325 mg per tablet; Take 1 tablet by mouth 2 (two) times a day as needed for severe pain Max Daily Amount: 2 tablets    Current severe episode of major depressive disorder without psychotic features without prior episode (HCC)  -     sertraline (Zoloft) 50 mg tablet; Take 1 tablet (50 mg total) by mouth daily Take in addition to 100mg tab daily for total daily dose 150mg  Current moderate episode of major depressive disorder without prior episode ([facility] [address] )        Grace Lemus MD  [address]  3/7/2022      Please be aware that this note contains text that was dictated and there may be errors pertaining to "sound-alike" words during the dictation process  SUBJECTIVE    CC: Follow-up and Physical Exam (possible shingles)    HPI:  Raine Hancock is a 49 y o  female who presented for a follow-up of chronic conditions  Lots of personal changes  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  She got engaged  Is feeling a lot more positive about things  DM - unfortunately not well controlled  Admits to medication and dietary non-compliance  She now takes Toujeo in the morning in an effort to remember it better, as she was forgetting it at night  Depression - much better after partial program  Good support  Shingles - has previously had 3-4 times already  In the right thigh area, very painful  Perimenopausal  Struggling with a lot of hot flashes right now  No menses since 04/2021  Review of Systems   Constitutional: Positive for appetite change and diaphoresis  Negative for activity change, chills, fever and unexpected weight change  HENT: Negative for congestion, rhinorrhea and sore throat  Eyes: Negative for visual disturbance  Respiratory: Negative for cough, shortness of breath and wheezing  Cardiovascular: Negative for chest pain and palpitations     Gastrointestinal: Negative for abdominal pain, blood in stool, constipation, diarrhea, nausea and vomiting  Genitourinary: Negative for dysuria  Musculoskeletal: Negative for arthralgias and myalgias  Skin: Positive for rash  Neurological: Negative for dizziness, weakness and headaches  Psychiatric/Behavioral: Positive for dysphoric mood  All other systems reviewed and are negative        Historical Information     The following portions of the patient's history were reviewed and updated as appropriate: allergies, current medications, past medical history, past social history and problem list     Medications:   Meds/Allergies     Current Outpatient Medications:     aspirin (ECOTRIN LOW STRENGTH) 81 mg EC tablet, Take 81 mg by mouth daily Last dose will be 8/23/20, Disp: , Rfl:     Blood Glucose Monitoring Suppl (Contour Next Monitor) w/Device KIT, , Disp: , Rfl:     FreeStyle Unistick II Lancets MISC, Patient tests blood sugars daily, Disp: 100 each, Rfl: 2    glucose blood (FREESTYLE LITE) test strip, Use 1 each daily Use as instructed, Disp: 100 each, Rfl: 2    insulin aspart (NovoLOG FlexPen) 100 UNIT/ML injection pen, Inject 5 Units under the skin 3 (three) times a day with meals, Disp: 15 mL, Rfl: 0    insulin glargine (Toujeo Max SoloStar) 300 units/mL CONCENTRATED U-300 injection pen (2-unit dial), Inject 10 Units under the skin daily at bedtime, Disp: 6 mL, Rfl: 0    lisinopril (ZESTRIL) 20 mg tablet, Take 0 5 tablets (10 mg total) by mouth daily, Disp: 90 tablet, Rfl: 1    mometasone (NASONEX) 50 mcg/act nasal spray, SPRAY 2 SPRAYS INTO EACH NOSTRIL DAILY AS NEEDED (ALLERGIES), Disp: 17 g, Rfl: 0    Multiple Vitamin (multivitamin) tablet, Take 1 tablet by mouth 4 (four) times a day, Disp: , Rfl:     Omega-3 Fatty Acids (fish oil) 1,000 mg, Take 1 capsule (1,000 mg total) by mouth 2 (two) times a day, Disp: 60 capsule, Rfl: 3    rosuvastatin (CRESTOR) 20 MG tablet, Take 1 tablet (20 mg total) by mouth daily at bedtime, Disp: 90 tablet, Rfl: 0    Semaglutide 14 MG TABS, Take 1 tablet by mouth daily, Disp: 90 tablet, Rfl: 1    sertraline (ZOLOFT) 100 mg tablet, Take 1 tablet (100 mg total) by mouth daily, Disp: 90 tablet, Rfl: 1    traZODone (DESYREL) 50 mg tablet, Take 1 tablet (50 mg total) by mouth daily at bedtime, Disp: 90 tablet, Rfl: 1    Continuous Blood Gluc  (FreeStyle Lilia 14 Day [place]) GREG, Use 1 each 1 (one) time for 1 dose, Disp: 1 each, Rfl: 0    Continuous Blood Gluc Sensor (FreeStyle Lilia 14 Day Sensor) MISC, Use 1 each every 14 (fourteen) days, Disp: 6 each, Rfl: 1    Insulin Pen Needle 32G X 5 MM MISC, Use 4 (four) times a day, Disp: 400 each, Rfl: 3    oxyCODONE-acetaminophen (Percocet) 5-325 mg per tablet, Take 1 tablet by mouth 2 (two) times a day as needed for severe pain Max Daily Amount: 2 tablets, Disp: 15 tablet, Rfl: 0    pregabalin (LYRICA) 25 mg capsule, Take 1 capsule (25 mg total) by mouth 3 (three) times a day, Disp: 5 capsule, Rfl: 0    sertraline (Zoloft) 50 mg tablet, Take 1 tablet (50 mg total) by mouth daily Take in addition to 100mg tab daily for total daily dose 150mg , Disp: 90 tablet, Rfl: 1    valACYclovir (VALTREX) 1,000 mg tablet, Take 1 tablet (1,000 mg total) by mouth 3 (three) times a day for 7 days, Disp: 21 tablet, Rfl: 0  Allergies   Allergen Reactions    Iodinated Diagnostic Agents Facial Swelling    Iodine - Food Allergy Facial Swelling    Shellfish-Derived Products - Food Allergy Throat Swelling    Amoxicillin Hives    Metformin Diarrhea    Penicillins      Yeast Infection       OBJECTIVE    Vitals:   Vitals:    03/07/22 1553   BP: 116/70   Pulse: 78   Temp: 98 3 °F (36 8 °C)   Weight: 74 8 kg (165 lb)   Height: 4' 10" (1 473 m)     Wt Readings from Last 3 Encounters:   03/07/22 74 8 kg (165 lb)   01/26/22 75 kg (165 lb 6 4 oz)   11/23/21 73 9 kg (163 lb)     Body mass index is 34 49 kg/m²      BP Readings from Last 3 Encounters:   03/07/22 116/70   01/26/22 135/83   11/23/21 (!) 163/103     Pulse Readings from Last 3 Encounters:   03/07/22 78   01/26/22 73   11/23/21 103     No LMP recorded  Patient is perimenopausal     Physical Exam:    Physical Exam  Vitals and nursing note reviewed  Constitutional:       General: She is not in acute distress  Appearance: She is well-developed  She is not ill-appearing  HENT:      Head: Normocephalic and atraumatic  Right Ear: Tympanic membrane, ear canal and external ear normal       Left Ear: Tympanic membrane, ear canal and external ear normal       Nose: Nose normal       Mouth/Throat:      Pharynx: Uvula midline  No oropharyngeal exudate  Tonsils: No tonsillar exudate  Eyes:      Conjunctiva/sclera: Conjunctivae normal    Neck:      Thyroid: No thyromegaly  Cardiovascular:      Rate and Rhythm: Normal rate and regular rhythm  Heart sounds: Normal heart sounds  No murmur heard  Pulmonary:      Effort: Pulmonary effort is normal  No respiratory distress  Breath sounds: Normal breath sounds  No decreased breath sounds, wheezing, rhonchi or rales  Abdominal:      General: Bowel sounds are normal  There is no distension  Palpations: Abdomen is soft  Tenderness: There is no abdominal tenderness  Lymphadenopathy:      Cervical: No cervical adenopathy  Skin:     General: Skin is warm and dry  Capillary Refill: Capillary refill takes less than 2 seconds  Findings: Rash present  Rash is vesicular  Comments: Linear vesicular lesions consistent with shingles    Neurological:      Mental Status: She is alert and oriented to person, place, and time  Sensory: No sensory deficit  Motor: No abnormal muscle tone  Deep Tendon Reflexes: Reflexes normal           Labs: I have personally reviewed all pertinent results  Imaging:  I have personally reviewed all pertinent results

## 2022-03-07 NOTE — PROGRESS NOTES
ADULT ANNUAL [address]    NAME: Karen Ngo  AGE: 49 y o  SEX: female  : 1965   DATE: 3/7/2022     Assessment and Plan:   Immunizations and preventive care screenings were discussed with patient today  Appropriate education was printed on patient's after visit summary  Counseling:  Dental Health: discussed importance of regular tooth brushing, flossing, and dental visits  Injury prevention: discussed safety/seat belts, safety helmets, smoke detectors, carbon dioxide detectors, and smoking near bedding or upholstery  · Exercise: the importance of regular exercise/physical activity was discussed  Recommend exercise 3-5 times per week for at least 30 minutes  BMI Counseling: Body mass index is 34 49 kg/m²  The BMI is above normal  Nutrition recommendations include decreasing portion sizes, encouraging healthy choices of fruits and vegetables and limiting drinks that contain sugar  Exercise recommendations include moderate physical activity 150 minutes/week, exercising 3-5 times per week and strength training exercises  Patient referred to PCP  Rationale for BMI follow-up plan is due to patient being overweight or obese  Return in about 3 months (around 2022) for Next scheduled follow up  Chief Complaint:     Chief Complaint   Patient presents with    Follow-up    Physical Exam     possible shingles      History of Present Illness:     Adult Annual Physical   Patient here for a comprehensive physical exam  The patient reports problems - see other note  Diet and Physical Activity  · Diet/Nutrition: poor diet  · Exercise: no formal exercise  Depression Screening  PHQ-2/9 Depression Screening         General Health  · Sleep: sleeps well  · Hearing: normal - bilateral   · Vision: wears glasses  · Dental: regular dental visits         /GYN Health  · Patient is: perimenopausal  · Last menstrual period: Patient's last menstrual period was 2021 (approximate)  Review of Systems:     Review of Systems   Constitutional: Negative for activity change, chills and fever  HENT: Negative for congestion, rhinorrhea and sore throat  Eyes: Negative for visual disturbance  Respiratory: Negative for cough, shortness of breath and wheezing  Cardiovascular: Negative for chest pain and palpitations  Gastrointestinal: Negative for abdominal pain, blood in stool, constipation, diarrhea, nausea and vomiting  Genitourinary: Negative for dysuria  Musculoskeletal: Negative for arthralgias and myalgias  Skin: Positive for rash  Neurological: Negative for dizziness, weakness and headaches  Psychiatric/Behavioral: Positive for dysphoric mood  All other systems reviewed and are negative       Past Medical History:     Past Medical History:   Diagnosis Date    Anemia     Last Assessed:  3/27/13a    Arthritis     Asthma     Blurred vision     Chronic kidney disease     Diabetes mellitus ([facility] [address] )     was in touch with PCP re [facility]- she adjusted insulin doses and is seeing surgeon 20    Di[facility]     Dream enactment behavior     Head injury     Hypercholesterolemia     Hyperlipidemia     Hypertension     Increased urinary frequency     Irritable bowel syndrome     Last Assessed:  10/2/12    Joint pain     Morbid obesity with BMI of 40 0-44 9, adult (HCC)     Nausea     Night sweat     Nightmare disorder 2020    Numbness     UTI (urinary tract infection)     Weakness       Past Surgical History:     Past Surgical History:   Procedure Laterality Date    ABDOMINAL SURGERY      CARPAL TUNNEL RELEASE Right      SECTION      x 2    COLONOSCOPY  2016    COLONOSCOPY      MD LAP, ALBAN RESTRICT PROC, LONGITUDINAL GASTRECTOMY N/A 2020    Procedure: LAPAROSCOPIC SLEEVE GASTRECTOMY  WITH EGD;  Surgeon: Barry Marks MD;  Location: [address] [place] [address];  Service: Bariatrics    UT WRIST ARTHROSCOP,RELEASE Bedshobha aFll LIG Left 2019    Procedure: RELEASE CARPAL TUNNEL ENDOSCOPIC-left;  Surgeon: Joanie Stark MD;  Location:  MAIN OR;  Service: Orthopedics    TUBAL LIGATION      WISDOM TOOTH EXTRACTION        Social History:     Social History     Socioeconomic History    Marital status:       Spouse name: Yoly Ragsdale    Number of children: 2    Years of education: 15    Highest education level: Some college, no degree   Occupational History     Comment: financial counseler   Tobacco Use    Smoking status: Former Smoker     Types: Cigarettes     Quit date:      Years since quittin 2    Smokeless tobacco: Former User   Vaping Use    Vaping Use: Never used   Substance and Sexual Activity    Alcohol use: Yes     Comment: rarely    Drug use: No    Sexual activity: Yes     Partners: Male     Birth control/protection: Female Sterilization   Other Topics Concern    None   Social History Narrative    Consumes 1 cup of coffee per day     Social Determinants of Health     Financial Resource Strain: Not on file   Food Insecurity: Not on file   Transportation Needs: Not on file   Physical Activity: Not on file   Stress: Not on file   Social Connections: Not on file   Intimate Partner Violence: Not on file   Housing Stability: Not on file      Family History:     Family History   Problem Relation Age of Onset    Diabetes Mother     Hypertension Mother     Diabetes Father     Heart disease Father     Stroke Father     Hypertension Father     Diverticulitis Father     Hyperlipidemia Father     Heart attack Father     Diabetes Maternal Grandmother     Pancreatic cancer Maternal Grandmother     Liver cancer Maternal Grandfather     Alcohol abuse Maternal Grandfather     Heart disease Paternal Grandmother     Crohn's disease Sister     Diabetes type I Daughter     Eczema Daughter     Diabetes Daughter     Heart attack Paternal Grandfather     Asthma Maternal Aunt     Alcohol abuse Maternal Uncle     Mental illness Paternal Aunt     Schizophrenia Paternal Aunt     Diabetes Paternal Aunt     Kidney disease Paternal Aunt     Alcohol abuse Paternal Uncle     Diabetes Paternal Uncle     Eczema Son     Drug abuse Cousin     Bipolar disorder Cousin     Colon cancer Neg Hx     Breast cancer Neg Hx       Current Medications:     Current Outpatient Medications   Medication Sig Dispense Refill    aspirin (ECOTRIN LOW STRENGTH) 81 mg EC tablet Take 81 mg by mouth daily Last dose will be 8/23/20      Blood Glucose Monitoring Suppl (Contour Next Monitor) w/Device KIT       FreeStyle Unistick II Lancets MISC Patient tests blood sugars daily 100 each 2    glucose blood (FREESTYLE LITE) test strip Use 1 each daily Use as instructed 100 each 2    insulin aspart (NovoLOG FlexPen) 100 UNIT/ML injection pen Inject 5 Units under the skin 3 (three) times a day with meals 15 mL 0    insulin glargine (Toujeo Max SoloStar) 300 units/mL CONCENTRATED U-300 injection pen (2-unit dial) Inject 10 Units under the skin daily at bedtime 6 mL 0    lisinopril (ZESTRIL) 20 mg tablet Take 0 5 tablets (10 mg total) by mouth daily 90 tablet 1    mometasone (NASONEX) 50 mcg/act nasal spray SPRAY 2 SPRAYS INTO EACH NOSTRIL DAILY AS NEEDED (ALLERGIES) 17 g 0    Multiple Vitamin (multivitamin) tablet Take 1 tablet by mouth 4 (four) times a day      Omega-3 Fatty Acids (fish oil) 1,000 mg Take 1 capsule (1,000 mg total) by mouth 2 (two) times a day 60 capsule 3    rosuvastatin (CRESTOR) 20 MG tablet Take 1 tablet (20 mg total) by mouth daily at bedtime 90 tablet 0    Semaglutide 14 MG TABS Take 1 tablet by mouth daily 90 tablet 1    sertraline (ZOLOFT) 100 mg tablet Take 1 tablet (100 mg total) by mouth daily 90 tablet 1    traZODone (DESYREL) 50 mg tablet Take 1 tablet (50 mg total) by mouth daily at bedtime 90 tablet 1    Continuous Blood Gluc  (FreeStyle Lilia 14 Day [place]) GRGE Use 1 each 1 (one) time for 1 dose 1 each 0    Continuous Blood Gluc Sensor (FreeStyle Lilia 14 Day Sensor) MISC Use 1 each every 14 (fourteen) days 6 each 1    Insulin Pen Needle 32G X 5 MM MISC Use 4 (four) times a day 400 each 3    oxyCODONE-acetaminophen (Percocet) 5-325 mg per tablet Take 1 tablet by mouth 2 (two) times a day as needed for severe pain Max Daily Amount: 2 tablets 15 tablet 0    pregabalin (LYRICA) 25 mg capsule Take 1 capsule (25 mg total) by mouth 3 (three) times a day 5 capsule 0    sertraline (Zoloft) 50 mg tablet Take 1 tablet (50 mg total) by mouth daily Take in addition to 100mg tab daily for total daily dose 150mg  90 tablet 1    valACYclovir (VALTREX) 1,000 mg tablet Take 1 tablet (1,000 mg total) by mouth 3 (three) times a day for 7 days 21 tablet 0     No current facility-administered medications for this visit  Allergies: Allergies   Allergen Reactions    Iodinated Diagnostic Agents Facial Swelling    Iodine - Food Allergy Facial Swelling    Shellfish-Derived Products - Food Allergy Throat Swelling    Amoxicillin Hives    Metformin Diarrhea    Penicillins      Yeast Infection      Physical Exam:     /70   Pulse 78   Temp 98 3 °F (36 8 °C)   Ht 4' 10" (1 473 m)   Wt 74 8 kg (165 lb)   LMP 04/01/2021 (Approximate)   BMI 34 49 kg/m²     Physical Exam  Vitals and nursing note reviewed  Constitutional:       General: She is not in acute distress  Appearance: She is well-developed  She is obese  She is not ill-appearing  HENT:      Head: Normocephalic and atraumatic  Right Ear: Tympanic membrane, ear canal and external ear normal  No middle ear effusion  Left Ear: Tympanic membrane, ear canal and external ear normal   No middle ear effusion  Nose: Nose normal  No congestion or rhinorrhea  Mouth/Throat:      Lips: Pink  Mouth: Mucous membranes are moist       Pharynx: Oropharynx is clear  Uvula midline  No oropharyngeal exudate  Tonsils: No tonsillar exudate  Eyes:      General: Lids are normal       Extraocular Movements: Extraocular movements intact  Conjunctiva/sclera: Conjunctivae normal       Pupils: Pupils are equal, round, and reactive to light  Neck:      Thyroid: No thyromegaly  Trachea: No tracheal deviation  Cardiovascular:      Rate and Rhythm: Normal rate and regular rhythm  Pulses: Normal pulses  Heart sounds: Normal heart sounds, S1 normal and S2 normal  No murmur heard  Pulmonary:      Effort: Pulmonary effort is normal  No respiratory distress  Breath sounds: Normal breath sounds  No decreased breath sounds, wheezing, rhonchi or rales  Abdominal:      General: Bowel sounds are normal  There is no distension  Palpations: Abdomen is soft  Tenderness: There is no abdominal tenderness  Musculoskeletal:      Right lower leg: No edema  Left lower leg: No edema  Lymphadenopathy:      Cervical: No cervical adenopathy  Skin:     General: Skin is warm and dry  Capillary Refill: Capillary refill takes less than 2 seconds  Neurological:      Mental Status: She is alert and oriented to person, place, and time  Cranial Nerves: Cranial nerves are intact  Deep Tendon Reflexes: Reflexes normal       Reflex Scores:       Patellar reflexes are 2+ on the right side and 2+ on the left side  Psychiatric:         Attention and Perception: Attention normal          Mood and Affect: Mood normal          Thought Content: Thought content does not include suicidal ideation            Georganne Cheadle, MD  [facility]

## 2022-03-07 NOTE — PATIENT INSTRUCTIONS
Wellness Visit for Adults   AMBULATORY CARE:   A wellness visit  is when you see your healthcare provider to get screened for health problems  Your healthcare provider will also give you advice on how to stay healthy  Write down your questions so you remember to ask them  Ask your healthcare provider how often you should have a wellness visit  What happens at a wellness visit:  Your healthcare provider will ask about your health, and your family history of health problems  This includes high blood pressure, heart disease, and cancer  He or she will ask if you have symptoms that concern you, if you smoke, and about your mood  You may also be asked about your intake of medicines, supplements, food, and alcohol  Any of the following may be done:  · Your weight  will be checked  Your height may also be checked so your body mass index (BMI) can be calculated  Your BMI shows if you are at a healthy weight  · Your blood pressure  and heart rate will be checked  Your temperature may also be checked  · Blood and urine tests  may be done  Blood tests may be done to check your cholesterol levels  Abnormal cholesterol levels increase your risk for heart disease and stroke  You may also need a blood or urine test to check for diabetes if you are at increased risk  Urine tests may be done to look for signs of an infection or kidney disease  · A physical exam  includes checking your heartbeat and lungs with a stethoscope  Your healthcare provider may also check your skin to look for sun damage  · Screening tests  may be recommended  A screening test is done to check for diseases that may not cause symptoms  The screening tests you may need depend on your age, gender, family history, and lifestyle habits  For example, colorectal screening may be recommended if you are 48years old or older  Screening tests you need if you are a woman:   · A Pap smear  is used to screen for cervical cancer   Pap smears are usually done every 3 to 5 years depending on your age  You may need them more often if you have had abnormal Pap smear test results in the past  Ask your healthcare provider how often you should have a Pap smear  · A mammogram  is an x-ray of your breasts to screen for breast cancer  Experts recommend mammograms every 2 years starting at age 48 years  You may need a mammogram at age 52 years or younger if you have an increased risk for breast cancer  Talk to your healthcare provider about when you should start having mammograms and how often you need them  Vaccines you may need:   · Get an influenza vaccine  every year  The influenza vaccine protects you from the flu  Several types of viruses cause the flu  The viruses change over time, so new vaccines are made each year  · Get a tetanus-diphtheria (Td) booster vaccine  every 10 years  This vaccine protects you against tetanus and diphtheria  Tetanus is a severe infection that may cause painful muscle spasms and lockjaw  Diphtheria is a severe bacterial infection that causes a thick covering in the back of your mouth and throat  · Get a human papillomavirus (HPV) vaccine  if you are female and aged 23 to 32 or male 23 to 24 and never received it  This vaccine protects you from HPV infection  HPV is the most common infection spread by sexual contact  HPV may also cause vaginal, penile, and anal cancers  · Get a pneumococcal vaccine  if you are aged 72 years or older  The pneumococcal vaccine is an injection given to protect you from pneumococcal disease  Pneumococcal disease is an infection caused by pneumococcal bacteria  The infection may cause pneumonia, meningitis, or an ear infection  · Get a shingles vaccine  if you are 60 or older, even if you have had shingles before  The shingles vaccine is an injection to protect you from the varicella-zoster virus  This is the same virus that causes chickenpox   Shingles is a painful rash that develops in people who had chickenpox or have been exposed to the virus  How to eat healthy:  My Plate is a model for planning healthy meals  It shows the types and amounts of foods that should go on your plate  Fruits and vegetables make up about half of your plate, and grains and protein make up the other half  A serving of dairy is included on the side of your plate  The amount of calories and serving sizes you need depends on your age, gender, weight, and height  Examples of healthy foods are listed below:  · Eat a variety of vegetables  such as dark green, red, and orange vegetables  You can also include canned vegetables low in sodium (salt) and frozen vegetables without added butter or sauces  · Eat a variety of fresh fruits , canned fruit in 100% juice, frozen fruit, and dried fruit  · Include whole grains  At least half of the grains you eat should be whole grains  Examples include whole-wheat bread, wheat pasta, brown rice, and whole-grain cereals such as oatmeal     · Eat a variety of protein foods such as seafood (fish and shellfish), lean meat, and poultry without skin (turkey and chicken)  Examples of lean meats include pork leg, shoulder, or tenderloin, and beef round, sirloin, tenderloin, and extra lean ground beef  Other protein foods include eggs and egg substitutes, beans, peas, soy products, nuts, and seeds  · Choose low-fat dairy products such as skim or 1% milk or low-fat yogurt, cheese, and cottage cheese  · Limit unhealthy fats  such as butter, hard margarine, and shortening  Exercise:  Exercise at least 30 minutes per day on most days of the week  Some examples of exercise include walking, biking, dancing, and swimming  You can also fit in more physical activity by taking the stairs instead of the elevator or parking farther away from stores  Include muscle strengthening activities 2 days each week  Regular exercise provides many health benefits   It helps you manage your weight, and decreases your risk for type 2 diabetes, heart disease, stroke, and high blood pressure  Exercise can also help improve your mood  Ask your healthcare provider about the best exercise plan for you  General health and safety guidelines:   · Do not smoke  Nicotine and other chemicals in cigarettes and cigars can cause lung damage  Ask your healthcare provider for information if you currently smoke and need help to quit  E-cigarettes or smokeless tobacco still contain nicotine  Talk to your healthcare provider before you use these products  · Limit alcohol  A drink of alcohol is 12 ounces of beer, 5 ounces of wine, or 1½ ounces of liquor  · Lose weight, if needed  Being overweight increases your risk of certain health conditions  These include heart disease, high blood pressure, type 2 diabetes, and certain types of cancer  · Protect your skin  Do not sunbathe or use tanning beds  Use sunscreen with a SPF 15 or higher  Apply sunscreen at least 15 minutes before you go outside  Reapply sunscreen every 2 hours  Wear protective clothing, hats, and sunglasses when you are outside  · Drive safely  Always wear your seatbelt  Make sure everyone in your car wears a seatbelt  A seatbelt can save your life if you are in an accident  Do not use your cell phone when you are driving  This could distract you and cause an accident  Pull over if you need to make a call or send a text message  · Practice safe sex  Use latex condoms if are sexually active and have more than one partner  Your healthcare provider may recommend screening tests for sexually transmitted infections (STIs)  · Wear helmets, lifejackets, and protective gear  Always wear a helmet when you ride a bike or motorcycle, go skiing, or play sports that could cause a head injury  Wear protective equipment when you play sports  Wear a lifejacket when you are on a boat or doing water sports      © Copyright [employer] 2022 Information is for End User's use only and may not be sold, redistributed or otherwise used for commercial purposes  All illustrations and images included in CareNotes® are the copyrighted property of A D A M [employer]  or Rita Back  The above information is an  only  It is not intended as medical advice for individual conditions or treatments  Talk to your doctor, nurse or pharmacist before following any medical regimen to see if it is safe and effective for you  Basic Carbohydrate Counting   AMBULATORY CARE:   Carbohydrate counting  is a way to plan your meals by counting the amount of carbohydrate in foods  Carbohydrates are the sugars, starches, and fiber found in fruit, grains, vegetables, and milk products  Carbohydrates increase your blood sugar levels  Carbohydrate counting can help you eat the right amount of carbohydrate to keep your blood sugar levels under control  What you need to know about planning meals using carbohydrate counting:  · A dietitian or healthcare provider will help you develop a healthy meal plan that works best for you  You will be taught how much carbohydrate to eat or drink for each meal and snack  Your meal plan will be based on your age, weight, usual food intake, and physical activity level  If you have diabetes, it will also include your blood sugar levels and diabetes medicine  Once you know how much carbohydrate you should eat, you can decide what type of food you want to eat  · You will need to know what foods contain carbohydrate and how much they contain  Keep track of the amount of carbohydrate in meals and snacks in order to follow your meal plan  Do not avoid carbohydrates or skip meals  Your blood sugar may fall too low if you do not eat enough carbohydrate or you skip meals  Foods that contain carbohydrate:   · Breads:  Each serving of food listed below contains about 15 g of carbohydrate       ? 1 slice of bread (1 ounce) or 1 flour or corn tortilla (6 inch)    ? ½ of a hamburger bun or ¼ of a large bagel (about 1 ounce)    ? 1 pancake (about 4 inches across and ¼ inch thick)    · Cereals and grains:  Serving sizes of ready-to-eat cereals vary  Look at the serving size and the total carbohydrate amount listed on the food label  Each serving of food listed below contains about 15 g of carbohydrate   ? ¾ cup of dry, unsweetened, ready-to-eat cereal or ¼ cup of low-fat granola     ? ½ cup of oatmeal or other cooked cereal     ? ? cup of cooked rice or pasta    · Starchy vegetables and beans:  Each serving of food listed below contains about 15 g of carbohydrate   ? ½ cup of corn, green peas, sweet potatoes, or mashed potatoes    ? ¼ of a large baked potato    ? ½ cup of beans, lentils, and peas (garbanzo, mejia, kidney, white, split, black-eyed)    · Crackers and snacks:  Each serving of food listed below contains about 15 g of carbohydrate   ? 3 zheng cracker squares or 8 animal crackers     ? 6 saltine-type crackers    ? 3 cups of popcorn or ¾ ounce of pretzels, potato chips, or tortilla chips    · Fruit:  Each serving of food listed below contains about 15 g of carbohydrate   ? 1 small (4 ounce) piece of fresh fruit or ¾ to 1 cup of fresh fruit    ? ½ cup of canned or frozen fruit, packed in natural juice    ? ½ cup (4 ounces) of unsweetened fruit juice    ? 2 tablespoons of dried fruit    · Desserts or sugary foods:  Each serving of food listed below contains about 15 g of carbohydrate   ? 2-inch square unfrosted cake or brownie     ? 2 small cookies    ? ½ cup of ice cream, frozen yogurt, or nondairy frozen yogurt    ? ¼ cup of sherbet or sorbet    ? 1 tablespoon of regular syrup, jam, or jelly    ? 2 tablespoons of light syrup    · Milk and yogurt:  Foods from the milk group contain about 12 g of carbohydrate per serving  ? 1 cup of fat-free or low-fat milk    ? 1 cup of soy milk    ?  ? cup of fat-free, yogurt sweetened with artificial sweetener    · Non-starchy vegetables:  Each serving contains about 5 g of carbohydrate   Three servings of non-starch vegetables count as 1 carbohydrate serving  ? ½ cup of cooked vegetables or 1 cup of raw vegetables  This includes beets, broccoli, cabbage, cauliflower, cucumber, mushrooms, tomatoes, and zucchini    ? ½ cup of vegetable juice    How to use carbohydrate counting to plan meals:   · Count carbohydrate amounts using serving sizes:      ? Pasta dinner example: You plan to have pasta, tossed salad, and an 8-ounce glass of milk  Your healthcare provider tells you that you may have 4 carbohydrate servings for dinner  One carbohydrate serving of pasta is ? cup  One cup of pasta will equal 3 carbohydrate servings  An 8-ounce glass of milk will count as 1 carbohydrate serving  These amounts of food would equal 4 carbohydrate servings  One cup of tossed salad does not count toward your carbohydrate servings  · Count carbohydrate amounts using food labels:  Find the total amount of carbohydrate in a packaged food by reading the food label  Food labels tell you the serving size of the food and the total carbohydrate amount in each serving  Find the serving size on the food label and then decide how many servings you will eat  Multiply the number of servings you plan to eat by the carbohydrate amount per serving  ? Granola bar snack example: Your meal plan allows you to have 2 carbohydrate servings (30 grams) of carbohydrate for a snack  You plan to eat 1 package of granola bars, which contains 2 bars  According to the food label, the serving size of food in this package is 1 bar  Each serving (1 bar) contains 25 grams of carbohydrate  The total amount of carbohydrate in this package of granola bars would be 50 g  Based on your meal plan, you should eat only 1 bar  Follow up with your doctor as directed:  Write down your questions so you remember to ask them during your visits     © Copyright [employer] 2022 Information is for [employer] use only and may not be sold, redistributed or otherwise used for commercial purposes  All illustrations and images included in CareNotes® are the copyrighted property of A D A M , Inc  or [employer] Back  The above information is an  only  It is not intended as medical advice for individual conditions or treatments  Talk to your doctor, nurse or pharmacist before following any medical regimen to see if it is safe and effective for you

## 2022-03-07 NOTE — ASSESSMENT & PLAN NOTE
Improving  Increase Zoloft to 150mg QD  - We discussed the benefits of counseling/therapy support  - Offered referrals as appropriate  - Counseled regarding lifestyle changes, sleep hygiene, breathing exercises, utilizing social supports, and CBT/breathing phone applications   Handout was provided on the same   - ED precautions reviewed for suicidal ideation/plan

## 2022-03-08 ENCOUNTER — TELEPHONE (OUTPATIENT)
Dept: FAMILY MEDICINE CLINIC | Facility: CLINIC | Age: 57
End: 2022-03-08

## 2022-03-08 DIAGNOSIS — E11.21 TYPE 2 DIABETES MELLITUS WITH DIABETIC NEPHROPATHY, WITH LONG-TERM CURRENT USE OF INSULIN (HCC): Primary | ICD-10-CM

## 2022-03-08 DIAGNOSIS — Z79.4 TYPE 2 DIABETES MELLITUS WITH DIABETIC NEPHROPATHY, WITH LONG-TERM CURRENT USE OF INSULIN (HCC): Primary | ICD-10-CM

## 2022-03-08 RX ORDER — BLOOD-GLUCOSE SENSOR
1 EACH MISCELLANEOUS
Qty: 3 EACH | Refills: 11 | Status: SHIPPED | OUTPATIENT
Start: 2022-03-08

## 2022-03-08 RX ORDER — BLOOD-GLUCOSE,RECEIVER,CONT
1 EACH MISCELLANEOUS ONCE
Qty: 1 EACH | Refills: 0 | Status: SHIPPED | OUTPATIENT
Start: 2022-03-08 | End: 2022-03-08

## 2022-03-08 RX ORDER — BLOOD-GLUCOSE TRANSMITTER
1 EACH MISCELLANEOUS
Qty: 1 EACH | Refills: 3 | Status: SHIPPED | OUTPATIENT
Start: 2022-03-08

## 2022-03-08 NOTE — TELEPHONE ENCOUNTER
Merlinda Blizzard from [facility] called stating pts insurance will cover the [employer] and requesting that you send a new prescription for this  musculoskeletal/cognitive

## 2022-03-09 ENCOUNTER — TELEPHONE (OUTPATIENT)
Dept: FAMILY MEDICINE CLINIC | Facility: CLINIC | Age: 57
End: 2022-03-09

## 2022-03-09 NOTE — TELEPHONE ENCOUNTER
Pt called to set up an appt to learn how to attach her new glucometer  Per the MA's in the back, they were never taught how to do it  Suggested she check with her pharmacist and pt states she was told by pharmacist her PCP's office could do this  Any suggestions?

## 2022-03-10 NOTE — ANESTHESIA PREPROCEDURE EVALUATION
Called patient with below. Will route to cardiology as FYI. Pt asking about PPM that was discussed at last OV. Patient appreciative of call and verbalized understanding. Will call with new or worsening symptoms.     Procedure:  LAPAROSCOPIC SLEEVE GASTRECTOMY (N/A Abdomen)    Relevant Problems   ANESTHESIA (within normal limits)      CARDIO   (+) Essential hypertension   (+) Hyperlipidemia      ENDO   (+) Diabetes mellitus, type 2 (HCC)      /RENAL   (+) Diabetic nephropathy associated with type 2 diabetes mellitus (HCC)   (+) Microalbuminuria due to type 2 diabetes mellitus (HCC)      HEMATOLOGY (within normal limits)      NEURO/PSYCH   (+) Current moderate episode of major depressive disorder without prior episode (HCC)      PULMONARY   (+) АНДРЕЙ (obstructive sleep apnea)        Physical Exam    Airway    Mallampati score: I  TM Distance: >3 FB  Neck ROM: full     Dental   No notable dental hx     Cardiovascular  Rhythm: regular, Rate: normal,     Pulmonary  Breath sounds clear to auscultation,     Other Findings        Anesthesia Plan  ASA Score- 3     Anesthesia Type- general with ASA Monitors  Additional Monitors:   Airway Plan: ETT  Plan Factors-Exercise tolerance (METS): >4 METS  Patient is not a current smoker  Patient did not smoke on day of surgery  Induction- intravenous  Postoperative Plan- Plan for postoperative opioid use  Planned trial extubation    Informed Consent- Anesthetic plan and risks discussed with patient  I personally reviewed this patient with the CRNA  Discussed and agreed on the Anesthesia Plan with the CRNA  Porfirio Dinero

## 2022-04-13 DIAGNOSIS — E78.2 MIXED HYPERLIPIDEMIA: ICD-10-CM

## 2022-04-13 DIAGNOSIS — E66.01 MORBID OBESITY (HCC): ICD-10-CM

## 2022-04-13 NOTE — TELEPHONE ENCOUNTER
Cardiovascular:  Antilipid - Statins Failed 04/13/2022 10:01 AM   Protocol Details  Total Cholesterol within 360 days    LDL within 360 days    HDL within 360 days    Triglycerides within 360 days    Valid encounter within last 12 months    Pregnancy status within 30 days      To be filled at: [address]

## 2022-04-14 DIAGNOSIS — F32.1 CURRENT MODERATE EPISODE OF MAJOR DEPRESSIVE DISORDER WITHOUT PRIOR EPISODE (HCC): ICD-10-CM

## 2022-04-14 RX ORDER — TRAZODONE HYDROCHLORIDE 50 MG/1
50 TABLET ORAL
Qty: 90 TABLET | Refills: 1 | Status: SHIPPED | OUTPATIENT
Start: 2022-04-14

## 2022-04-14 RX ORDER — ROSUVASTATIN CALCIUM 20 MG/1
TABLET, COATED ORAL
Qty: 90 TABLET | Refills: 3 | Status: SHIPPED | OUTPATIENT
Start: 2022-04-14

## 2022-05-06 ENCOUNTER — APPOINTMENT (OUTPATIENT)
Dept: LAB | Facility: CLINIC | Age: 57
End: 2022-05-06

## 2022-05-06 ENCOUNTER — APPOINTMENT (OUTPATIENT)
Dept: LAB | Facility: CLINIC | Age: 57
End: 2022-05-06
Payer: COMMERCIAL

## 2022-05-06 DIAGNOSIS — E11.65 UNCONTROLLED TYPE 2 DIABETES MELLITUS WITH HYPERGLYCEMIA (HCC): ICD-10-CM

## 2022-05-06 DIAGNOSIS — E11.21 TYPE 2 DIABETES MELLITUS WITH DIABETIC NEPHROPATHY, WITH LONG-TERM CURRENT USE OF INSULIN (HCC): ICD-10-CM

## 2022-05-06 DIAGNOSIS — E78.2 MIXED HYPERLIPIDEMIA: ICD-10-CM

## 2022-05-06 DIAGNOSIS — Z98.84 S/P LAPAROSCOPIC SLEEVE GASTRECTOMY: ICD-10-CM

## 2022-05-06 DIAGNOSIS — Z79.4 TYPE 2 DIABETES MELLITUS WITH DIABETIC NEPHROPATHY, WITH LONG-TERM CURRENT USE OF INSULIN (HCC): ICD-10-CM

## 2022-05-06 DIAGNOSIS — Z00.8 HEALTH EXAMINATION IN POPULATION SURVEY: ICD-10-CM

## 2022-05-06 DIAGNOSIS — R82.71 BACTERIURIA: Primary | ICD-10-CM

## 2022-05-06 DIAGNOSIS — I10 ESSENTIAL HYPERTENSION: ICD-10-CM

## 2022-05-06 DIAGNOSIS — K91.2 POSTSURGICAL MALABSORPTION: ICD-10-CM

## 2022-05-06 DIAGNOSIS — E66.01 OBESITY, CLASS III, BMI 40-49.9 (MORBID OBESITY) (HCC): ICD-10-CM

## 2022-05-06 LAB
25(OH)D3 SERPL-MCNC: 29.3 NG/ML (ref 30–100)
ALBUMIN SERPL BCP-MCNC: 4.1 G/DL (ref 3.5–5)
ALP SERPL-CCNC: 67 U/L (ref 34–104)
ALT SERPL W P-5'-P-CCNC: 14 U/L (ref 7–52)
ANION GAP SERPL CALCULATED.3IONS-SCNC: 8 MMOL/L (ref 4–13)
AST SERPL W P-5'-P-CCNC: 18 U/L (ref 13–39)
BACTERIA UR QL AUTO: ABNORMAL /HPF
BILIRUB SERPL-MCNC: 0.42 MG/DL (ref 0.2–1)
BILIRUB UR QL STRIP: NEGATIVE
BUN SERPL-MCNC: 20 MG/DL (ref 5–25)
CALCIUM SERPL-MCNC: 9.6 MG/DL (ref 8.4–10.2)
CHLORIDE SERPL-SCNC: 104 MMOL/L (ref 96–108)
CHOLEST SERPL-MCNC: 137 MG/DL
CLARITY UR: ABNORMAL
CO2 SERPL-SCNC: 26 MMOL/L (ref 21–32)
COLOR UR: YELLOW
CREAT SERPL-MCNC: 0.9 MG/DL (ref 0.6–1.3)
CREAT UR-MCNC: 295 MG/DL
ERYTHROCYTE [DISTWIDTH] IN BLOOD BY AUTOMATED COUNT: 13.6 % (ref 11.6–15.1)
EST. AVERAGE GLUCOSE BLD GHB EST-MCNC: 194 MG/DL
FERRITIN SERPL-MCNC: 22 NG/ML (ref 8–388)
FOLATE SERPL-MCNC: 6.6 NG/ML (ref 3.1–17.5)
GFR SERPL CREATININE-BSD FRML MDRD: 72 ML/MIN/1.73SQ M
GLUCOSE P FAST SERPL-MCNC: 111 MG/DL (ref 65–99)
GLUCOSE UR STRIP-MCNC: NEGATIVE MG/DL
HBA1C MFR BLD: 8.4 %
HCT VFR BLD AUTO: 39.2 % (ref 34.8–46.1)
HDLC SERPL-MCNC: 44 MG/DL
HGB BLD-MCNC: 13 G/DL (ref 11.5–15.4)
HGB UR QL STRIP.AUTO: NEGATIVE
IRON SATN MFR SERPL: 15 % (ref 15–50)
IRON SERPL-MCNC: 65 UG/DL (ref 50–170)
KETONES UR STRIP-MCNC: NEGATIVE MG/DL
LDLC SERPL CALC-MCNC: 55 MG/DL (ref 0–100)
LEUKOCYTE ESTERASE UR QL STRIP: ABNORMAL
MCH RBC QN AUTO: 27.1 PG (ref 26.8–34.3)
MCHC RBC AUTO-ENTMCNC: 33.2 G/DL (ref 31.4–37.4)
MCV RBC AUTO: 82 FL (ref 82–98)
MICROALBUMIN UR-MCNC: 80.2 MG/L (ref 0–20)
MICROALBUMIN/CREAT 24H UR: 27 MG/G CREATININE (ref 0–30)
MUCOUS THREADS UR QL AUTO: ABNORMAL
NITRITE UR QL STRIP: POSITIVE
NON-SQ EPI CELLS URNS QL MICRO: ABNORMAL /HPF
PH UR STRIP.AUTO: 5.5 [PH]
PLATELET # BLD AUTO: 333 THOUSANDS/UL (ref 149–390)
PMV BLD AUTO: 8.2 FL (ref 8.9–12.7)
POTASSIUM SERPL-SCNC: 3.9 MMOL/L (ref 3.5–5.3)
PROT SERPL-MCNC: 7.8 G/DL (ref 6.4–8.4)
PROT UR STRIP-MCNC: ABNORMAL MG/DL
PTH-INTACT SERPL-MCNC: 80.4 PG/ML (ref 18.4–80.1)
RBC # BLD AUTO: 4.79 MILLION/UL (ref 3.81–5.12)
RBC #/AREA URNS AUTO: ABNORMAL /HPF
SODIUM SERPL-SCNC: 138 MMOL/L (ref 135–147)
SP GR UR STRIP.AUTO: >=1.03 (ref 1–1.03)
TIBC SERPL-MCNC: 433 UG/DL (ref 250–450)
TRIGL SERPL-MCNC: 190 MG/DL
TSH SERPL DL<=0.05 MIU/L-ACNC: 4.33 UIU/ML (ref 0.45–4.5)
UROBILINOGEN UR QL STRIP.AUTO: 1 E.U./DL
VIT B12 SERPL-MCNC: 394 PG/ML (ref 100–900)
WBC # BLD AUTO: 6.98 THOUSAND/UL (ref 4.31–10.16)
WBC #/AREA URNS AUTO: ABNORMAL /HPF

## 2022-05-06 PROCEDURE — 84630 ASSAY OF ZINC: CPT

## 2022-05-06 PROCEDURE — 84443 ASSAY THYROID STIM HORMONE: CPT

## 2022-05-06 PROCEDURE — 83883 ASSAY NEPHELOMETRY NOT SPEC: CPT

## 2022-05-06 PROCEDURE — 82570 ASSAY OF URINE CREATININE: CPT

## 2022-05-06 PROCEDURE — 84590 ASSAY OF VITAMIN A: CPT

## 2022-05-06 PROCEDURE — 83540 ASSAY OF IRON: CPT

## 2022-05-06 PROCEDURE — 80061 LIPID PANEL: CPT

## 2022-05-06 PROCEDURE — 83918 ORGANIC ACIDS TOTAL QUANT: CPT

## 2022-05-06 PROCEDURE — 82043 UR ALBUMIN QUANTITATIVE: CPT

## 2022-05-06 PROCEDURE — 82306 VITAMIN D 25 HYDROXY: CPT

## 2022-05-06 PROCEDURE — 80053 COMPREHEN METABOLIC PANEL: CPT

## 2022-05-06 PROCEDURE — 81001 URINALYSIS AUTO W/SCOPE: CPT

## 2022-05-06 PROCEDURE — 83036 HEMOGLOBIN GLYCOSYLATED A1C: CPT

## 2022-05-06 PROCEDURE — 82607 VITAMIN B-12: CPT

## 2022-05-06 PROCEDURE — 85027 COMPLETE CBC AUTOMATED: CPT

## 2022-05-06 PROCEDURE — 83970 ASSAY OF PARATHORMONE: CPT

## 2022-05-06 PROCEDURE — 36415 COLL VENOUS BLD VENIPUNCTURE: CPT

## 2022-05-06 PROCEDURE — 84425 ASSAY OF VITAMIN B-1: CPT

## 2022-05-06 PROCEDURE — 82525 ASSAY OF COPPER: CPT

## 2022-05-06 PROCEDURE — 82728 ASSAY OF FERRITIN: CPT

## 2022-05-06 PROCEDURE — 82746 ASSAY OF FOLIC ACID SERUM: CPT

## 2022-05-06 PROCEDURE — 83550 IRON BINDING TEST: CPT

## 2022-05-11 LAB
COPPER SERPL-MCNC: 122 UG/DL (ref 80–158)
RETINOL BIND PROT SERPL-MCNC: 6.2 MG/DL (ref 1.6–6.1)
ZINC SERPL-MCNC: 76 UG/DL (ref 44–115)

## 2022-05-16 LAB — VIT B1 BLD-SCNC: 85.9 NMOL/L (ref 66.5–200)

## 2022-05-17 LAB — METHYLMALONATE SERPL-SCNC: 148 NMOL/L (ref 0–378)

## 2022-05-18 LAB — VIT A SERPL-MCNC: 38.1 UG/DL (ref 20.1–62)

## 2022-08-22 DIAGNOSIS — Z12.39 SCREENING FOR BREAST CANCER: Primary | ICD-10-CM

## 2022-09-01 ENCOUNTER — OFFICE VISIT (OUTPATIENT)
Dept: FAMILY MEDICINE CLINIC | Facility: CLINIC | Age: 57
End: 2022-09-01
Payer: COMMERCIAL

## 2022-09-01 VITALS
SYSTOLIC BLOOD PRESSURE: 116 MMHG | HEIGHT: 58 IN | WEIGHT: 158.8 LBS | TEMPERATURE: 96.5 F | OXYGEN SATURATION: 99 % | HEART RATE: 88 BPM | BODY MASS INDEX: 33.33 KG/M2 | DIASTOLIC BLOOD PRESSURE: 74 MMHG

## 2022-09-01 DIAGNOSIS — N30.00 ACUTE CYSTITIS WITHOUT HEMATURIA: ICD-10-CM

## 2022-09-01 DIAGNOSIS — E11.21 TYPE 2 DIABETES MELLITUS WITH DIABETIC NEPHROPATHY, WITH LONG-TERM CURRENT USE OF INSULIN (HCC): Primary | ICD-10-CM

## 2022-09-01 DIAGNOSIS — F32.1 CURRENT MODERATE EPISODE OF MAJOR DEPRESSIVE DISORDER WITHOUT PRIOR EPISODE (HCC): ICD-10-CM

## 2022-09-01 DIAGNOSIS — Z12.31 ENCOUNTER FOR SCREENING MAMMOGRAM FOR MALIGNANT NEOPLASM OF BREAST: ICD-10-CM

## 2022-09-01 DIAGNOSIS — N89.8 VAGINAL ODOR: ICD-10-CM

## 2022-09-01 DIAGNOSIS — Z12.4 CERVICAL CANCER SCREENING: ICD-10-CM

## 2022-09-01 DIAGNOSIS — Z79.4 TYPE 2 DIABETES MELLITUS WITH DIABETIC NEPHROPATHY, WITH LONG-TERM CURRENT USE OF INSULIN (HCC): Primary | ICD-10-CM

## 2022-09-01 LAB
CREAT UR-MCNC: 194 MG/DL
MICROALBUMIN UR-MCNC: 122 MG/L (ref 0–20)
MICROALBUMIN/CREAT 24H UR: 63 MG/G CREATININE (ref 0–30)
SL AMB  POCT GLUCOSE, UA: 100
SL AMB LEUKOCYTE ESTERASE,UA: ABNORMAL
SL AMB POCT BILIRUBIN,UA: ABNORMAL
SL AMB POCT BLOOD,UA: ABNORMAL
SL AMB POCT CLARITY,UA: ABNORMAL
SL AMB POCT COLOR,UA: YELLOW
SL AMB POCT HEMOGLOBIN AIC: 8.7 (ref ?–6.5)
SL AMB POCT KETONES,UA: ABNORMAL
SL AMB POCT NITRITE,UA: ABNORMAL
SL AMB POCT PH,UA: 5
SL AMB POCT SPECIFIC GRAVITY,UA: 1.02
SL AMB POCT URINE PROTEIN: ABNORMAL
SL AMB POCT UROBILINOGEN: ABNORMAL

## 2022-09-01 PROCEDURE — 82570 ASSAY OF URINE CREATININE: CPT | Performed by: FAMILY MEDICINE

## 2022-09-01 PROCEDURE — 99215 OFFICE O/P EST HI 40 MIN: CPT | Performed by: FAMILY MEDICINE

## 2022-09-01 PROCEDURE — 87077 CULTURE AEROBIC IDENTIFY: CPT | Performed by: FAMILY MEDICINE

## 2022-09-01 PROCEDURE — 83036 HEMOGLOBIN GLYCOSYLATED A1C: CPT | Performed by: FAMILY MEDICINE

## 2022-09-01 PROCEDURE — 82043 UR ALBUMIN QUANTITATIVE: CPT | Performed by: FAMILY MEDICINE

## 2022-09-01 PROCEDURE — 87186 SC STD MICRODIL/AGAR DIL: CPT | Performed by: FAMILY MEDICINE

## 2022-09-01 PROCEDURE — 87086 URINE CULTURE/COLONY COUNT: CPT | Performed by: FAMILY MEDICINE

## 2022-09-01 PROCEDURE — 81002 URINALYSIS NONAUTO W/O SCOPE: CPT | Performed by: FAMILY MEDICINE

## 2022-09-01 RX ORDER — INSULIN ASPART 100 [IU]/ML
10 INJECTION, SOLUTION INTRAVENOUS; SUBCUTANEOUS
Qty: 15 ML | Refills: 0
Start: 2022-09-01

## 2022-09-01 RX ORDER — INSULIN GLARGINE 300 U/ML
26 INJECTION, SOLUTION SUBCUTANEOUS
Qty: 6 ML | Refills: 0
Start: 2022-09-01

## 2022-09-01 RX ORDER — METRONIDAZOLE 500 MG/1
500 TABLET ORAL EVERY 8 HOURS SCHEDULED
Qty: 21 TABLET | Refills: 0 | Status: SHIPPED | OUTPATIENT
Start: 2022-09-01 | End: 2022-09-08

## 2022-09-01 NOTE — ASSESSMENT & PLAN NOTE
Lab Results   Component Value Date    HGBA1C 8 7 (A) 09/01/2022   stagnation of improvement (better than 12% in March, but last A1c 8 4 in May)  increa Toujeo to 26u HS, continue Novolog 10u TID AC, Rybelsis  Dexcom placed today and will manage with this, hopefully for tighter control  Eye exam next week, foot exam next visit, urine micro collected today

## 2022-09-01 NOTE — ASSESSMENT & PLAN NOTE
Reduce Zoloft to 200mg she increased to 250 without provider approval  Advised not to adjust medications on her own without clearing with me, as this could be dangerous to her health     Continue Zoloft 200mg QD  May add adjunct if not improving

## 2022-09-02 RX ORDER — SULFAMETHOXAZOLE AND TRIMETHOPRIM 800; 160 MG/1; MG/1
1 TABLET ORAL 2 TIMES DAILY
Qty: 6 TABLET | Refills: 0 | Status: SHIPPED | OUTPATIENT
Start: 2022-09-02 | End: 2022-09-05

## 2022-09-03 LAB — BACTERIA UR CULT: ABNORMAL

## 2022-09-06 ENCOUNTER — TELEPHONE (OUTPATIENT)
Dept: FAMILY MEDICINE CLINIC | Facility: CLINIC | Age: 57
End: 2022-09-06

## 2022-09-06 DIAGNOSIS — Z79.4 TYPE 2 DIABETES MELLITUS WITH DIABETIC NEPHROPATHY, WITH LONG-TERM CURRENT USE OF INSULIN (HCC): Primary | ICD-10-CM

## 2022-09-06 DIAGNOSIS — E11.21 TYPE 2 DIABETES MELLITUS WITH DIABETIC NEPHROPATHY, WITH LONG-TERM CURRENT USE OF INSULIN (HCC): Primary | ICD-10-CM

## 2022-09-06 NOTE — TELEPHONE ENCOUNTER
She should take and complete the Bactrim for the urine infection  The Flagyl (metronidazole) was sent initially for the vaginal odor, but her symptoms were from the urine infection

## 2022-09-06 NOTE — TELEPHONE ENCOUNTER
Patient called stating she is confused about medications sent in     She states one antibiotic was sent in for her on 9/1 and another one was sent in on 9/2    She isn't sure if you want her to take both or if the one sent in on 9/2 is supposed to replace the one sent in on 9/1    Please advise

## 2022-09-07 RX ORDER — BLOOD-GLUCOSE,RECEIVER,CONT
1 EACH MISCELLANEOUS DAILY
Qty: 1 EACH | Refills: 0 | Status: SHIPPED | OUTPATIENT
Start: 2022-09-07

## 2022-09-07 NOTE — TELEPHONE ENCOUNTER
Patient informed     She is also asking if you can please send a script to pharmacy for a  to go with her dexcom   She states she is having trouble getting it to connect to her phone       [employer]

## 2022-09-12 ENCOUNTER — TELEPHONE (OUTPATIENT)
Dept: FAMILY MEDICINE CLINIC | Facility: CLINIC | Age: 57
End: 2022-09-12

## 2022-09-12 NOTE — TELEPHONE ENCOUNTER
Patient states that since this past Thursday, she has been getting very bad headaches that affects her vision and fevers but only at night   Fever is usually around 100    She states she wakes up in the mornings and feels fine    This morning while at work she states she is experiencing a headache     She is asking if she should come in and see you     She has not tested for COVID and states she did have COVID the beginning of August

## 2022-09-12 NOTE — TELEPHONE ENCOUNTER
Recommend if she is having vision changes with her headache today she goes to the ER for immediate evaluation  If just the headache but otherwise well may schedule same day today

## 2022-09-13 ENCOUNTER — APPOINTMENT (EMERGENCY)
Dept: CT IMAGING | Facility: HOSPITAL | Age: 57
End: 2022-09-13
Payer: COMMERCIAL

## 2022-09-13 ENCOUNTER — TELEPHONE (OUTPATIENT)
Dept: FAMILY MEDICINE CLINIC | Facility: CLINIC | Age: 57
End: 2022-09-13

## 2022-09-13 ENCOUNTER — HOSPITAL ENCOUNTER (EMERGENCY)
Facility: HOSPITAL | Age: 57
Discharge: HOME | End: 2022-09-13
Attending: INTERNAL MEDICINE
Payer: COMMERCIAL

## 2022-09-13 VITALS
BODY MASS INDEX: 32.54 KG/M2 | HEART RATE: 88 BPM | WEIGHT: 155 LBS | OXYGEN SATURATION: 100 % | RESPIRATION RATE: 16 BRPM | DIASTOLIC BLOOD PRESSURE: 58 MMHG | SYSTOLIC BLOOD PRESSURE: 133 MMHG | TEMPERATURE: 98 F | HEIGHT: 58 IN

## 2022-09-13 DIAGNOSIS — G43.809 OTHER MIGRAINE WITHOUT STATUS MIGRAINOSUS, NOT INTRACTABLE: Primary | ICD-10-CM

## 2022-09-13 LAB
ALBUMIN SERPL BCP-MCNC: 3.9 G/DL (ref 3.5–5)
ALP SERPL-CCNC: 76 U/L (ref 34–104)
ALT SERPL W P-5'-P-CCNC: 16 U/L (ref 7–52)
ANION GAP SERPL CALCULATED.3IONS-SCNC: 8 MMOL/L (ref 4–13)
AST SERPL W P-5'-P-CCNC: 19 U/L (ref 13–39)
BASOPHILS # BLD AUTO: 0.05 THOUSANDS/ΜL (ref 0–0.1)
BASOPHILS NFR BLD AUTO: 1 % (ref 0–1)
BILIRUB SERPL-MCNC: 0.24 MG/DL (ref 0.2–1)
BUN SERPL-MCNC: 18 MG/DL (ref 5–25)
CALCIUM SERPL-MCNC: 9.7 MG/DL (ref 8.4–10.2)
CHLORIDE SERPL-SCNC: 107 MMOL/L (ref 96–108)
CO2 SERPL-SCNC: 25 MMOL/L (ref 21–32)
CREAT SERPL-MCNC: 1.14 MG/DL (ref 0.6–1.3)
EOSINOPHIL # BLD AUTO: 0.51 THOUSAND/ΜL (ref 0–0.61)
EOSINOPHIL NFR BLD AUTO: 7 % (ref 0–6)
ERYTHROCYTE [DISTWIDTH] IN BLOOD BY AUTOMATED COUNT: 13.6 % (ref 11.6–15.1)
ERYTHROCYTE [SEDIMENTATION RATE] IN BLOOD: 51 MM/HOUR (ref 0–30)
GFR SERPL CREATININE-BSD FRML MDRD: 54 ML/MIN/1.73SQ M
GLUCOSE SERPL-MCNC: 146 MG/DL (ref 65–140)
HCT VFR BLD AUTO: 34.6 % (ref 34.8–46.1)
HGB BLD-MCNC: 11.2 G/DL (ref 11.5–15.4)
IMM GRANULOCYTES # BLD AUTO: 0.02 THOUSAND/UL (ref 0–0.2)
IMM GRANULOCYTES NFR BLD AUTO: 0 % (ref 0–2)
LYMPHOCYTES # BLD AUTO: 2.98 THOUSANDS/ΜL (ref 0.6–4.47)
LYMPHOCYTES NFR BLD AUTO: 43 % (ref 14–44)
MCH RBC QN AUTO: 26.4 PG (ref 26.8–34.3)
MCHC RBC AUTO-ENTMCNC: 32.4 G/DL (ref 31.4–37.4)
MCV RBC AUTO: 81 FL (ref 82–98)
MONOCYTES # BLD AUTO: 0.55 THOUSAND/ΜL (ref 0.17–1.22)
MONOCYTES NFR BLD AUTO: 8 % (ref 4–12)
NEUTROPHILS # BLD AUTO: 2.82 THOUSANDS/ΜL (ref 1.85–7.62)
NEUTS SEG NFR BLD AUTO: 41 % (ref 43–75)
NRBC BLD AUTO-RTO: 0 /100 WBCS
PLATELET # BLD AUTO: 256 THOUSANDS/UL (ref 149–390)
PMV BLD AUTO: 8.5 FL (ref 8.9–12.7)
POTASSIUM SERPL-SCNC: 4.5 MMOL/L (ref 3.5–5.3)
PROT SERPL-MCNC: 7.2 G/DL (ref 6.4–8.4)
RBC # BLD AUTO: 4.25 MILLION/UL (ref 3.81–5.12)
SODIUM SERPL-SCNC: 140 MMOL/L (ref 135–147)
WBC # BLD AUTO: 6.93 THOUSAND/UL (ref 4.31–10.16)

## 2022-09-13 PROCEDURE — 96376 TX/PRO/DX INJ SAME DRUG ADON: CPT

## 2022-09-13 PROCEDURE — 36415 COLL VENOUS BLD VENIPUNCTURE: CPT | Performed by: INTERNAL MEDICINE

## 2022-09-13 PROCEDURE — 80053 COMPREHEN METABOLIC PANEL: CPT | Performed by: INTERNAL MEDICINE

## 2022-09-13 PROCEDURE — 85025 COMPLETE CBC W/AUTO DIFF WBC: CPT | Performed by: INTERNAL MEDICINE

## 2022-09-13 PROCEDURE — 85652 RBC SED RATE AUTOMATED: CPT | Performed by: INTERNAL MEDICINE

## 2022-09-13 PROCEDURE — 96361 HYDRATE IV INFUSION ADD-ON: CPT

## 2022-09-13 PROCEDURE — 99284 EMERGENCY DEPT VISIT MOD MDM: CPT

## 2022-09-13 PROCEDURE — 99284 EMERGENCY DEPT VISIT MOD MDM: CPT | Performed by: INTERNAL MEDICINE

## 2022-09-13 PROCEDURE — 70450 CT HEAD/BRAIN W/O DYE: CPT

## 2022-09-13 PROCEDURE — 96375 TX/PRO/DX INJ NEW DRUG ADDON: CPT

## 2022-09-13 PROCEDURE — 96374 THER/PROPH/DIAG INJ IV PUSH: CPT

## 2022-09-13 PROCEDURE — G1004 CDSM NDSC: HCPCS

## 2022-09-13 RX ORDER — BUTALBITAL, ACETAMINOPHEN AND CAFFEINE 300; 40; 50 MG/1; MG/1; MG/1
1 CAPSULE ORAL 4 TIMES DAILY PRN
Qty: 28 CAPSULE | Refills: 0 | Status: SHIPPED | OUTPATIENT
Start: 2022-09-13

## 2022-09-13 RX ORDER — SODIUM CHLORIDE 9 MG/ML
125 INJECTION, SOLUTION INTRAVENOUS CONTINUOUS
Status: DISCONTINUED | OUTPATIENT
Start: 2022-09-13 | End: 2022-09-13 | Stop reason: HOSPADM

## 2022-09-13 RX ORDER — DIPHENHYDRAMINE HYDROCHLORIDE 50 MG/ML
25 INJECTION INTRAMUSCULAR; INTRAVENOUS ONCE
Status: COMPLETED | OUTPATIENT
Start: 2022-09-13 | End: 2022-09-13

## 2022-09-13 RX ORDER — KETOROLAC TROMETHAMINE 30 MG/ML
15 INJECTION, SOLUTION INTRAMUSCULAR; INTRAVENOUS ONCE
Status: COMPLETED | OUTPATIENT
Start: 2022-09-13 | End: 2022-09-13

## 2022-09-13 RX ORDER — ONDANSETRON 2 MG/ML
4 INJECTION INTRAMUSCULAR; INTRAVENOUS ONCE
Status: DISCONTINUED | OUTPATIENT
Start: 2022-09-13 | End: 2022-09-13 | Stop reason: HOSPADM

## 2022-09-13 RX ADMIN — KETOROLAC TROMETHAMINE 15 MG: 30 INJECTION, SOLUTION INTRAMUSCULAR at 07:13

## 2022-09-13 RX ADMIN — SODIUM CHLORIDE 125 ML/HR: 9 INJECTION, SOLUTION INTRAVENOUS at 06:36

## 2022-09-13 RX ADMIN — DIPHENHYDRAMINE HYDROCHLORIDE 25 MG: 50 INJECTION, SOLUTION INTRAMUSCULAR; INTRAVENOUS at 06:36

## 2022-09-13 RX ADMIN — KETOROLAC TROMETHAMINE 15 MG: 30 INJECTION, SOLUTION INTRAMUSCULAR at 07:47

## 2022-09-13 NOTE — ED PROVIDER NOTES
History  Chief Complaint   Patient presents with    Headache     Pt reports pressure like headaches starting last Thursday with sensitivity to light, no meds PTA     A this is a 56years old came for having headache for 4 days  Patient stated the headache started on Thursday and next day she has low-grade fever  Patient has low-grade fever at night time  Patient stated the headache is a pressure over the head and goes all the way to the neck  Patient having photophobia  Patient stated that this headache is different from done before  This is not the worst headache she ever have  Patient described headache as 8/10  Patient has no nausea vomiting  Patient has no blurred vision  Patient denies any numbness or tingling of upper or lower extremities  Patient denies any change of her speech  Patient denies history of stroke  Patient has history of microadenoma of the pituitary and she is followed with MRI  Last MRI was 3 years ago  Patient has history of diabetes hypertension and she is compliant with her medications  Patient took Tylenol at home 2 days ago with no effect        History provided by:  Patient   used: No    Headache  Pain location:  Generalized  Quality:  Dull  Radiates to:  L neck and R neck  Severity currently:  8/10  Severity at highest:  8/10  Onset quality:  Gradual  Timing:  Constant  Progression:  Unchanged  Chronicity:  Recurrent  Context: bright light    Context: not activity, not caffeine, not coughing, not defecating, not eating, not stress, not exposure to cold air, not loud noise and not straining    Relieved by:  Nothing  Worsened by:  Light  Ineffective treatments:  Acetaminophen  Associated symptoms: no abdominal pain, no back pain, no blurred vision, no congestion, no cough, no diarrhea, no dizziness, no drainage, no ear pain, no eye pain, no facial pain, no fatigue, no fever, no focal weakness, no hearing loss, no loss of balance, no myalgias, no nausea, no near-syncope, no neck pain, no neck stiffness, no numbness, no paresthesias, no photophobia, no seizures, no sinus pressure, no sore throat, no swollen glands, no syncope, no tingling, no URI, no visual change, no vomiting and no weakness    Risk factors: no family hx of SAH        Prior to Admission Medications   Prescriptions Last Dose Informant Patient Reported? Taking?    Blood Glucose Monitoring Suppl (Contour Next Monitor) w/Device KIT  Self Yes No   Continuous Blood Gluc  (Dexcom G6 ) GREG   No No   Sig: Use 1 Device in the morning   Continuous Blood Gluc Sensor (Dexcom G6 Sensor) MISC  Self No No   Sig: Use 1 each every 14 (fourteen) days   Patient not taking: No sig reported   Continuous Blood Gluc Transmit (Dexcom G6 Transmitter) MISC  Self No No   Sig: Use 1 each every 3 (three) months   Patient not taking: No sig reported   FreeStyle Unistick II Lancets MISC  Self No No   Sig: Patient tests blood sugars daily   Insulin Pen Needle 32G X 5 MM MISC  Self No No   Sig: Use 4 (four) times a day   Semaglutide (Rybelsus) 14 MG TABS  Self Yes No   Sig: Take by mouth   glucose blood (FREESTYLE LITE) test strip  Self No No   Sig: Use 1 each daily Use as instructed   insulin aspart (NovoLOG FlexPen) 100 UNIT/ML injection pen   No No   Sig: Inject 10 Units under the skin 3 (three) times a day with meals   insulin glargine (Toujeo Max SoloStar) 300 units/mL CONCENTRATED U-300 injection pen (2-unit dial)   No No   Sig: Inject 26 Units under the skin daily at bedtime   lisinopril (ZESTRIL) 20 mg tablet  Self No No   Sig: Take 0 5 tablets (10 mg total) by mouth daily   mometasone (NASONEX) 50 mcg/act nasal spray  Self No No   Si sprays into each nostril daily   rosuvastatin (CRESTOR) 20 MG tablet  Self No No   Sig: TAKE ONE TABLET BY MOUTH EVERY DAY AT BEDTIME   sertraline (ZOLOFT) 100 mg tablet  Self No No   Sig: Take 1 tablet (100 mg total) by mouth daily   sertraline (Zoloft) 50 mg tablet  Self No No   Sig: Take 1 tablet (50 mg total) by mouth daily Take in addition to 100mg tab daily for total daily dose 150mg    traZODone (DESYREL) 50 mg tablet  Self No No   Sig: Take 1 tablet (50 mg total) by mouth daily at bedtime      Facility-Administered Medications: None       Past Medical History:   Diagnosis Date    Anemia     Last Assessed:  3/27/13a    Arthritis     Asthma     Blurred vision     Chronic kidney disease     Diabetes mellitus ([facility] )     was in touch with PCP re [facility]- she adjusted insulin doses and is seeing surgeon 20    Diabetic retinopathy ([facility] )     Dream enactment behavior     Head injury     Hypercholesterolemia     Hyperlipidemia     Hypertension     Increased urinary frequency     Irritable bowel syndrome     Last Assessed:  10/2/12    Joint pain     Morbid obesity with BMI of 40 0-44 9, adult ([facility] )     Nasal congestion     Nausea     Night sweat     Nightmare disorder 2020    Numbness     UTI (urinary tract infection)     Weakness        Past Surgical History:   Procedure Laterality Date    ABDOMINAL SURGERY      CARPAL TUNNEL RELEASE Right      SECTION      x 2    COLONOSCOPY  2016    COLONOSCOPY      [place] LAP, [place]BAN RESTRICT PROC, LONGITUDINAL GASTRECTOMY N/A 2020    Procedure: LAPAROSCOPIC SLEEVE GASTRECTOMY  WITH EGD;  Surgeon: Bibiana Castro MD;  Location: [facility];  Service: Bariatrics    [place] WRIST Charmel Keara LIG Left 2019    Procedure: RELEASE CARPAL TUNNEL ENDOSCOPIC-left;  Surgeon: Keyla Garcia MD;  Location:  MAIN OR;  Service: Orthopedics    TUBAL LIGATION      WISDOM TOOTH EXTRACTION         Family History   Problem Relation Age of Onset    Diabetes Mother     Hypertension Mother     Diabetes Father     Heart disease Father     Stroke Father     Hypertension Father     Diverticulitis Father     Hyperlipidemia Father     Heart attack Father     Diabetes Maternal Grandmother     Pancreatic cancer Maternal Grandmother     Liver cancer Maternal Grandfather     Alcohol abuse Maternal Grandfather     Heart disease Paternal Grandmother     Crohn's disease Sister     Diabetes type I Daughter     Eczema Daughter     Diabetes Daughter     Heart attack Paternal Grandfather     Asthma Maternal Aunt     Alcohol abuse Maternal Uncle     Mental illness Paternal Aunt     Schizophrenia Paternal Aunt     Diabetes Paternal Aunt     Kidney disease Paternal Aunt     Alcohol abuse Paternal Uncle     Diabetes Paternal Uncle     Eczema Son     Drug abuse Cousin     Bipolar disorder Cousin     Colon cancer Neg Hx     Breast cancer Neg Hx      I have reviewed and agree with the history as documented  E-Cigarette/Vaping    E-Cigarette Use Never User      E-Cigarette/Vaping Substances     Social History     Tobacco Use    Smoking status: Former Smoker     Types: Cigarettes     Quit date:      Years since quittin 7    Smokeless tobacco: Former User   Vaping Use    Vaping Use: Never used   Substance Use Topics    Alcohol use: Yes     Comment: rarely    Drug use: No       Review of Systems   Constitutional: Negative for fatigue and fever  HENT: Negative for congestion, drooling, ear discharge, ear pain, hearing loss, postnasal drip, sinus pressure, sore throat, tinnitus and trouble swallowing  Eyes: Negative for blurred vision, photophobia and pain  Respiratory: Negative for cough and shortness of breath  Cardiovascular: Negative for chest pain, palpitations, syncope and near-syncope  Gastrointestinal: Negative for abdominal pain, diarrhea, nausea and vomiting  Genitourinary: Negative for difficulty urinating, dysuria, flank pain and frequency  Musculoskeletal: Negative for back pain, myalgias, neck pain and neck stiffness  Skin: Negative for color change, pallor and rash  Neurological: Positive for headaches   Negative for dizziness, tremors, focal weakness, seizures, syncope, weakness, light-headedness, numbness, paresthesias and loss of balance  Psychiatric/Behavioral: Negative for agitation and behavioral problems  Physical Exam  Physical Exam  Constitutional:       General: She is not in acute distress  Appearance: She is well-developed and normal weight  She is not ill-appearing, toxic-appearing or diaphoretic  HENT:      Head: Normocephalic and atraumatic  Right Ear: Ear canal normal       Left Ear: Ear canal normal       Nose: Nose normal  No congestion or rhinorrhea  Mouth/Throat:      Mouth: Mucous membranes are moist       Pharynx: No oropharyngeal exudate or posterior oropharyngeal erythema  Eyes:      Extraocular Movements: Extraocular movements intact  Pupils: Pupils are equal, round, and reactive to light  Neck:      Vascular: No carotid bruit  Cardiovascular:      Rate and Rhythm: Normal rate and regular rhythm  Heart sounds: Normal heart sounds  No murmur heard  No friction rub  No gallop  Pulmonary:      Effort: Pulmonary effort is normal  No respiratory distress  Breath sounds: Normal breath sounds  No wheezing, rhonchi or rales  Abdominal:      General: Bowel sounds are normal  There is no distension  Palpations: Abdomen is soft  There is no mass  Tenderness: There is no abdominal tenderness  There is no right CVA tenderness, left CVA tenderness, guarding or rebound  Hernia: No hernia is present  Musculoskeletal:         General: No swelling, tenderness, deformity or signs of injury  Normal range of motion  Cervical back: Normal range of motion and neck supple  No rigidity or tenderness  Right lower leg: No edema  Left lower leg: No edema  Lymphadenopathy:      Cervical: No cervical adenopathy  Skin:     General: Skin is warm and dry  Capillary Refill: Capillary refill takes less than 2 seconds  Coloration: Skin is not jaundiced or pale        Findings: No bruising, erythema, lesion or rash  Neurological:      General: No focal deficit present  Mental Status: She is alert and oriented to person, place, and time  Cranial Nerves: No cranial nerve deficit  Sensory: No sensory deficit  Motor: No weakness        Coordination: Coordination normal    Psychiatric:         Mood and Affect: Mood normal          Behavior: Behavior normal          Vital Signs  ED Triage Vitals   Temperature Pulse Respirations Blood Pressure SpO2   09/13/22 0603 09/13/22 0603 09/13/22 0603 09/13/22 0604 09/13/22 0603   98 °F (36 7 °C) 88 16 133/58 100 %      Temp Source Heart Rate Source Patient Position - Orthostatic VS BP Location FiO2 (%)   09/13/22 0603 -- -- -- --   Oral          Pain Score       09/13/22 0747       3           Vitals:    09/13/22 0603 09/13/22 0604   BP:  133/58   Pulse: 88          Visual Acuity      ED Medications  Medications   diphenhydrAMINE (BENADRYL) injection 25 mg (25 mg Intravenous Given 9/13/22 0636)   ketorolac (TORADOL) injection 15 mg (15 mg Intravenous Given 9/13/22 0713)   ketorolac (TORADOL) injection 15 mg (15 mg Intravenous Given 9/13/22 0747)       Diagnostic Studies  Results Reviewed     Procedure Component Value Units Date/Time    Sedimentation rate, automated [[id number]]  (Abnormal) Collected: 09/13/22 0638    Lab Status: Final result Specimen: Blood from Arm, Right Updated: 09/13/22 0729     Sed Rate 51 mm/hour     Comprehensive metabolic panel [[id number]]  (Abnormal) Collected: 09/13/22 0638    Lab Status: Final result Specimen: Blood from Arm, Right Updated: 09/13/22 0709     Sodium 140 mmol/L      Potassium 4 5 mmol/L      Chloride 107 mmol/L      CO2 25 mmol/L      ANION GAP 8 mmol/L      BUN 18 mg/dL      Creatinine 1 14 mg/dL      Glucose 146 mg/dL      Calcium 9 7 mg/dL      AST 19 U/L      ALT 16 U/L      Alkaline Phosphatase 76 U/L      Total Protein 7 2 g/dL      Albumin 3 9 g/dL      Total Bilirubin 0 24 mg/dL      eGFR 47 ml/min/1 73sq m     Narrative:      [place] guidelines for Chronic Kidney Disease (CKD):     Stage 1 with normal or high GFR (GFR > 90 mL/min/1 73 square meters)    Stage 2 Mild CKD (GFR = 60-89 mL/min/1 73 square meters)    Stage 3A Moderate CKD (GFR = 45-59 mL/min/1 73 square meters)    Stage 3B Moderate CKD (GFR = 30-44 mL/min/1 73 square meters)    Stage 4 Severe CKD (GFR = 15-29 mL/min/1 73 square meters)    Stage 5 End Stage CKD (GFR <15 mL/min/1 73 square meters)  Note: GFR calculation is accurate only with a steady state creatinine    CBC and differential [[id number]]  (Abnormal) Collected: 09/13/22 0638    Lab Status: Final result Specimen: Blood from Arm, Right Updated: 09/13/22 0650     WBC 6 93 Thousand/uL      RBC 4 25 Million/uL      Hemoglobin 11 2 g/dL      Hematocrit 34 6 %      MCV 81 fL      MCH 26 4 pg      MCHC 32 4 g/dL      RDW 13 6 %      MPV 8 5 fL      Platelets 668 Thousands/uL      nRBC 0 /100 WBCs      Neutrophils Relative 41 %      Immat GRANS % 0 %      Lymphocytes Relative 43 %      Monocytes Relative 8 %      Eosinophils Relative 7 %      Basophils Relative 1 %      Neutrophils Absolute 2 82 Thousands/µL      Immature Grans Absolute 0 02 Thousand/uL      Lymphocytes Absolute 2 98 Thousands/µL      Monocytes Absolute 0 55 Thousand/µL      Eosinophils Absolute 0 51 Thousand/µL      Basophils Absolute 0 05 Thousands/µL                  CT head wo contrast   Final Result by Janene Perez MD (09/13 0719)      No evidence of acute intracranial process  Workstation performed: [id number]                    Procedures  Procedures         ED Course  ED Course as of 09/14/22 0949   Tue Sep 13, 2022   9763 PT started to feel better on the treatment  Will give another dose of toradol                                  SBIRT 22yo+    Flowsheet Row Most Recent Value   SBIRT (23 yo +)    In order to provide better care to our patients, we are screening all of our patients for alcohol and drug use  Would it be okay to ask you these screening questions? No Filed at: 09/13/2022 0641                    [facility]  Number of Diagnoses or Management Options  Diagnosis management comments: This is 51y old came for having HA for 4 days  Pt has HA on the top of the head and go to her neck  Pt had low grad fever 2 days ago   Pt has photophobia and she denies nausea vomiting, blurred vision  Pt has no eye pain  Labs came back OK and has ESR 50  CT head is negative  Pt received IV fluids ,zofran, toradol , benadryl and felt better  Will discharge home on fiorcet PRN for HA   Pt is feeling better and would like to go home  I rechecked the pt and undated on the results of the ED findings, including physical exam,diagnosis, and all abnormal test results  I discussed the plan of discharge with the pt and advised to follow up with PCP or to return to the ED for any new or worsening symptoms  pt understands and agrees with the plan of care  All questions and concerns have been addressed at this time           Amount and/or Complexity of Data Reviewed  Clinical lab tests: ordered and reviewed  Tests in the radiology section of CPT®: ordered and reviewed  Decide to obtain previous medical records or to obtain history from someone other than the patient: yes    Risk of Complications, Morbidity, and/or Mortality  Presenting problems: moderate  Diagnostic procedures: moderate  Management options: moderate    Patient Progress  Patient progress: improved      Disposition  Final diagnoses:   Other migraine without status migrainosus, not intractable     Time reflects when diagnosis was documented in both MDM as applicable and the Disposition within this note     Time User Action Codes Description Comment    9/13/2022  7:56 AM Ronan Harkins Add [G43 119] Other migraine without status migrainosus, not intractable       ED Disposition     ED Disposition   Discharge    Condition Stable    Date/Time   Tue Sep 13, 2022  8:02 AM    Comment   Karen Ngo discharge to home/self care                 Follow-up Information     Follow up With Specialties Details Why Rose Marie Reed MD Family Medicine In 1 week  [id number] [place] [address] Diane Ulloa [address] [facility]  [phone]            Discharge Medication List as of 9/13/2022  8:02 AM      START taking these medications    Details   Butalbital-APAP-Caffeine (Fioricet) -40 MG CAPS Take 1 tablet by mouth 4 (four) times a day as needed (for Migraine Headache) for up to 28 doses, Starting Tue 9/13/2022, Normal         CONTINUE these medications which have NOT CHANGED    Details   Continuous Blood Gluc  (Dexcom G6 ) GREG Use 1 Device in the morning, Starting Wed 9/7/2022, Normal      Blood Glucose Monitoring Suppl (Contour Next Monitor) w/Device KIT Starting Fri 9/17/2021, Historical Med      Continuous Blood Gluc Sensor (Dexcom G6 Sensor) MISC Use 1 each every 14 (fourteen) days, Starting Tue 3/8/2022, Normal      Continuous Blood Gluc Transmit (Dexcom G6 Transmitter) MISC Use 1 each every 3 (three) months, Starting Tue 3/8/2022, Normal      FreeStyle Unistick II Lancets MISC Patient tests blood sugars daily, Normal      glucose blood (FREESTYLE LITE) test strip Use 1 each daily Use as instructed, Starting Fri 9/17/2021, Normal      insulin aspart (NovoLOG FlexPen) 100 UNIT/ML injection pen Inject 10 Units under the skin 3 (three) times a day with meals, Starting Thu 9/1/2022, No Print      insulin glargine (Toujeo Max SoloStar) 300 units/mL CONCENTRATED U-300 injection pen (2-unit dial) Inject 26 Units under the skin daily at bedtime, Starting Thu 9/1/2022, No Print      Insulin Pen Needle 32G X 5 MM MISC Use 4 (four) times a day, Starting Mon 3/7/2022, Normal      lisinopril (ZESTRIL) 20 mg tablet Take 0 5 tablets (10 mg total) by mouth daily, Starting Tue 11/16/2021, Normal      mometasone (NASONEX) 50 mcg/act nasal spray 2 sprays into each nostril daily, Starting Tue 8/30/2022, Normal      rosuvastatin (CRESTOR) 20 MG tablet TAKE ONE TABLET BY MOUTH EVERY DAY AT BEDTIME, Normal      Semaglutide (Rybelsus) 14 MG TABS Take by mouth, Historical Med      !! sertraline (ZOLOFT) 100 mg tablet Take 1 tablet (100 mg total) by mouth daily, Starting Tue 11/16/2021, Normal      !! sertraline (Zoloft) 50 mg tablet Take 1 tablet (50 mg total) by mouth daily Take in addition to 100mg tab daily for total daily dose 150mg , Starting Mon 3/7/2022, Normal      traZODone (DESYREL) 50 mg tablet Take 1 tablet (50 mg total) by mouth daily at bedtime, Starting Thu 4/14/2022, Normal       !! - Potential duplicate medications found  Please discuss with provider  No discharge procedures on file      PDMP Review       Value Time User    PDMP Reviewed  Yes 9/1/2020  8:33 AM Maite Villeda PA-C          ED Provider  Electronically Signed by           Andrew Santo MD  09/14/22 5954

## 2022-09-13 NOTE — DISCHARGE INSTRUCTIONS
Follow up with dr Cristhian Reed  Take medications as prescribed  Labs Reviewed   SEDIMENTATION RATE - Abnormal       Result Value Ref Range Status    Sed Rate 51 (*) 0 - 30 mm/hour Final   CBC AND DIFFERENTIAL - Abnormal    WBC 6 93  4 31 - 10 16 Thousand/uL Final    RBC 4 25  3 81 - 5 12 Million/uL Final    Hemoglobin 11 2 (*) 11 5 - 15 4 g/dL Final    Hematocrit 34 6 (*) 34 8 - 46 1 % Final    MCV 81 (*) 82 - 98 fL Final    MCH 26 4 (*) 26 8 - 34 3 pg Final    MCHC 32 4  31 4 - 37 4 g/dL Final    RDW 13 6  11 6 - 15 1 % Final    MPV 8 5 (*) 8 9 - 12 7 fL Final    Platelets 455  324 - 390 Thousands/uL Final    nRBC 0  /100 WBCs Final    Neutrophils Relative 41 (*) 43 - 75 % Final    Immat GRANS % 0  0 - 2 % Final    Lymphocytes Relative 43  14 - 44 % Final    Monocytes Relative 8  4 - 12 % Final    Eosinophils Relative 7 (*) 0 - 6 % Final    Basophils Relative 1  0 - 1 % Final    Neutrophils Absolute 2 82  1 85 - 7 62 Thousands/µL Final    Immature Grans Absolute 0 02  0 00 - 0 20 Thousand/uL Final    Lymphocytes Absolute 2 98  0 60 - 4 47 Thousands/µL Final    Monocytes Absolute 0 55  0 17 - 1 22 Thousand/µL Final    Eosinophils Absolute 0 51  0 00 - 0 61 Thousand/µL Final    Basophils Absolute 0 05  0 00 - 0 10 Thousands/µL Final   COMPREHENSIVE METABOLIC PANEL - Abnormal    Sodium 140  135 - 147 mmol/L Final    Potassium 4 5  3 5 - 5 3 mmol/L Final    Chloride 107  96 - 108 mmol/L Final    CO2 25  21 - 32 mmol/L Final    ANION GAP 8  4 - 13 mmol/L Final    BUN 18  5 - 25 mg/dL Final    Creatinine 1 14  0 60 - 1 30 mg/dL Final    Comment: Standardized to IDMS reference method    Glucose 146 (*) 65 - 140 mg/dL Final    Comment: If the patient is fasting, the ADA then defines impaired fasting glucose as > 100 mg/dL and diabetes as > or equal to 123 mg/dL  Specimen collection should occur prior to Sulfasalazine administration due to the potential for falsely depressed results   Specimen collection should occur prior to Sulfapyridine administration due to the potential for falsely elevated results  Calcium 9 7  8 4 - 10 2 mg/dL Final    AST 19  13 - 39 U/L Final    Comment: Specimen collection should occur prior to Sulfasalazine administration due to the potential for falsely depressed results  ALT 16  7 - 52 U/L Final    Comment: Specimen collection should occur prior to Sulfasalazine administration due to the potential for falsely depressed results  Alkaline Phosphatase 76  34 - 104 U/L Final    Total Protein 7 2  6 4 - 8 4 g/dL Final    Albumin 3 9  3 5 - 5 0 g/dL Final    Total Bilirubin 0 24  0 20 - 1 00 mg/dL Final    eGFR 54  ml/min/1 73sq m Final    Narrative:     [place] guidelines for Chronic Kidney Disease (CKD):     Stage 1 with normal or high GFR (GFR > 90 mL/min/1 73 square meters)    Stage 2 Mild CKD (GFR = 60-89 mL/min/1 73 square meters)    Stage 3A Moderate CKD (GFR = 45-59 mL/min/1 73 square meters)    Stage 3B Moderate CKD (GFR = 30-44 mL/min/1 73 square meters)    Stage 4 Severe CKD (GFR = 15-29 mL/min/1 73 square meters)    Stage 5 End Stage CKD (GFR <15 mL/min/1 73 square meters)  Note: GFR calculation is accurate only with a steady state creatinine     CT head wo contrast   Final Result      No evidence of acute intracranial process                    Workstation performed: [id number]

## 2022-11-08 DIAGNOSIS — F32.2 CURRENT SEVERE EPISODE OF MAJOR DEPRESSIVE DISORDER WITHOUT PSYCHOTIC FEATURES WITHOUT PRIOR EPISODE (HCC): ICD-10-CM

## 2022-11-08 RX ORDER — SERTRALINE HYDROCHLORIDE 100 MG/1
TABLET, FILM COATED ORAL
Qty: 90 TABLET | Refills: 0 | Status: SHIPPED | OUTPATIENT
Start: 2022-11-08

## 2022-11-08 RX ORDER — ORAL SEMAGLUTIDE 14 MG/1
TABLET ORAL
Qty: 90 TABLET | Refills: 0 | Status: SHIPPED | OUTPATIENT
Start: 2022-11-08

## 2022-12-21 ENCOUNTER — TELEPHONE (OUTPATIENT)
Dept: OTHER | Facility: OTHER | Age: 57
End: 2022-12-21

## 2022-12-21 NOTE — TELEPHONE ENCOUNTER
Patient needs a prior authorization on the Rybelsus  She is loosing her insurance at the end of the month, so if she needs to be seen, patient would like that scheduled before she looses her insurance

## 2023-01-20 ENCOUNTER — TELEPHONE (OUTPATIENT)
Dept: FAMILY MEDICINE CLINIC | Facility: CLINIC | Age: 58
End: 2023-01-20

## 2023-01-20 NOTE — TELEPHONE ENCOUNTER
Patient called back stating that she does not have health insurance  She is asking for an estimate on TB arm test & read  She is also asking for an estimate on the blood work  Please let me know what I can tell the patient and where I can find this information

## 2023-01-20 NOTE — TELEPHONE ENCOUNTER
She would still need to start with the arm test or blood test first before proceeding with the Xray, if needed

## 2023-01-20 NOTE — TELEPHONE ENCOUNTER
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

## 2023-01-23 NOTE — TELEPHONE ENCOUNTER
Would we just use the normal self pay codes as a regular office visit? If not, please advise where I would be able to find these codes  Thank you!

## 2023-01-24 NOTE — TELEPHONE ENCOUNTER
Patient called back asking for an estimate  Her appointment is next Tuesday and next Thursday       Please advise

## 2023-01-31 ENCOUNTER — CLINICAL SUPPORT (OUTPATIENT)
Dept: FAMILY MEDICINE CLINIC | Facility: CLINIC | Age: 58
End: 2023-01-31

## 2023-01-31 DIAGNOSIS — Z11.1 SCREENING EXAMINATION FOR PULMONARY TUBERCULOSIS: Primary | ICD-10-CM

## 2023-02-02 ENCOUNTER — CLINICAL SUPPORT (OUTPATIENT)
Dept: FAMILY MEDICINE CLINIC | Facility: CLINIC | Age: 58
End: 2023-02-02

## 2023-02-02 DIAGNOSIS — Z11.1 SCREENING EXAMINATION FOR PULMONARY TUBERCULOSIS: Primary | ICD-10-CM

## 2023-02-02 LAB
INDURATION: 0 MM
TB SKIN TEST: NEGATIVE

## 2023-05-25 ENCOUNTER — TELEPHONE (OUTPATIENT)
Dept: FAMILY MEDICINE CLINIC | Facility: CLINIC | Age: 58
End: 2023-05-25

## 2023-05-25 DIAGNOSIS — B37.9 YEAST INFECTION: Primary | ICD-10-CM

## 2023-05-25 RX ORDER — FLUCONAZOLE 150 MG/1
150 TABLET ORAL ONCE
Qty: 1 TABLET | Refills: 0 | Status: SHIPPED | OUTPATIENT
Start: 2023-05-25 | End: 2023-05-25

## 2023-05-25 NOTE — TELEPHONE ENCOUNTER
Patient states she has a yeast infection  Symptoms began 3 days ago   She states she has been using otc monistat but she feels she really needs prescription medication to resolve it     She is asking if you could please send diflucan to the pharmacy     [employer] [place]

## 2023-05-30 ENCOUNTER — TELEPHONE (OUTPATIENT)
Dept: FAMILY MEDICINE CLINIC | Facility: CLINIC | Age: 58
End: 2023-05-30

## 2023-05-30 DIAGNOSIS — B02.9 HERPES ZOSTER WITHOUT COMPLICATION: Primary | ICD-10-CM

## 2023-05-30 RX ORDER — VALACYCLOVIR HYDROCHLORIDE 1 G/1
1000 TABLET, FILM COATED ORAL 2 TIMES DAILY
Qty: 14 TABLET | Refills: 1 | Status: SHIPPED | OUTPATIENT
Start: 2023-05-30 | End: 2023-06-06

## 2023-07-11 DIAGNOSIS — Z79.4 TYPE 2 DIABETES MELLITUS WITH DIABETIC NEPHROPATHY, WITH LONG-TERM CURRENT USE OF INSULIN (HCC): ICD-10-CM

## 2023-07-11 DIAGNOSIS — I10 PRIMARY HYPERTENSION: ICD-10-CM

## 2023-07-11 DIAGNOSIS — F32.2 CURRENT SEVERE EPISODE OF MAJOR DEPRESSIVE DISORDER WITHOUT PSYCHOTIC FEATURES WITHOUT PRIOR EPISODE (HCC): ICD-10-CM

## 2023-07-11 DIAGNOSIS — F32.1 CURRENT MODERATE EPISODE OF MAJOR DEPRESSIVE DISORDER WITHOUT PRIOR EPISODE (HCC): ICD-10-CM

## 2023-07-11 DIAGNOSIS — E11.21 TYPE 2 DIABETES MELLITUS WITH DIABETIC NEPHROPATHY, WITH LONG-TERM CURRENT USE OF INSULIN (HCC): ICD-10-CM

## 2023-07-11 DIAGNOSIS — R80.1 PERSISTENT PROTEINURIA: ICD-10-CM

## 2023-07-11 RX ORDER — LISINOPRIL 20 MG/1
10 TABLET ORAL DAILY
Qty: 45 TABLET | Refills: 0 | Status: CANCELLED | OUTPATIENT
Start: 2023-07-11

## 2023-07-11 RX ORDER — INSULIN ASPART 100 [IU]/ML
5 INJECTION, SOLUTION INTRAVENOUS; SUBCUTANEOUS
Qty: 15 ML | Refills: 0 | Status: SHIPPED | OUTPATIENT
Start: 2023-07-11

## 2023-07-11 RX ORDER — TRAZODONE HYDROCHLORIDE 50 MG/1
50 TABLET ORAL
Qty: 90 TABLET | Refills: 0 | Status: SHIPPED | OUTPATIENT
Start: 2023-07-11

## 2023-07-11 RX ORDER — SERTRALINE HYDROCHLORIDE 100 MG/1
100 TABLET, FILM COATED ORAL DAILY
Qty: 90 TABLET | Refills: 0 | Status: SHIPPED | OUTPATIENT
Start: 2023-07-11

## 2023-07-11 RX ORDER — INSULIN GLARGINE 300 U/ML
10 INJECTION, SOLUTION SUBCUTANEOUS
Qty: 6 ML | Refills: 0 | Status: SHIPPED | OUTPATIENT
Start: 2023-07-11

## 2023-07-11 NOTE — TELEPHONE ENCOUNTER
Medication Refill Request     Name lisinopril (ZESTRIL) 20 mg tablet   Dose/Frequency TAKE 1/2 TABLET BY MOUTH DAILY   Quantity 45 tablet  Verified pharmacy   [x]  Verified ordering Provider   [x]  Does patient have enough for the next 3 days? Yes [x] No []    Medication Refill Request     Name semaglutide (Rybelsus) 14 MG tablet  Dose/Frequency Take 1 tablet (14 mg total) by mouth daily  Quantity 90 tablet  Verified pharmacy   [x]  Verified ordering Provider   [x]  Does patient have enough for the next 3 days?  Yes [x] No []

## 2023-07-11 NOTE — TELEPHONE ENCOUNTER
Patient called to f/u in regards to the message she left on the refill line yesterday.  She wanted to make sure it was received and that the medications will be sent to the [facility] on [employer]

## 2023-07-11 NOTE — TELEPHONE ENCOUNTER
Please confirm meds needed, I did not have a message on the refill line from her yesterday or today. The refills were cleared prior to closing yesterday. Please apologize for any inconvenience.

## 2023-07-11 NOTE — TELEPHONE ENCOUNTER
I requested the refills patient needs    She also needs Contour Next test strips     I only see Freestyle lite strips on her med list     Please send to [facility] on [email or web address]

## 2023-07-12 DIAGNOSIS — I10 PRIMARY HYPERTENSION: ICD-10-CM

## 2023-07-12 DIAGNOSIS — R80.1 PERSISTENT PROTEINURIA: ICD-10-CM

## 2023-07-13 DIAGNOSIS — F32.1 CURRENT MODERATE EPISODE OF MAJOR DEPRESSIVE DISORDER WITHOUT PRIOR EPISODE (HCC): ICD-10-CM

## 2023-07-13 DIAGNOSIS — F32.2 CURRENT SEVERE EPISODE OF MAJOR DEPRESSIVE DISORDER WITHOUT PSYCHOTIC FEATURES WITHOUT PRIOR EPISODE (HCC): ICD-10-CM

## 2023-07-13 DIAGNOSIS — Z79.4 TYPE 2 DIABETES MELLITUS WITH DIABETIC NEPHROPATHY, WITH LONG-TERM CURRENT USE OF INSULIN (HCC): ICD-10-CM

## 2023-07-13 DIAGNOSIS — E11.21 TYPE 2 DIABETES MELLITUS WITH DIABETIC NEPHROPATHY, WITH LONG-TERM CURRENT USE OF INSULIN (HCC): ICD-10-CM

## 2023-07-13 RX ORDER — TRAZODONE HYDROCHLORIDE 50 MG/1
TABLET ORAL
Qty: 90 TABLET | Refills: 0 | OUTPATIENT
Start: 2023-07-13

## 2023-07-13 RX ORDER — INSULIN ASPART 100 [IU]/ML
INJECTION, SOLUTION INTRAVENOUS; SUBCUTANEOUS
Qty: 15 ML | Refills: 0 | OUTPATIENT
Start: 2023-07-13

## 2023-07-13 RX ORDER — SERTRALINE HYDROCHLORIDE 100 MG/1
TABLET, FILM COATED ORAL
Qty: 90 TABLET | Refills: 0 | OUTPATIENT
Start: 2023-07-13

## 2023-07-13 RX ORDER — LISINOPRIL 20 MG/1
10 TABLET ORAL DAILY
Qty: 45 TABLET | Refills: 1 | Status: SHIPPED | OUTPATIENT
Start: 2023-07-13

## 2023-07-18 ENCOUNTER — TELEPHONE (OUTPATIENT)
Dept: FAMILY MEDICINE CLINIC | Facility: CLINIC | Age: 58
End: 2023-07-18

## 2023-07-18 NOTE — TELEPHONE ENCOUNTER
Patient left the following message on [employer] in regards to the pior rios for her Miguel     "Yes, good morning. This is nearly surpassed did you brief of August 2nd, 1965? My prescription for Rebbrennan SIS, waiting to hear about the authorization. It's been over a week now. Claudia is let me know that there's an issue. So if somebody can get back to me as soon as possible, my number is [phone]. Again, it's for the medication Rebel SIS. Great.  Thank you very much."

## 2023-07-19 NOTE — TELEPHONE ENCOUNTER
Good morning. This is [employer] again. I'm calling in reference about my rebel SIS and the authorization for it. My YOB: 1965. If somebody could please get back to me, [phone]. Thank you.

## 2023-07-20 NOTE — TELEPHONE ENCOUNTER
Spoke with pt, she's aware in regards to the prior Auth. Will contact once its submitted to insurance.

## 2023-07-24 ENCOUNTER — TELEPHONE (OUTPATIENT)
Dept: FAMILY MEDICINE CLINIC | Facility: CLINIC | Age: 58
End: 2023-07-24

## 2023-07-24 DIAGNOSIS — Z79.4 TYPE 2 DIABETES MELLITUS WITH DIABETIC NEPHROPATHY, WITH LONG-TERM CURRENT USE OF INSULIN (HCC): Primary | ICD-10-CM

## 2023-07-24 DIAGNOSIS — E11.21 TYPE 2 DIABETES MELLITUS WITH DIABETIC NEPHROPATHY, WITH LONG-TERM CURRENT USE OF INSULIN (HCC): Primary | ICD-10-CM

## 2023-07-24 DIAGNOSIS — D35.2 PITUITARY MICROADENOMA (HCC): ICD-10-CM

## 2023-07-24 DIAGNOSIS — R80.1 PERSISTENT PROTEINURIA: ICD-10-CM

## 2023-07-24 NOTE — TELEPHONE ENCOUNTER
Patient has appt scheduled August 4th. She would like to get labs prior to appt.  Please enter and I will let her know

## 2023-08-04 ENCOUNTER — OFFICE VISIT (OUTPATIENT)
Dept: FAMILY MEDICINE CLINIC | Facility: CLINIC | Age: 58
End: 2023-08-04
Payer: COMMERCIAL

## 2023-08-04 VITALS
HEART RATE: 84 BPM | SYSTOLIC BLOOD PRESSURE: 140 MMHG | TEMPERATURE: 97.8 F | DIASTOLIC BLOOD PRESSURE: 80 MMHG | WEIGHT: 173.5 LBS | BODY MASS INDEX: 36.42 KG/M2 | OXYGEN SATURATION: 100 % | HEIGHT: 58 IN

## 2023-08-04 DIAGNOSIS — E66.01 CLASS 2 SEVERE OBESITY DUE TO EXCESS CALORIES WITH SERIOUS COMORBIDITY AND BODY MASS INDEX (BMI) OF 36.0 TO 36.9 IN ADULT (HCC): ICD-10-CM

## 2023-08-04 DIAGNOSIS — D35.2 PITUITARY MICROADENOMA (HCC): ICD-10-CM

## 2023-08-04 DIAGNOSIS — E11.21 TYPE 2 DIABETES MELLITUS WITH DIABETIC NEPHROPATHY, WITH LONG-TERM CURRENT USE OF INSULIN (HCC): ICD-10-CM

## 2023-08-04 DIAGNOSIS — Z00.00 ANNUAL PHYSICAL EXAM: Primary | ICD-10-CM

## 2023-08-04 DIAGNOSIS — F32.2 CURRENT SEVERE EPISODE OF MAJOR DEPRESSIVE DISORDER WITHOUT PSYCHOTIC FEATURES WITHOUT PRIOR EPISODE (HCC): ICD-10-CM

## 2023-08-04 DIAGNOSIS — Z79.4 TYPE 2 DIABETES MELLITUS WITH DIABETIC NEPHROPATHY, WITH LONG-TERM CURRENT USE OF INSULIN (HCC): ICD-10-CM

## 2023-08-04 LAB
CREAT ?TM UR-SCNC: 228.5 UMOL/L
EXT ALBUMIN URINE RANDOM: 22.6
HBA1C MFR BLD HPLC: 11.2 %
MICROALBUMIN/CREAT UR: 98.9 MG/G{CREAT}

## 2023-08-04 PROCEDURE — 99214 OFFICE O/P EST MOD 30 MIN: CPT | Performed by: FAMILY MEDICINE

## 2023-08-04 PROCEDURE — 99396 PREV VISIT EST AGE 40-64: CPT | Performed by: FAMILY MEDICINE

## 2023-08-04 NOTE — PATIENT INSTRUCTIONS
Hip Bursitis Exercises   AMBULATORY CARE:   Hip bursitis exercises  help strengthen the muscles in your hip and keep the joint flexible. Strong muscles can help reduce pain, prevent injury, and keep the joint stable. The exercises can also help increase the range of motion in your hip joint. Call your doctor or physical therapist if:   You have sharp pain during exercise or at rest.    You have questions or concerns about the stretches or exercises. What you need to know about exercise safety:   Move slowly and smoothly. Avoid fast or jerky motions. This will help prevent an injury. Breathe normally. Do not hold your breath. It is important to breathe in and out so you do not tense up during exercise. Tension could prevent you from moving your joint in a full range of motion. Do the exercises and stretches on both legs. Do this so both hips remain strong and flexible. Stop if you feel sharp pain or an increase in pain. Contact your healthcare provider or physical therapist. It is normal to feel some discomfort during exercise. Regular exercise will help decrease your discomfort over time. Warm up and cool down when you exercise. Walk or ride a stationary bike for 5 to 10 minutes before you start. This warms and relaxes your muscles to help prevent an injury. When you have finished the exercises, cool down by walking in place for a few minutes. You may also need to stretch as part of your warm up or cool down routine. Your provider or physical therapist will tell you which stretches to do. How to do hip stretches: Your healthcare provider or physical therapist will tell you how many times to do each stretch. Do the stretch on both sides before you move to the next stretch. Standing iliotibial band stretch:  Stand with the leg on your injured side behind your other leg. Bend sideways toward the side that is not injured. Stop when you feel a stretch in your outer hip.  Hold for 5 to 10 seconds. Then return to the starting position. Lying iliotibial band stretch:  Lie on your back. Bend the knee on your injured side toward your chest. Place your hands on the outside of your knee and thigh. Slowly pull the knee across your body. Stop when you feel a stretch in your hip and outer thigh. Hold for 5 to 10 seconds. Return your leg to the starting position. Hip stretch:  Lie on your back with both legs straight and on the ground. Bend the knee on your injured side toward your chest until you can reach your lower leg. Place both hands on your shin and pull your knee toward your chest. Hold for 5 to 10 seconds. Return your leg to the starting position. Knee to chest:  Lie on your back with both knees bent and feet flat on the floor. Bend the knee on your injured side toward your chest until you can reach your lower leg. Place both hands on your shin and pull your knee toward your chest. Hold for 5 to 10 seconds. Return your leg to the starting position. Internal hip rotator stretch:  You will do this exercise on a table. Lie on your side with the injured hip on top. You may be told to keep a pillow between your thighs. Move the top leg so the foot hangs below the edge of the table. Rotate your hip to raise your foot in the opposite direction of the bottom shoulder. Raise your foot as high as you can so you feel a stretch in the back of your thigh. Hold for 5 seconds. Then slowly lower your foot to the starting position. External hip rotator stretch:  You will do this exercise on a table. Lie on your side with the injured hip on the bottom. You do not need a pillow between your thighs for this exercise. Move the bottom leg so the foot is off the edge of the table. Rotate your hip to lift the foot in the opposite direction of the bottom shoulder. Raise your foot as high as you can so you feel a stretch in your buttock. Hold for 5 seconds.  Then slowly lower your foot to the starting position. Kneeling hip flexor stretch:  Kneel on your knee on the injured side. Place the foot of your other leg on the floor so the knee is bent. Put both hands on top of your thigh. Keep your back straight and abdominal muscles tight. Lean forward until you feel a stretch in your other thigh. Hold the stretch for 10 seconds. Return to the starting position. How to do hip strengthening exercises: Your healthcare provider or physical therapist will tell you how many times to do each exercise. Do the exercise on both sides before you move to the next exercise. Straight leg lift to the side: This may also be called hip abduction. Lie on your side with straight legs, with the injured hip on top. Slowly raise your top leg toward the ceiling as high as you can. Keep your foot pointed. Hold for 5 seconds. Then slowly lower your leg to the starting position. Inner thigh lift: This may also be called hip adduction. Lie on your side with straight legs, with the injured hip on the bottom. Cross your top leg over your bottom leg. Put the foot of your top leg on the floor in front of you. Raise your bottom leg until it touches the top leg. Hold for 5 seconds. Then slowly lower the leg to the floor. Clam exercise:  Lie on your side so your injured side is on top. Bend your knees. Keep your heels together during this exercise. Slowly raise your top knee toward the ceiling. Then lower your leg so your knees are together. Follow up with your doctor or physical therapist as directed:  Write down your questions so you remember to ask them during your visits. © Copyright [employer] 2022 Information is for End User's use only and may not be sold, redistributed or otherwise used for commercial purposes. The above information is an  only. It is not intended as medical advice for individual conditions or treatments.  Talk to your doctor, nurse or pharmacist before following any medical regimen to see if it is safe and effective for you. Wellness Visit for Adults   AMBULATORY CARE:   A wellness visit  is when you see your healthcare provider to get screened for health problems. Your healthcare provider will also give you advice on how to stay healthy. Write down your questions so you remember to ask them. Ask your healthcare provider how often you should have a wellness visit. What happens at a wellness visit:  Your healthcare provider will ask about your health, and your family history of health problems. This includes high blood pressure, heart disease, and cancer. He or she will ask if you have symptoms that concern you, if you smoke, and about your mood. You may also be asked about your intake of medicines, supplements, food, and alcohol. Any of the following may be done: Your weight  will be checked. Your height may also be checked so your body mass index (BMI) can be calculated. Your BMI shows if you are at a healthy weight. Your blood pressure  and heart rate will be checked. Your temperature may also be checked. Blood and urine tests  may be done. Blood tests may be done to check your cholesterol levels. Abnormal cholesterol levels increase your risk for heart disease and stroke. You may also need a blood or urine test to check for diabetes if you are at increased risk. Urine tests may be done to look for signs of an infection or kidney disease. A physical exam  includes checking your heartbeat and lungs with a stethoscope. Your healthcare provider may also check your skin to look for sun damage. Screening tests  may be recommended. A screening test is done to check for diseases that may not cause symptoms. The screening tests you may need depend on your age, gender, family history, and lifestyle habits. For example, colorectal screening may be recommended if you are 48years old or older.     Screening tests you need if you are a woman:   A Pap smear  is used to screen for cervical cancer. Pap smears are usually done every 3 to 5 years depending on your age. You may need them more often if you have had abnormal Pap smear test results in the past. Ask your healthcare provider how often you should have a Pap smear. A mammogram  is an x-ray of your breasts to screen for breast cancer. Experts recommend mammograms every 2 years starting at age 48 years. You may need a mammogram at age 52 years or younger if you have an increased risk for breast cancer. Talk to your healthcare provider about when you should start having mammograms and how often you need them. Vaccines you may need:   Get an influenza vaccine  every year. The influenza vaccine protects you from the flu. Several types of viruses cause the flu. The viruses change over time, so new vaccines are made each year. Get a tetanus-diphtheria (Td) booster vaccine  every 10 years. This vaccine protects you against tetanus and diphtheria. Tetanus is a severe infection that may cause painful muscle spasms and lockjaw. Diphtheria is a severe bacterial infection that causes a thick covering in the back of your mouth and throat. Get a human papillomavirus (HPV) vaccine  if you are female and aged 23 to 32 or male 23 to 24 and never received it. This vaccine protects you from HPV infection. HPV is the most common infection spread by sexual contact. HPV may also cause vaginal, penile, and anal cancers. Get a pneumococcal vaccine  if you are aged 72 years or older. The pneumococcal vaccine is an injection given to protect you from pneumococcal disease. Pneumococcal disease is an infection caused by pneumococcal bacteria. The infection may cause pneumonia, meningitis, or an ear infection. Get a shingles vaccine  if you are 60 or older, even if you have had shingles before. The shingles vaccine is an injection to protect you from the varicella-zoster virus. This is the same virus that causes chickenpox.  Shingles is a painful rash that develops in people who had chickenpox or have been exposed to the virus. How to eat healthy:  My Plate is a model for planning healthy meals. It shows the types and amounts of foods that should go on your plate. Fruits and vegetables make up about half of your plate, and grains and protein make up the other half. A serving of dairy is included on the side of your plate. The amount of calories and serving sizes you need depends on your age, gender, weight, and height. Examples of healthy foods are listed below:  Eat a variety of vegetables  such as dark green, red, and orange vegetables. You can also include canned vegetables low in sodium (salt) and frozen vegetables without added butter or sauces. Eat a variety of fresh fruits , canned fruit in 100% juice, frozen fruit, and dried fruit. Include whole grains. At least half of the grains you eat should be whole grains. Examples include whole-wheat bread, wheat pasta, brown rice, and whole-grain cereals such as oatmeal.    Eat a variety of protein foods such as seafood (fish and shellfish), lean meat, and poultry without skin (turkey and chicken). Examples of lean meats include pork leg, shoulder, or tenderloin, and beef round, sirloin, tenderloin, and extra lean ground beef. Other protein foods include eggs and egg substitutes, beans, peas, soy products, nuts, and seeds. Choose low-fat dairy products such as skim or 1% milk or low-fat yogurt, cheese, and cottage cheese. Limit unhealthy fats  such as butter, hard margarine, and shortening. Exercise:  Exercise at least 30 minutes per day on most days of the week. Some examples of exercise include walking, biking, dancing, and swimming. You can also fit in more physical activity by taking the stairs instead of the elevator or parking farther away from stores. Include muscle strengthening activities 2 days each week. Regular exercise provides many health benefits.  It helps you manage your weight, and decreases your risk for type 2 diabetes, heart disease, stroke, and high blood pressure. Exercise can also help improve your mood. Ask your healthcare provider about the best exercise plan for you. General health and safety guidelines:   Do not smoke. Nicotine and other chemicals in cigarettes and cigars can cause lung damage. Ask your healthcare provider for information if you currently smoke and need help to quit. E-cigarettes or smokeless tobacco still contain nicotine. Talk to your healthcare provider before you use these products. Limit alcohol. A drink of alcohol is 12 ounces of beer, 5 ounces of wine, or 1½ ounces of liquor. Lose weight, if needed. Being overweight increases your risk of certain health conditions. These include heart disease, high blood pressure, type 2 diabetes, and certain types of cancer. Protect your skin. Do not sunbathe or use tanning beds. Use sunscreen with a SPF 15 or higher. Apply sunscreen at least 15 minutes before you go outside. Reapply sunscreen every 2 hours. Wear protective clothing, hats, and sunglasses when you are outside. Drive safely. Always wear your seatbelt. Make sure everyone in your car wears a seatbelt. A seatbelt can save your life if you are in an accident. Do not use your cell phone when you are driving. This could distract you and cause an accident. Pull over if you need to make a call or send a text message. Practice safe sex. Use latex condoms if are sexually active and have more than one partner. Your healthcare provider may recommend screening tests for sexually transmitted infections (STIs). Wear helmets, lifejackets, and protective gear. Always wear a helmet when you ride a bike or motorcycle, go skiing, or play sports that could cause a head injury. Wear protective equipment when you play sports. Wear a lifejacket when you are on a boat or doing water sports.     © Copyright [employer] 2022 Information is for End User's use only and may not be sold, redistributed or otherwise used for commercial purposes. The above information is an  only. It is not intended as medical advice for individual conditions or treatments. Talk to your doctor, nurse or pharmacist before following any medical regimen to see if it is safe and effective for you.

## 2023-08-04 NOTE — PROGRESS NOTES
ADULT [facility]    NAME: Karen Ngo  AGE: 66 y.o. SEX: female  : 1965   DATE: 2023     Assessment and Plan:     Problem List Items Addressed This Visit        Endocrine    Pituitary microadenoma ([address])     Obtain prolactin as ordered          Relevant Orders    Prolactin    Type 2 diabetes mellitus with diabetic nephropathy, with long-term current use of insulin ([facility])       Lab Results   Component Value Date    HGBA1C 11.2 (H) 2023   Poor control  Referred to endocrinology, MNT, diabetic education classes  Maintain current doses until directed by endocrinology  Foot exam and eye exam today  Encourage follow up with bariatric team as she has started to gain weight again         Relevant Orders    Ambulatory Referral to Weight Management    Ambulatory referral to Endocrinology    IRIS Diabetic eye exam    Ambulatory referral to Diabetic Education - use to refer for diabetes group classes, individual diabetes education, medical nutrition therapy, device training    Ambulatory Referral to Medical Fitness Exercise Specialist    Albumin / creatinine urine ratio    Lipid Panel with Direct LDL reflex    TSH, 3rd generation with Free T4 reflex    CBC and Platelet    Comprehensive metabolic panel    Hemoglobin A1C       Other    Current severe episode of major depressive disorder without psychotic features without prior episode ([address])   Other Visit Diagnoses     Annual physical exam    -  Primary    Class 2 severe obesity due to excess calories with serious comorbidity and body mass index (BMI) of 36.0 to 36.9 in adult [facility])        Relevant Orders    Ambulatory Referral to Weight Management        Immunizations and preventive care screenings were discussed with patient today. Appropriate education was printed on patient's after visit summary.     Counseling:  Alcohol/drug use: discussed moderation in alcohol intake, the recommendations for healthy alcohol use, and avoidance of illicit drug use. Dental Health: discussed importance of regular tooth brushing, flossing, and dental visits. Injury prevention: discussed safety/seat belts, safety helmets, smoke detectors, carbon dioxide detectors, and smoking near bedding or upholstery. Exercise: the importance of regular exercise/physical activity was discussed. Recommend exercise 3-5 times per week for at least 30 minutes. BMI Counseling: Body mass index is 36.26 kg/m². The BMI is above normal. Nutrition recommendations include decreasing portion sizes, encouraging healthy choices of fruits and vegetables and limiting drinks that contain sugar. Exercise recommendations include moderate physical activity 150 minutes/week, exercising 3-5 times per week and strength training exercises. Patient referred to PCP. Rationale for BMI follow-up plan is due to patient being overweight or obese. Return in about 3 months (around 11/4/2023) for diabetes . Chief Complaint:     Chief Complaint   Patient presents with   • Follow-up      History of Present Illness:     Adult Annual Physical   Patient here for a comprehensive physical exam. The patient reports problems - see below. Patient unfortunately was without insurance from December through June. She ran out of medications in February. She has only recently resumed medications in the last month or two. She self-adjusted her insulin: she is now taking Toujeo 30u in the morning, and variable amounts of Nolovog (sometimes 10u sometimes 30u). She reports her fasting glucose is around 160-170's. Her 1-2 hr post-prandial is between 200-250. She's been feeling off, nausea, irritable, vaginal complaints. Has an OB appt next week. Diet and Physical Activity  Diet/Nutrition: diabetic diet. Exercise: no formal exercise.       Depression Screening  PHQ-2/9 Depression Screening    Little interest or pleasure in doing things: 0 - not at all  Feeling down, depressed, or hopeless: 0 - not at all  Trouble falling or staying asleep, or sleeping too much: 0 - not at all  Feeling tired or having little energy: 0 - not at all  Poor appetite or overeatin - not at all  Feeling bad about yourself - or that you are a failure or have let yourself or your family down: 1 - several days  Trouble concentrating on things, such as reading the newspaper or watching television: 1 - several days  Moving or speaking so slowly that other people could have noticed. Or the opposite - being so fidgety or restless that you have been moving around a lot more than usual: 0 - not at all  Thoughts that you would be better off dead, or of hurting yourself in some way: 0 - not at all  PHQ-9 Score: 2   PHQ-9 Interpretation: No or Minimal depression        General Health  Sleep: sleeps well. Hearing: normal - bilateral.  Vision: most recent eye exam <1 year ago and wears glasses. Dental: regular dental visits. /GYN Health  Patient is: postmenopausal  Last menstrual period: Patient's last menstrual period was 2021 (approximate). Review of Systems:     Review of Systems   Constitutional: Negative for activity change, chills and fever. HENT: Negative for congestion, rhinorrhea and sore throat. Eyes: Negative for visual disturbance. Respiratory: Negative for cough, shortness of breath and wheezing. Cardiovascular: Negative for chest pain and palpitations. Gastrointestinal: Positive for nausea. Negative for abdominal pain, blood in stool, constipation, diarrhea and vomiting. Genitourinary: Positive for vaginal pain. Negative for dysuria. Musculoskeletal: Positive for arthralgias (right hip). Negative for myalgias. Skin: Negative for rash. Neurological: Negative for dizziness, weakness and headaches. Psychiatric/Behavioral: Negative for dysphoric mood. The patient is not nervous/anxious. All other systems reviewed and are negative. Past Medical History:     Past Medical History:   Diagnosis Date   • Anemia     Last Assessed:  3/27/13a   • Arthritis    • Asthma    • Blurred vision    • Chronic kidney disease    • Diabetes mellitus ([address])     was in touch with PCP re [facility]- she adjusted insulin doses and is seeing surgeon 20   • Diabetic retinopathy [facility])    • Dream enactment behavior    • Head injury    • Hypercholesterolemia    • Hyperlipidemia    • Hypertension    • Increased urinary frequency    • Irritable bowel syndrome     Last Assessed:  10/2/12   • Joint pain    • Morbid obesity with BMI of 40.0-44.9, adult (HCC)    • Nasal congestion    • Nausea    • Night sweat    • Nightmare disorder 2020   • Numbness    • UTI (urinary tract infection)    • Weakness       Past Surgical History:     Past Surgical History:   Procedure Laterality Date   • ABDOMINAL SURGERY     • CARPAL TUNNEL RELEASE Right    •  SECTION      x 2   • COLONOSCOPY  2016   • COLONOSCOPY     • FL LAPS GSTRC RSTRICTIV PX LONGITUDINAL GASTRECTOMY N/A 2020    Procedure: LAPAROSCOPIC SLEEVE GASTRECTOMY  WITH EGD;  Surgeon: Jenifer Castaneda MD;  Location: [facility]  Service: Bariatrics   • [place] [address] WRST SURG W/RLS TRANSVRS CARPL LIGM Left 2019    Procedure: RELEASE CARPAL TUNNEL ENDOSCOPIC-left;  Surgeon: Fabian Gallegos MD;  Location: [facility];  Service: Orthopedics   • TUBAL LIGATION     • WISDOM TOOTH EXTRACTION        Social History:     Social History     Socioeconomic History   • Marital status:       Spouse name: Radha Blood   • Number of children: 2   • Years of education: 15   • Highest education level: Some college, no degree   Occupational History     Comment: financial counseler   Tobacco Use   • Smoking status: Former     Types: Cigarettes     Quit date:      Years since quittin.6     Passive exposure: Never   • Smokeless tobacco: Former   Vaping Use   • Vaping Use: Never used   Substance and Sexual Activity • Alcohol use: Yes     Comment: rarely   • Drug use: No   • Sexual activity: Yes     Partners: Male     Birth control/protection: Female Sterilization   Other Topics Concern   • None   Social History Narrative    Consumes 1 cup of coffee per day     Social Determinants of Health     Financial Resource Strain: Low Risk  (2/26/2021)    Overall Financial Resource Strain (CARDIA)    • Difficulty of Paying Living Expenses: Not hard at all   Food Insecurity: No Food Insecurity (2/26/2021)    Hunger Vital Sign    • Worried About Running Out of Food in the Last Year: Never true    • Ran Out of Food in the Last Year: Never true   Transportation Needs: No Transportation Needs (2/26/2021)    PRAPARE - Transportation    • Lack of Transportation (Medical): No    • Lack of Transportation (Non-Medical): No   Physical Activity: Inactive (9/11/2019)    Exercise Vital Sign    • Days of Exercise per Week: 0 days    • Minutes of Exercise per Session: 0 min   Stress: Stress Concern Present (9/11/2019)    [facility]    • Feeling of Stress : To some extent   Social Connections: Unknown (9/11/2019)    Social Connection and Isolation Panel [NHANES]    • Frequency of Communication with Friends and Family: More than three times a week    • Frequency of Social Gatherings with Friends and Family: More than three times a week    • Attends Anabaptist Services: Not asked    • Active Member of Clubs or Organizations: Yes    • Attends Club or Organization Meetings: More than 4 times per year    • Marital Status:     Intimate Partner Violence: Not At Risk (9/11/2019)    Humiliation, Afraid, Rape, and Kick questionnaire    • Fear of Current or Ex-Partner: No    • Emotionally Abused: No    • Physically Abused: No    • Sexually Abused: No   Housing Stability: Not on file      Family History:     Family History   Problem Relation Age of Onset   • Diabetes Mother    • Hypertension Mother    • Diabetes Father    • Heart disease Father    • Stroke Father    • Hypertension Father    • Diverticulitis Father    • Hyperlipidemia Father    • Heart attack Father    • Diabetes Maternal Grandmother    • Pancreatic cancer Maternal Grandmother    • Liver cancer Maternal Grandfather    • Alcohol abuse Maternal Grandfather    • Heart disease Paternal Grandmother    • Crohn's disease Sister    • Diabetes type I Daughter    • Eczema Daughter    • Diabetes Daughter    • Heart attack Paternal Grandfather    • Asthma Maternal Aunt    • Alcohol abuse Maternal Uncle    • Mental illness Paternal Aunt    • Schizophrenia Paternal Aunt    • Diabetes Paternal Aunt    • Kidney disease Paternal Aunt    • Alcohol abuse Paternal Uncle    • Diabetes Paternal Uncle    • Eczema Son    • Drug abuse Cousin    • Bipolar disorder Cousin    • Colon cancer Neg Hx    • Breast cancer Neg Hx       Current Medications:     Current Outpatient Medications   Medication Sig Dispense Refill   • Butalbital-APAP-Caffeine (Fioricet) -40 MG CAPS Take 1 tablet by mouth 4 (four) times a day as needed (for Migraine Headache) for up to 28 doses 28 capsule 0   • glucose blood test strip Use 1 each daily as needed (Contour Next) Use as instructed 100 each 0   • insulin aspart (NovoLOG FlexPen) 100 UNIT/ML injection pen Inject 5 Units under the skin 3 (three) times a day with meals (Patient taking differently: Inject 5 Units under the skin 3 (three) times a day with meals 10-20 units) 15 mL 0   • insulin glargine (Toujeo Max SoloStar) 300 units/mL CONCENTRATED U-300 injection pen (2-unit dial) Inject 10 Units under the skin daily at bedtime (Patient taking differently: Inject 30 Units under the skin daily at bedtime) 6 mL 0   • Insulin Pen Needle 32G X 5 MM MISC Use 4 (four) times a day 400 each 3   • lisinopril (ZESTRIL) 20 mg tablet Take 0.5 tablets (10 mg total) by mouth daily 45 tablet 1   • mometasone (NASONEX) 50 mcg/act nasal spray 2 sprays into each nostril daily 17 g 6   • rosuvastatin (CRESTOR) 20 MG tablet TAKE ONE TABLET BY MOUTH EVERY DAY AT BEDTIME 90 tablet 3   • semaglutide (Rybelsus) 14 MG tablet Take 1 tablet (14 mg total) by mouth daily 90 tablet 0   • sertraline (ZOLOFT) 100 mg tablet Take 1 tablet (100 mg total) by mouth daily 90 tablet 0   • traZODone (DESYREL) 50 mg tablet Take 1 tablet (50 mg total) by mouth daily at bedtime 90 tablet 0   • sertraline (Zoloft) 50 mg tablet Take 1 tablet (50 mg total) by mouth daily Take in addition to 100mg tab daily for total daily dose 150mg. (Patient not taking: Reported on 8/4/2023) 90 tablet 1   • valACYclovir (VALTREX) 1,000 mg tablet Take 1 tablet (1,000 mg total) by mouth 2 (two) times a day for 7 days 14 tablet 1     No current facility-administered medications for this visit. Allergies: Allergies   Allergen Reactions   • Iodinated Contrast Media Facial Swelling   • Iodine - Food Allergy Facial Swelling   • Shellfish-Derived Products - Food Allergy Throat Swelling   • Amoxicillin Hives   • Metformin Diarrhea   • Penicillins      Yeast Infection      Physical Exam:     /80   Pulse 84   Temp 97.8 °F (36.6 °C)   Ht 4' 10" (1.473 m)   Wt 78.7 kg (173 lb 8 oz)   LMP 04/01/2021 (Approximate)   SpO2 100%   BMI 36.26 kg/m²     Physical Exam  Vitals and nursing note reviewed. Constitutional:       General: She is not in acute distress. Appearance: She is well-developed. She is not ill-appearing. HENT:      Head: Normocephalic and atraumatic. Right Ear: Tympanic membrane, ear canal and external ear normal. No middle ear effusion. Left Ear: Tympanic membrane, ear canal and external ear normal.  No middle ear effusion. Nose: Nose normal. No congestion or rhinorrhea. Mouth/Throat:      Lips: Pink. Mouth: Mucous membranes are moist.      Pharynx: Oropharynx is clear. Uvula midline. No oropharyngeal exudate. Tonsils: No tonsillar exudate. Eyes:      General: Lids are normal.      Extraocular Movements: Extraocular movements intact. Conjunctiva/sclera: Conjunctivae normal.      Pupils: Pupils are equal, round, and reactive to light. Neck:      Thyroid: No thyromegaly. Trachea: No tracheal deviation. Cardiovascular:      Rate and Rhythm: Normal rate and regular rhythm. Pulses: Normal pulses. no weak pulses          Dorsalis pedis pulses are 2+ on the right side and 2+ on the left side. Posterior tibial pulses are 2+ on the right side and 2+ on the left side. Heart sounds: Normal heart sounds, S1 normal and S2 normal. No murmur heard. Pulmonary:      Effort: Pulmonary effort is normal. No respiratory distress. Breath sounds: Normal breath sounds. No decreased breath sounds, wheezing, rhonchi or rales. Abdominal:      General: Bowel sounds are normal. There is no distension. Palpations: Abdomen is soft. Tenderness: There is no abdominal tenderness. Musculoskeletal:      Right lower leg: No edema. Left lower leg: No edema. Feet:      Right foot:      Skin integrity: No ulcer, skin breakdown, erythema, warmth, callus or dry skin. Left foot:      Skin integrity: No ulcer, skin breakdown, erythema, warmth, callus or dry skin. Lymphadenopathy:      Cervical: No cervical adenopathy. Skin:     General: Skin is warm and dry. Capillary Refill: Capillary refill takes less than 2 seconds. Neurological:      Mental Status: She is alert and oriented to person, place, and time. Deep Tendon Reflexes: Reflexes normal.      Reflex Scores:       Patellar reflexes are 2+ on the right side and 2+ on the left side. Psychiatric:         Attention and Perception: Attention normal.         Mood and Affect: Mood normal.         Thought Content: Thought content does not include suicidal ideation. Diabetic Foot Exam    Patient's shoes and socks removed.     Right Foot/Ankle   Right Foot Inspection  Skin Exam: skin normal and skin intact. No dry skin, no warmth, no callus, no erythema, no maceration, no abnormal color, no pre-ulcer, no ulcer and no callus. Toe Exam: ROM and strength within normal limits. Sensory   Vibration: intact  Monofilament testing: intact    Vascular  Capillary refills: < 3 seconds  The right DP pulse is 2+. The right PT pulse is 2+. Left Foot/Ankle  Left Foot Inspection  Skin Exam: skin normal and skin intact. No dry skin, no warmth, no erythema, no maceration, normal color, no pre-ulcer, no ulcer and no callus. Toe Exam: ROM and strength within normal limits. Sensory   Vibration: intact  Monofilament testing: intact    Vascular  Capillary refills: < 3 seconds  The left DP pulse is 2+. The left PT pulse is 2+.      Assign Risk Category  No deformity present  No loss of protective sensation  No weak pulses  Risk: 0      Jayna Simon MD  [address]

## 2023-08-04 NOTE — ASSESSMENT & PLAN NOTE
Lab Results   Component Value Date    HGBA1C 11.2 (H) 08/04/2023   Poor control  Referred to endocrinology, MNT, diabetic education classes  Maintain current doses until directed by endocrinology  Foot exam and eye exam today  Encourage follow up with bariatric team as she has started to gain weight again

## 2023-08-05 LAB
LEFT EYE DIABETIC RETINOPATHY: NORMAL
LEFT EYE IMAGE QUALITY: NORMAL
LEFT EYE MACULAR EDEMA: NORMAL
LEFT EYE OTHER RETINOPATHY: NORMAL
RIGHT EYE DIABETIC RETINOPATHY: NORMAL
RIGHT EYE IMAGE QUALITY: NORMAL
RIGHT EYE MACULAR EDEMA: NORMAL
RIGHT EYE OTHER RETINOPATHY: NORMAL
SEVERITY (EYE EXAM): NORMAL

## 2023-08-10 ENCOUNTER — OFFICE VISIT (OUTPATIENT)
Dept: OBGYN CLINIC | Facility: CLINIC | Age: 58
End: 2023-08-10
Payer: COMMERCIAL

## 2023-08-10 VITALS
HEIGHT: 58 IN | SYSTOLIC BLOOD PRESSURE: 134 MMHG | WEIGHT: 173 LBS | BODY MASS INDEX: 36.31 KG/M2 | DIASTOLIC BLOOD PRESSURE: 82 MMHG

## 2023-08-10 DIAGNOSIS — Z01.419 WELL WOMAN EXAM WITH ROUTINE GYNECOLOGICAL EXAM: Primary | ICD-10-CM

## 2023-08-10 DIAGNOSIS — N64.4 NIPPLE PAIN: ICD-10-CM

## 2023-08-10 DIAGNOSIS — Z11.51 SCREENING FOR HPV (HUMAN PAPILLOMAVIRUS): ICD-10-CM

## 2023-08-10 DIAGNOSIS — B37.2 CUTANEOUS CANDIDIASIS: ICD-10-CM

## 2023-08-10 DIAGNOSIS — Z12.4 ENCOUNTER FOR SCREENING FOR MALIGNANT NEOPLASM OF CERVIX: ICD-10-CM

## 2023-08-10 PROCEDURE — G0476 HPV COMBO ASSAY CA SCREEN: HCPCS | Performed by: OBSTETRICS & GYNECOLOGY

## 2023-08-10 PROCEDURE — G0145 SCR C/V CYTO,THINLAYER,RESCR: HCPCS | Performed by: OBSTETRICS & GYNECOLOGY

## 2023-08-10 PROCEDURE — S0610 ANNUAL GYNECOLOGICAL EXAMINA: HCPCS | Performed by: OBSTETRICS & GYNECOLOGY

## 2023-08-10 RX ORDER — ONDANSETRON 4 MG/1
TABLET, ORALLY DISINTEGRATING ORAL
COMMUNITY
End: 2023-08-10

## 2023-08-10 RX ORDER — METOPROLOL TARTRATE 50 MG/1
TABLET, FILM COATED ORAL
COMMUNITY
End: 2023-08-10

## 2023-08-10 RX ORDER — OXYCODONE HYDROCHLORIDE AND ACETAMINOPHEN 5; 325 MG/1; MG/1
TABLET ORAL
COMMUNITY
End: 2023-08-10

## 2023-08-10 RX ORDER — IBUPROFEN AND FAMOTIDINE 26.6; 8 MG/1; MG/1
TABLET ORAL
COMMUNITY
End: 2023-08-10

## 2023-08-10 RX ORDER — AZITHROMYCIN 250 MG/1
TABLET, FILM COATED ORAL
COMMUNITY
End: 2023-08-10

## 2023-08-10 RX ORDER — OMEPRAZOLE 20 MG/1
CAPSULE, DELAYED RELEASE ORAL
COMMUNITY

## 2023-08-10 RX ORDER — CLINDAMYCIN HYDROCHLORIDE 150 MG/1
CAPSULE ORAL
COMMUNITY
End: 2023-08-10

## 2023-08-10 RX ORDER — METAXALONE 800 MG/1
TABLET ORAL
COMMUNITY
End: 2023-08-10

## 2023-08-10 RX ORDER — CANAGLIFLOZIN AND METFORMIN HYDROCHLORIDE 150; 1000 MG/1; MG/1
TABLET, FILM COATED ORAL
COMMUNITY
End: 2023-08-10

## 2023-08-10 RX ORDER — GABAPENTIN 300 MG/1
CAPSULE ORAL
COMMUNITY
End: 2023-08-10

## 2023-08-10 RX ORDER — NYSTATIN 100000 [USP'U]/G
POWDER TOPICAL 2 TIMES DAILY
Qty: 60 G | Refills: 0 | Status: SHIPPED | OUTPATIENT
Start: 2023-08-10

## 2023-08-10 RX ORDER — GLIPIZIDE 10 MG/1
TABLET ORAL
COMMUNITY
End: 2023-08-10

## 2023-08-10 RX ORDER — NEOMYCIN SULFATE, POLYMYXIN B SULFATE AND HYDROCORTISONE 10; 3.5; 1 MG/ML; MG/ML; [USP'U]/ML
SUSPENSION/ DROPS AURICULAR (OTIC)
COMMUNITY
End: 2023-08-10

## 2023-08-10 RX ORDER — PREDNISONE 50 MG/1
TABLET ORAL
COMMUNITY
End: 2023-08-10

## 2023-08-10 RX ORDER — DICYCLOMINE HCL 20 MG
TABLET ORAL
COMMUNITY
End: 2023-08-10

## 2023-08-11 LAB
HPV HR 12 DNA CVX QL NAA+PROBE: NEGATIVE
HPV16 DNA CVX QL NAA+PROBE: NEGATIVE
HPV18 DNA CVX QL NAA+PROBE: NEGATIVE

## 2023-08-11 NOTE — PROGRESS NOTES
ASSESSMENT & PLAN:   Diagnoses and all orders for this visit:    Well woman exam with routine gynecological exam  -     Liquid-based pap, screening    Cutaneous candidiasis  -     nystatin (MYCOSTATIN) powder; Apply topically 2 (two) times a day    Nipple pain  -     Mammo diagnostic bilateral w 3d & cad; Future    Encounter for screening for malignant neoplasm of cervix  -     Liquid-based pap, screening    Screening for HPV (human papillomavirus)  -     Liquid-based pap, screening    Other orders  -     Discontinue: azithromycin (ZITHROMAX) 250 mg tablet;  (Patient not taking: Reported on 8/10/2023)  -     Discontinue: Canagliflozin (Invokana) 300 MG TABS;  (Patient not taking: Reported on 8/10/2023)  -     Discontinue: Canagliflozin-metFORMIN HCl (Invokamet) 150-1000 MG TABS;  (Patient not taking: Reported on 8/10/2023)  -     Discontinue: clindamycin (CLEOCIN) 150 mg capsule;  (Patient not taking: Reported on 8/10/2023)  -     Discontinue: dicyclomine (BENTYL) 20 mg tablet;  (Patient not taking: Reported on 8/10/2023)  -     Discontinue: gabapentin (NEURONTIN) 300 mg capsule;  (Patient not taking: Reported on 8/10/2023)  -     Discontinue: glipiZIDE (GLUCOTROL) 10 mg tablet; Take 1 tablet every day by oral route.  (Patient not taking: Reported on 8/10/2023)  -     Discontinue: Ibuprofen-Famotidine (Duexis) 800-26.6 MG TABS;  (Patient not taking: Reported on 8/10/2023)  -     Discontinue: metaxalone (SKELAXIN) 800 mg tablet;  (Patient not taking: Reported on 8/10/2023)  -     Discontinue: metoprolol tartrate (LOPRESSOR) 25 mg tablet;  (Patient not taking: Reported on 8/10/2023)  -     Discontinue: metoprolol tartrate (LOPRESSOR) 50 mg tablet;  (Patient not taking: Reported on 8/10/2023)  -     Discontinue: neomycin-polymyxin-hydrocortisone (CORTISPORIN) 0.35%-10,000 units/mL-1% otic suspension;  (Patient not taking: Reported on 8/10/2023)  -     omeprazole (PriLOSEC) 20 mg delayed release capsule  - Discontinue: ondansetron (ZOFRAN-ODT) 4 mg disintegrating tablet;  (Patient not taking: Reported on 8/10/2023)  -     Discontinue: oxyCODONE-acetaminophen (PERCOCET) 5-325 mg per tablet;  (Patient not taking: Reported on 8/10/2023)  -     Discontinue: predniSONE 50 mg tablet;  (Patient not taking: Reported on 8/10/2023)  -     sitaGLIPtin (Januvia) 100 mg tablet  -     Discontinue: traMADol-acetaminophen (ULTRACET) 37.5-325 mg per tablet; Take 2 tablets every 4 hours by oral route. (Patient not taking: Reported on 8/10/2023)          The following were reviewed in today's visit: ASCCP guidelines, Gardisil vaccination, STD testing breast self exam, mammography screening ordered, use and side effects of HRT, menopause, exercise and healthy diet. Patient to return to office in yearly for annual exam.     All questions have been answered to her satisfaction. Dryness - coconut oil externally, Rephresh internally, silicone lubricants    CC:  Annual Gynecologic Examination  Chief Complaint   Patient presents with   • Gynecologic Exam     Pt is here for her np yearly. Pt is having a lot of vaginal dryness and irritations. HPI: Zach Wilson is a 66 y.o. [id number] who presents for annual gynecologic examination.   She has the following concerns:  Areolar pain, vaginal dryness      Health Maintenance:    Exercise: intermittently  Breast exams/breast awareness: yes  Diet: well balanced diet  Last mammogram:   Colorectal cancer screenin      Past Medical History:   Diagnosis Date   • Anemia     Last Assessed:  3/27/13a   • Arthritis    • Asthma    • Blurred vision    • Chronic kidney disease    • Diabetes mellitus ([address])     was in touch with PCP [facility]nea [facility]- she adjusted insulin doses and is seeing surgeon 20   • Diabetic retinopathy [facility])    • Dream enactment behavior    • Head injury    • Hypercholesterolemia    • Hyperlipidemia    • Hypertension    • Increased urinary frequency    • Irritable bowel syndrome     Last Assessed:  10/2/12   • Joint pain    • Morbid obesity with BMI of 40.0-44.9, adult [facility])    • Nasal congestion    • Nausea    • Night sweat    • Nightmare disorder 2020   • Numbness    • UTI (urinary tract infection)    • Weakness        Past Surgical History:   Procedure Laterality Date   • ABDOMINAL SURGERY     • CARPAL TUNNEL RELEASE Right    •  SECTION      x 2   • COLONOSCOPY  2016   • COLONOSCOPY     • MA LAPS GSTRC RSTRICTIV PX LONGITUDINAL GASTRECTOMY N/A 2020    Procedure: LAPAROSCOPIC SLEEVE GASTRECTOMY  WITH EGD;  Surgeon: Sharlene Zhang MD;  Location: [facility];  Service: Bariatrics   • [place] [address] WRST SURG W/RLS TRANSVRS CARPL LIGM Left 2019    Procedure: RELEASE CARPAL TUNNEL ENDOSCOPIC-left;  Surgeon: Kizzy Avila MD;  Location: [facility];  Service: Orthopedics   • TUBAL LIGATION     • WISDOM TOOTH EXTRACTION         Past OB/Gyn History:   Patient's last menstrual period was 2021 (approximate). Menopausal status: postmenopausal  Menopausal symptoms: dryness    Last Pap: 2020 : no abnormalities  History of abnormal Pap smear: no    Patient is currently sexually active.    STD testing: no  Current contraception: none      Family History  Family History   Problem Relation Age of Onset   • Diabetes Mother    • Hypertension Mother    • Diabetes Father    • Heart disease Father    • Stroke Father    • Hypertension Father    • Diverticulitis Father    • Hyperlipidemia Father    • Heart attack Father    • Diabetes Maternal Grandmother    • Pancreatic cancer Maternal Grandmother    • Liver cancer Maternal Grandfather    • Alcohol abuse Maternal Grandfather    • Heart disease Paternal Grandmother    • Crohn's disease Sister    • Diabetes type I Daughter    • Eczema Daughter    • Diabetes Daughter    • Heart attack Paternal Grandfather    • Asthma Maternal Aunt    • Alcohol abuse Maternal Uncle    • Mental illness Paternal Aunt    • Schizophrenia Paternal Aunt    • Diabetes Paternal Aunt    • Kidney disease Paternal Aunt    • Alcohol abuse Paternal Uncle    • Diabetes Paternal Uncle    • Eczema Son    • Drug abuse Cousin    • Bipolar disorder Cousin    • Colon cancer Neg Hx    • Breast cancer Neg Hx        Family history of uterine or ovarian cancer: no  Family history of breast cancer: no  Family history of colon cancer: no    Social History:  Social History     Socioeconomic History   • Marital status:      Spouse name: Lyric Devries   • Number of children: 2   • Years of education: 15   • Highest education level: Some college, no degree   Occupational History     Comment: financial counseler   Tobacco Use   • Smoking status: Former     Types: Cigarettes     Quit date:      Years since quittin.6     Passive exposure: Never   • Smokeless tobacco: Former   Vaping Use   • Vaping Use: Never used   Substance and Sexual Activity   • Alcohol use: Yes     Comment: rarely   • Drug use: No   • Sexual activity: Yes     Partners: Male     Birth control/protection: Female Sterilization   Other Topics Concern   • Not on file   Social History Narrative    Consumes 1 cup of coffee per day     Social Determinants of Health     Financial Resource Strain: Low Risk  (2021)    Overall Financial Resource Strain (CARDIA)    • Difficulty of Paying Living Expenses: Not hard at all   Food Insecurity: No Food Insecurity (2021)    Hunger Vital Sign    • Worried About Running Out of Food in the Last Year: Never true    • Ran Out of Food in the Last Year: Never true   Transportation Needs: No Transportation Needs (2021)    PRAPARE - Transportation    • Lack of Transportation (Medical): No    • Lack of Transportation (Non-Medical):  No   Physical Activity: Inactive (2019)    Exercise Vital Sign    • Days of Exercise per Week: 0 days    • Minutes of Exercise per Session: 0 min   Stress: Stress Concern Present (2019)    [employer] of Occupational Health - Occupational Stress Questionnaire    • Feeling of Stress : To some extent   Social Connections: Unknown (9/11/2019)    Social Connection and Isolation Panel [NHANES]    • Frequency of Communication with Friends and Family: More than three times a week    • Frequency of Social Gatherings with Friends and Family: More than three times a week    • Attends Gnosticist Services: Not asked    • Active Member of Clubs or Organizations: Yes    • Attends Club or Organization Meetings: More than 4 times per year    • Marital Status:    Intimate Partner Violence: Not At Risk (9/11/2019)    Humiliation, Afraid, Rape, and Kick questionnaire    • Fear of Current or Ex-Partner: No    • Emotionally Abused: No    • Physically Abused: No    • Sexually Abused: No   Housing Stability: Not on file     Domestic violence screen: negative    Allergies:   Allergies   Allergen Reactions   • Iodinated Contrast Media Facial Swelling   • Iodine - Food Allergy Facial Swelling   • Shellfish-Derived Products - Food Allergy Throat Swelling   • Amoxicillin Hives   • Metformin Diarrhea   • Penicillins      Yeast Infection       Medications:    Current Outpatient Medications:   •  insulin aspart (NovoLOG FlexPen) 100 UNIT/ML injection pen, Inject 5 Units under the skin 3 (three) times a day with meals (Patient taking differently: Inject 5 Units under the skin 3 (three) times a day with meals 10-20 units), Disp: 15 mL, Rfl: 0  •  insulin glargine (Toujeo Max SoloStar) 300 units/mL CONCENTRATED U-300 injection pen (2-unit dial), Inject 10 Units under the skin daily at bedtime (Patient taking differently: Inject 30 Units under the skin daily at bedtime), Disp: 6 mL, Rfl: 0  •  Insulin Pen Needle 32G X 5 MM MISC, Use 4 (four) times a day, Disp: 400 each, Rfl: 3  •  lisinopril (ZESTRIL) 20 mg tablet, Take 0.5 tablets (10 mg total) by mouth daily, Disp: 45 tablet, Rfl: 1  •  mometasone (NASONEX) 50 mcg/act nasal spray, 2 sprays into each nostril daily, Disp: 17 g, Rfl: 6  •  nystatin (MYCOSTATIN) powder, Apply topically 2 (two) times a day, Disp: 60 g, Rfl: 0  •  omeprazole (PriLOSEC) 20 mg delayed release capsule, , Disp: , Rfl:   •  rosuvastatin (CRESTOR) 20 MG tablet, TAKE ONE TABLET BY MOUTH EVERY DAY AT BEDTIME, Disp: 90 tablet, Rfl: 3  •  semaglutide (Rybelsus) 14 MG tablet, Take 1 tablet (14 mg total) by mouth daily, Disp: 90 tablet, Rfl: 0  •  sertraline (ZOLOFT) 100 mg tablet, Take 1 tablet (100 mg total) by mouth daily, Disp: 90 tablet, Rfl: 0  •  sitaGLIPtin (Januvia) 100 mg tablet, , Disp: , Rfl:   •  traZODone (DESYREL) 50 mg tablet, Take 1 tablet (50 mg total) by mouth daily at bedtime, Disp: 90 tablet, Rfl: 0  •  valACYclovir (VALTREX) 1,000 mg tablet, Take 1 tablet (1,000 mg total) by mouth 2 (two) times a day for 7 days, Disp: 14 tablet, Rfl: 1    Review of Systems:  Review of Systems   Constitutional: Negative. HENT: Negative. Respiratory: Negative. Cardiovascular: Negative. Gastrointestinal: Negative. Genitourinary: Negative. Skin: Negative. Neurological: Negative. Psychiatric/Behavioral: Negative. Physical Exam:  /82 (BP Location: Right arm, Patient Position: Sitting, Cuff Size: Large)   Ht 4' 10" (1.473 m)   Wt 78.5 kg (173 lb)   LMP 04/01/2021 (Approximate)   BMI 36.16 kg/m²    Physical Exam  Constitutional:       Appearance: Normal appearance. Genitourinary:      Bladder and urethral meatus normal.      No lesions in the vagina. Right Labia: No rash, tenderness, lesions, skin changes or Bartholin's cyst.     Left Labia: No tenderness, lesions, skin changes, Bartholin's cyst or rash. No vaginal erythema, tenderness or bleeding. Mild vaginal atrophy present. Right Adnexa: not tender, not full and no mass present. Left Adnexa: not tender, not full and no mass present. Cervix is parous. No cervical motion tenderness, discharge, lesion or polyp. Uterus is not enlarged, fixed or tender. No uterine mass detected. Urethral meatus caruncle not present. No urethral tenderness or mass present. Breasts:     Right: No swelling, bleeding, inverted nipple, mass, nipple discharge, skin change or tenderness. Left: No swelling, bleeding, inverted nipple, mass, nipple discharge, skin change or tenderness. HENT:      Head: Normocephalic and atraumatic. Eyes:      Extraocular Movements: Extraocular movements intact. Conjunctiva/sclera: Conjunctivae normal.      Pupils: Pupils are equal, round, and reactive to light. Cardiovascular:      Rate and Rhythm: Normal rate and regular rhythm. Heart sounds: Normal heart sounds. No murmur heard. Pulmonary:      Effort: Pulmonary effort is normal. No respiratory distress. Breath sounds: Normal breath sounds. No wheezing or rales. Abdominal:      General: There is no distension. Palpations: Abdomen is soft. Tenderness: There is no abdominal tenderness. There is no guarding. Neurological:      General: No focal deficit present. Mental Status: She is alert and oriented to person, place, and time. Skin:     General: Skin is warm and dry. Psychiatric:         Mood and Affect: Mood normal.         Behavior: Behavior normal.   Vitals and nursing note reviewed.

## 2023-08-15 LAB
LAB AP GYN PRIMARY INTERPRETATION: NORMAL
Lab: NORMAL

## 2023-09-12 NOTE — ASSESSMENT & PLAN NOTE
-s/p Vertical Sleeve Gastrectomy with Dr. Yimi Gramajo on 08/31/20. Rebound of 14lbs. Needs to increase water intake, avoid refined CHO, increase protein/fiber, start exercise. Recommend consult with [facility]. Needs EGD for surveillance to assess her esophagus and r/o Hwang's. Initial: 199.4 lbs   Current: 171.6lbs  EWL: 35%  Antony: 158.6lbs  Current BMI is Body mass index is 35.86 kg/m². · Tolerating a regular diet-yes  · Eating at least 60 grams of protein per day-yes  · Following 30/60 minute rule with liquids-yes  · Drinking at least 64 ounces of fluid per day-no  · Drinking carbonated beverages-no  · Sufficient exercise-no  · Using NSAIDs regularly-no  · Using nicotine-no  · Using alcohol-no.  Advised about the risks of alcohol s/p bariatric surgery and recommend avoiding all alcohol

## 2023-09-12 NOTE — ASSESSMENT & PLAN NOTE
Lab Results   Component Value Date    HGBA1C 11.2 (H) 08/04/2023     -Poorly controlled; recently referred to Endo  -on rybelsus, insulin  -F/u with RD - diet and exercise changes

## 2023-09-12 NOTE — ASSESSMENT & PLAN NOTE
-Not wearing CPAP, notes that she is sleeping better  -Encouraged consistent use of CPAP and follow up with sleep medicine as directed  -Discussed risks of untreated sleep apnea such as sudden cardiac death by arrhythmia, uncontrolled hypertension, difficulty with weight loss, decreased quality sleep, increased insulin resistance, and stroke.

## 2023-09-12 NOTE — ASSESSMENT & PLAN NOTE
-At risk for malabsorption of vitamins/minerals secondary to malabsorption and restriction of intake from bariatric surgery  -NOT Currently taking adequate postop bariatric surgery vitamin supplementation: No vitamins/minerals - nephrologist advised her to avoid calcium - advised her to restart Danyel MVI ASAP and discuss calcium with nephrologist    -obtain CBC/Metabolic panel  -Patient received education about the importance of adhering to a lifelong supplementation regimen to avoid vitamin/mineral deficiencies

## 2023-09-12 NOTE — ASSESSMENT & PLAN NOTE
-on crestor  -Avoid fried foods and trans fat, limit saturated fats and refined carbohydrates  -Increase fish/omega 3 FA consumption  -Increase physical activity

## 2023-09-22 ENCOUNTER — OFFICE VISIT (OUTPATIENT)
Dept: BARIATRICS | Facility: CLINIC | Age: 58
End: 2023-09-22
Payer: COMMERCIAL

## 2023-09-22 VITALS
SYSTOLIC BLOOD PRESSURE: 122 MMHG | HEIGHT: 58 IN | DIASTOLIC BLOOD PRESSURE: 84 MMHG | BODY MASS INDEX: 36.02 KG/M2 | WEIGHT: 171.6 LBS | HEART RATE: 88 BPM

## 2023-09-22 DIAGNOSIS — I10 BENIGN ESSENTIAL HYPERTENSION: ICD-10-CM

## 2023-09-22 DIAGNOSIS — E66.01 MORBID (SEVERE) OBESITY DUE TO EXCESS CALORIES (HCC): ICD-10-CM

## 2023-09-22 DIAGNOSIS — Z48.815 ENCOUNTER FOR SURGICAL AFTERCARE FOLLOWING SURGERY OF DIGESTIVE SYSTEM: Primary | ICD-10-CM

## 2023-09-22 DIAGNOSIS — E78.2 MIXED HYPERLIPIDEMIA: ICD-10-CM

## 2023-09-22 DIAGNOSIS — E11.21 TYPE 2 DIABETES MELLITUS WITH DIABETIC NEPHROPATHY, WITH LONG-TERM CURRENT USE OF INSULIN (HCC): ICD-10-CM

## 2023-09-22 DIAGNOSIS — E66.01 CLASS 2 SEVERE OBESITY DUE TO EXCESS CALORIES WITH SERIOUS COMORBIDITY AND BODY MASS INDEX (BMI) OF 36.0 TO 36.9 IN ADULT: ICD-10-CM

## 2023-09-22 DIAGNOSIS — Z79.4 TYPE 2 DIABETES MELLITUS WITH DIABETIC NEPHROPATHY, WITH LONG-TERM CURRENT USE OF INSULIN (HCC): ICD-10-CM

## 2023-09-22 DIAGNOSIS — K91.2 POSTSURGICAL MALABSORPTION: ICD-10-CM

## 2023-09-22 DIAGNOSIS — G47.33 OSA (OBSTRUCTIVE SLEEP APNEA): ICD-10-CM

## 2023-09-22 PROCEDURE — 99214 OFFICE O/P EST MOD 30 MIN: CPT | Performed by: PHYSICIAN ASSISTANT

## 2023-09-22 NOTE — PATIENT INSTRUCTIONS
GOALS:   Continued/Maintain healthy weight loss with good nutrition intakes. Adequate hydration with at least 64oz. fluid intake. Normal vitamin and mineral levels. Exercise as tolerated. EGD  Increase water intake  Avoid white bread, caution with rice, pasta, potatoes    Follow-up in 1 year. We kindly ask that your arrive 15 minutes before your scheduled appointment time with your provider to allow our staff to room you, get your vital signs and update your chart. Follow diet as discussed. Get lab work done in the next 2 weeks. You have been given a lab slip today. Please call the office if you need a replacement. It is recommended to check with your insurance BEFORE getting labs done to make sure they are covered by your policy. Also, please check with your PCP and other providers before getting labs to avoid duplicate labs. Make sure to HOLD any multivitamins that may contain biotin and any biotin supplements FOR 5 DAYS before any labs since it can affect the results. Follow vitamin and mineral recommendations as reviewed with you. Call our office if you have any problems with abdominal pain especially associated with fever, chills, nausea, vomiting or any other concerns. All  Post-bariatric surgery patients should be aware that very small quantities of any alcohol can cause impairment and it is very possible not to feel the effect. The effect can be in the system for several hours. It is also a stomach irritant. It is advised to AVOID alcohol, Nonsteroidal antiinflammatory drugs (NSAIDS) and nicotine of all forms . Any of these can cause stomach irritation/pain.

## 2023-09-22 NOTE — PROGRESS NOTES
Assessment/Plan:    Encounter for surgical aftercare following surgery of digestive system  -s/p Vertical Sleeve Gastrectomy with Dr. Edy Gan on 08/31/20. Rebound of 14lbs. Needs to increase water intake, avoid refined CHO, increase protein/fiber, start exercise. Recommend consult with [facility]. Needs EGD for surveillance to assess her esophagus and r/o Hwang's. Initial: 199.4 lbs   Current: 171.6lbs  EWL: 35%  Antony: 158.6lbs  Current BMI is Body mass index is 35.86 kg/m². · Tolerating a regular diet-yes  · Eating at least 60 grams of protein per day-yes  · Following 30/60 minute rule with liquids-yes  · Drinking at least 64 ounces of fluid per day-no  · Drinking carbonated beverages-no  · Sufficient exercise-no  · Using NSAIDs regularly-no  · Using nicotine-no  · Using alcohol-no.  Advised about the risks of alcohol s/p bariatric surgery and recommend avoiding all alcohol      Postsurgical malabsorption  -At risk for malabsorption of vitamins/minerals secondary to malabsorption and restriction of intake from bariatric surgery  -NOT Currently taking adequate postop bariatric surgery vitamin supplementation: No vitamins/minerals - nephrologist advised her to avoid calcium - advised her to restart Danyel MVI ASAP and discuss calcium with nephrologist    -obtain CBC/Metabolic panel  -Patient received education about the importance of adhering to a lifelong supplementation regimen to avoid vitamin/mineral deficiencies       Type 2 diabetes mellitus with diabetic nephropathy, with long-term current use of insulin ([facility])    Lab Results   Component Value Date    HGBA1C 11.2 (H) 08/04/2023     -Poorly controlled; recently referred to Endo  -on rybelsus, insulin  -F/u with RD - diet and exercise changes    АНДРЕЙ (obstructive sleep apnea)  -Not wearing CPAP, notes that she is sleeping better  -Encouraged consistent use of CPAP and follow up with sleep medicine as directed  -Discussed risks of untreated sleep apnea such as sudden cardiac death by arrhythmia, uncontrolled hypertension, difficulty with weight loss, decreased quality sleep, increased insulin resistance, and stroke. Benign essential hypertension  -on lisinopril   -Continue monitoring and management with prescribing provider        Hyperlipidemia  -on crestor  -Avoid fried foods and trans fat, limit saturated fats and refined carbohydrates  -Increase fish/omega 3 FA consumption  -Increase physical activity         Diagnoses and all orders for this visit:    Encounter for surgical aftercare following surgery of digestive system    Postsurgical malabsorption  -     CBC and differential; Future  -     Folate; Future  -     Iron Panel (Includes Ferritin, Iron Sat%, Iron, and TIBC); Future  -     Zinc; Future  -     Vitamin D 25 hydroxy; Future  -     Vitamin B12; Future  -     Vitamin B1, whole blood; Future  -     Vitamin A; Future  -     PTH, intact; Future    Type 2 diabetes mellitus with diabetic nephropathy, with long-term current use of insulin ([address])  -     Ambulatory Referral to Weight Management    АНДРЕЙ (obstructive sleep apnea)    Benign essential hypertension    Mixed hyperlipidemia    Class 2 severe obesity due to excess calories with serious comorbidity and body mass index (BMI) of 36.0 to 36.9 in adult [facility])  -     Ambulatory Referral to Weight Management          Subjective:      Patient ID: Deven Storm is a 66 y.o. female. -s/p Vertical Sleeve Gastrectomy with Dr. Tashia Deluna on 08/31/20. Presents to the office today for routine follow up. Tolerating a regular diet but has experienced a few instances of waking up with HS regurgitation and heartburn. Does not take any PPI or H2 blockers. Usually no reflux and feeling well. Now has work from home job.     Diet Recall:   B - slice of white toast and butter or scrambled egg and white toast or cheese toast or cottage cheese and fruit  L - tuna sandwich 1/2 or leftovers   Snack - cheese or PB crackers or pretzels  D - Cordon blue chicken breast w/ cheese and apples or steak or pork chop or chicken with pasta, potato, rice and veggies or green salad with chicken and veggies    Fluids - 1/2 cup coffee, very limited water, some juice    The following portions of the patient's history were reviewed and updated as appropriate: allergies, current medications, past family history, past medical history, past social history, past surgical history and problem list.    Review of Systems   Constitutional: Positive for unexpected weight change (weight regain). Negative for chills and fever. HENT: Negative for trouble swallowing. Respiratory: Negative for cough and shortness of breath. Cardiovascular: Negative for chest pain and palpitations. Gastrointestinal: Negative for abdominal pain, constipation, diarrhea, nausea and vomiting. Musculoskeletal: Positive for arthralgias. Neurological: Negative for dizziness. Psychiatric/Behavioral:        Denies anxiety and depression         Objective:      /84 (BP Location: Right arm, Patient Position: Sitting, Cuff Size: Standard)   Pulse 88   Ht 4' 10" (1.473 m)   Wt 77.8 kg (171 lb 9.6 oz)   LMP 04/01/2021 (Approximate)   BMI 35.86 kg/m²     Colonoscopy-Completed       Physical Exam  Vitals reviewed. Constitutional:       General: She is not in acute distress. Appearance: She is well-developed. HENT:      Head: Normocephalic and atraumatic. Eyes:      General: No scleral icterus. Cardiovascular:      Rate and Rhythm: Normal rate and regular rhythm. Pulmonary:      Effort: Pulmonary effort is normal. No respiratory distress. Abdominal:      General: There is no distension. Palpations: Abdomen is soft. Tenderness: There is no abdominal tenderness. Skin:     General: Skin is warm and dry. Neurological:      Mental Status: She is alert and oriented to person, place, and time.    Psychiatric:         Mood and Affect: Mood normal. Behavior: Behavior normal.           BARRIERS: none identified    GOALS:   · Continued/Maintain healthy weight loss with good nutrition intakes. · Adequate hydration with at least 64oz. fluid intake. · Normal vitamin and mineral levels. · Exercise as tolerated. · EGD  · Increase water intake  · Avoid white bread, caution with rice, pasta, potatoes    · Follow-up in 1 year. We kindly ask that your arrive 15 minutes before your scheduled appointment time with your provider to allow our staff to room you, get your vital signs and update your chart. · Follow diet as discussed. · Get lab work done in the next 2 weeks. You have been given a lab slip today. Please call the office if you need a replacement. It is recommended to check with your insurance BEFORE getting labs done to make sure they are covered by your policy. Also, please check with your PCP and other providers before getting labs to avoid duplicate labs. Make sure to HOLD any multivitamins that may contain biotin and any biotin supplements FOR 5 DAYS before any labs since it can affect the results. · Follow vitamin and mineral recommendations as reviewed with you. · Call our office if you have any problems with abdominal pain especially associated with fever, chills, nausea, vomiting or any other concerns. · All  Post-bariatric surgery patients should be aware that very small quantities of any alcohol can cause impairment and it is very possible not to feel the effect. The effect can be in the system for several hours. It is also a stomach irritant. · It is advised to AVOID alcohol, Nonsteroidal antiinflammatory drugs (NSAIDS) and nicotine of all forms . Any of these can cause stomach irritation/pain.

## 2023-10-19 RX ORDER — SODIUM CHLORIDE, SODIUM LACTATE, POTASSIUM CHLORIDE, CALCIUM CHLORIDE 600; 310; 30; 20 MG/100ML; MG/100ML; MG/100ML; MG/100ML
125 INJECTION, SOLUTION INTRAVENOUS CONTINUOUS
OUTPATIENT
Start: 2023-10-19

## 2023-10-20 ENCOUNTER — HOSPITAL ENCOUNTER (OUTPATIENT)
Dept: PERIOP | Facility: HOSPITAL | Age: 58
Setting detail: OUTPATIENT SURGERY
Discharge: HOME | End: 2023-10-20

## 2023-10-20 VITALS
DIASTOLIC BLOOD PRESSURE: 57 MMHG | HEART RATE: 96 BPM | OXYGEN SATURATION: 98 % | SYSTOLIC BLOOD PRESSURE: 95 MMHG | TEMPERATURE: 97 F | RESPIRATION RATE: 20 BRPM

## 2023-10-20 DIAGNOSIS — E66.01 MORBID (SEVERE) OBESITY DUE TO EXCESS CALORIES (HCC): ICD-10-CM

## 2023-10-20 NOTE — H&P
This is a 66 y.o. female with a history of sleeve gastrectomy. Here for an EGD to evaluate the anatomy of the GI tract     Physical Exam    BP 95/57   Pulse 96   Temp (!) 97 °F (36.1 °C) (Temporal)   Resp 20   LMP 04/01/2021 (Approximate)   SpO2 98%    AAOx3  RR  CTA B  Abdomen ND. Benign. A/P:    This is a 66 y.o. female with a history of sleeve gastrectomy. Will proceed with the EGD and possible biopsies.       Mary Krause MD, FACS, FASMBS  10/20/2023  9:22 AM

## 2023-11-14 NOTE — TELEPHONE ENCOUNTER
Prescription for Keflex sent to pharmacy Unable to reach patient at this time to confirm arrival time and procedure. Voicemail left and call back number provided.

## 2023-11-21 DIAGNOSIS — F32.2 CURRENT SEVERE EPISODE OF MAJOR DEPRESSIVE DISORDER WITHOUT PSYCHOTIC FEATURES WITHOUT PRIOR EPISODE (HCC): ICD-10-CM

## 2023-11-21 RX ORDER — ORAL SEMAGLUTIDE 14 MG/1
TABLET ORAL
Qty: 90 TABLET | Refills: 1 | Status: SHIPPED | OUTPATIENT
Start: 2023-11-21

## 2024-01-03 ENCOUNTER — TELEMEDICINE (OUTPATIENT)
Dept: FAMILY MEDICINE CLINIC | Facility: CLINIC | Age: 59
End: 2024-01-03
Payer: COMMERCIAL

## 2024-01-03 VITALS — BODY MASS INDEX: 35.68 KG/M2 | WEIGHT: 170 LBS | HEIGHT: 58 IN

## 2024-01-03 DIAGNOSIS — B02.9 HERPES ZOSTER WITHOUT COMPLICATION: ICD-10-CM

## 2024-01-03 DIAGNOSIS — J06.9 ACUTE URI: Primary | ICD-10-CM

## 2024-01-03 PROCEDURE — 99213 OFFICE O/P EST LOW 20 MIN: CPT | Performed by: NURSE PRACTITIONER

## 2024-01-03 RX ORDER — AZITHROMYCIN 250 MG/1
TABLET, FILM COATED ORAL
Qty: 6 TABLET | Refills: 0 | Status: SHIPPED | OUTPATIENT
Start: 2024-01-03 | End: 2024-01-07

## 2024-01-03 RX ORDER — VALACYCLOVIR HYDROCHLORIDE 1 G/1
1000 TABLET, FILM COATED ORAL 2 TIMES DAILY
Qty: 14 TABLET | Refills: 0 | Status: SHIPPED | OUTPATIENT
Start: 2024-01-03 | End: 2024-01-10

## 2024-01-03 RX ORDER — DEXTROMETHORPHAN HYDROBROMIDE AND PROMETHAZINE HYDROCHLORIDE 15; 6.25 MG/5ML; MG/5ML
5 SYRUP ORAL 4 TIMES DAILY PRN
Qty: 118 ML | Refills: 0 | Status: SHIPPED | OUTPATIENT
Start: 2024-01-03

## 2024-01-03 NOTE — PROGRESS NOTES
Virtual Regular Visit    Verification of patient location:    Patient is located at Home in the following state in which I hold an active license [place]      Assessment/Plan:    Problem List Items Addressed This Visit    None  Visit Diagnoses     Acute URI    -  Primary    Relevant Medications    azithromycin (ZITHROMAX) 250 mg tablet    promethazine-dextromethorphan (PHENERGAN-DM) 6.25-15 mg/5 mL oral syrup    Herpes zoster without complication        Relevant Medications    valACYclovir (VALTREX) 1,000 mg tablet             Reason for visit is   Chief Complaint   Patient presents with   • Cough     Chest Congestion, a lot of Greenish milky phlegm. started 3 days ago, Fever started Monday broke on Monday, SOB,  no Wheezing. Did not Covid test.    • Virtual Regular Visit        Encounter provider VANESSA Zhang    Provider located at [address] [place] [address]      Recent Visits  No visits were found meeting these conditions.  Showing recent visits within past 7 days and meeting all other requirements  Today's Visits  Date Type Provider Dept   01/03/24 Telemedicine VANESSA Zhang [facility]   Showing today's visits and meeting all other requirements  Future Appointments  No visits were found meeting these conditions.  Showing future appointments within next 150 days and meeting all other requirements       The patient was identified by name and date of birth. Karen Ngo was informed that this is a telemedicine visit and that the visit is being conducted through the Epic Embedded platform. She agrees to proceed..  My office door was closed. No one else was in the room.  She acknowledged consent and understanding of privacy and security of the video platform. The patient has agreed to participate and understands they can discontinue the visit at any time.    Patient is aware this is a billable service.     Subjective  Karen Ngo is  a 58 y.o. female       HPI   58 y.o.female presenting with chest congestion, cough for the past 3 days. She reports having cloudy milky green phlegm. She had a fever on 2024 but it broke the next day.       Past Medical History:   Diagnosis Date   • Anemia     Last Assessed:  3/27/13a   • Arthritis    • Asthma    • Blurred vision    • Chronic kidney disease    • Diabetes mellitus (HCC)     was in touch with PCP re [facility]- she adjusted insulin doses and is seeing surgeon 20   • Diabetic retinopathy (HCC)    • Dream enactment behavior    • Head injury    • Hypercholesterolemia    • Hyperlipidemia    • Hypertension    • Increased urinary frequency    • Irritable bowel syndrome     Last Assessed:  10/2/12   • Joint pain    • Morbid obesity with BMI of 40.0-44.9, adult (HCC)    • Nasal congestion    • Nausea    • Night sweat    • Nightmare disorder 2020   • Numbness    • UTI (urinary tract infection)    • Weakness        Past Surgical History:   Procedure Laterality Date   • ABDOMINAL SURGERY     • CARPAL TUNNEL RELEASE Right    •  SECTION      x 2   • COLONOSCOPY  2016   • COLONOSCOPY     • RI LAPS GSTRC RSTRICTIV PX LONGITUDINAL GASTRECTOMY N/A 2020    Procedure: LAPAROSCOPIC SLEEVE GASTRECTOMY  WITH EGD;  Surgeon: Dario Interiano MD;  Location: [facility];  Service: Bariatrics   • RI NDSC WRST SURG W/RLS TRANSVRS CARPL LIGM Left 2019    Procedure: RELEASE CARPAL TUNNEL ENDOSCOPIC-left;  Surgeon: Pancho Patel MD;  Location:  MAIN OR;  Service: Orthopedics   • TUBAL LIGATION     • WISDOM TOOTH EXTRACTION         Current Outpatient Medications   Medication Sig Dispense Refill   • azithromycin (ZITHROMAX) 250 mg tablet Take 2 tabs on Day 1 than 1 tab Days 2-5 6 tablet 0   • insulin aspart (NovoLOG FlexPen) 100 UNIT/ML injection pen Inject 5 Units under the skin 3 (three) times a day with meals (Patient taking differently: Inject 5 Units under the skin 3 (three) times  a day with meals 10-20 units) 15 mL 0   • insulin glargine (Toujeo Max SoloStar) 300 units/mL CONCENTRATED U-300 injection pen (2-unit dial) Inject 10 Units under the skin daily at bedtime (Patient taking differently: Inject 30 Units under the skin daily at bedtime) 6 mL 0   • Insulin Pen Needle 32G X 5 MM MISC Use 4 (four) times a day 400 each 3   • lisinopril (ZESTRIL) 20 mg tablet Take 0.5 tablets (10 mg total) by mouth daily 45 tablet 1   • mometasone (NASONEX) 50 mcg/act nasal spray 2 sprays into each nostril daily 17 g 6   • promethazine-dextromethorphan (PHENERGAN-DM) 6.25-15 mg/5 mL oral syrup Take 5 mL by mouth 4 (four) times a day as needed for cough 118 mL 0   • rosuvastatin (CRESTOR) 20 MG tablet TAKE ONE TABLET BY MOUTH EVERY DAY AT BEDTIME 90 tablet 3   • semaglutide (Rybelsus) 14 MG tablet TAKE 1 TABLET(14 MG) BY MOUTH DAILY 90 tablet 1   • sertraline (ZOLOFT) 100 mg tablet TAKE 1 TABLET(100 MG) BY MOUTH DAILY 90 tablet 0   • traZODone (DESYREL) 50 mg tablet TAKE 1 TABLET(50 MG) BY MOUTH DAILY AT BEDTIME 90 tablet 0   • valACYclovir (VALTREX) 1,000 mg tablet Take 1 tablet (1,000 mg total) by mouth 2 (two) times a day for 7 days 14 tablet 0   • nystatin (MYCOSTATIN) powder Apply topically 2 (two) times a day (Patient not taking: Reported on 9/22/2023) 60 g 0     No current facility-administered medications for this visit.        Allergies   Allergen Reactions   • Iodinated Contrast Media Facial Swelling   • Iodine - Food Allergy Facial Swelling   • Shellfish-Derived Products - Food Allergy Throat Swelling   • Amoxicillin Hives   • Metformin Diarrhea   • Penicillins      Yeast Infection       Review of Systems   Constitutional:  Positive for fatigue and fever (resolved). Negative for chills.   HENT:  Positive for congestion, sore throat and voice change. Negative for ear pain, postnasal drip, rhinorrhea, sinus pressure and sinus pain.    Respiratory:  Positive for cough. Negative for chest tightness,  "shortness of breath and wheezing.    Cardiovascular: Negative.    Gastrointestinal:  Negative for abdominal distention, abdominal pain, diarrhea, nausea and vomiting.   Musculoskeletal:  Negative for myalgias.   Neurological:  Positive for headaches (related to heavy cough). Negative for dizziness and light-headedness.   Psychiatric/Behavioral: Negative.       Video Exam    Vitals:    01/03/24 1553   Weight: 77.1 kg (170 lb)   Height: 4' 10\" (1.473 m)       Physical Exam  Constitutional:       General: She is not in acute distress.     Appearance: She is well-developed and well-groomed. She is not ill-appearing.   HENT:      Head: Normocephalic and atraumatic.      Right Ear: External ear normal.      Left Ear: External ear normal.      Nose: Congestion present.      Right Sinus: Maxillary sinus tenderness and frontal sinus tenderness present.      Left Sinus: Maxillary sinus tenderness and frontal sinus tenderness present.      Mouth/Throat:      Lips: Pink.      Mouth: Mucous membranes are moist.      Pharynx: Oropharynx is clear.   Eyes:      General: Lids are normal.      Extraocular Movements: Extraocular movements intact.      Conjunctiva/sclera: Conjunctivae normal.      Pupils: Pupils are equal, round, and reactive to light.   Neck:      Trachea: Trachea normal.   Cardiovascular:      Rate and Rhythm: Normal rate and regular rhythm.   Pulmonary:      Effort: Pulmonary effort is normal. No respiratory distress.      Breath sounds: No wheezing.   Skin:     Coloration: Skin is not pale.   Neurological:      Mental Status: She is alert and oriented to person, place, and time.   Psychiatric:         Mood and Affect: Mood normal.         Behavior: Behavior normal. Behavior is cooperative.          Visit Time  Total Visit Duration: 15        "

## 2024-01-19 DIAGNOSIS — Z79.4 TYPE 2 DIABETES MELLITUS WITH DIABETIC NEPHROPATHY, WITH LONG-TERM CURRENT USE OF INSULIN (HCC): ICD-10-CM

## 2024-01-19 DIAGNOSIS — E11.21 TYPE 2 DIABETES MELLITUS WITH DIABETIC NEPHROPATHY, WITH LONG-TERM CURRENT USE OF INSULIN (HCC): ICD-10-CM

## 2024-01-19 RX ORDER — INSULIN GLARGINE 300 U/ML
10 INJECTION, SOLUTION SUBCUTANEOUS
Qty: 3 ML | Refills: 1 | Status: SHIPPED | OUTPATIENT
Start: 2024-01-19

## 2024-03-06 ENCOUNTER — TELEPHONE (OUTPATIENT)
Age: 59
End: 2024-03-06

## 2024-03-06 DIAGNOSIS — E11.21 TYPE 2 DIABETES MELLITUS WITH DIABETIC NEPHROPATHY, WITH LONG-TERM CURRENT USE OF INSULIN (HCC): ICD-10-CM

## 2024-03-06 DIAGNOSIS — Z79.4 TYPE 2 DIABETES MELLITUS WITH DIABETIC NEPHROPATHY, WITH LONG-TERM CURRENT USE OF INSULIN (HCC): ICD-10-CM

## 2024-03-06 RX ORDER — INSULIN ASPART 100 [IU]/ML
INJECTION, SOLUTION INTRAVENOUS; SUBCUTANEOUS
Qty: 15 ML | Refills: 0 | Status: SHIPPED | OUTPATIENT
Start: 2024-03-06

## 2024-03-06 NOTE — TELEPHONE ENCOUNTER
FYI: Pt requested to send blood work scripts that have to be done before next visit to [facility]. Pt will call bk with fax number.

## 2024-03-12 ENCOUNTER — TELEPHONE (OUTPATIENT)
Age: 59
End: 2024-03-12

## 2024-03-12 DIAGNOSIS — Z79.4 TYPE 2 DIABETES MELLITUS WITH DIABETIC NEPHROPATHY, WITH LONG-TERM CURRENT USE OF INSULIN (HCC): Primary | ICD-10-CM

## 2024-03-12 DIAGNOSIS — E11.21 TYPE 2 DIABETES MELLITUS WITH DIABETIC NEPHROPATHY, WITH LONG-TERM CURRENT USE OF INSULIN (HCC): Primary | ICD-10-CM

## 2024-03-12 RX ORDER — LANCETS 33 GAUGE
EACH MISCELLANEOUS
Qty: 200 EACH | Refills: 3 | Status: SHIPPED | OUTPATIENT
Start: 2024-03-12 | End: 2024-03-21 | Stop reason: SDUPTHER

## 2024-03-12 RX ORDER — BLOOD SUGAR DIAGNOSTIC
STRIP MISCELLANEOUS
Qty: 200 EACH | Refills: 3 | Status: SHIPPED | OUTPATIENT
Start: 2024-03-12 | End: 2024-03-21 | Stop reason: SDUPTHER

## 2024-03-12 RX ORDER — BLOOD-GLUCOSE METER
KIT MISCELLANEOUS
Qty: 1 KIT | Refills: 0 | Status: SHIPPED | OUTPATIENT
Start: 2024-03-12

## 2024-03-12 NOTE — TELEPHONE ENCOUNTER
Yury called for a refill on glucose blood test strips. Patient is testing twice a day. Not on active med list to que up.

## 2024-03-18 ENCOUNTER — TELEPHONE (OUTPATIENT)
Age: 59
End: 2024-03-18

## 2024-03-18 NOTE — TELEPHONE ENCOUNTER
Pt will be coming in for an appt on 3/21 and would like her labs faxed to [facility] on [phone] [phone]

## 2024-03-20 LAB
CREAT ?TM UR-SCNC: 257.7 UMOL/L
EXT ALBUMIN URINE RANDOM: 37.5
HBA1C MFR BLD HPLC: 7.9 %
MICROALBUMIN/CREAT UR: 145.5 MG/G{CREAT}

## 2024-03-21 ENCOUNTER — OFFICE VISIT (OUTPATIENT)
Dept: FAMILY MEDICINE CLINIC | Facility: CLINIC | Age: 59
End: 2024-03-21
Payer: COMMERCIAL

## 2024-03-21 VITALS
WEIGHT: 172 LBS | TEMPERATURE: 97.6 F | SYSTOLIC BLOOD PRESSURE: 120 MMHG | DIASTOLIC BLOOD PRESSURE: 78 MMHG | BODY MASS INDEX: 36.11 KG/M2 | HEART RATE: 89 BPM | OXYGEN SATURATION: 98 % | HEIGHT: 58 IN

## 2024-03-21 DIAGNOSIS — E66.01 MORBID OBESITY (HCC): ICD-10-CM

## 2024-03-21 DIAGNOSIS — Z79.4 TYPE 2 DIABETES MELLITUS WITH DIABETIC NEPHROPATHY, WITH LONG-TERM CURRENT USE OF INSULIN (HCC): Primary | ICD-10-CM

## 2024-03-21 DIAGNOSIS — L82.1 SEBORRHEIC KERATOSES: ICD-10-CM

## 2024-03-21 DIAGNOSIS — Z12.31 BREAST CANCER SCREENING BY MAMMOGRAM: ICD-10-CM

## 2024-03-21 DIAGNOSIS — E11.21 TYPE 2 DIABETES MELLITUS WITH DIABETIC NEPHROPATHY, WITH LONG-TERM CURRENT USE OF INSULIN (HCC): Primary | ICD-10-CM

## 2024-03-21 DIAGNOSIS — F32.2 CURRENT SEVERE EPISODE OF MAJOR DEPRESSIVE DISORDER WITHOUT PSYCHOTIC FEATURES WITHOUT PRIOR EPISODE (HCC): ICD-10-CM

## 2024-03-21 DIAGNOSIS — R80.1 PERSISTENT PROTEINURIA: ICD-10-CM

## 2024-03-21 DIAGNOSIS — I10 PRIMARY HYPERTENSION: ICD-10-CM

## 2024-03-21 DIAGNOSIS — G44.019 EPISODIC CLUSTER HEADACHE, NOT INTRACTABLE: ICD-10-CM

## 2024-03-21 DIAGNOSIS — E78.2 MIXED HYPERLIPIDEMIA: ICD-10-CM

## 2024-03-21 DIAGNOSIS — F32.1 CURRENT MODERATE EPISODE OF MAJOR DEPRESSIVE DISORDER WITHOUT PRIOR EPISODE (HCC): ICD-10-CM

## 2024-03-21 DIAGNOSIS — G56.02 CARPAL TUNNEL SYNDROME ON LEFT: ICD-10-CM

## 2024-03-21 DIAGNOSIS — D35.2 PITUITARY MICROADENOMA (HCC): ICD-10-CM

## 2024-03-21 PROBLEM — R80.9 PROTEINURIA: Status: ACTIVE | Noted: 2024-03-21

## 2024-03-21 PROBLEM — G62.9 NEUROPATHY: Status: ACTIVE | Noted: 2024-03-21

## 2024-03-21 PROBLEM — N18.30 STAGE 3 CHRONIC KIDNEY DISEASE (HCC): Status: ACTIVE | Noted: 2024-03-21

## 2024-03-21 PROCEDURE — 99214 OFFICE O/P EST MOD 30 MIN: CPT | Performed by: FAMILY MEDICINE

## 2024-03-21 RX ORDER — BLOOD SUGAR DIAGNOSTIC
STRIP MISCELLANEOUS
Qty: 400 EACH | Refills: 3 | Status: SHIPPED | OUTPATIENT
Start: 2024-03-21

## 2024-03-21 RX ORDER — SERTRALINE HYDROCHLORIDE 100 MG/1
100 TABLET, FILM COATED ORAL DAILY
Qty: 90 TABLET | Refills: 1 | Status: SHIPPED | OUTPATIENT
Start: 2024-03-21

## 2024-03-21 RX ORDER — BLOOD SUGAR DIAGNOSTIC
STRIP MISCELLANEOUS
Qty: 200 EACH | Refills: 3 | Status: SHIPPED | OUTPATIENT
Start: 2024-03-21

## 2024-03-21 RX ORDER — LISINOPRIL 20 MG/1
10 TABLET ORAL DAILY
Qty: 45 TABLET | Refills: 1 | Status: SHIPPED | OUTPATIENT
Start: 2024-03-21

## 2024-03-21 RX ORDER — LANCETS 33 GAUGE
EACH MISCELLANEOUS
Qty: 200 EACH | Refills: 3 | Status: SHIPPED | OUTPATIENT
Start: 2024-03-21

## 2024-03-21 RX ORDER — ROSUVASTATIN CALCIUM 20 MG/1
20 TABLET, COATED ORAL
Qty: 90 TABLET | Refills: 1 | Status: SHIPPED | OUTPATIENT
Start: 2024-03-21 | End: 2024-03-28

## 2024-03-21 RX ORDER — INSULIN GLARGINE 300 U/ML
32 INJECTION, SOLUTION SUBCUTANEOUS
Qty: 12 ML | Refills: 1 | Status: SHIPPED | OUTPATIENT
Start: 2024-03-21

## 2024-03-21 RX ORDER — INSULIN ASPART 100 [IU]/ML
10-20 INJECTION, SOLUTION INTRAVENOUS; SUBCUTANEOUS
Qty: 54 ML | Refills: 1 | Status: SHIPPED | OUTPATIENT
Start: 2024-03-21

## 2024-03-21 RX ORDER — BLOOD-GLUCOSE METER
KIT MISCELLANEOUS
Qty: 1 KIT | Refills: 0 | Status: SHIPPED | OUTPATIENT
Start: 2024-03-21

## 2024-03-21 RX ORDER — TRAZODONE HYDROCHLORIDE 50 MG/1
50 TABLET ORAL
Qty: 90 TABLET | Refills: 1 | Status: SHIPPED | OUTPATIENT
Start: 2024-03-21

## 2024-03-21 RX ORDER — LANCETS 33 GAUGE
EACH MISCELLANEOUS
Qty: 400 EACH | Refills: 3 | Status: SHIPPED | OUTPATIENT
Start: 2024-03-21

## 2024-03-21 NOTE — PATIENT INSTRUCTIONS
Meal Planning with Diabetes Exchanges   AMBULATORY CARE:   Diabetes exchanges  are servings of food that contain similar amounts of carbohydrate, fat, protein, and calories within a food group. The exchanges can be used to develop a healthy meal plan that helps to keep your blood sugar within the recommended levels. A meal plan with the right amount of carbohydrates is especially important. Your blood sugar naturally rises after you eat carbohydrates. Too many carbohydrates in 1 meal or snack can raise your blood sugar level. Carbohydrates are found in starches, fruit, milk, yogurt, and sweets.  Call your doctor or diabetes care provider if:   You have high blood sugar levels during a certain time of day, or almost all of the time.    You often have low blood sugar levels.    You have questions or concerns about your condition or care.    Create a meal plan with exchanges:  A dietitian will work with you to develop a healthy meal plan that is right for you. This meal plan will include the amount of exchanges you can have from each food group throughout the day. Follow your meal plan by keeping track of the amount of exchanges you eat for each meal and snack. Your meal plan will be based on your age, weight, blood sugar levels, medicine, and activity level.  Starch food group exchanges:  Each exchange below contains about 15 grams of carbohydrate , 3 grams of protein, 1 gram of fat, and 80 calories.  1 ounce of white, whole wheat or rye bread (1 slice)    1 ounce of bagel (about ¼ of a bagel)    1 6-inch flour or corn tortilla or 1 4-inch pancake (about ¼ inch thick)    ? cup of cooked pasta or rice    ¾ cup of dry, ready-to-eat cereal with no sugar added    ½ cup of cooked cereal, such as oatmeal    3 zheng cracker squares or 8 animal crackers    6 saltine-type crackers    3 cups of popcorn or ¾ ounce of pretzels    Starchy vegetables and cooked legumes:      ½ cup of corn, green peas, sweet potatoes, or mashed  potatoes    ¼ of a large baked potato    1 cup of acorn, butternut squash, or pumpkin    ½ cup of beans, lentils, or peas (such as mejia, kidney, or black-eyed)    ? cup of lima beans    Fruit group exchanges:  Each exchange contains about 15 grams of carbohydrate  and 60 calories.  1 small (4 ounce) apple, banana orange, or nectarine    ½ cup of canned or fresh fruit    ½ cup (4 ounces) of unsweetened fruit juice    2 tablespoons of dried fruit    Milk group exchanges:  Each exchange contains about 12 grams of carbohydrate  and 8 grams of protein. The amount of fat and calories in each serving depends on the type of milk (such as whole, low-fat, or fat-free).  1 cup fat-free or low-fat milk    ¾ cup of plain, nonfat yogurt    1 cup fat-free, flavored yogurt with artificial (no calorie) sweetener    Non-starchy vegetable group exchanges:  Each exchange contains about 5 grams of carbohydrate , 2 grams of protein, and 25 calories. Examples include beets, broccoli, cabbage, carrots, cauliflower, cucumber, mushrooms, tomatoes, and zucchini.  ½ cup of cooked vegetables or 1 cup of raw vegetables    ½ cup of vegetable juice    Meat and meat substitute group exchanges:  Each exchange of a lean meat  listed below contains about 7 grams of protein, 0 to 3 grams of fat, and 45 calories. The meat and meat substitutes food group does not contain any carbohydrates. Medium and high-fat meats have more calories.  1 ounce of chicken or turkey without skin, or 1 ounce of fish (not breaded or fried)    1 ounce of lean beef, pork, or lamb    1-inch cube or 1 ounce of low-fat cheese    2 egg whites or ¼ cup of egg substitute    ½ cup of tofu    Sweets, desserts, and other carbohydrate group exchanges:   Sweets and other desserts:  Each exchange has about 15 grams of carbohydrate .    1 ounce of joe food cake or 2-inch square cake (unfrosted)    2 small cookies    ½ cup of sugar-free, fat-free ice cream    1 tablespoon of syrup, jam,  jelly, table sugar, or honey    Combination foods:     1 cup of an entrée, such as lasagna, spaghetti with meatballs, macaroni and cheese, and chili with beans (each serving counts as 2 carbohydrate exchanges )    1 cup of tomato or vegetable beef soup (each serving counts as 1 carbohydrate exchange )    Fat group exchanges:  Each exchange contains 5 grams of fat and 45 calories.  1 teaspoon of oil (such as canola, olive, or corn oil)    6 almonds or cashews, 10 peanuts, or 4 pecan halves    2 tablespoons of avocado    ½ tablespoon of peanut butter    1 teaspoon of regular margarine or 2 teaspoons of low-fat margarine    1 teaspoon of regular butter or 1 tablespoon of low-fat butter    1 teaspoon of regular mayonnaise or 1 tablespoon of low-fat mayonnaise    1 tablespoon of regular salad dressing or 2 tablespoons of low-fat salad dressing    Free foods:  The foods on this list are called free foods because they have very few calories. Free foods usually do not increase your blood sugar if you limit them.  1 tablespoon of catsup or taco sauce    ¼ cup of salsa    2 tablespoons of sugar-free syrup or 2 teaspoons of light jam or jelly    1 tablespoon of fat-free salad dressing    4 tablespoons of fat-free margarine or fat-free mayonnaise    Sugar-free drinks: diet soda, sugar-free drink mixes, or mineral water    Low-sodium bouillon or fat-free broth    Mustard    Seasonings such as spices, herbs, and garlic    Sugar-free gelatin without added fruit    Other healthy nutrition guidelines:   Limit drinks with sugar substitutes.  Your dietitian or healthcare provider will encourage you to drink water. Water helps your kidneys to function properly. Ask how much water you should drink every day.    Eat more fiber.  Choose foods that are good sources of fiber, such as fruits, vegetables, and whole grains. Cereals that contain 5 or more grams of fiber per serving are good sources of fiber. Legumes such as garbanzo, mejia  beans, kidney beans, and lentils are also good sources.         Limit fat.  Ask your dietitian or healthcare provider how much fat you should eat each day. Choose foods low in fat, saturated fat, trans fat, and cholesterol. Examples include turkey or chicken without the skin, fish, lean cuts of meat, and beans. Low-fat dairy foods, such as low-fat or fat-free milk and low-fat yogurt are also good choices. Omega-3 fatty acids are healthy fats that are found in canola oil, soybean oil and fatty fish. [place], albacore tuna, and sardines are good sources of omega 3 fatty acids. Eat 2 servings of these types of fish each week. Do not eat fried fish.         Limit sugar.  Sugar and sweets must be counted toward the carbohydrate exchanges that you can have within your meal plan. Limit sugar and sweets because they are usually also high in calories and fat. Eat smaller portions of sweets by sharing a dessert or asking for a child-size portion at a restaurant.    Limit sodium  (salt) to about 2,300 mg per day. You may need to eat even less sodium if you have certain medical conditions. Foods high in sodium include soy sauce, potato chips, and soup.         Limit alcohol.  Ask your healthcare provider if it is safe for you to drink alcohol. If alcohol is safe for you to have, eat a meal when you drink alcohol. If you drink alcohol on an empty stomach, your blood sugar may drop to a low level. Women 21 years or older and men 65 years or older should limit alcohol to 1 drink a day. Men aged 21 to 64 years should limit alcohol to 2 drinks a day. A drink of alcohol is 5 ounces of wine, 12 ounces of beer, or 1½ ounces of liquor.    Other ways to manage diabetes:   Control your blood sugar level.  Test your blood sugar level regularly and keep a record of the results. Ask your healthcare provider when and how often to test your blood sugar. You may need to check your blood sugar level at least 3 times each day.    Talk to your  healthcare provider about your weight.  Ask if you need to lose weight, and how much you need to lose. If you are overweight, you may need to make other changes to lose weight. Ask your healthcare provider to help you create a weight loss program.    Get regular physical activity.  Physical activity can help decrease your blood sugar level. It can also help to decrease your risk for heart disease and help you lose weight. Adults should have moderate intensity physical activity for at least 150 minutes every week. Spread the amount of activity over at least 3 days a week. Do not skip more than 2 days in a row. Children should get at least 60 minutes of moderate physical activity on most days of the week. Examples of moderate physical activity include brisk walking, running, and swimming. Do not sit for longer than 30 minutes. Work with your healthcare provider to create a plan for physical activity.       © Copyright [employer] 2023 Information is for End User's use only and may not be sold, redistributed or otherwise used for commercial purposes.  The above information is an  only. It is not intended as medical advice for individual conditions or treatments. Talk to your doctor, nurse or pharmacist before following any medical regimen to see if it is safe and effective for you.

## 2024-03-21 NOTE — PROGRESS NOTES
Name: Karen Ngo      : 1965      MRN: [id number]  Encounter Provider: Bri Montenegro MD  Encounter Date: 3/21/2024   Encounter department: [facility]    Assessment & Plan     1. Type 2 diabetes mellitus with diabetic nephropathy, with long-term current use of insulin ([facility])  Assessment & Plan:  A1c 7.9  Improving control  Continue to titrate to goal A1c <7, ideally 6.5  History of noncompliance  Repeat labs 3 months, sync current labs from [facility]     Orders:  -     Lipid Panel with Direct LDL reflex; Future  -     Hemoglobin A1C; Future  -     Comprehensive metabolic panel; Future  -     CBC; Future; Expected date: 2024  -     Albumin / creatinine urine ratio; Future; Expected date: 2024  -     UA (URINE) with reflex to Scope; Future  -     TSH, 3rd generation with Free T4 reflex; Future; Expected date: 2024  -     insulin aspart (NovoLOG FlexPen) 100 UNIT/ML injection pen; Inject 10-20 Units under the skin 3 (three) times a day with meals  -     insulin glargine (Toujeo Max SoloStar) 300 units/mL CONCENTRATED U-300 injection pen (2-unit dial); Inject 32 Units under the skin daily at bedtime  -     Insulin Pen Needle 32G X 5 MM MISC; Use 4 (four) times a day  -     glucose blood (OneTouch Verio) test strip; Check blood sugars twice daily. Please substitute with appropriate alternative as covered by patient's insurance. Dx: E11.65  -     OneTouch Delica Lancets 33G MISC; Check blood sugars twice daily. Please substitute with appropriate alternative as covered by patient's insurance. Dx: E11.65  -     Ambulatory referral to Diabetic Education - use to refer for diabetes group classes, individual diabetes education, medical nutrition therapy, device training; Future; Expected date: 2024  -     Ambulatory Referral to Medical Fitness Exercise Specialist; Future  -     Blood Glucose Monitoring Suppl (OneTouch Verio Reflect) w/Device KIT; Check blood  sugars four times daily. Please substitute with appropriate alternative as covered by patient's insurance. Dx: E11.65  -     glucose blood (OneTouch Verio) test strip; Check blood sugars four times daily. Please substitute with appropriate alternative as covered by patient's insurance. Dx: E11.65  -     OneTouch Delica Lancets 33G MISC; Check blood sugars four times daily. Please substitute with appropriate alternative as covered by patient's insurance. Dx: E11.65    2. Breast cancer screening by mammogram  -     Mammo screening bilateral w 3d & cad; Future    3. Pituitary microadenoma (HCC)    4. Current severe episode of major depressive disorder without psychotic features without prior episode (HCC)  -     sertraline (ZOLOFT) 100 mg tablet; Take 1 tablet (100 mg total) by mouth daily  -     semaglutide (Rybelsus) 14 MG tablet; Take 1 tablet (14 mg total) by mouth daily before breakfast    5. Seborrheic keratoses  -     Ambulatory Referral to Dermatology; Future    6. Persistent proteinuria  -     lisinopril (ZESTRIL) 20 mg tablet; Take 0.5 tablets (10 mg total) by mouth daily    7. Primary hypertension  -     lisinopril (ZESTRIL) 20 mg tablet; Take 0.5 tablets (10 mg total) by mouth daily    8. Morbid obesity (HCC)  -     rosuvastatin (CRESTOR) 20 MG tablet; Take 1 tablet (20 mg total) by mouth daily at bedtime    9. Mixed hyperlipidemia  -     rosuvastatin (CRESTOR) 20 MG tablet; Take 1 tablet (20 mg total) by mouth daily at bedtime    10. Current moderate episode of major depressive disorder without prior episode (HCC)  -     traZODone (DESYREL) 50 mg tablet; Take 1 tablet (50 mg total) by mouth daily at bedtime    11. Carpal tunnel syndrome on left    12. Episodic cluster headache, not intractable      Supportive care for headaches  Return for trigger finger correction once A1c is at goal - good progress so far  Moles are SK, benign, follow up with derm for removal of one by bra strap that gets irritated and  full skin exam   F/u 3 months DM and 6 mo annual     Subjective      HPI  Patient presents for diabetes follow up.   Hemoglobin A1c 7.9 CMP within normal limits except glucose which is 116 GFR 84 cholesterol borderline.  Total cholesterol 212 triglycerides 171 HDL 50  cholesterol HDL ratio 4.2  TSH 2.46    Both middle fingers trigger finger.     She reports nephro was concerned about calcium.     She reports her mom was diagnosed with an enlarged heart.   Toujeo 32 HS  Novolog 10-25 TID.    She's getting recurrent headaches. Mostly on the left forehead radiating into the head and left ear. She's had migraines in the past. She had associated neck and back pain.   She works from home and tries to get up and move on her breaks.   Also notes new skin lesions.     Review of Systems   Constitutional:  Negative for activity change, chills and fever.   HENT:  Negative for congestion, rhinorrhea and sore throat.    Eyes:  Negative for visual disturbance.   Respiratory:  Negative for cough, shortness of breath and wheezing.    Cardiovascular:  Negative for chest pain and palpitations.   Gastrointestinal:  Negative for abdominal pain, blood in stool, constipation, diarrhea, nausea and vomiting.   Genitourinary:  Negative for dysuria.   Musculoskeletal:  Negative for arthralgias and myalgias.   Skin:  Negative for rash.        Skin lesions   Neurological:  Positive for headaches. Negative for dizziness and weakness.   All other systems reviewed and are negative.      Current Outpatient Medications on File Prior to Visit   Medication Sig   • Blood Glucose Monitoring Suppl (OneTouch Verio Reflect) w/Device KIT Check blood sugars twice daily. Please substitute with appropriate alternative as covered by patient's insurance. Dx: E11.65   • mometasone (NASONEX) 50 mcg/act nasal spray 2 sprays into each nostril daily   • nystatin (MYCOSTATIN) powder Apply topically 2 (two) times a day (Patient not taking: Reported on 9/22/2023)  "  • valACYclovir (VALTREX) 1,000 mg tablet Take 1 tablet (1,000 mg total) by mouth 2 (two) times a day for 7 days   • [DISCONTINUED] glucose blood (OneTouch Verio) test strip Check blood sugars twice daily. Please substitute with appropriate alternative as covered by patient's insurance. Dx: E11.65   • [DISCONTINUED] insulin aspart (NovoLOG FlexPen) 100 UNIT/ML injection pen ADMINISTER 5 UNITS UNDER THE SKIN THREE TIMES DAILY WITH MEALS   • [DISCONTINUED] insulin glargine (Toujeo Max SoloStar) 300 units/mL CONCENTRATED U-300 injection pen (2-unit dial) INJECT 10 UNITS UNDER THE SKIN EVERY NIGHT AT BEDTIME   • [DISCONTINUED] Insulin Pen Needle 32G X 5 MM MISC Use 4 (four) times a day   • [DISCONTINUED] lisinopril (ZESTRIL) 20 mg tablet Take 0.5 tablets (10 mg total) by mouth daily   • [DISCONTINUED] OneTouch Delica Lancets 33G MISC Check blood sugars twice daily. Please substitute with appropriate alternative as covered by patient's insurance. Dx: E11.65   • [DISCONTINUED] promethazine-dextromethorphan (PHENERGAN-DM) 6.25-15 mg/5 mL oral syrup Take 5 mL by mouth 4 (four) times a day as needed for cough   • [DISCONTINUED] rosuvastatin (CRESTOR) 20 MG tablet TAKE ONE TABLET BY MOUTH EVERY DAY AT BEDTIME   • [DISCONTINUED] semaglutide (Rybelsus) 14 MG tablet TAKE 1 TABLET(14 MG) BY MOUTH DAILY   • [DISCONTINUED] sertraline (ZOLOFT) 100 mg tablet TAKE 1 TABLET(100 MG) BY MOUTH DAILY   • [DISCONTINUED] traZODone (DESYREL) 50 mg tablet TAKE 1 TABLET(50 MG) BY MOUTH DAILY AT BEDTIME       Objective     /78 (BP Location: Left arm, Patient Position: Sitting, Cuff Size: Large)   Pulse 89   Temp 97.6 °F (36.4 °C)   Ht 4' 10\" (1.473 m)   Wt 78 kg (172 lb)   LMP 04/01/2021 (Approximate)   SpO2 98%   BMI 35.95 kg/m²     Physical Exam  Vitals and nursing note reviewed.   Constitutional:       General: She is not in acute distress.     Appearance: Normal appearance. She is well-developed. She is obese. She is not " ill-appearing or diaphoretic.   HENT:      Head: Normocephalic and atraumatic.      Comments: Serous effusion left TM      Right Ear: External ear normal.      Left Ear: External ear normal.      Nose: Nose normal.   Eyes:      General: Lids are normal.      Conjunctiva/sclera: Conjunctivae normal.   Neck:      Vascular: No JVD.      Trachea: No tracheal deviation.   Cardiovascular:      Rate and Rhythm: Normal rate and regular rhythm.      Pulses: Normal pulses.      Heart sounds: Normal heart sounds. No murmur heard.  Pulmonary:      Effort: Pulmonary effort is normal. No accessory muscle usage or respiratory distress.      Breath sounds: Normal breath sounds. No wheezing, rhonchi or rales.   Lymphadenopathy:      Cervical: No cervical adenopathy.   Skin:     Findings: No rash.      Comments: Multiple moles to the back. Several SK    Neurological:      Mental Status: She is alert.       Bri Montenegro MD

## 2024-03-21 NOTE — ASSESSMENT & PLAN NOTE
A1c 7.9  Improving control  Continue to titrate to goal A1c <7, ideally 6.5  History of noncompliance  Repeat labs 3 months, sync current labs from [facility]

## 2024-03-22 ENCOUNTER — TELEPHONE (OUTPATIENT)
Age: 59
End: 2024-03-22

## 2024-03-22 DIAGNOSIS — E11.21 TYPE 2 DIABETES MELLITUS WITH DIABETIC NEPHROPATHY, WITH LONG-TERM CURRENT USE OF INSULIN (HCC): Primary | ICD-10-CM

## 2024-03-22 DIAGNOSIS — Z79.4 TYPE 2 DIABETES MELLITUS WITH DIABETIC NEPHROPATHY, WITH LONG-TERM CURRENT USE OF INSULIN (HCC): Primary | ICD-10-CM

## 2024-03-22 NOTE — TELEPHONE ENCOUNTER
PA for OneTouch Verio     Submitted via    []CMM-KEY   [x][employer]-Case ID # [id number]  []Faxed to plan   []Other website   []Phone call Case ID #     Office notes sent, clinical questions answered. Awaiting determination    Turnaround time for your insurance to make a decision on your Prior Authorization can take 7-21 business days.

## 2024-03-22 NOTE — TELEPHONE ENCOUNTER
PA for Insulin aspart (Novolog FlexPen) 100 UNIT/ML injection pen    Submitted via    []CMShoefitr-KEY   [x][employer]-Case ID # [id number]  []Faxed to plan   []Other website   []Phone call Case ID #     Office notes sent, clinical questions answered. Awaiting determination    Turnaround time for your insurance to make a decision on your Prior Authorization can take 7-21 business days.

## 2024-03-27 RX ORDER — INSULIN LISPRO 100 [IU]/ML
10-20 INJECTION, SOLUTION INTRAVENOUS; SUBCUTANEOUS
Qty: 54 ML | Refills: 1 | Status: SHIPPED | OUTPATIENT
Start: 2024-03-27

## 2024-03-27 NOTE — TELEPHONE ENCOUNTER
PA for OneTouch Verio Approved     Date(s) approved 3- - 3-    Patient advised by [x] [facility]                      [] Phone call       Pharmacy advised by [x]Fax                                     []Phone call    Approval letter scanned into Media Yes

## 2024-03-27 NOTE — TELEPHONE ENCOUNTER
PA for Insulin aspart (Novolog FlexPen) 100 UNIT/ML injection pen Denied    Reason:      Message sent to office clinical pool Yes    Denial letter scanned into Media Yes    Appeal started No

## 2024-03-28 ENCOUNTER — TELEPHONE (OUTPATIENT)
Dept: BARIATRICS | Facility: CLINIC | Age: 59
End: 2024-03-28

## 2024-03-28 ENCOUNTER — TELEPHONE (OUTPATIENT)
Dept: FAMILY MEDICINE CLINIC | Facility: CLINIC | Age: 59
End: 2024-03-28

## 2024-03-28 DIAGNOSIS — K91.2 POSTSURGICAL MALABSORPTION: Primary | ICD-10-CM

## 2024-03-28 DIAGNOSIS — E53.8 B12 DEFICIENCY: ICD-10-CM

## 2024-03-28 DIAGNOSIS — E55.9 VITAMIN D DEFICIENCY: Primary | ICD-10-CM

## 2024-03-28 DIAGNOSIS — Z90.3 POSTGASTRECTOMY MALABSORPTION: ICD-10-CM

## 2024-03-28 DIAGNOSIS — E55.9 VITAMIN D DEFICIENCY: ICD-10-CM

## 2024-03-28 DIAGNOSIS — E78.2 MIXED HYPERLIPIDEMIA: ICD-10-CM

## 2024-03-28 DIAGNOSIS — K91.2 POSTGASTRECTOMY MALABSORPTION: ICD-10-CM

## 2024-03-28 RX ORDER — ERGOCALCIFEROL 1.25 MG/1
50000 CAPSULE ORAL WEEKLY
Qty: 12 CAPSULE | Refills: 3 | Status: SHIPPED | OUTPATIENT
Start: 2024-03-28 | End: 2024-03-28

## 2024-03-28 RX ORDER — CYANOCOBALAMIN 1000 UG/ML
1000 INJECTION, SOLUTION INTRAMUSCULAR; SUBCUTANEOUS WEEKLY
Status: SHIPPED | OUTPATIENT
Start: 2024-03-28 | End: 2024-04-25

## 2024-03-28 RX ORDER — ERGOCALCIFEROL 1.25 MG/1
50000 CAPSULE ORAL
Qty: 24 CAPSULE | Refills: 0 | Status: SHIPPED | OUTPATIENT
Start: 2024-03-28

## 2024-03-28 RX ORDER — ROSUVASTATIN CALCIUM 40 MG/1
40 TABLET, COATED ORAL DAILY
Qty: 90 TABLET | Refills: 3 | Status: SHIPPED | OUTPATIENT
Start: 2024-03-28

## 2024-03-28 NOTE — TELEPHONE ENCOUNTER
----- Message from Bri Montenegro MD sent at 3/28/2024  9:03 AM EDT -----  Please call patient. Labs stable except:  - Increase Crestor to 40mg daily   - A1c 7.9 - improving  - B12 is low, recommend B12 shots weekly x 4 followed by oral supplementation  - Vit D is low, take 50,000 weekly  - recommend every other day iron supplement due to bariatric surgery

## 2024-03-28 NOTE — TELEPHONE ENCOUNTER
----- Message from Rahda Valenzuela PA-C sent at 3/28/2024 11:11 AM EDT -----  Please let Leighann know about her labs:    -You have a vitamin D deficiency. Please start taking the vitamin D deficiency prescription that I am sending for 50,000IU 2x/week with FOOD x 12 weeks - take with food for best absorption. We will repeat vitamin D lab in about 4 months. ( I replaced the Rx her PCP just sent)    -she has iron deficiency but I don't see an iron panel - please obtain results, thank you

## 2024-03-28 NOTE — TELEPHONE ENCOUNTER
Sent patient message via [facility] giving her results and Radha's recommendations.

## 2024-04-03 ENCOUNTER — TELEPHONE (OUTPATIENT)
Age: 59
End: 2024-04-03

## 2024-04-03 NOTE — TELEPHONE ENCOUNTER
Pt called in stated that she hasn't been able to find her FSA card since her last appointment on 3/21. She would like to notified if it has been found. Please advise thank you

## 2024-05-01 ENCOUNTER — TELEPHONE (OUTPATIENT)
Age: 59
End: 2024-05-01

## 2024-05-01 ENCOUNTER — TELEPHONE (OUTPATIENT)
Dept: FAMILY MEDICINE CLINIC | Facility: CLINIC | Age: 59
End: 2024-05-01

## 2024-05-01 NOTE — TELEPHONE ENCOUNTER
Patient calling to update pharmacy to [facility]   No further action at this time  Yu Alejo

## 2024-05-02 ENCOUNTER — OFFICE VISIT (OUTPATIENT)
Dept: FAMILY MEDICINE CLINIC | Facility: CLINIC | Age: 59
End: 2024-05-02
Payer: COMMERCIAL

## 2024-05-02 VITALS
WEIGHT: 171 LBS | HEIGHT: 58 IN | TEMPERATURE: 97.6 F | SYSTOLIC BLOOD PRESSURE: 118 MMHG | DIASTOLIC BLOOD PRESSURE: 70 MMHG | BODY MASS INDEX: 35.89 KG/M2 | OXYGEN SATURATION: 98 % | HEART RATE: 82 BPM

## 2024-05-02 DIAGNOSIS — B02.9 HERPES ZOSTER WITHOUT COMPLICATION: ICD-10-CM

## 2024-05-02 PROCEDURE — 99213 OFFICE O/P EST LOW 20 MIN: CPT | Performed by: FAMILY MEDICINE

## 2024-05-02 RX ORDER — VALACYCLOVIR HYDROCHLORIDE 1 G/1
1000 TABLET, FILM COATED ORAL 3 TIMES DAILY
Qty: 21 TABLET | Refills: 0 | Status: SHIPPED | OUTPATIENT
Start: 2024-05-02 | End: 2024-05-09

## 2024-05-02 RX ORDER — BLOOD-GLUCOSE METER
EACH MISCELLANEOUS
COMMUNITY
Start: 2024-03-27

## 2024-05-02 NOTE — PATIENT INSTRUCTIONS
Shingles   AMBULATORY CARE:   Shingles  is a viral infection that causes a painful rash. Shingles is caused by the varicella-zoster virus. This is the same virus that causes chickenpox. The virus stays in your body after you have chickenpox, without causing any symptoms. Shingles occurs when the virus becomes active again. The active virus travels along a nerve to your skin and causes a rash. The rash usually lasts 2 to 3 weeks. Most people have shingles one time, but it is possible to develop it again.  Common signs and symptoms:  Shingles can appear anywhere on your body, but it is most common on your torso. A line of painful blisters develops on the left or right side of your torso. The rash starts as red dots that become blisters filled with fluid. The blisters usually grow bigger, become filled with pus, and then crust over after a few days. You may also have any of the following:  Severe tiredness and muscle weakness    Pain when your skin is lightly touched    Headache    Fever    Eye pain when exposed to light       Call your local emergency number (911 in the ) if:   You have trouble moving your arms, legs, or face.    You become confused, or have trouble speaking.    You have a seizure.    Seek care immediately if:   You have weakness in an arm or leg.    You have dizziness, a severe headache, or hearing or vision loss.    You have painful, red, warm skin around the blisters, or the blisters drain pus.    Your neck is stiff or you have trouble moving it.    Call your doctor if:   A painful rash appears near your eye.    The rash spreads to more areas and your pain worsens.    You feel weak or have a headache.    You have a cough, chills, or a fever.    You have abdominal pain or nausea, or you are vomiting.    You have questions or concerns about your condition or care.    Treatment:  Shingles cannot be cured. The following medicines can decrease your pain and help prevent complications:  Antiviral  medicine  fights the virus causing your shingles. Start this medicine within 3 days after you notice the first symptoms. This may help prevent nerve pain. A shingles outbreak can cause nerve pain called post-herpetic neuralgia (PHN). PHN can last a long time after you heal from shingles.    Topical anesthetics  are used to numb the skin and decrease pain. They can be a cream, gel, spray, or patch.    Anticonvulsants and antidepressants  decrease nerve pain and may help you sleep at night.    Antihistamines  may help decrease itching.    Acetaminophen  decreases pain and fever. It is available without a doctor's order. Ask how much to take and how often to take it. Follow directions. Read the labels of all other medicines you are using to see if they also contain acetaminophen, or ask your doctor or pharmacist. Acetaminophen can cause liver damage if not taken correctly.    NSAIDs , such as ibuprofen, help decrease swelling, pain, and fever. This medicine is available with or without a doctor's order. NSAIDs can cause stomach bleeding or kidney problems in certain people. If you take blood thinner medicine, always ask your healthcare provider if NSAIDs are safe for you. Always read the medicine label and follow directions.    A steroid and numbing medicine injection  may decrease severe pain that does not get better with other medicines.    Self-care:   Apply a cool, wet compress  or take a cool bath. This may help decrease itching and pain.    Keep your rash clean and dry.  Cover your rash with a bandage. Do not use bandages with adhesive. Clothes may irritate your skin.    Prevent the spread of the shingles virus:  The virus can be passed to a person who has never had chickenpox. This usually happens if the other person comes in contact with your open sores. This person may get chickenpox, but not shingles. You are contagious until your blisters scab over. Stay away from people who have not had chickenpox or the  chickenpox vaccine. Avoid pregnant women, newborns, and people with weak immune systems. They have a higher risk of infection.  Wash your hands often.  Wash your hands several times each day. Wash after you use the bathroom, change a child's diaper, and before you prepare or eat food. Use soap and water every time. Rub your soapy hands together, lacing your fingers. Wash the front and back of your hands, and in between your fingers. Use the fingers of one hand to scrub under the fingernails of the other hand. Wash for at least 20 seconds. Rinse with warm, running water for several seconds. Then dry your hands with a clean towel or paper towel. Use hand  that contains alcohol if soap and water are not available. Do not touch your eyes, nose, or mouth without washing your hands first.         Cover a sneeze or cough.  Use a tissue that covers your mouth and nose. Throw the tissue away in a trash can right away. Use the bend of your arm if a tissue is not available. Wash your hands well with soap and water or use a hand .       Prevent shingles or another shingles outbreak:   A vaccine may be given to help prevent shingles. You can get the vaccine even if you already had shingles. The vaccine comes in 2 forms. A 2-dose vaccine is usually given to adults 50 years or older. A 1-dose vaccine may be given to adults 60 years or older.    The vaccine can help prevent a future outbreak. If you do get shingles again, the vaccine can keep it from becoming severe. Ask your healthcare provider about other vaccines you may need.    Follow up with your doctor as directed:  Write down your questions so you remember to ask them during your visits.  For more information:   Centers for Disease Control and Prevention  [address] [place] [address]  Phone: 5- 793 - 1441149  Phone: 1- 234 - 9903122  Web Address: http://www.cdc.gov    © Copyright [employer] 2023 Information is for End User's use only and may not be  sold, redistributed or otherwise used for commercial purposes.  The above information is an  only. It is not intended as medical advice for individual conditions or treatments. Talk to your doctor, nurse or pharmacist before following any medical regimen to see if it is safe and effective for you.

## 2024-05-02 NOTE — PROGRESS NOTES
Name: Karen Ngo      : 1965      MRN: [id number]  Encounter Provider: Bri Montenegro MD  Encounter Date: 2024   Encounter department: [facility]    Assessment & Plan     1. Herpes zoster without complication  -     valACYclovir (VALTREX) 1,000 mg tablet; Take 1 tablet (1,000 mg total) by mouth 3 (three) times a day for 7 days    Get Shingles vaccine        Subjective      HPI Started  with itching. Rash feels irritated. No drainage. Hasn't tried any treatment yet. Hx Shingles x2. Also was recently in hot tub.     Review of Systems   Constitutional:  Negative for fever.   HENT:  Negative for congestion.    Respiratory:  Negative for cough.    Skin:  Positive for rash.   All other systems reviewed and are negative.      Current Outpatient Medications on File Prior to Visit   Medication Sig   • Blood Glucose Monitoring Suppl (Contour Next Gen Monitor) GREG USE TO CHECK BLOOD SUGARS   • glucose blood (OneTouch Verio) test strip Check blood sugars twice daily. Please substitute with appropriate alternative as covered by patient's insurance. Dx: E11.65   • insulin glargine (Toujeo Max SoloStar) 300 units/mL CONCENTRATED U-300 injection pen (2-unit dial) Inject 32 Units under the skin daily at bedtime   • insulin lispro (Admelog SoloStar) 100 units/mL injection pen Inject 10-20 Units under the skin 3 (three) times a day with meals   • Insulin Pen Needle 32G X 5 MM MISC Use 4 (four) times a day   • lisinopril (ZESTRIL) 20 mg tablet Take 0.5 tablets (10 mg total) by mouth daily   • mometasone (NASONEX) 50 mcg/act nasal spray 2 sprays into each nostril daily   • OneTouch Delica Lancets 33G MISC Check blood sugars twice daily. Please substitute with appropriate alternative as covered by patient's insurance. Dx: E11.65   • rosuvastatin (CRESTOR) 40 MG tablet Take 1 tablet (40 mg total) by mouth daily   • semaglutide (Rybelsus) 14 MG tablet Take 1 tablet (14 mg total) by  "mouth daily before breakfast   • sertraline (ZOLOFT) 100 mg tablet Take 1 tablet (100 mg total) by mouth daily   • traZODone (DESYREL) 50 mg tablet Take 1 tablet (50 mg total) by mouth daily at bedtime   • [DISCONTINUED] Blood Glucose Monitoring Suppl (OneTouch Verio Reflect) w/Device KIT Check blood sugars twice daily. Please substitute with appropriate alternative as covered by patient's insurance. Dx: E11.65   • [DISCONTINUED] valACYclovir (VALTREX) 1,000 mg tablet Take 1 tablet (1,000 mg total) by mouth 2 (two) times a day for 7 days   • glucose blood (OneTouch Verio) test strip Check blood sugars four times daily. Please substitute with appropriate alternative as covered by patient's insurance. Dx: E11.65 (Patient not taking: Reported on 5/2/2024)   • insulin aspart (NovoLOG FlexPen) 100 UNIT/ML injection pen Inject 10-20 Units under the skin 3 (three) times a day with meals (Patient not taking: Reported on 5/2/2024)   • nystatin (MYCOSTATIN) powder Apply topically 2 (two) times a day (Patient not taking: Reported on 9/22/2023)   • OneTouch Delica Lancets 33G MISC Check blood sugars four times daily. Please substitute with appropriate alternative as covered by patient's insurance. Dx: E11.65 (Patient not taking: Reported on 5/2/2024)   • [DISCONTINUED] Blood Glucose Monitoring Suppl (OneTouch Verio Reflect) w/Device KIT Check blood sugars four times daily. Please substitute with appropriate alternative as covered by patient's insurance. Dx: E11.65 (Patient not taking: Reported on 5/2/2024)   • [DISCONTINUED] ergocalciferol (VITAMIN D2) 50,000 units Take 1 capsule (50,000 Units total) by mouth 2 (two) times a week with meals       Objective     /70   Pulse 82   Temp 97.6 °F (36.4 °C)   Ht 4' 10\" (1.473 m)   Wt 77.6 kg (171 lb)   LMP 04/01/2021 (Approximate)   SpO2 98%   BMI 35.74 kg/m²     Physical Exam  Vitals and nursing note reviewed.   Constitutional:       General: She is not in acute " distress.     Appearance: Normal appearance. She is well-developed. She is not ill-appearing or diaphoretic.   HENT:      Head: Normocephalic and atraumatic.      Right Ear: External ear normal.      Left Ear: External ear normal.      Nose: Nose normal.   Eyes:      General: Lids are normal.      Conjunctiva/sclera: Conjunctivae normal.   Neck:      Vascular: No JVD.      Trachea: No tracheal deviation.   Pulmonary:      Effort: No accessory muscle usage or respiratory distress.   Skin:     Findings: Rash present. Rash is vesicular.   Neurological:      Mental Status: She is alert.       Bri Montenegro MD

## 2024-05-06 ENCOUNTER — TELEPHONE (OUTPATIENT)
Age: 59
End: 2024-05-06

## 2024-05-06 DIAGNOSIS — K91.2 POSTGASTRECTOMY MALABSORPTION: ICD-10-CM

## 2024-05-06 DIAGNOSIS — E53.8 B12 DEFICIENCY: Primary | ICD-10-CM

## 2024-05-06 DIAGNOSIS — Z90.3 POSTGASTRECTOMY MALABSORPTION: ICD-10-CM

## 2024-05-06 RX ORDER — CYANOCOBALAMIN 1000 UG/ML
1000 INJECTION, SOLUTION INTRAMUSCULAR; SUBCUTANEOUS
Qty: 3 ML | Refills: 3 | Status: SHIPPED | OUTPATIENT
Start: 2024-05-06

## 2024-05-06 NOTE — TELEPHONE ENCOUNTER
Patient called and stated she was seen at the office on 5/2 and B12 medication was discussed .Patient wanted to know is the B12 medication and syringes going to  be called into the pharmacy.

## 2024-05-07 ENCOUNTER — TELEPHONE (OUTPATIENT)
Age: 59
End: 2024-05-07

## 2024-05-07 DIAGNOSIS — E53.8 B12 DEFICIENCY: ICD-10-CM

## 2024-05-07 DIAGNOSIS — E11.42 DIABETIC POLYNEUROPATHY ASSOCIATED WITH TYPE 2 DIABETES MELLITUS (HCC): ICD-10-CM

## 2024-05-07 DIAGNOSIS — B02.9 HERPES ZOSTER WITHOUT COMPLICATION: Primary | ICD-10-CM

## 2024-05-07 RX ORDER — GABAPENTIN 100 MG/1
100 CAPSULE ORAL 3 TIMES DAILY
Qty: 21 CAPSULE | Refills: 0 | Status: SHIPPED | OUTPATIENT
Start: 2024-05-07 | End: 2024-05-17

## 2024-05-07 NOTE — TELEPHONE ENCOUNTER
Yes - can do note. I will also send gabapentin for her to try for the pain. Advise possible sedation.

## 2024-05-07 NOTE — TELEPHONE ENCOUNTER
Patient called would like to know if she can have a note for work to be out from 5/6/2024 and return 5/9/2024. Is in a lot of pain from shingles. Patient would like note emailed to:  [email or web address]  If there is any questions [phone]

## 2024-05-08 RX ORDER — PREGABALIN 25 MG/1
25 CAPSULE ORAL 3 TIMES DAILY PRN
Qty: 60 CAPSULE | Refills: 0 | Status: SHIPPED | OUTPATIENT
Start: 2024-05-08 | End: 2024-05-17 | Stop reason: SDUPTHER

## 2024-05-08 NOTE — TELEPHONE ENCOUNTER
Patient called in to follow up on call yesterday to inform provider that she cannot tolerate gabapentin; requesting order for Lyrica for shingles pain.    Patient did  order for   cyanocobalamin 1,000 mcg/mL B12 injections but syringes and needles were not ordered.     Please follow up with patient today for provider response, especially for her shingles pain.

## 2024-05-15 NOTE — TELEPHONE ENCOUNTER
Patient called and stated she increased the Lyrica to 50 mg BID.Also patient ts requesting a referral to be seen by a  diabetic neuropathy provider

## 2024-05-16 NOTE — TELEPHONE ENCOUNTER
Pt seemed a little frustrated. She states her neuropathic pain is NOT foot related. She said it is in her thigh and groin. She feels a neurologist would be more beneficial. She also said she would like her Lyrica increased in the interim. She said she increased it to 50mg BID. Appt with RHODA is in July.

## 2024-05-16 NOTE — TELEPHONE ENCOUNTER
There is no specialist. She can see a podiatrist if she has neuropathic pain in her feet, and again recommend the OBGYN for her vaginal pain. Otherwise primary care typically manages neuropathy with medications.

## 2024-05-17 RX ORDER — PREGABALIN 50 MG/1
50 CAPSULE ORAL 3 TIMES DAILY PRN
Qty: 90 CAPSULE | Refills: 0 | Status: SHIPPED | OUTPATIENT
Start: 2024-05-17 | End: 2024-05-17

## 2024-05-17 NOTE — TELEPHONE ENCOUNTER
Patient was already instructed not to adjust medications without physician approval. I am discontinuing the Lyrica - I am not confident in her ability to safely have this controlled substance at home, as she has already disregarded prior instructions from our office.       PDMP reviewed:  - note patient received Percocet from the ER on 5/5.   05/08/2024 05/08/2024 2 Pregabalin 25 Mg Capsule 60.00 20 [place] [id number] Pen ([id number]) 0 0.50 LME Comm [place]   05/06/2024 05/06/2024 2 Oxycodone-Acetaminophen 5-325 7.00 3 [place]reet [id number] Pen ([id number]) 0 17.50 MME Comm Ins [place]

## 2024-05-17 NOTE — TELEPHONE ENCOUNTER
I put in a referral for neuro.   I did increase her Lyrica to 50mg. Please advise patient not to change medication dose or frequency without approval from me in the future, it can be dangerous. If she changes the dose again without approval I will not prescribe it to her again. It is a controlled substance.     05/08/2024 05/08/2024 2 Pregabalin 25 Mg Capsule 60.00 20 [place] [id number] Pen ([id number]) 0 0.50 LME Comm [place]

## 2024-05-17 NOTE — TELEPHONE ENCOUNTER
Pt aware. However she said he pain from her buttocks, groin, leg was so intense last night she decided to take 75mg of Lyrica because of the extreme pain. She will call neurology now.

## 2024-06-17 DIAGNOSIS — Z00.6 ENCOUNTER FOR EXAMINATION FOR NORMAL COMPARISON OR CONTROL IN CLINICAL RESEARCH PROGRAM: ICD-10-CM

## 2024-06-20 ENCOUNTER — TELEPHONE (OUTPATIENT)
Dept: NEUROLOGY | Facility: CLINIC | Age: 59
End: 2024-06-20

## 2024-06-20 NOTE — TELEPHONE ENCOUNTER
Called and spoke to patient about r/s appt with Dr Bingham. she is in-between jobs and wants to call back when she checks with new insurance if we are in network..

## 2024-06-21 ENCOUNTER — TELEPHONE (OUTPATIENT)
Age: 59
End: 2024-06-21

## 2024-06-21 NOTE — TELEPHONE ENCOUNTER
Made outbound call to patient to offer earlier appointment time. Patient will keep her appointment as is.

## 2024-07-22 ENCOUNTER — TELEPHONE (OUTPATIENT)
Dept: FAMILY MEDICINE CLINIC | Facility: CLINIC | Age: 59
End: 2024-07-22

## 2024-07-22 NOTE — TELEPHONE ENCOUNTER
Please remove Dr. Norman as PCP, the patient sent us a message. She now has a job at [employer] and will be seeing a PCP in that network

## 2024-07-25 NOTE — TELEPHONE ENCOUNTER
07/25/24 4:26 PM        The office's request has been received, reviewed, and the patient chart updated. The PCP has successfully been removed with a patient attribution note. This message will now be completed.        Thank you  Nikita Stanton

## 2024-08-02 ENCOUNTER — TELEPHONE (OUTPATIENT)
Age: 59
End: 2024-08-02

## 2024-08-02 DIAGNOSIS — Z79.4 TYPE 2 DIABETES MELLITUS WITH DIABETIC NEPHROPATHY, WITH LONG-TERM CURRENT USE OF INSULIN (HCC): ICD-10-CM

## 2024-08-02 DIAGNOSIS — F32.2 CURRENT SEVERE EPISODE OF MAJOR DEPRESSIVE DISORDER WITHOUT PSYCHOTIC FEATURES WITHOUT PRIOR EPISODE (HCC): ICD-10-CM

## 2024-08-02 DIAGNOSIS — E11.21 TYPE 2 DIABETES MELLITUS WITH DIABETIC NEPHROPATHY, WITH LONG-TERM CURRENT USE OF INSULIN (HCC): ICD-10-CM

## 2024-08-02 RX ORDER — INSULIN GLARGINE 300 U/ML
32 INJECTION, SOLUTION SUBCUTANEOUS
Qty: 12 ML | Refills: 0 | Status: SHIPPED | OUTPATIENT
Start: 2024-08-02

## 2024-08-02 NOTE — TELEPHONE ENCOUNTER
Patient called and stated due to her new insurance from her new job, she will not be able to see Dr. Norman as her PCP.  However, she will be running out of medication and requested refills.  Patient called to update her pharmacy to [facility].

## 2024-08-02 NOTE — TELEPHONE ENCOUNTER
Patient called and requested a refill of semaglutide (Rybelsus) 14 MG tablet.  Patient stated she was informed by her insurance that it will need prior authorization.  Please advise patient of any updates.  Thank you!

## 2024-08-19 DIAGNOSIS — B02.9 HERPES ZOSTER WITHOUT COMPLICATION: ICD-10-CM

## 2024-08-19 RX ORDER — VALACYCLOVIR HYDROCHLORIDE 1 G/1
1000 TABLET, FILM COATED ORAL 3 TIMES DAILY
Qty: 21 TABLET | Refills: 0 | Status: SHIPPED | OUTPATIENT
Start: 2024-08-19 | End: 2024-08-26

## 2024-08-19 NOTE — TELEPHONE ENCOUNTER
Message sent via [employer].   Good morning,  Dr. Norman I'm sorry to bother you but is there any way that you can send a refill of my Valtrex to [facility] pharmacy? I have an appt on 09/03/2024 with the new PCP but I need a refill please.  Thank you,  Leighann MANZANARES

## (undated) DEVICE — COVIDIEN GIA BLACK (XTRA THICK) RELOAD

## (undated) DEVICE — INTENDED FOR TISSUE SEPARATION, AND OTHER PROCEDURES THAT REQUIRE A SHARP SURGICAL BLADE TO PUNCTURE OR CUT.: Brand: BARD-PARKER SAFETY BLADES SIZE 11, STERILE

## (undated) DEVICE — ENDOPATH XCEL BLADELESS TROCARS WITH STABILITY SLEEVES: Brand: ENDOPATH XCEL

## (undated) DEVICE — SPONGE LAP 18 X 18 IN STRL RFD

## (undated) DEVICE — TUBING SMOKE EVAC W/FILTRATION DEVICE PLUMEPORT ACTIV

## (undated) DEVICE — DRAPE UTILITY

## (undated) DEVICE — POWER SHELL SIGNIA

## (undated) DEVICE — GLOVE SRG BIOGEL 7.5

## (undated) DEVICE — ASTOUND STANDARD SURGICAL GOWN, XL: Brand: CONVERTORS

## (undated) DEVICE — CHLORAPREP HI-LITE 26ML ORANGE

## (undated) DEVICE — GLOVE INDICATOR PI UNDERGLOVE SZ 7 BLUE

## (undated) DEVICE — COVIDIEN ENDO GIA PURPLE (MED) RELOAD 60MM

## (undated) DEVICE — PMI DISPOSABLE PUNCTURE CLOSURE DEVICE / SUTURE GRASPER: Brand: PMI

## (undated) DEVICE — VISIGI 3D®  CALIBRATION SYSTEM  SIZE 36FR SLEEVE/STD: Brand: BOEHRINGER® VISIGI 3D™ SLEEVE GASTRECTOMY CALIBRATION SYSTEM, SIZE 36FR

## (undated) DEVICE — GLOVE SRG BIOGEL ECLIPSE 7.5

## (undated) DEVICE — PAD CAST 6 IN COTTON NON STERILE

## (undated) DEVICE — TIBURON LAPAROSCOPIC ABDOMINAL DRAPE: Brand: CONVERTORS

## (undated) DEVICE — PACK GENERAL LF

## (undated) DEVICE — VISUALIZATION SYSTEM: Brand: CLEARIFY

## (undated) DEVICE — MAYO STAND COVER: Brand: CONVERTORS

## (undated) DEVICE — GLOVE SRG BIOGEL 6.5

## (undated) DEVICE — ADHESIVE SKIN CLSR DERMABOND NX

## (undated) DEVICE — GLOVE INDICATOR PI UNDERGLOVE SZ 8 BLUE

## (undated) DEVICE — SYRINGE 20ML LL

## (undated) DEVICE — VIOLET BRAIDED (POLYGLACTIN 910), SYNTHETIC ABSORBABLE SUTURE: Brand: COATED VICRYL

## (undated) DEVICE — INTENDED FOR TISSUE SEPARATION, AND OTHER PROCEDURES THAT REQUIRE A SHARP SURGICAL BLADE TO PUNCTURE OR CUT.: Brand: BARD-PARKER SAFETY BLADES SIZE 15, STERILE

## (undated) DEVICE — GLOVE SRG BIOGEL 7

## (undated) DEVICE — STERILE COTTON TIPPED APPLICATORS: Brand: PURITAN

## (undated) DEVICE — IRRIG ENDO FLO TUBING

## (undated) DEVICE — TROCAR: Brand: KII FIOS FIRST ENTRY

## (undated) DEVICE — KERLIX BANDAGE ROLL: Brand: KERLIX

## (undated) DEVICE — SUT MONOCRYL 4-0 PS-2 27 IN Y426H

## (undated) DEVICE — INSUFFLATION NEEDLE TO ESTABLISH PNEUMOPERITONEUM.: Brand: INSUFFLATION NEEDLE

## (undated) DEVICE — 3M™ TEGADERM™ TRANSPARENT FILM DRESSING FRAME STYLE, 1626W, 4 IN X 4-3/4 IN (10 CM X 12 CM), 50/CT 4CT/CASE: Brand: 3M™ TEGADERM™

## (undated) DEVICE — STERILE BETHLEHEM PLASTIC HAND: Brand: CARDINAL HEALTH

## (undated) DEVICE — HARMONIC 1100 SHEARS, 36CM SHAFT LENGTH: Brand: HARMONIC

## (undated) DEVICE — SPONGE PVP SCRUB WING STERILE

## (undated) DEVICE — SUT VICRYL 0 18 IN J906G

## (undated) DEVICE — RETROGRADE KNIFE BOX OF 6: Brand: ECTRA

## (undated) DEVICE — TELFA NON-ADHERENT ABSORBENT DRESSING: Brand: TELFA

## (undated) DEVICE — GLOVE INDICATOR PI UNDERGLOVE SZ 6.5 BLUE

## (undated) DEVICE — ELECTRODE LAP SPATULA STR E-Z CLEAN 33CM -0018

## (undated) DEVICE — ENDOPATH 5MM CURVED SCISSORS WITH MONOPOLAR CAUTERY: Brand: ENDOPATH

## (undated) DEVICE — [HIGH FLOW INSUFFLATOR,  DO NOT USE IF PACKAGE IS DAMAGED,  KEEP DRY,  KEEP AWAY FROM SUNLIGHT,  PROTECT FROM HEAT AND RADIOACTIVE SOURCES.]: Brand: PNEUMOSURE

## (undated) DEVICE — NEEDLE SPINAL 20G X 3.5 LF

## (undated) DEVICE — STANDARD SURGICAL GOWN, L: Brand: CONVERTORS

## (undated) DEVICE — SUT PROLENE 4-0 PC-3 18 IN 8634G